# Patient Record
Sex: MALE | Race: WHITE | NOT HISPANIC OR LATINO | Employment: OTHER | URBAN - METROPOLITAN AREA
[De-identification: names, ages, dates, MRNs, and addresses within clinical notes are randomized per-mention and may not be internally consistent; named-entity substitution may affect disease eponyms.]

---

## 2017-12-08 ENCOUNTER — TELEPHONE (OUTPATIENT)
Dept: ORTHOPEDICS | Facility: CLINIC | Age: 72
End: 2017-12-08

## 2017-12-08 DIAGNOSIS — M40.12 OTHER SECONDARY KYPHOSIS, CERVICAL REGION: ICD-10-CM

## 2017-12-08 DIAGNOSIS — M47.12 CERVICAL SPONDYLOSIS WITH MYELOPATHY: Primary | ICD-10-CM

## 2017-12-08 DIAGNOSIS — G95.89 MYELOMALACIA OF CERVICAL CORD (H): ICD-10-CM

## 2017-12-08 NOTE — TELEPHONE ENCOUNTER
[facility].  C-spine MRI done 11/5/17.    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  WOF signs of worsening myelopathy.  Recommend surgery; at present given stable symptoms, this is elective in nature.  If will have surgery, likely AP fusion C3-T1.    Patient applying for non-[facility] Care ([employer]) approval to come to [facility].  Once approved, will schedule into [facility].  - with EOS full spine ap-lat and cervical flex-ext.

## 2017-12-11 ENCOUNTER — MEDICAL CORRESPONDENCE (OUTPATIENT)
Dept: HEALTH INFORMATION MANAGEMENT | Facility: CLINIC | Age: 72
End: 2017-12-11

## 2018-01-19 NOTE — TELEPHONE ENCOUNTER
APPT INFO    Date /Time: 3/1/18   Reason for Appt: Advanced Multilevel Spondylosis/Stenosis/Kyphosis   Ref Provider/Clinic: Dr Smalls, [facility]   Are there internal records? Yes/No?  IF YES, list clinic names: No   Are there outside records? Yes/No? Yes  See Above   Patient Contact (Y/N) & Call Details: No referred   Action: Sent records/imaging to ortho     OUTSIDE RECORDS CHECKLIST     CLINIC NAME COMMENTS REC (x) IMG (x)   [facility] Records Received From:     Date/Exam/Location  (specify location if different)   Office Notes: 12/4/17, 10/6/17    x x           Received Imaging From: [facility]    Image Type (x): Disc:_1__  Pacs:___      Exam Date/Name: 10/6/17 xray C Spine  11/5/17 MR C Spine Comments: Sent to Ortho

## 2018-02-22 DIAGNOSIS — M47.819 MULTILEVEL SPONDYLOSIS: ICD-10-CM

## 2018-02-22 DIAGNOSIS — M54.2 NECK PAIN: Primary | ICD-10-CM

## 2018-03-01 ENCOUNTER — OFFICE VISIT (OUTPATIENT)
Dept: ORTHOPEDICS | Facility: CLINIC | Age: 73
End: 2018-03-01
Payer: COMMERCIAL

## 2018-03-01 ENCOUNTER — RADIANT APPOINTMENT (OUTPATIENT)
Dept: CT IMAGING | Facility: CLINIC | Age: 73
End: 2018-03-01
Attending: ORTHOPAEDIC SURGERY
Payer: COMMERCIAL

## 2018-03-01 ENCOUNTER — RADIANT APPOINTMENT (OUTPATIENT)
Dept: GENERAL RADIOLOGY | Facility: CLINIC | Age: 73
End: 2018-03-01
Attending: ORTHOPAEDIC SURGERY
Payer: COMMERCIAL

## 2018-03-01 ENCOUNTER — PRE VISIT (OUTPATIENT)
Dept: ORTHOPEDICS | Facility: CLINIC | Age: 73
End: 2018-03-01

## 2018-03-01 VITALS — HEIGHT: 67 IN | BODY MASS INDEX: 22.6 KG/M2 | WEIGHT: 144 LBS | RESPIRATION RATE: 16 BRPM

## 2018-03-01 DIAGNOSIS — M47.12 CERVICAL SPONDYLOSIS WITH MYELOPATHY: Primary | ICD-10-CM

## 2018-03-01 DIAGNOSIS — M47.819 MULTILEVEL SPONDYLOSIS: ICD-10-CM

## 2018-03-01 DIAGNOSIS — M40.12 OTHER SECONDARY KYPHOSIS, CERVICAL REGION: ICD-10-CM

## 2018-03-01 DIAGNOSIS — M47.12 CERVICAL SPONDYLOSIS WITH MYELOPATHY: ICD-10-CM

## 2018-03-01 RX ORDER — AMLODIPINE BESYLATE 10 MG/1
10 TABLET ORAL DAILY
Status: ON HOLD | COMMUNITY
End: 2018-06-05

## 2018-03-01 RX ORDER — TACROLIMUS 1 MG/1
1 CAPSULE, GELATIN COATED ORAL 2 TIMES DAILY
Status: ON HOLD | COMMUNITY
End: 2018-06-01

## 2018-03-01 RX ORDER — MYCOPHENOLIC ACID 360 MG/1
720 TABLET, DELAYED RELEASE ORAL 2 TIMES DAILY
COMMUNITY

## 2018-03-01 ASSESSMENT — ENCOUNTER SYMPTOMS
RECTAL PAIN: 0
CHILLS: 0
FEVER: 0
VOMITING: 0
JAUNDICE: 0
FATIGUE: 0
EYE PAIN: 0
DIARRHEA: 1
EYE IRRITATION: 0
ABDOMINAL PAIN: 0
HEARTBURN: 1
HALLUCINATIONS: 0
NAUSEA: 0
ALTERED TEMPERATURE REGULATION: 1
EYE WATERING: 0
WEIGHT GAIN: 0
INCREASED ENERGY: 1
POLYPHAGIA: 0
DECREASED APPETITE: 1
EYE REDNESS: 0
NIGHT SWEATS: 0
DOUBLE VISION: 0
BLOATING: 0
WEIGHT LOSS: 1
BOWEL INCONTINENCE: 0
POLYDIPSIA: 0
BLOOD IN STOOL: 0
CONSTIPATION: 0
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
## 2018-03-01 NOTE — LETTER
"3/1/2018       RE: Familia Irving  [address]  [place] [address]     Dear Colleague,    Thank you for referring your patient, Familia Irving, to the [facility] at [facility]. Please see a copy of my visit note below.    REASON FOR CONSULTATION: Cervical stenosis with myelopathy     REFERRING PHYSICIAN: Raphael Smalls ([facility])  PCP:  Suki Goldberg - [facility]    History of Present Illness:  72/m, RHD, accompanied by son, referred from [facility] for treatment of cervical stenosis with myelopathy.    At [facility], patient seen [facility] 3/3/17 for neck pain; ordered MRI.  Again seen 10/6/17, still no MRI done (> 6 mos later).      Also with LBP but immensely helped by L4-5 IL NOELLE x 2 (last one 7/19/17).  Thinking of getting another.    Also with R knee pain; improved with R knee steroid injection ([facility], [facility] LEARY).    At 10/2017 visit, reported worsened neck sxs, mainly axial neck pain.  Was in ER 8/7/17 for very bad pain; given oxycodone, sent home. Since then, only taking tylenol.    Neck 95%, Arm 5%,  Left > Right  Left hand goes numb when driving; improves when he takes L hand off steering wheel.    MRI done 11/5/17.  I reviewed and tried to call patient 11/24/17.  \"(+) advanced multielvel spondylosis, stenosis , and kyphosis; worst at C4-5, with corresponding flattening of spinal cord and cord signal change (myelomalacia) indication spinal cord bruising.    I tried calling patient through all 3 phone numbers listed; left M on each.  Patient's reported sxs of neck pain and L arm/hand going numb is likely related to MRI findings.  Because of severity of stenosis and presence of meylomalacia patient is at incrased risk of progressive worsening (balance problems, loss of fine motor skills) and possible psinal cord injury (eg cenral cord syndrome; quadriplegia) with relatively trivial neck injuries " "or with whiplash type injury mechanism  Consider surgery (multilevel ap fusion) ~C3-T1 with intraop spinal cord monitoring.  Smaller surgery (eg C4-5 only) may be considered but may be inadequate and would have high lieklihood of needing further surgery.  Also, not sure how much is contributed by stenosis with HNP at C6-7; this may also progress in future.  Continued nonop may be an option, but patient will need to be thoroughly counselled re close monitoring of sxs that may suggest worsening myelopathy (e.g balance problems, loss of dexterity / clumsiness, bowel/bladder control problems).\"    I talked to him 12/8/17:  \"Nack pain slightly better than it was last Oct.  Does not know why, but still there, an still bothersome.  L arm nubnness/tingling still there.    Re myelopathic sxs:  Balance not great but ok.  Not necessarily gettingg worse.  Bowel/bladder function ok.  (+) clumsiness L hand - dropping things frequently, cannot  very well, cannot use small tools (e.g. Screwdriver).  Seems ok with R hand.  (+) difficulty with buttons - this is new (last 6 mos).  Discussed MRI results.  Explained his c-spien condition.  Given that he currently is not debilitated, is able to walk ok - surgery is elective and not urgent.  However, given severity of imaging findings, presence of myelomalacia and risk of profound neurologic deficit (ie quadriplegia) even wityh relatively trvial trauma, I counseled him to consider elective surgical treatment.  Advised to watch out for symptoms of worsening myelopathy (gait ibalance, clumsiness, b/b control problems, LE weakness).  Would liek to proceed with non-[facility] care at [facility].\"    I reviewed the above with the patient.  He corroborated what was written.  Symptoms essentially unchanged since Dec; no major worsening.  Still with neck and L arm sxs.      Oswestry (SRIKANTH) Questionnaire    OSWESTRY DISABILITY INDEX 3/1/2018   Count 10   Sum 16   Oswestry Score (%) 32 " "  Some recent data might be hidden        Neck Disability Index (NDI) Questionnaire    No flowsheet data found.       PROMIS-10 Scores  Global Mental Health Score: (P) 14  Global Physical Health Score: (P) 12  PROMIS TOTAL - SUBSCORES: (P) 26    ROS: A 12-point review of systems was completed and is negative except for otherwise noted above in the history of present illness.    Med Hx:  No past medical history on file.   [facility]: (+) Type 2 DM, s/p kidney transplant, obesity, hypertension, Vit D deficiency, BPH, tinnitus, diabetic retinopathy, central retinal vein occllusion, chronic renal failure, cystoid macular degeneration    Surg Hx:  No past surgical history on file.    Allergies:  Allergies   Allergen Reactions     Heparin        Meds:  Current Outpatient Prescriptions   Medication     ASPIRIN PO     MYFORTIC (BRAND) 360 MG EC TABLET     PROGRAF (BRAND) 1 MG CAPSULE     LISINOPRIL PO     PRAVASTATIN SODIUM PO     amLODIPine (NORVASC) 10 MG tablet     FUROSEMIDE PO     FOLIC ACID PO     calcium-vitamin D (CALTRATE) 600-400 MG-UNIT per tablet     insulin glargine (LANTUS) 100 UNIT/ML injection     No current facility-administered medications for this visit.        Fam Hx:  No family history on file.    P/S Hx:  Social History   Substance Use Topics     Smoking status: Not on file     Smokeless tobacco: Not on file     Alcohol use Not on file     (-) smoking / EtOH      Physical Exam:  Very pleasant, healthy appearing, alert, oriented x 3, cooperative.  Normal mood and affect.  Not in cardiorespiratory distress.  Resp 16  Ht 1.702 m (5' 7\")  Wt 65.3 kg (144 lb)  BMI 22.55 kg/m2  Able to stand on own, slightly hunched forward.  Slow gait with wide based gait.  Cannot tandem gait.      Neck: normal neck posture, able to maintain horizontal gaze.  No deformity, no skin lesions or surgical scars.  Localizes pain at posterior axial neck.  Limited neck ROM, ximena extension.  (+) neck pain on bilat " Spurling's.    Neuro Exam:  Motor: good strength for all muscle groups in both UE's.  Sensory:  Intact to light touch in both UE's.   Reflexes:      Biceps 2+ bilat.      Brachioradialis 2+ bilat.      Triceps 3+ bilat.    (+) Hester's bilat.        Imaging:   EOS full spine ap-lat today shows multilevel advanced cervical spondylosis with kyphosis.    MRI reviewed; findings as above.    After visit, had cervical CT, which also showed the multilevel spondylosis with loss of lordosis.    Impression:   - Cervical spondylotic myelopathy, progressive  - Severe spinal stenosis with myelomalacia  - Cervical kyphosis  - Lumbar stenosis, improved with L4-5 IL NOELLE  - R knee DJD, improved with intra-articular steroid injection    Plan:   Had long discussion with patient and son.  Explained his neck condition, likely natural history, risk of quariplegia.  Discussed nonop (observation) and surgical options.  Recommend AP fusion C3-T1/T2.  Goal is to decompress the spinal cord, stbilize the spine, and correct the deformity (restor lordosis).  Discussed rationale, risks, benfits, and alternatives.  Discussed bone graft options, agreed to ICBG harvest.    Surgical request placed:  - Part 1 (supine): ACDF C3-4,C4-5,C5-6,C6-7, poss C7-T1; Anterior ICBG harvest   - Part 2 (prone): PISF C3-T1/T2  SPinal cord monitoring, Patrick Ibarra bivector traction, TXA, cellsaver, intrawound vanco powder, computer navigation.    PAC referral placed.    Will likely need postop rehab/TCU stay (lives alone).  [facility].    Re getting another L4-5 IL NOELLE, I advised [facility].  Re getting another R knee injection, I advised that he [facility].    All questions and concerns were answered to the patient's apparent satisfaction before leaving the clinic.     Total visit time > 45 mins, > 50% counseling and coordination of care.      Again, thank you for allowing me to participate in the  care of your patient.      Sincerely,    Brijesh Bass MD

## 2018-03-01 NOTE — MR AVS SNAPSHOT
After Visit Summary   3/1/2018    Familia Irving    MRN: [id number]           Patient Information     Date Of Birth          1945        Visit Information        Provider Department      3/1/2018 11:00 AM Brijesh Bass MD [facility]        Today's Diagnoses     Cervical spondylosis with myelopathy    -  1    Other secondary kyphosis, cervical region          Care Instructions    Please get your CT done before you leave today.           Follow-ups after your visit        Additional Services     PAC Visit Referral (For [facility] Only)       Does this visit require an Anesthesia consult?    Will have multilevel anterior-posterior fusion C3-T2.      H&P done by:  N/A and PAC      Please be aware that coverage of these services is subject to the terms and limitations of your health insurance plan.  Call member services at your health plan with any benefit or coverage questions.      Please bring the following to your appointment:  >>   Any x-rays, CTs or MRIs which have been performed.  Contact the facility where they were done to arrange for  prior to your scheduled appointment.  Any new CT, MRI or other procedures ordered by your specialist must be performed at a [facility] or coordinated by your clinic's referral office.    >>   List of current medications  >>   This referral request   >>   Any documents/labs given to you for this referral                  Who to contact     Please call your clinic at [phone] to:    Ask questions about your health    Make or cancel appointments    Discuss your medicines    Learn about your test results    Speak to your doctor            Additional Information About Your Visit        MyChart Information     [employer] gives you secure access to your electronic health record. If you see a primary care provider, you can also send messages to your care team and make appointments. If you have questions, please call your primary  "care clinic.  If you do not have a primary care provider, please call [phone] and they will assist you.      [employer] is an electronic gateway that provides easy, online access to your medical records. With [employer], you can request a clinic appointment, read your test results, renew a prescription or communicate with your care team.     To access your existing account, please contact your [facility] or call [phone] for assistance.        Care EveryWhere ID     This is your Care EveryWhere ID. This could be used by other organizations to access your [place] medical records  [id number]        Your Vitals Were     Respirations Height BMI (Body Mass Index)             16 1.702 m (5' 7\") 22.55 kg/m2          Blood Pressure from Last 3 Encounters:   No data found for BP    Weight from Last 3 Encounters:   03/01/18 65.3 kg (144 lb)              We Performed the Following     PAC Visit Referral (For [facility] Only)     Maggie-Operative Worksheet        Primary Care Provider    None Specified       No primary provider on file.        Equal Access to Services     ARSENIO CHANG : Hadii briana ku hadasho Somarichuyali, waaxda luqadaha, qaybta kaalmada adeegyada, paulino parks . So [facility] [phone].    ATENCIÓN: Si habla español, tiene a moser disposición servicios gratuitos de asistencia lingüística. Llame al [phone].    We comply with applicable federal civil rights laws and [place] laws. We do not discriminate on the basis of race, color, national origin, age, disability, sex, sexual orientation, or gender identity.            Thank you!     Thank you for choosing [facility]  for your care. Our goal is always to provide you with excellent care. Hearing back from our patients is one way we can continue to improve our services. Please take a few minutes to complete the written survey that you may receive in the mail after your visit with us. Thank " you!             Your Updated Medication List - Protect others around you: Learn how to safely use, store and throw away your medicines at www.disposemymeds.org.          This list is accurate as of 3/1/18 11:59 PM.  Always use your most recent med list.                   Brand Name Dispense Instructions for use Diagnosis    amLODIPine 10 MG tablet    NORVASC     Take 10 mg by mouth daily        ASPIRIN PO      Take 81 mg by mouth daily        calcium-vitamin D 600-400 MG-UNIT per tablet    CALTRATE     Take 1 tablet by mouth 2 times daily        FOLIC ACID PO      Take 1 mg by mouth daily        FUROSEMIDE PO      Take 20 mg by mouth daily        insulin glargine 100 UNIT/ML injection    LANTUS     Inject 30 Units Subcutaneous At Bedtime        LISINOPRIL PO      Take 40 mg by mouth daily        mycophenolic acid 360 MG EC tablet      Take 360 mg by mouth 2 times daily        PRAVASTATIN SODIUM PO      Take 40 mg by mouth daily        tacrolimus 1 MG capsule      Take 1 mg by mouth 2 times daily

## 2018-03-03 NOTE — PROGRESS NOTES
"REASON FOR CONSULTATION: Cervical stenosis with myelopathy     REFERRING PHYSICIAN: Raphael Smalls ([facility])  PCP[facility]  [facility]    History of Present Illness:  72/m, RHD, accompanied by son, referred from [facility] for treatment of cervical stenosis with myelopathy.    At [facility], [facility] 3[facility]17 for neck pain; ordered MRI.  Again seen 10/6/17, still no MRI done (> 6 mos later).      Also with LBP but immensely helped by L4-5 IL NOELLE x 2 (last one 7/19/17).  Thinking of getting another.    Also with R knee pain; improved with R knee steroid injection ([facility]/21/[facility], [facility] LEARY).    At 10/2017 visit, reported worsened neck sxs, mainly axial neck pain.  Was in ER 8/7/17 for very bad pain; given oxycodone, sent home. Since then, only taking tylenol.    Neck 95%, Arm 5%,  Left > Right  Left hand goes numb when driving; improves when he takes L hand off steering wheel.    MRI done 11/5/17.  I reviewed and tried to call patient 11/24/17.  \"(+) advanced multielvel spondylosis, stenosis , and kyphosis; worst at C4-5, with corresponding flattening of spinal cord and cord signal change (myelomalacia) indication spinal cord bruising.    I tried calling patient through all 3 phone numbers listed; left M on each.  Patient's reported sxs of neck pain and L arm/hand going numb is likely related to MRI findings.  Because of severity of stenosis and presence of meylomalacia patient is at incrased risk of progressive worsening (balance problems, loss of fine motor skills) and possible psinal cord injury (eg cenral cord syndrome; quadriplegia) with relatively trivial neck injuries or with whiplash type injury mechanism  Consider surgery (multilevel ap fusion) ~C3-T1 with intraop spinal cord monitoring.  Smaller surgery (eg C4-5 only) may be considered but may be inadequate and would have high lieklihood of needing further surgery.  Also, not sure how much is " "contributed by stenosis with HNP at C6-7; this may also progress in future.  Continued nonop may be an option, but patient will need to be thoroughly counselled re close monitoring of sxs that may suggest worsening myelopathy (e.g balance problems, loss of dexterity / clumsiness, bowel/bladder control problems).\"    I talked to him 12/8/17:  \"Nack pain slightly better than it was last Oct.  Does not know why, but still there, an still bothersome.  L arm nubnness/tingling still there.    Re myelopathic sxs:  Balance not great but ok.  Not necessarily gettingg worse.  Bowel/bladder function ok.  (+) clumsiness L hand - dropping things frequently, cannot  very well, cannot use small tools (e.g. Screwdriver).  Seems ok with R hand.  (+) difficulty with buttons - this is new (last 6 mos).  Discussed MRI results.  Explained his c-spien condition.  Given that he currently is not debilitated, is able to walk ok - surgery is elective and not urgent.  However, given severity of imaging findings, presence of myelomalacia and risk of profound neurologic deficit (ie quadriplegia) even wityh relatively trvial trauma, I counseled him to consider elective surgical treatment.  Advised to watch out for symptoms of worsening myelopathy (gait ibalance, clumsiness, b/b control problems, LE weakness).  Would liek to proceed with non-[facility] care at [facility].\"    I reviewed the above with the patient.  He corroborated what was written.  Symptoms essentially unchanged since Dec; no major worsening.  Still with neck and L arm sxs.      Oswestry (SRIKANTH) Questionnaire    OSWESTRY DISABILITY INDEX 3/1/2018   Count 10   Sum 16   Oswestry Score (%) 32   Some recent data might be hidden        Neck Disability Index (NDI) Questionnaire    No flowsheet data found.       PROMIS-10 Scores  Global Mental Health Score: (P) 14  Global Physical Health Score: (P) 12  PROMIS TOTAL - SUBSCORES: (P) 26    ROS: A 12-point review of systems was " "completed and is negative except for otherwise noted above in the history of present illness.    Med Hx:  No past medical history on file.   [facility]: (+) Type 2 DM, s/p kidney transplant, obesity, hypertension, Vit D deficiency, BPH, tinnitus, diabetic retinopathy, central retinal vein occllusion, chronic renal failure, cystoid macular degeneration    Surg Hx:  No past surgical history on file.    Allergies:  Allergies   Allergen Reactions     Heparin        Meds:  Current Outpatient Prescriptions   Medication     ASPIRIN PO     MYFORTIC (BRAND) 360 MG EC TABLET     PROGRAF (BRAND) 1 MG CAPSULE     LISINOPRIL PO     PRAVASTATIN SODIUM PO     amLODIPine (NORVASC) 10 MG tablet     FUROSEMIDE PO     FOLIC ACID PO     calcium-vitamin D (CALTRATE) 600-400 MG-UNIT per tablet     insulin glargine (LANTUS) 100 UNIT/ML injection     No current facility-administered medications for this visit.        Fam Hx:  No family history on file.    P/S Hx:  Social History   Substance Use Topics     Smoking status: Not on file     Smokeless tobacco: Not on file     Alcohol use Not on file     (-) smoking / EtOH      Physical Exam:  Very pleasant, healthy appearing, alert, oriented x 3, cooperative.  Normal mood and affect.  Not in cardiorespiratory distress.  Resp 16  Ht 1.702 m (5' 7\")  Wt 65.3 kg (144 lb)  BMI 22.55 kg/m2  Able to stand on own, slightly hunched forward.  Slow gait with wide based gait.  Cannot tandem gait.      Neck: normal neck posture, able to maintain horizontal gaze.  No deformity, no skin lesions or surgical scars.  Localizes pain at posterior axial neck.  Limited neck ROM, ximena extension.  (+) neck pain on bilat Spurling's.    Neuro Exam:  Motor: good strength for all muscle groups in both UE's.  Sensory:  Intact to light touch in both UE's.   Reflexes:      Biceps 2+ bilat.      Brachioradialis 2+ bilat.      Triceps 3+ bilat.    (+) Hester's bilat.        Imaging:   EOS full spine ap-lat today shows " multilevel advanced cervical spondylosis with kyphosis.    MRI reviewed; findings as above.    After visit, had cervical CT, which also showed the multilevel spondylosis with loss of lordosis.    Impression:   - Cervical spondylotic myelopathy, progressive  - Severe spinal stenosis with myelomalacia  - Cervical kyphosis  - Lumbar stenosis, improved with L4-5 IL NOELLE  - R knee DJD, improved with intra-articular steroid injection    Plan:   Had long discussion with patient and son.  Explained his neck condition, likely natural history, risk of quariplegia.  Discussed nonop (observation) and surgical options.  Recommend AP fusion C3-T1/T2.  Goal is to decompress the spinal cord, stbilize the spine, and correct the deformity (restor lordosis).  Discussed rationale, risks, benfits, and alternatives.  Discussed bone graft options, agreed to ICBG harvest.    Surgical request placed:  - Part 1 (supine): ACDF C3-4,C4-5,C5-6,C6-7, poss C7-T1; Anterior ICBG harvest   - Part 2 (prone): PISF C3-T1/T2  SPinal cord monitoring, Patrick Ibarra bivector traction, TXA, cellsaver, intrawound vanco powder, computer navigation.    PAC referral placed.    Will likely need postop rehab/TCU stay (lives alone).  [facility] may [facility] an [facility].    Re getting another L4-5 IL NOELLE, I advised [facility].  Re getting another R knee injection, I advised that he [facility].    All questions and concerns were answered to the patient's apparent satisfaction before leaving the clinic.     Total visit time > 45 mins, > 50% counseling and coordination of care.    Respectfully,    Brijesh Bass MD    Orthopaedic Spine Surgery  Dept Orthopaedic Surgery, [facility], located within [facility]  [phone] office, [phone] pager  [email or web address]    Answers for HPI/ROS submitted by the patient on 3/1/2018   General Symptoms: Yes  Skin Symptoms: No  HENT Symptoms: No  EYE SYMPTOMS: Yes  HEART  SYMPTOMS: No  LUNG SYMPTOMS: No  INTESTINAL SYMPTOMS: Yes  URINARY SYMPTOMS: No  REPRODUCTIVE SYMPTOMS: No  SKELETAL SYMPTOMS: No  BLOOD SYMPTOMS: No  NERVOUS SYSTEM SYMPTOMS: No  MENTAL HEALTH SYMPTOMS: No  Fever: No  Loss of appetite: Yes  Weight loss: Yes  Weight gain: No  Fatigue: No  Night sweats: No  Chills: No  Increased stress: No  Excessive hunger: No  Excessive thirst: No  Feeling hot or cold when others believe the temperature is normal: Yes  Loss of height: Yes  Post-operative complications: No  Surgical site pain: No  Hallucinations: No  Change in or Loss of Energy: Yes  Hyperactivity: No  Confusion: No  Eye pain: No  Vision loss: Yes  Dry eyes: Yes  Watery eyes: No  Eye bulging: No  Double vision: No  Flashing of lights: No  Spots: No  Floaters: No  Redness: No  Crossed eyes: No  Tunnel Vision: No  Yellowing of eyes: No  Eye irritation: No  Heart burn or indigestion: Yes  Nausea: No  Vomiting: No  Abdominal pain: No  Bloating: No  Constipation: No  Diarrhea: Yes  Blood in stool: No  Black stools: No  Rectal or Anal pain: No  Fecal incontinence: No  Yellowing of skin or eyes: No  Vomit with blood: No  Change in stools: No

## 2018-03-09 ENCOUNTER — TELEPHONE (OUTPATIENT)
Dept: ORTHOPEDICS | Facility: CLINIC | Age: 73
End: 2018-03-09

## 2018-04-18 ENCOUNTER — OFFICE VISIT (OUTPATIENT)
Dept: SURGERY | Facility: CLINIC | Age: 73
End: 2018-04-18
Payer: COMMERCIAL

## 2018-04-18 ENCOUNTER — MEDICAL CORRESPONDENCE (OUTPATIENT)
Dept: HEALTH INFORMATION MANAGEMENT | Facility: CLINIC | Age: 73
End: 2018-04-18

## 2018-04-18 ENCOUNTER — ALLIED HEALTH/NURSE VISIT (OUTPATIENT)
Dept: SURGERY | Facility: CLINIC | Age: 73
End: 2018-04-18
Payer: COMMERCIAL

## 2018-04-18 ENCOUNTER — ANESTHESIA EVENT (OUTPATIENT)
Dept: SURGERY | Facility: CLINIC | Age: 73
DRG: 454 | End: 2018-04-18
Payer: COMMERCIAL

## 2018-04-18 ENCOUNTER — TRANSFERRED RECORDS (OUTPATIENT)
Dept: HEALTH INFORMATION MANAGEMENT | Facility: CLINIC | Age: 73
End: 2018-04-18

## 2018-04-18 VITALS
OXYGEN SATURATION: 100 % | HEIGHT: 67 IN | SYSTOLIC BLOOD PRESSURE: 111 MMHG | RESPIRATION RATE: 18 BRPM | WEIGHT: 134.8 LBS | TEMPERATURE: 97.4 F | DIASTOLIC BLOOD PRESSURE: 57 MMHG | BODY MASS INDEX: 21.16 KG/M2 | HEART RATE: 72 BPM

## 2018-04-18 DIAGNOSIS — R06.09 DOE (DYSPNEA ON EXERTION): ICD-10-CM

## 2018-04-18 DIAGNOSIS — M48.02 CERVICAL STENOSIS OF SPINAL CANAL: ICD-10-CM

## 2018-04-18 DIAGNOSIS — Z01.818 PREOP EXAMINATION: Primary | ICD-10-CM

## 2018-04-18 DIAGNOSIS — Z01.818 PRE-OP EXAMINATION: Primary | ICD-10-CM

## 2018-04-18 LAB
ALBUMIN UR-MCNC: NEGATIVE MG/DL
ANION GAP SERPL CALCULATED.3IONS-SCNC: 16 MMOL/L (ref 3–14)
APPEARANCE UR: ABNORMAL
BILIRUB UR QL STRIP: NEGATIVE
BUN SERPL-MCNC: 83 MG/DL (ref 7–30)
CALCIUM SERPL-MCNC: 8.6 MG/DL (ref 8.5–10.1)
CHLORIDE SERPL-SCNC: 118 MMOL/L (ref 94–109)
CO2 SERPL-SCNC: 8 MMOL/L (ref 20–32)
COLOR UR AUTO: YELLOW
CREAT SERPL-MCNC: 3.64 MG/DL (ref 0.66–1.25)
ERYTHROCYTE [DISTWIDTH] IN BLOOD BY AUTOMATED COUNT: 13.4 % (ref 10–15)
GFR SERPL CREATININE-BSD FRML MDRD: 17 ML/MIN/1.7M2
GLUCOSE SERPL-MCNC: 102 MG/DL (ref 70–99)
GLUCOSE UR STRIP-MCNC: NEGATIVE MG/DL
HBA1C MFR BLD: 7.8 % (ref 0–6.4)
HCT VFR BLD AUTO: 30.3 % (ref 40–53)
HGB BLD-MCNC: 9.7 G/DL (ref 13.3–17.7)
HGB UR QL STRIP: NEGATIVE
HYALINE CASTS #/AREA URNS LPF: 3 /LPF (ref 0–2)
INR PPP: 1.24 (ref 0.86–1.14)
KETONES UR STRIP-MCNC: NEGATIVE MG/DL
LEUKOCYTE ESTERASE UR QL STRIP: NEGATIVE
MCH RBC QN AUTO: 30.1 PG (ref 26.5–33)
MCHC RBC AUTO-ENTMCNC: 32 G/DL (ref 31.5–36.5)
MCV RBC AUTO: 94 FL (ref 78–100)
MUCOUS THREADS #/AREA URNS LPF: PRESENT /LPF
NITRATE UR QL: NEGATIVE
PH UR STRIP: 5 PH (ref 5–7)
PLATELET # BLD AUTO: 118 10E9/L (ref 150–450)
POTASSIUM SERPL-SCNC: 4.1 MMOL/L (ref 3.4–5.3)
RBC # BLD AUTO: 3.22 10E12/L (ref 4.4–5.9)
RBC #/AREA URNS AUTO: 1 /HPF (ref 0–2)
SODIUM SERPL-SCNC: 142 MMOL/L (ref 133–144)
SOURCE: ABNORMAL
SP GR UR STRIP: 1.01 (ref 1–1.03)
SQUAMOUS #/AREA URNS AUTO: <1 /HPF (ref 0–1)
UROBILINOGEN UR STRIP-MCNC: 0 MG/DL (ref 0–2)
WBC # BLD AUTO: 10 10E9/L (ref 4–11)
WBC #/AREA URNS AUTO: 3 /HPF (ref 0–5)

## 2018-04-18 RX ORDER — LOPERAMIDE HCL 2 MG
2 CAPSULE ORAL PRN
Status: ON HOLD | COMMUNITY
End: 2018-06-05

## 2018-04-18 ASSESSMENT — LIFESTYLE VARIABLES: TOBACCO_USE: 0

## 2018-04-18 NOTE — PATIENT INSTRUCTIONS
Preparing for Your Surgery      Name:  Familia Irving   MRN:  [id number]   :  1945   Today's Date:  2018     Arriving for surgery:  Surgery date:  18  Arrival time:  05:30 a.m.  Please come to:     [facility] [address]  [address] [place]  [address]    - parking is available in front of [facility] from 5:15AM to 8:00PM. If you prefer, park your car in the Green Lot.    -Proceed to the 3rd floor, check in at the Adult Surgery Waiting Lounge. [phone]    If an escort is needed stop at the Information Desk in the lobby. Inform the information person that you are here for surgery. An escort to the Adult Surgery Waiting Lounge will be provided.        What can I eat or drink?  -  You may have solid food or milk products until 8 hours prior to your surgery 11:00 p.m.  -  You may have water, apple juice or 7up/Sprite until 2 hours prior to your surgery 05:30 a.m.    Which medicines can I take? (Please hold aspirin and vitamins 7 days prior to procedure)  -  Do NOT take these medications in the morning, the day of surgery:     Lisinopril, furosemide    -  Please take these medications the day of surgery: (PLEASE REDUCE INSULIN GLARGINE LANTUS FROM 30 UNITS      TO 24 UNITS MORNING OF SURGERY)     Myfortic, Prograf, Amlodipine    How do I prepare myself?  -  Take two showers: one the night before surgery; and one the morning of surgery.         Use Scrubcare or Hibiclens to wash from neck down.  You may use your own shampoo and conditioner. No other hair products.   -  Do NOT use lotion, powder, deodorant, or antiperspirant the day of your surgery.  -  Do NOT wear any makeup, fingernail polish or jewelry.  -Do not bring your own medications to the hospital, except for inhalers and eye drops.  -  Bring your ID and insurance card.    Questions or Concerns:  If you have questions or concerns regarding the day of surgery, please call the  Preoperative Assessment Center (PAC), Monday-Friday 7AM-7PM:  [phone].  After surgery please call your surgeons office.     AFTER YOUR SURGERY  Breathing exercises   Breathing exercises help you recover faster. Take deep breaths and let the air out slowly. This will:     Help you wake up after surgery.    Help prevent complications like pneumonia.  Preventing complications will help you go home sooner.   We may give you a breathing device (incentive spirometer) to encourage you to breathe deeply.   Nausea and vomiting   You may feel sick to your stomach after surgery; if so, let your nurse know.    Pain control:  After surgery, you may have pain. Our goal is to help you manage your pain. Pain medicine will help you feel comfortable enough to do activities that will help you heal.  These activities may include breathing exercises, walking and physical therapy.   To help your health care team treat your pain we will ask: 1) If you have pain  2) where it is located 3) describe your pain in your words  Methods of pain control include medications given by mouth, vein or by nerve block for some surgeries.  We may give you a pain control pump that will:  1) Deliver the medicine through a tube placed in your vein  2) Control the amount of medicine you receive  3) Allow you to push a button to deliver a dose of pain medicine  Sequential Compression Device (SCD) or Pneumo Boots:  You may need to wear SCD S on your legs or feet. These are wraps connected to a machine that pumps in air and releases it. The repeated pumping helps prevent blood clots from forming.

## 2018-04-18 NOTE — PROGRESS NOTES
Preoperative Assessment Center medication history for April 18, 2018 is complete.    See Epic admission navigator for allergy information, pharmacy and prior to admission medications.    Operating room staff will still need to confirm medications and last dose information on day of surgery.     Medication history interview sources:  patient, patient    Changes made to PTA medication list (reason)  Added: vitamin D, immodium, mag oxide.   Deleted: calcium/vit D  Changed: updated dose on myfortic     Additional medication history information (including reliability of information, actions taken by pharmacist):  -- Takes Brand name myfortic and Prograf  -- Lantus insulin not on [facility], called patient - reports he fills at the [place] outpatient clinic and re-confirmed he is taking lantus insulin 30 units every morning.   -- No recent (within 30 days) course of antibiotics  -- Did have LESI about 2 weeks ago  -- No recent (within 30 days) chronic daily medications stopped   -- Patient declines being on any other prescription or over-the-counter medications    Prior to Admission medications    Medication Sig Last Dose Taking? Auth Provider   amLODIPine (NORVASC) 10 MG tablet Take 10 mg by mouth daily Taking Yes Reported, Patient   FOLIC ACID PO Take 1 mg by mouth daily Taking Yes Reported, Patient   FUROSEMIDE PO Take 20 mg by mouth 2 times daily  Taking Yes Reported, Patient   insulin glargine (LANTUS) 100 UNIT/ML injection Inject 30 Units Subcutaneous every morning  Taking Yes Reported, Patient   LISINOPRIL PO Take 40 mg by mouth At Bedtime  Taking Yes Reported, Patient   loperamide (IMODIUM) 2 MG capsule Take 4 mg by mouth as needed for diarrhea Taking Yes Unknown, Entered By History   MAGNESIUM OXIDE PO Take 420 mg by mouth Every Mon, Wed, Fri Morning Taking Yes Unknown, Entered By History   MYFORTIC (BRAND) 360 MG EC TABLET Take 720 mg by mouth 2 times daily  Taking Yes Reported, Patient   PRAVASTATIN SODIUM PO  Take 40 mg by mouth At Bedtime  Taking Yes Reported, Patient   PROGRAF (BRAND) 1 MG CAPSULE Take 1 mg by mouth 2 times daily Taking Yes Reported, Patient   VITAMIN D, CHOLECALCIFEROL, PO Take 400 Units by mouth daily Taking Yes Unknown, Entered By History   ASPIRIN PO Take 81 mg by mouth daily Not Taking  Reported, Patient       Medication history completed by: Pedro Pablo Stephen, [facility]

## 2018-04-18 NOTE — PLAN OF CARE
18 April 2018    STAFF NOTE: PAC  We were called from lab to report very abnormal BMP with creatine > 3.5, and CO2 = 8.  These normals are VERY inconsistent with prior labs from an outside hospital (Dec, 2017).   First thought = get labs repeated for potential artifact/ error of the lab values.   BUT, if the numbers are valid==>  1. Patient needs to be seen by Renal / transplant physician urgently!  2. This could be a number of things, including acute on chronic renal rejection, acquired RTA from either drugs or autoimmune process or even multiple myeloma.   3. The spine surgery would almost certainly need to be delayed or cancelled until there is clarification and stabilzation of the renal status.     STATUS = PENDING  --katharina cobb

## 2018-04-18 NOTE — MR AVS SNAPSHOT
After Visit Summary   2018    Familia Irving    MRN: [id number]           Patient Information     Date Of Birth          1945        Visit Information        Provider Department      2018 11:30 AM Rn, [facility]        Care Instructions    Preparing for Your Surgery      Name:  Familia Irving   MRN:  [id number]   :  1945   Today's Date:  2018     Arriving for surgery:  Surgery date:  18  Arrival time:  05:30 a.m.  Please come to:     [facility] [address]  [address] [place], [place] [address]  [address]    - parking is available in front of [facility] from 5:15AM to 8:00PM. If you prefer, park your car in the Green Lot.    -Proceed to the 3rd floor, check in at the Adult Surgery Waiting Lounge. [phone]    If an escort is needed stop at the Information Desk in the lobby. Inform the information person that you are here for surgery. An escort to the Adult Surgery Waiting Lounge will be provided.        What can I eat or drink?  -  You may have solid food or milk products until 8 hours prior to your surgery 11:00 p.m.  -  You may have water, apple juice or 7up/Sprite until 2 hours prior to your surgery 05:30 a.m.    Which medicines can I take? (Please hold aspirin and vitamins 7 days prior to procedure)  -  Do NOT take these medications in the morning, the day of surgery:     Lisinopril, furosemide    -  Please take these medications the day of surgery: (PLEASE REDUCE INSULIN GLARGINE LANTUS FROM 30 UNITS      TO 24 UNITS MORNING OF SURGERY)     Myfortic, Prograf, Amlodipine    How do I prepare myself?  -  Take two showers: one the night before surgery; and one the morning of surgery.         Use Scrubcare or Hibiclens to wash from neck down.  You may use your own shampoo and conditioner. No other hair products.   -  Do NOT use lotion, powder, deodorant, or antiperspirant the day of your  surgery.  -  Do NOT wear any makeup, fingernail polish or jewelry.  -Do not bring your own medications to the hospital, except for inhalers and eye drops.  -  Bring your ID and insurance card.    Questions or Concerns:  If you have questions or concerns regarding the day of surgery, please call the Preoperative Assessment Center (PAC), Monday-Friday 7AM-7PM:  [phone].  After surgery please call your surgeons office.     AFTER YOUR SURGERY  Breathing exercises   Breathing exercises help you recover faster. Take deep breaths and let the air out slowly. This will:     Help you wake up after surgery.    Help prevent complications like pneumonia.  Preventing complications will help you go home sooner.   We may give you a breathing device (incentive spirometer) to encourage you to breathe deeply.   Nausea and vomiting   You may feel sick to your stomach after surgery; if so, let your nurse know.    Pain control:  After surgery, you may have pain. Our goal is to help you manage your pain. Pain medicine will help you feel comfortable enough to do activities that will help you heal.  These activities may include breathing exercises, walking and physical therapy.   To help your health care team treat your pain we will ask: 1) If you have pain  2) where it is located 3) describe your pain in your words  Methods of pain control include medications given by mouth, vein or by nerve block for some surgeries.  We may give you a pain control pump that will:  1) Deliver the medicine through a tube placed in your vein  2) Control the amount of medicine you receive  3) Allow you to push a button to deliver a dose of pain medicine  Sequential Compression Device (SCD) or Pneumo Boots:  You may need to wear SCD S on your legs or feet. These are wraps connected to a machine that pumps in air and releases it. The repeated pumping helps prevent blood clots from forming.           Follow-ups after your visit        Your next 10  appointments already scheduled     May 16, 2018   Procedure with Brijesh Bass MD   [facility] [place] Same Day Surgery (--)    [address]  [place] [id number] [phone]            Jun 28, 2018 11:45 AM CDT   (Arrive by 11:15 AM)   RETURN NECK with Brijesh Bass MD   [facility] ([facility])    [address]  4th Floor  [facility] [address] [phone]              Future tests that were ordered for you today     Open Future Orders        Priority Expected Expires Ordered    Basic metabolic panel Routine 4/18/2018 5/18/2018 4/18/2018    CBC with platelets Routine 4/18/2018 5/18/2018 4/18/2018    Hemoglobin A1c Routine 4/18/2018 5/18/2018 4/18/2018    INR Routine 4/18/2018 5/18/2018 4/18/2018    UA reflex to Microscopic and Culture Routine 4/18/2018 5/18/2018 4/18/2018    ABO/Rh type and screen Routine 4/18/2018 5/18/2018 4/18/2018            Who to contact     Please call your clinic at [phone] to:    Ask questions about your health    Make or cancel appointments    Discuss your medicines    Learn about your test results    Speak to your doctor            Additional Information About Your Visit        [employer] Information     [employer] gives you secure access to your electronic health record. If you see a primary care provider, you can also send messages to your care team and make appointments. If you have questions, please call your primary care clinic.  If you do not have a primary care provider, please call [phone] and they will assist you.      [employer] is an electronic gateway that provides easy, online access to your medical records. With [employer], you can request a clinic appointment, read your test results, renew a prescription or communicate with your care team.     To access your existing account, please contact your [facility] or call [phone] for assistance.        Care  EveryWhere ID     This is your Care EveryWhere ID. This could be used by other organizations to access your [facility] medical records  [id number]         Blood Pressure from Last 3 Encounters:   04/18/18 111/57    Weight from Last 3 Encounters:   04/18/18 61.1 kg (134 lb 12.8 oz)   03/01/18 65.3 kg (144 lb)              Today, you had the following     No orders found for display       Primary Care Provider Office Phone # Fax #    [facility] [phone]       [facility]        Equal Access to Services     : Hadii aad ku hadasho Soomaali, waaxda luqadaha, qaybta kaalmada adedoniyada, paulino parks . So [facility] [phone].    ATENCIÓN: Si habla español, tiene a moser disposición servicios gratuitos de asistencia lingüística. Mickey al [phone].    We comply with applicable federal civil rights laws and [place] laws. We do not discriminate on the basis of race, color, national origin, age, disability, sex, sexual orientation, or gender identity.            Thank you!     Thank you for choosing [facility]  for your care. Our goal is always to provide you with excellent care. Hearing back from our patients is one way we can continue to improve our services. Please take a few minutes to complete the written survey that you may receive in the mail after your visit with us. Thank you!             Your Updated Medication List - Protect others around you: Learn how to safely use, store and throw away your medicines at www.disposemymeds.org.          This list is accurate as of 4/18/18 12:28 PM.  Always use your most recent med list.                   Brand Name Dispense Instructions for use Diagnosis    amLODIPine 10 MG tablet    NORVASC     Take 10 mg by mouth daily        ASPIRIN PO      Take 81 mg by mouth daily        FOLIC ACID PO      Take 1 mg by mouth daily        FUROSEMIDE PO      Take 20 mg by mouth 2 times daily         insulin glargine 100 UNIT/ML injection    LANTUS     Inject 30 Units Subcutaneous every morning        LISINOPRIL PO      Take 40 mg by mouth At Bedtime        loperamide 2 MG capsule    IMODIUM     Take 4 mg by mouth as needed for diarrhea        mycophenolic acid 360 MG EC tablet      Take 720 mg by mouth 2 times daily        PRAVASTATIN SODIUM PO      Take 40 mg by mouth At Bedtime        tacrolimus 1 MG capsule      Take 1 mg by mouth 2 times daily        VITAMIN D (CHOLECALCIFEROL) PO      Take 400 Units by mouth daily
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
## 2018-04-18 NOTE — CONSULTS
18 April 2018    STAFF NOTE: PAC addendum  We were called from lab to report very abnormal BMP with creatine > 3.5, and CO2 = 8.  These normals are VERY inconsistent with prior labs from an outside hospital (Dec, 2017).   First thought = get labs repeated for potential artifact/ error of the lab values.   BUT, if the numbers are valid==>  1. Patient needs to be seen by Renal / transplant physician urgently!  2. This could be a number of things, including acute on chronic renal rejection, acquired RTA from either drugs or autoimmune process or even multiple myeloma.   3. The spine surgery would almost certainly need to be delayed or cancelled until there is clarification and stabilzation of the renal status.     STATUS = PENDING  --katharina cobb
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
## 2018-04-18 NOTE — OR NURSING
MRAMARK Score   SNF/Home with help.  Discussed with patient and his son.  Jose Alberto lives alone.  His brother lives upstairs and would be willing to help Jose Alberto.  Dr. Bass mentions in preop note that TCU would be recommended postop.  Jose Alberto is here on his preop visit with his son Tacho.  Both agree with discussing TCU and are open to it.

## 2018-04-18 NOTE — MR AVS SNAPSHOT
After Visit Summary   4/18/2018    Familia Irving    MRN: [id number]           Patient Information     Date Of Birth          1945        Visit Information        Provider Department      4/18/2018 10:00 AM Pharmacist, Christine Easley [facility] Preoperative Assessment [place]        Today's Diagnoses     Preop examination    -  1       Follow-ups after your visit        Your next 10 appointments already scheduled     Apr 18, 2018 11:30 AM CDT   (Arrive by 11:15 AM)   PAC RN ASSESSMENT with Christine Pac Rn   [facility] Preoperative Assessment Center ([facility])    [address] [place] [id number] [phone]            Apr 18, 2018 12:00 PM CDT   (Arrive by 11:45 AM)   PAC Anesthesia Consult with Christine Pac Anesthesiologist   [facility] Preoperative Assessment [place] ([facility])    [address] [place] [id number] [phone]            Apr 18, 2018 12:15 PM CDT   LAB with CHRISTINE LAB   [facility] Lab ([facility])    [address] [place] [id number] [phone]           Please do not eat 10-12 hours before your appointment if you are coming in fasting for labs on lipids, cholesterol, or glucose (sugar). This does not apply to pregnant women. Water, hot tea and black coffee (with nothing added) are okay. Do not drink other fluids, diet soda or chew gum.            May 16, 2018   Procedure with Brijesh Bass MD   [facility], [place], Same Day Surgery (--)    [address] [id number] [phone]            Jun 28, 2018 11:45 AM CDT   (Arrive by 11:15 AM)   RETURN NECK with Brijesh Bass MD   [facility] Orthopaedic Clinic ([facility])    [address] [place] [id number] [phone]              Who to contact     Please call your clinic at [phone] to:    Ask questions about your  health    Make or cancel appointments    Discuss your medicines    Learn about your test results    Speak to your doctor            Additional Information About Your Visit        [employer] Information     [employer] gives you secure access to your electronic health record. If you see a primary care provider, you can also send messages to your care team and make appointments. If you have questions, please call your primary care clinic.  If you do not have a primary care provider, please call [phone] and they will assist you.      [employer] is an electronic gateway that provides easy, online access to your medical records. With [employer], you can request a clinic appointment, read your test results, renew a prescription or communicate with your care team.     To access your existing account, please contact your [facility] or call [phone] for assistance.        Care EveryWhere ID     This is your Care EveryWhere ID. This could be used by other organizations to access your [place] medical records  [id number]         Blood Pressure from Last 3 Encounters:   04/18/18 111/57    Weight from Last 3 Encounters:   04/18/18 61.1 kg (134 lb 12.8 oz)   03/01/18 65.3 kg (144 lb)              Today, you had the following     No orders found for display       Primary Care Provider Office Phone # Fax #    [facility] [phone]       [facility]        Equal Access to Services     ARSENIO CHANG : Hadii briana benítezo Solizandro, waaxda luqadaha, qaybta kaalmada adeegyada, paulino kwon. So [facility] [phone].    ATENCIÓN: Si habla español, tiene a moser disposición servicios gratuitos de asistencia lingüística. Llame al [phone].    We comply with applicable federal civil rights laws and [place] laws. We do not discriminate on the basis of race, color, national origin, age, disability, sex, sexual orientation, or gender identity.             Thank you!     Thank you for choosing [facility] PREOPERATIVE ASSESSMENT CENTER  for your care. Our goal is always to provide you with excellent care. Hearing back from our patients is one way we can continue to improve our services. Please take a few minutes to complete the written survey that you may receive in the mail after your visit with us. Thank you!             Your Updated Medication List - Protect others around you: Learn how to safely use, store and throw away your medicines at www.disposemymeds.org.          This list is accurate as of 4/18/18 11:27 AM.  Always use your most recent med list.                   Brand Name Dispense Instructions for use Diagnosis    amLODIPine 10 MG tablet    NORVASC     Take 10 mg by mouth daily        ASPIRIN PO      Take 81 mg by mouth daily        FOLIC ACID PO      Take 1 mg by mouth daily        FUROSEMIDE PO      Take 20 mg by mouth 2 times daily        insulin glargine 100 UNIT/ML injection    LANTUS     Inject 30 Units Subcutaneous every morning        LISINOPRIL PO      Take 40 mg by mouth At Bedtime        loperamide 2 MG capsule    IMODIUM     Take 4 mg by mouth as needed for diarrhea        mycophenolic acid 360 MG EC tablet      Take 720 mg by mouth 2 times daily        PRAVASTATIN SODIUM PO      Take 40 mg by mouth At Bedtime        tacrolimus 1 MG capsule      Take 1 mg by mouth 2 times daily        VITAMIN D (CHOLECALCIFEROL) PO      Take 400 Units by mouth daily

## 2018-04-19 ENCOUNTER — TRANSFERRED RECORDS (OUTPATIENT)
Dept: HEALTH INFORMATION MANAGEMENT | Facility: CLINIC | Age: 73
End: 2018-04-19

## 2018-04-19 LAB — INTERPRETATION ECG - MUSE: NORMAL

## 2018-05-02 ENCOUNTER — HOSPITAL ENCOUNTER (OUTPATIENT)
Dept: NUCLEAR MEDICINE | Facility: CLINIC | Age: 73
Setting detail: NUCLEAR MEDICINE
End: 2018-05-02
Attending: NURSE PRACTITIONER
Payer: COMMERCIAL

## 2018-05-02 ENCOUNTER — HOSPITAL ENCOUNTER (OUTPATIENT)
Dept: CARDIOLOGY | Facility: CLINIC | Age: 73
Discharge: HOME | End: 2018-05-02
Attending: NURSE PRACTITIONER | Admitting: NURSE PRACTITIONER
Payer: COMMERCIAL

## 2018-05-02 ENCOUNTER — HOSPITAL ENCOUNTER (OUTPATIENT)
Dept: ULTRASOUND IMAGING | Facility: CLINIC | Age: 73
Discharge: HOME | End: 2018-05-02
Attending: NURSE PRACTITIONER | Admitting: NURSE PRACTITIONER
Payer: COMMERCIAL

## 2018-05-02 DIAGNOSIS — R06.09 DOE (DYSPNEA ON EXERTION): ICD-10-CM

## 2018-05-02 LAB — RADIOLOGIST FLAGS: NORMAL

## 2018-05-02 PROCEDURE — A9502 TC99M TETROFOSMIN: HCPCS | Performed by: NURSE PRACTITIONER

## 2018-05-02 PROCEDURE — 93016 CV STRESS TEST SUPVJ ONLY: CPT | Performed by: INTERNAL MEDICINE

## 2018-05-02 PROCEDURE — 93880 EXTRACRANIAL BILAT STUDY: CPT

## 2018-05-02 PROCEDURE — 78452 HT MUSCLE IMAGE SPECT MULT: CPT | Mod: 26 | Performed by: INTERNAL MEDICINE

## 2018-05-02 PROCEDURE — 78452 HT MUSCLE IMAGE SPECT MULT: CPT

## 2018-05-02 PROCEDURE — 93017 CV STRESS TEST TRACING ONLY: CPT

## 2018-05-02 PROCEDURE — 93018 CV STRESS TEST I&R ONLY: CPT | Performed by: INTERNAL MEDICINE

## 2018-05-02 PROCEDURE — 34300033 ZZH RX 343: Performed by: NURSE PRACTITIONER

## 2018-05-02 PROCEDURE — 25000128 H RX IP 250 OP 636: Performed by: INTERNAL MEDICINE

## 2018-05-02 RX ORDER — ALBUTEROL SULFATE 90 UG/1
2 AEROSOL, METERED RESPIRATORY (INHALATION) EVERY 5 MIN PRN
Status: DISCONTINUED | OUTPATIENT
Start: 2018-05-02 | End: 2018-05-03 | Stop reason: HOSPADM

## 2018-05-02 RX ORDER — AMINOPHYLLINE 25 MG/ML
50-100 INJECTION, SOLUTION INTRAVENOUS
Status: DISCONTINUED | OUTPATIENT
Start: 2018-05-02 | End: 2018-05-03 | Stop reason: HOSPADM

## 2018-05-02 RX ORDER — ACYCLOVIR 200 MG/1
0-1 CAPSULE ORAL
Status: DISCONTINUED | OUTPATIENT
Start: 2018-05-02 | End: 2018-05-03 | Stop reason: HOSPADM

## 2018-05-02 RX ORDER — REGADENOSON 0.08 MG/ML
0.4 INJECTION, SOLUTION INTRAVENOUS ONCE
Status: COMPLETED | OUTPATIENT
Start: 2018-05-02 | End: 2018-05-02

## 2018-05-02 RX ADMIN — TETROFOSMIN 40 MCI.: 1.38 INJECTION, POWDER, LYOPHILIZED, FOR SOLUTION INTRAVENOUS at 10:54

## 2018-05-02 RX ADMIN — REGADENOSON 0.4 MG: 0.08 INJECTION, SOLUTION INTRAVENOUS at 10:52

## 2018-05-02 RX ADMIN — TETROFOSMIN 11 MCI.: 1.38 INJECTION, POWDER, LYOPHILIZED, FOR SOLUTION INTRAVENOUS at 09:37

## 2018-05-02 NOTE — PROGRESS NOTES
Pt here for Lexiscan.  Test, medication and side effects reviewed with patient.  Lung sounds clear.  Pt denies caffeine use. Pt tolerated Lexiscan dose without any adverse reactions.  Pt monitored post injection and then taken back to nuclear imaging for follow up imaging.

## 2018-05-07 ENCOUNTER — TELEPHONE (OUTPATIENT)
Dept: SURGERY | Facility: CLINIC | Age: 73
End: 2018-05-07

## 2018-05-07 NOTE — TELEPHONE ENCOUNTER
Called patient this morning to remind him of his appointment with Dr. Abdi on 4/10/18.  Patient called back and discussed time of appointment.  Answered his questions.  Plan on coming to appointment with Dr. Abdi.

## 2018-05-09 NOTE — TELEPHONE ENCOUNTER
RECORDS RECEIVED FROM: [employer] Admin   NOTES STATUS DETAILS   OFFICE NOTE from referring provider Received 4/24/2018 - Scanned   OFFICE NOTE from other specialist Internal 3/1/2018   DISCHARGE SUMMARY from hospital In process Surgery scheduled 5/16/18   DISCHARGE REPORT from the ER Received ED 4/24/2018   MEDICATION LIST Internal    XRAYS (IMAGES & REPORTS) Internal 5/2 Us Carotid Bilat; 5/2 NM Lexiscan Stress Test, 4/18 EKG

## 2018-05-10 ENCOUNTER — OFFICE VISIT (OUTPATIENT)
Dept: VASCULAR SURGERY | Facility: CLINIC | Age: 73
End: 2018-05-10
Payer: COMMERCIAL

## 2018-05-10 ENCOUNTER — PRE VISIT (OUTPATIENT)
Dept: VASCULAR SURGERY | Facility: CLINIC | Age: 73
End: 2018-05-10

## 2018-05-10 VITALS
SYSTOLIC BLOOD PRESSURE: 151 MMHG | OXYGEN SATURATION: 100 % | RESPIRATION RATE: 16 BRPM | DIASTOLIC BLOOD PRESSURE: 73 MMHG | HEART RATE: 67 BPM

## 2018-05-10 DIAGNOSIS — I65.22 CAROTID STENOSIS, ASYMPTOMATIC, LEFT: Primary | ICD-10-CM

## 2018-05-10 ASSESSMENT — PAIN SCALES - GENERAL: PAINLEVEL: NO PAIN (0)

## 2018-05-10 NOTE — MR AVS SNAPSHOT
After Visit Summary   5/10/2018    Familia Irving    MRN: [id number]           Patient Information     Date Of Birth          1945        Visit Information        Provider Department      5/10/2018 1:30 PM Jessy Rueda MD [facility]        Today's Diagnoses     Carotid stenosis, asymptomatic, left    -  1       Follow-ups after your visit        Your next 10 appointments already scheduled     May 16, 2018   Procedure with Brijesh Bass MD   [facility], [place], Same Day Surgery (--)    [address]  [facility] [address]   [phone]            Jun 28, 2018 11:45 AM CDT   (Arrive by 11:15 AM)   RETURN NECK with Brijesh Bass MD   [facility] Orthopaedic [facility] ([facility])    [address] [place] [id number] [phone]            Aug 09, 2018 10:30 AM CDT   (Arrive by 10:00 AM)   RETURN NECK with Brijesh Bass MD   [facility])    [address] [place] [address] [id number] [phone]              Who to contact     Please call your clinic at [phone] to:    Ask questions about your health    Make or cancel appointments    Discuss your medicines    Learn about your test results    Speak to your doctor            Additional Information About Your Visit        MyChart Information     [employer] gives you secure access to your electronic health record. If you see a primary care provider, you can also send messages to your care team and make appointments. If you have questions, please call your primary care clinic.  If you do not have a primary care provider, please call [phone] and they will assist you.      [employer] is an electronic gateway that provides easy, online access to your medical records. With [employer], you can request a clinic appointment, read your test results, renew a prescription or communicate with your care  team.     To access your existing account, please contact your [facility] or call [phone] for assistance.        Care EveryWhere ID     This is your Care EveryWhere ID. This could be used by other organizations to access your [place] medical records  [id number]        Your Vitals Were     Pulse Respirations Pulse Oximetry             67 16 100%          Blood Pressure from Last 3 Encounters:   05/10/18 151/73   04/18/18 111/57    Weight from Last 3 Encounters:   04/18/18 134 lb 12.8 oz   03/01/18 144 lb              Today, you had the following     No orders found for display       Primary Care Provider Office Phone # Fax #    [facility] [phone]       [facility]        Equal Access to Services     ARSENIO CHANG : Hadii aad ku hadasho Solizandro, waaxda luqadaha, qaybta kaalmada adeegyada, paulino parks . So [facility] [phone].    ATENCIÓN: Si habla español, tiene a moser disposición servicios gratuitos de asistencia lingüística. [facility] [phone].    We comply with applicable federal civil rights laws and [place] laws. We do not discriminate on the basis of race, color, national origin, age, disability, sex, sexual orientation, or gender identity.            Thank you!     Thank you for choosing [facility]  for your care. Our goal is always to provide you with excellent care. Hearing back from our patients is one way we can continue to improve our services. Please take a few minutes to complete the written survey that you may receive in the mail after your visit with us. Thank you!             Your Updated Medication List - Protect others around you: Learn how to safely use, store and throw away your medicines at www.disposemymeds.org.          This list is accurate as of 5/10/18  9:43 PM.  Always use your most recent med list.                   Brand Name Dispense Instructions for use Diagnosis     amLODIPine 10 MG tablet    NORVASC     Take 10 mg by mouth daily        ASPIRIN PO      Take 81 mg by mouth daily        FOLIC ACID PO      Take 1 mg by mouth daily        FUROSEMIDE PO      Take 20 mg by mouth 2 times daily        insulin glargine 100 UNIT/ML injection    LANTUS     Inject 30 Units Subcutaneous every morning        LISINOPRIL PO      Take 40 mg by mouth At Bedtime        loperamide 2 MG capsule    IMODIUM     Take 4 mg by mouth as needed for diarrhea        MAGNESIUM OXIDE PO      Take 420 mg by mouth Every Mon, Wed, Fri Morning        mycophenolic acid 360 MG EC tablet      Take 720 mg by mouth 2 times daily        PRAVASTATIN SODIUM PO      Take 40 mg by mouth At Bedtime        tacrolimus 1 MG capsule      Take 1 mg by mouth 2 times daily        VITAMIN D (CHOLECALCIFEROL) PO      Take 400 Units by mouth daily

## 2018-05-10 NOTE — NURSING NOTE
Chief Complaint   Patient presents with     Consult     left side carotid        Vitals:    05/10/18 1330 05/10/18 1333   BP: 146/77 151/73   BP Location: Right arm Left arm   Pulse: 67    Resp: 16    SpO2: 100%        There is no height or weight on file to calculate BMI.              Kirsty Mendez LPN

## 2018-05-10 NOTE — LETTER
5/10/2018       RE: Familia Irving  [address]  [facility] [id number]     Dear Colleague,    Thank you for referring your patient, Familia Irving, to the [facility] at [facility]. Please see a copy of my visit note below.      Vascular Surgery Consultation Note     Patient:  Familia Irving   Date of birth 1945, Medical record number [id number]  Date of Visit:  05/10/2018         Assessment and Recommendations:   ASSESSMENT:  74-year-old man with planned surgery for spinal stenosis next week also has asymptomatic 70% left internal carotid stenosis.  The patient is already on daily aspirin and pravastatin.    RECOMMENDATION:  1. The patient does not need carotid endarterectomy for his asymptomatic 70% left internal carotid stenosis at this point.  2. He is okay to proceed with a cervical spine surgery next week.  3. The patient will need a repeat follow-up carotid duplex in 6 months.  [facility]. the patient's son will discuss with his [facility] physicians to make sure that he has an appointment with vascular surgery in 6 months.    ________________________________________________________________    Consult Question: Asymptomatic left carotid stenosis         History of Present Illness:   Mr. Familia Irving is a 73-year-old man with history of end-stage renal disease status post cadaveric kidney transplant in 2006 to the right iliac fossa, hypertension, hyperlipidemia, diabetes, cervical kyphosis and stenosis of the spinal canal.  The patient has been worked up for cervical spine surgery, which is scheduled next week on May 16, 2018.  His cardiac stress test was negative for cardiac ischemia.  His preoperative workup also included a carotid duplex, which showed a 70% internal carotid stenosis on the left and a 50-69% internal carotid stenosis on the right.  The patient  denies symptoms of TIA, stroke or amaurosis fugax.  He has no history of slurred speech or facial droop.  He does have left-sided numbness due to spinal stenosis for the last year.  The patient was referred to vascular surgery clinic for evaluation  To determine whether he needs a carotid endarterectomy.           Review of Systems:   CONSTITUTIONAL:  No fevers or chills  EYES: negative for icterus  ENT:  negative for hearing loss, tinnitus and sore throat  RESPIRATORY:  negative for cough with sputum and dyspnea  CARDIOVASCULAR:  negative for chest pain, dyspnea  GASTROINTESTINAL:  negative for nausea, vomiting, diarrhea and constipation  GENITOURINARY:  negative for dysuria  HEME:  No easy bruising  INTEGUMENT:  negative for rash and pruritus  NEURO:  Negative for headache           Past Medical History:     Past Medical History:   Diagnosis Date     Cervical kyphosis      Cervical stenosis of spinal canal     with myeopathy'     Degenerative joint disease     right     Diabetes (H)      Hyperlipidemia      Hypertension      Kidney replaced by transplant      Retinopathy     no vision in left eye 2/2 retinal occlusion.      Tinnitus             Past Surgical History:     Past Surgical History:   Procedure Laterality Date     AMPUTATION Right     First and second partial amputation of toe.      AS OPEN TX KNEE DISLOCATION W REPAIR/RECONSTRUCTION       TRANSPLANT KIDNEY RECIPIENT  DONOR  2006            Family History:   No family history on file.         Social History:     Social History   Substance Use Topics     Smoking status: Never Smoker     Smokeless tobacco: Never Used     Alcohol use No     History   Sexual Activity     Sexual activity: Not on file            Current Medications (antimicrobials listed in bold):            Allergies:     Allergies   Allergen Reactions     Heparin      Patient unsure of what reaction was, but it was severe reaction during his transplant and finally was  determined that it was heparin            Physical Exam:   Vitals were reviewed  Patient Vitals for the past 24 hrs:   BP Pulse Resp SpO2   05/10/18 1333 151/73 - - -   05/10/18 1330 146/77 67 16 100 %     Ranges for his vital signs:  Pulse:  [67] 67  Resp:  [16] 16  BP: (146-151)/(73-77) 151/73  SpO2:  [100 %] 100 %    Physical Examination:  GENERAL: Awake, alert and oriented  HEENT: Normocephalic and atraumatic  RESPIRATORY: Not any respiratory distress  ABDOMEN: Soft and nontender  EXTREMITIES: Both hands are warm to touch there is no sign of ischemia in any of the extremities.         Laboratory Data:     Hematology Studies    Recent Labs   Lab Test  04/18/18   1330   WBC  10.0   HGB  9.7*   MCV  94   PLT  118*       Metabolic Studies   Recent Labs   Lab Test  04/18/18   1330   NA  142   POTASSIUM  4.1   CHLORIDE  118*   CO2  8*   BUN  83*   CR  3.64*   GFRESTIMATED  17*         Fanny (Jay Jay) MD Nohelia  Vascular Surgery Fellow  [phone] (p)      I saw the patient with the resident.  I agree with the resident note and plan of care attached.  In summary this patient has moderate left asymptomatic carotid stenosis.  Even if he did not have need for C-spine surgery, I would likely continue to observe him rather than operate.  The C-spine surgery should not significantly increase his risk for stroke.  It does however make carotid endarterectomy more challenging afterward.  She does stenosis progress we may want to consider trans-carotid stenting.  Chance of this and need for surgery in 1-2% per year risk range.  He should be kept on statin and aspirin.  He will need to be seen in 6 months time with repeat carotid duplex.  [facility] given that we have excellent vascular surgeons there.    I spent >50% of this 30 min visit in counseling    Again, thank you for allowing me to participate in the care of your patient.      Sincerely,    Jessy Rueda,  MD

## 2018-05-10 NOTE — PROGRESS NOTES
Vascular Surgery Consultation Note     Patient:  Familia Irving   Date of birth 1945, Medical record number [id number]  Date of Visit:  05/10/2018         Assessment and Recommendations:   ASSESSMENT:  74-year-old man with planned surgery for spinal stenosis next week also has asymptomatic 70% left internal carotid stenosis.  The patient is already on daily aspirin and pravastatin.    RECOMMENDATION:  1. The patient does not need carotid endarterectomy for his asymptomatic 70% left internal carotid stenosis at this point.  2. He is okay to proceed with a cervical spine surgery next week.  3. The patient will need a repeat follow-up carotid duplex in 6 months.  [facility]. the patient's son will discuss with his [facility] physicians to make sure that he has an appointment with vascular surgery in 6 months.    ________________________________________________________________    Consult Question: Asymptomatic left carotid stenosis         History of Present Illness:   Mr. Familia Irving is a 73-year-old man with history of end-stage renal disease status post cadaveric kidney transplant in 2006 to the right iliac fossa, hypertension, hyperlipidemia, diabetes, cervical kyphosis and stenosis of the spinal canal.  The patient has been worked up for cervical spine surgery, which is scheduled next week on May 16, 2018.  His cardiac stress test was negative for cardiac ischemia.  His preoperative workup also included a carotid duplex, which showed a 70% internal carotid stenosis on the left and a 50-69% internal carotid stenosis on the right.  The patient denies symptoms of TIA, stroke or amaurosis fugax.  He has no history of slurred speech or facial droop.  He does have left-sided numbness due to spinal stenosis for the last year.  The patient was referred to vascular surgery clinic for evaluation  To determine whether he needs a carotid  endarterectomy.           Review of Systems:   CONSTITUTIONAL:  No fevers or chills  EYES: negative for icterus  ENT:  negative for hearing loss, tinnitus and sore throat  RESPIRATORY:  negative for cough with sputum and dyspnea  CARDIOVASCULAR:  negative for chest pain, dyspnea  GASTROINTESTINAL:  negative for nausea, vomiting, diarrhea and constipation  GENITOURINARY:  negative for dysuria  HEME:  No easy bruising  INTEGUMENT:  negative for rash and pruritus  NEURO:  Negative for headache           Past Medical History:     Past Medical History:   Diagnosis Date     Cervical kyphosis      Cervical stenosis of spinal canal     with myeopathy'     Degenerative joint disease     right     Diabetes (H)      Hyperlipidemia      Hypertension      Kidney replaced by transplant      Retinopathy     no vision in left eye 2/2 retinal occlusion.      Tinnitus             Past Surgical History:     Past Surgical History:   Procedure Laterality Date     AMPUTATION Right     First and second partial amputation of toe.      AS OPEN TX KNEE DISLOCATION W REPAIR/RECONSTRUCTION       TRANSPLANT KIDNEY RECIPIENT  DONOR  2006            Family History:   No family history on file.         Social History:     Social History   Substance Use Topics     Smoking status: Never Smoker     Smokeless tobacco: Never Used     Alcohol use No     History   Sexual Activity     Sexual activity: Not on file            Current Medications (antimicrobials listed in bold):            Allergies:     Allergies   Allergen Reactions     Heparin      Patient unsure of what reaction was, but it was severe reaction during his transplant and finally was determined that it was heparin            Physical Exam:   Vitals were reviewed  Patient Vitals for the past 24 hrs:   BP Pulse Resp SpO2   05/10/18 1333 151/73 - - -   05/10/18 1330 146/77 67 16 100 %     Ranges for his vital signs:  Pulse:  [67] 67  Resp:  [16] 16  BP: (146-151)/(73-77)  151/73  SpO2:  [100 %] 100 %    Physical Examination:  GENERAL: Awake, alert and oriented  HEENT: Normocephalic and atraumatic  RESPIRATORY: Not any respiratory distress  ABDOMEN: Soft and nontender  EXTREMITIES: Both hands are warm to touch there is no sign of ischemia in any of the extremities.         Laboratory Data:     Hematology Studies    Recent Labs   Lab Test  04/18/18   1330   WBC  10.0   HGB  9.7*   MCV  94   PLT  118*       Metabolic Studies   Recent Labs   Lab Test  04/18/18   1330   NA  142   POTASSIUM  4.1   CHLORIDE  118*   CO2  8*   BUN  83*   CR  3.64*   GFRESTIMATED  17*         Fanny (Jay Jay) MD Nohelia  Vascular Surgery Fellow  [phone] (p)

## 2018-05-11 NOTE — PROGRESS NOTES
I saw the patient with the resident.  I agree with the resident note and plan of care attached.  In summary this patient has moderate left asymptomatic carotid stenosis.  Even if he did not have need for C-spine surgery, I would likely continue to observe him rather than operate.  The C-spine surgery should not significantly increase his risk for stroke.  It does however make carotid endarterectomy more challenging afterward.  She does stenosis progress we may want to consider trans-carotid stenting.  Chance of this and need for surgery in 1-2% per year risk range.  He should be kept on statin and aspirin.  He will need to be seen in 6 months time with repeat carotid duplex.  [facility] and I [facility] is child reasonable given that we have excellent vascular surgeons there.  Jessy Rueda MD     I spent >50% of this 30 min visit in counseling

## 2018-05-16 ENCOUNTER — APPOINTMENT (OUTPATIENT)
Dept: GENERAL RADIOLOGY | Facility: CLINIC | Age: 73
DRG: 454 | End: 2018-05-16
Attending: ORTHOPAEDIC SURGERY
Payer: COMMERCIAL

## 2018-05-16 ENCOUNTER — ANESTHESIA (OUTPATIENT)
Dept: SURGERY | Facility: CLINIC | Age: 73
DRG: 454 | End: 2018-05-16
Payer: COMMERCIAL

## 2018-05-16 ENCOUNTER — HOSPITAL ENCOUNTER (INPATIENT)
Facility: CLINIC | Age: 73
LOS: 5 days | Discharge: NURSING, REHAB OR LONG-TERM CARE | DRG: 454 | End: 2018-05-21
Attending: ORTHOPAEDIC SURGERY | Admitting: SURGERY
Payer: COMMERCIAL

## 2018-05-16 DIAGNOSIS — Z98.1 S/P CERVICAL SPINAL FUSION: Primary | ICD-10-CM

## 2018-05-16 PROBLEM — Z98.890 POST-OPERATIVE STATE: Status: ACTIVE | Noted: 2018-05-16

## 2018-05-16 LAB
ABO + RH BLD: NORMAL
ABO + RH BLD: NORMAL
APTT PPP: 32 SEC (ref 22–37)
APTT PPP: 32 SEC (ref 22–37)
APTT PPP: 33 SEC (ref 22–37)
BASE DEFICIT BLDA-SCNC: 6.4 MMOL/L
BASE DEFICIT BLDA-SCNC: 6.5 MMOL/L
BASE DEFICIT BLDA-SCNC: 6.7 MMOL/L
BASE DEFICIT BLDA-SCNC: 6.9 MMOL/L
BASE DEFICIT BLDA-SCNC: 7.1 MMOL/L
BASE DEFICIT BLDA-SCNC: 7.6 MMOL/L
BASE DEFICIT BLDA-SCNC: 7.8 MMOL/L
BASE DEFICIT BLDA-SCNC: 7.9 MMOL/L
BASE DEFICIT BLDA-SCNC: 8.1 MMOL/L
BLD GP AB SCN SERPL QL: NORMAL
BLD PROD TYP BPU: NORMAL
BLD UNIT ID BPU: 0
BLOOD BANK CMNT PATIENT-IMP: NORMAL
BLOOD BANK CMNT PATIENT-IMP: NORMAL
BLOOD PRODUCT CODE: NORMAL
BPU ID: NORMAL
CA-I BLD-MCNC: 4.3 MG/DL (ref 4.4–5.2)
CA-I BLD-MCNC: 4.5 MG/DL (ref 4.4–5.2)
CA-I BLD-MCNC: 4.5 MG/DL (ref 4.4–5.2)
CA-I BLD-MCNC: 4.6 MG/DL (ref 4.4–5.2)
CA-I BLD-MCNC: 4.7 MG/DL (ref 4.4–5.2)
CREAT SERPL-MCNC: 1.67 MG/DL (ref 0.66–1.25)
ERYTHROCYTE [DISTWIDTH] IN BLOOD BY AUTOMATED COUNT: 15.8 % (ref 10–15)
ERYTHROCYTE [DISTWIDTH] IN BLOOD BY AUTOMATED COUNT: 17.3 % (ref 10–15)
ERYTHROCYTE [DISTWIDTH] IN BLOOD BY AUTOMATED COUNT: 17.3 % (ref 10–15)
FIBRINOGEN PPP-MCNC: 306 MG/DL (ref 200–420)
FIBRINOGEN PPP-MCNC: 307 MG/DL (ref 200–420)
FIBRINOGEN PPP-MCNC: 309 MG/DL (ref 200–420)
FIBRINOGEN PPP-MCNC: 343 MG/DL (ref 200–420)
FIBRINOGEN PPP-MCNC: 348 MG/DL (ref 200–420)
FIBRINOGEN PPP-MCNC: 363 MG/DL (ref 200–420)
GFR SERPL CREATININE-BSD FRML MDRD: 41 ML/MIN/1.7M2
GLUCOSE BLD-MCNC: 123 MG/DL (ref 70–99)
GLUCOSE BLD-MCNC: 125 MG/DL (ref 70–99)
GLUCOSE BLD-MCNC: 128 MG/DL (ref 70–99)
GLUCOSE BLD-MCNC: 129 MG/DL (ref 70–99)
GLUCOSE BLD-MCNC: 143 MG/DL (ref 70–99)
GLUCOSE BLD-MCNC: 147 MG/DL (ref 70–99)
GLUCOSE BLD-MCNC: 150 MG/DL (ref 70–99)
GLUCOSE BLD-MCNC: 153 MG/DL (ref 70–99)
GLUCOSE BLD-MCNC: 156 MG/DL (ref 70–99)
GLUCOSE BLDC GLUCOMTR-MCNC: 135 MG/DL (ref 70–99)
GLUCOSE BLDC GLUCOMTR-MCNC: 164 MG/DL (ref 70–99)
GLUCOSE SERPL-MCNC: 134 MG/DL (ref 70–99)
HCO3 BLD-SCNC: 17 MMOL/L (ref 21–28)
HCO3 BLD-SCNC: 18 MMOL/L (ref 21–28)
HCO3 BLD-SCNC: 18 MMOL/L (ref 21–28)
HCO3 BLD-SCNC: 19 MMOL/L (ref 21–28)
HCO3 BLD-SCNC: 20 MMOL/L (ref 21–28)
HCT VFR BLD AUTO: 25.3 % (ref 40–53)
HCT VFR BLD AUTO: 30.8 % (ref 40–53)
HCT VFR BLD AUTO: 31.5 % (ref 40–53)
HGB BLD-MCNC: 7.3 G/DL (ref 13.3–17.7)
HGB BLD-MCNC: 7.4 G/DL (ref 13.3–17.7)
HGB BLD-MCNC: 7.5 G/DL (ref 13.3–17.7)
HGB BLD-MCNC: 7.8 G/DL (ref 13.3–17.7)
HGB BLD-MCNC: 7.9 G/DL (ref 13.3–17.7)
HGB BLD-MCNC: 8.7 G/DL (ref 13.3–17.7)
HGB BLD-MCNC: 9.1 G/DL (ref 13.3–17.7)
HGB BLD-MCNC: 9.6 G/DL (ref 13.3–17.7)
HGB BLD-MCNC: 9.6 G/DL (ref 13.3–17.7)
HGB BLD-MCNC: 9.7 G/DL (ref 13.3–17.7)
HGB BLD-MCNC: 9.9 G/DL (ref 13.3–17.7)
INR PPP: 1.23 (ref 0.86–1.14)
INR PPP: 1.26 (ref 0.86–1.14)
INR PPP: 1.29 (ref 0.86–1.14)
INR PPP: 1.4 (ref 0.86–1.14)
INR PPP: 1.41 (ref 0.86–1.14)
INR PPP: 1.42 (ref 0.86–1.14)
LACTATE BLD-SCNC: 0.6 MMOL/L (ref 0.7–2)
LACTATE BLD-SCNC: 0.7 MMOL/L (ref 0.7–2)
LACTATE BLD-SCNC: 0.8 MMOL/L (ref 0.7–2)
LACTATE BLD-SCNC: 0.9 MMOL/L (ref 0.7–2)
LACTATE BLD-SCNC: 1 MMOL/L (ref 0.7–2)
LACTATE BLD-SCNC: 1 MMOL/L (ref 0.7–2)
MCH RBC QN AUTO: 28.4 PG (ref 26.5–33)
MCH RBC QN AUTO: 28.9 PG (ref 26.5–33)
MCH RBC QN AUTO: 29.8 PG (ref 26.5–33)
MCHC RBC AUTO-ENTMCNC: 30.8 G/DL (ref 31.5–36.5)
MCHC RBC AUTO-ENTMCNC: 31.2 G/DL (ref 31.5–36.5)
MCHC RBC AUTO-ENTMCNC: 31.2 G/DL (ref 31.5–36.5)
MCV RBC AUTO: 92 FL (ref 78–100)
MCV RBC AUTO: 93 FL (ref 78–100)
MCV RBC AUTO: 96 FL (ref 78–100)
NUM BPU REQUESTED: 1
NUM BPU REQUESTED: 2
NUM BPU REQUESTED: 4
O2/TOTAL GAS SETTING VFR VENT: 33 %
O2/TOTAL GAS SETTING VFR VENT: 34 %
O2/TOTAL GAS SETTING VFR VENT: 36 %
O2/TOTAL GAS SETTING VFR VENT: ABNORMAL %
PCO2 BLD: 34 MM HG (ref 35–45)
PCO2 BLD: 36 MM HG (ref 35–45)
PCO2 BLD: 38 MM HG (ref 35–45)
PCO2 BLD: 38 MM HG (ref 35–45)
PCO2 BLD: 39 MM HG (ref 35–45)
PCO2 BLD: 41 MM HG (ref 35–45)
PCO2 BLD: 41 MM HG (ref 35–45)
PH BLD: 7.27 PH (ref 7.35–7.45)
PH BLD: 7.29 PH (ref 7.35–7.45)
PH BLD: 7.29 PH (ref 7.35–7.45)
PH BLD: 7.3 PH (ref 7.35–7.45)
PH BLD: 7.3 PH (ref 7.35–7.45)
PH BLD: 7.31 PH (ref 7.35–7.45)
PH BLD: 7.32 PH (ref 7.35–7.45)
PLATELET # BLD AUTO: 120 10E9/L (ref 150–450)
PLATELET # BLD AUTO: 131 10E9/L (ref 150–450)
PLATELET # BLD AUTO: 145 10E9/L (ref 150–450)
PLATELET # BLD AUTO: 146 10E9/L (ref 150–450)
PLATELET # BLD AUTO: 147 10E9/L (ref 150–450)
PLATELET # BLD AUTO: 159 10E9/L (ref 150–450)
PLATELET # BLD AUTO: 175 10E9/L (ref 150–450)
PO2 BLD: 105 MM HG (ref 80–105)
PO2 BLD: 118 MM HG (ref 80–105)
PO2 BLD: 119 MM HG (ref 80–105)
PO2 BLD: 120 MM HG (ref 80–105)
PO2 BLD: 126 MM HG (ref 80–105)
PO2 BLD: 131 MM HG (ref 80–105)
PO2 BLD: 132 MM HG (ref 80–105)
PO2 BLD: 133 MM HG (ref 80–105)
PO2 BLD: 158 MM HG (ref 80–105)
POTASSIUM BLD-SCNC: 4.1 MMOL/L (ref 3.4–5.3)
POTASSIUM BLD-SCNC: 4.1 MMOL/L (ref 3.4–5.3)
POTASSIUM BLD-SCNC: 4.3 MMOL/L (ref 3.4–5.3)
POTASSIUM BLD-SCNC: 4.4 MMOL/L (ref 3.4–5.3)
POTASSIUM BLD-SCNC: 4.5 MMOL/L (ref 3.4–5.3)
POTASSIUM SERPL-SCNC: 4.6 MMOL/L (ref 3.4–5.3)
RBC # BLD AUTO: 2.65 10E12/L (ref 4.4–5.9)
RBC # BLD AUTO: 3.32 10E12/L (ref 4.4–5.9)
RBC # BLD AUTO: 3.42 10E12/L (ref 4.4–5.9)
SODIUM BLD-SCNC: 142 MMOL/L (ref 133–144)
SODIUM BLD-SCNC: 143 MMOL/L (ref 133–144)
SODIUM BLD-SCNC: 146 MMOL/L (ref 133–144)
SODIUM BLD-SCNC: 147 MMOL/L (ref 133–144)
SPECIMEN EXP DATE BLD: NORMAL
TRANSFUSION STATUS PATIENT QL: NORMAL
WBC # BLD AUTO: 12.1 10E9/L (ref 4–11)
WBC # BLD AUTO: 12.5 10E9/L (ref 4–11)
WBC # BLD AUTO: 9.2 10E9/L (ref 4–11)

## 2018-05-16 PROCEDURE — 27210995 ZZH RX 272: Performed by: ORTHOPAEDIC SURGERY

## 2018-05-16 PROCEDURE — 85018 HEMOGLOBIN: CPT | Performed by: ANESTHESIOLOGY

## 2018-05-16 PROCEDURE — 0RG2071 FUSION OF 2 OR MORE CERVICAL VERTEBRAL JOINTS WITH AUTOLOGOUS TISSUE SUBSTITUTE, POSTERIOR APPROACH, POSTERIOR COLUMN, OPEN APPROACH: ICD-10-PCS | Performed by: ORTHOPAEDIC SURGERY

## 2018-05-16 PROCEDURE — 27211024 ZZHC OR SUPPLY OTHER OPNP: Performed by: ORTHOPAEDIC SURGERY

## 2018-05-16 PROCEDURE — 99207 ZZC MOONLIGHTING INDICATOR: CPT | Performed by: INTERNAL MEDICINE

## 2018-05-16 PROCEDURE — 85384 FIBRINOGEN ACTIVITY: CPT | Performed by: ORTHOPAEDIC SURGERY

## 2018-05-16 PROCEDURE — 95940 IONM IN OPERATNG ROOM 15 MIN: CPT | Performed by: ORTHOPAEDIC SURGERY

## 2018-05-16 PROCEDURE — C9399 UNCLASSIFIED DRUGS OR BIOLOG: HCPCS | Performed by: NURSE ANESTHETIST, CERTIFIED REGISTERED

## 2018-05-16 PROCEDURE — 27211020 ZZHC OR CELL SAVER OPNP: Performed by: ORTHOPAEDIC SURGERY

## 2018-05-16 PROCEDURE — 82803 BLOOD GASES ANY COMBINATION: CPT | Performed by: ORTHOPAEDIC SURGERY

## 2018-05-16 PROCEDURE — 25000132 ZZH RX MED GY IP 250 OP 250 PS 637: Performed by: NURSE ANESTHETIST, CERTIFIED REGISTERED

## 2018-05-16 PROCEDURE — P9016 RBC LEUKOCYTES REDUCED: HCPCS | Performed by: NURSE PRACTITIONER

## 2018-05-16 PROCEDURE — 0QB30ZZ EXCISION OF LEFT PELVIC BONE, OPEN APPROACH: ICD-10-PCS | Performed by: ORTHOPAEDIC SURGERY

## 2018-05-16 PROCEDURE — 25000125 ZZHC RX 250: Performed by: NURSE ANESTHETIST, CERTIFIED REGISTERED

## 2018-05-16 PROCEDURE — 84295 ASSAY OF SERUM SODIUM: CPT | Performed by: ORTHOPAEDIC SURGERY

## 2018-05-16 PROCEDURE — 71000016 ZZH RECOVERY PHASE 1 LEVEL 3 FIRST HR: Performed by: ORTHOPAEDIC SURGERY

## 2018-05-16 PROCEDURE — 25000128 H RX IP 250 OP 636: Performed by: NURSE ANESTHETIST, CERTIFIED REGISTERED

## 2018-05-16 PROCEDURE — 85049 AUTOMATED PLATELET COUNT: CPT | Performed by: ORTHOPAEDIC SURGERY

## 2018-05-16 PROCEDURE — 84295 ASSAY OF SERUM SODIUM: CPT | Performed by: ANESTHESIOLOGY

## 2018-05-16 PROCEDURE — 25000301 ZZH OR RX SURGIFLO W/THROMBIN KIT 2ML 1991 OPNP: Performed by: ORTHOPAEDIC SURGERY

## 2018-05-16 PROCEDURE — 85027 COMPLETE CBC AUTOMATED: CPT | Performed by: ORTHOPAEDIC SURGERY

## 2018-05-16 PROCEDURE — 25000128 H RX IP 250 OP 636

## 2018-05-16 PROCEDURE — C1762 CONN TISS, HUMAN(INC FASCIA): HCPCS | Performed by: ORTHOPAEDIC SURGERY

## 2018-05-16 PROCEDURE — 37000008 ZZH ANESTHESIA TECHNICAL FEE, 1ST 30 MIN: Performed by: ORTHOPAEDIC SURGERY

## 2018-05-16 PROCEDURE — 25000128 H RX IP 250 OP 636: Performed by: ANESTHESIOLOGY

## 2018-05-16 PROCEDURE — 25000125 ZZHC RX 250: Performed by: PHYSICIAN ASSISTANT

## 2018-05-16 PROCEDURE — 85730 THROMBOPLASTIN TIME PARTIAL: CPT | Performed by: ORTHOPAEDIC SURGERY

## 2018-05-16 PROCEDURE — 0RG6071 FUSION OF THORACIC VERTEBRAL JOINT WITH AUTOLOGOUS TISSUE SUBSTITUTE, POSTERIOR APPROACH, POSTERIOR COLUMN, OPEN APPROACH: ICD-10-PCS | Performed by: ORTHOPAEDIC SURGERY

## 2018-05-16 PROCEDURE — 0RG40A0 FUSION OF CERVICOTHORACIC VERTEBRAL JOINT WITH INTERBODY FUSION DEVICE, ANTERIOR APPROACH, ANTERIOR COLUMN, OPEN APPROACH: ICD-10-PCS | Performed by: ORTHOPAEDIC SURGERY

## 2018-05-16 PROCEDURE — 82330 ASSAY OF CALCIUM: CPT | Performed by: ANESTHESIOLOGY

## 2018-05-16 PROCEDURE — 82565 ASSAY OF CREATININE: CPT | Performed by: ANESTHESIOLOGY

## 2018-05-16 PROCEDURE — 82803 BLOOD GASES ANY COMBINATION: CPT | Performed by: ANESTHESIOLOGY

## 2018-05-16 PROCEDURE — 85049 AUTOMATED PLATELET COUNT: CPT | Performed by: ANESTHESIOLOGY

## 2018-05-16 PROCEDURE — 37000009 ZZH ANESTHESIA TECHNICAL FEE, EACH ADDTL 15 MIN: Performed by: ORTHOPAEDIC SURGERY

## 2018-05-16 PROCEDURE — C1713 ANCHOR/SCREW BN/BN,TIS/BN: HCPCS | Performed by: ORTHOPAEDIC SURGERY

## 2018-05-16 PROCEDURE — 82947 ASSAY GLUCOSE BLOOD QUANT: CPT | Performed by: ANESTHESIOLOGY

## 2018-05-16 PROCEDURE — 00NW0ZZ RELEASE CERVICAL SPINAL CORD, OPEN APPROACH: ICD-10-PCS | Performed by: ORTHOPAEDIC SURGERY

## 2018-05-16 PROCEDURE — 0RG20A0 FUSION OF 2 OR MORE CERVICAL VERTEBRAL JOINTS WITH INTERBODY FUSION DEVICE, ANTERIOR APPROACH, ANTERIOR COLUMN, OPEN APPROACH: ICD-10-PCS | Performed by: ORTHOPAEDIC SURGERY

## 2018-05-16 PROCEDURE — 0RT30ZZ RESECTION OF CERVICAL VERTEBRAL DISC, OPEN APPROACH: ICD-10-PCS | Performed by: ORTHOPAEDIC SURGERY

## 2018-05-16 PROCEDURE — 84132 ASSAY OF SERUM POTASSIUM: CPT | Performed by: ANESTHESIOLOGY

## 2018-05-16 PROCEDURE — 84132 ASSAY OF SERUM POTASSIUM: CPT | Performed by: ORTHOPAEDIC SURGERY

## 2018-05-16 PROCEDURE — P9041 ALBUMIN (HUMAN),5%, 50ML: HCPCS | Performed by: NURSE ANESTHETIST, CERTIFIED REGISTERED

## 2018-05-16 PROCEDURE — 4A11X4G MONITORING OF PERIPHERAL NERVOUS ELECTRICAL ACTIVITY, INTRAOPERATIVE, EXTERNAL APPROACH: ICD-10-PCS | Performed by: ORTHOPAEDIC SURGERY

## 2018-05-16 PROCEDURE — 36415 COLL VENOUS BLD VENIPUNCTURE: CPT | Performed by: ANESTHESIOLOGY

## 2018-05-16 PROCEDURE — 20000004 ZZH R&B ICU UMMC

## 2018-05-16 PROCEDURE — 83605 ASSAY OF LACTIC ACID: CPT | Performed by: ORTHOPAEDIC SURGERY

## 2018-05-16 PROCEDURE — 25000566 ZZH SEVOFLURANE, EA 15 MIN: Performed by: ORTHOPAEDIC SURGERY

## 2018-05-16 PROCEDURE — 83605 ASSAY OF LACTIC ACID: CPT | Performed by: ANESTHESIOLOGY

## 2018-05-16 PROCEDURE — 83735 ASSAY OF MAGNESIUM: CPT | Performed by: STUDENT IN AN ORGANIZED HEALTH CARE EDUCATION/TRAINING PROGRAM

## 2018-05-16 PROCEDURE — 40000278 XR SURGERY CARM FLUORO LESS THAN 5 MIN: Mod: TC

## 2018-05-16 PROCEDURE — P9059 PLASMA, FRZ BETWEEN 8-24HOUR: HCPCS | Performed by: ORTHOPAEDIC SURGERY

## 2018-05-16 PROCEDURE — 36000076 ZZH SURGERY LEVEL 6 EA 15 ADDTL MIN - UMMC: Performed by: ORTHOPAEDIC SURGERY

## 2018-05-16 PROCEDURE — 00000146 ZZHCL STATISTIC GLUCOSE BY METER IP

## 2018-05-16 PROCEDURE — 25000128 H RX IP 250 OP 636: Performed by: STUDENT IN AN ORGANIZED HEALTH CARE EDUCATION/TRAINING PROGRAM

## 2018-05-16 PROCEDURE — 36000078 ZZH SURGERY LEVEL 6 W FLUORO 1ST 30 MIN - UMMC: Performed by: ORTHOPAEDIC SURGERY

## 2018-05-16 PROCEDURE — 82947 ASSAY GLUCOSE BLOOD QUANT: CPT | Performed by: ORTHOPAEDIC SURGERY

## 2018-05-16 PROCEDURE — 82330 ASSAY OF CALCIUM: CPT | Performed by: ORTHOPAEDIC SURGERY

## 2018-05-16 PROCEDURE — 80048 BASIC METABOLIC PNL TOTAL CA: CPT | Performed by: STUDENT IN AN ORGANIZED HEALTH CARE EDUCATION/TRAINING PROGRAM

## 2018-05-16 PROCEDURE — 84100 ASSAY OF PHOSPHORUS: CPT | Performed by: STUDENT IN AN ORGANIZED HEALTH CARE EDUCATION/TRAINING PROGRAM

## 2018-05-16 PROCEDURE — 25000128 H RX IP 250 OP 636: Performed by: PHYSICIAN ASSISTANT

## 2018-05-16 PROCEDURE — 25000128 H RX IP 250 OP 636: Performed by: ORTHOPAEDIC SURGERY

## 2018-05-16 PROCEDURE — 27210794 ZZH OR GENERAL SUPPLY STERILE: Performed by: ORTHOPAEDIC SURGERY

## 2018-05-16 PROCEDURE — 0RG4071 FUSION OF CERVICOTHORACIC VERTEBRAL JOINT WITH AUTOLOGOUS TISSUE SUBSTITUTE, POSTERIOR APPROACH, POSTERIOR COLUMN, OPEN APPROACH: ICD-10-PCS | Performed by: ORTHOPAEDIC SURGERY

## 2018-05-16 PROCEDURE — 40000344 ZZHCL STATISTIC THAWING COMPONENT: Performed by: ORTHOPAEDIC SURGERY

## 2018-05-16 PROCEDURE — 0RT50ZZ RESECTION OF CERVICOTHORACIC VERTEBRAL DISC, OPEN APPROACH: ICD-10-PCS | Performed by: ORTHOPAEDIC SURGERY

## 2018-05-16 PROCEDURE — E2402 NEG PRESS WOUND THERAPY PUMP: HCPCS | Performed by: PHYSICAL THERAPIST

## 2018-05-16 PROCEDURE — 40000170 ZZH STATISTIC PRE-PROCEDURE ASSESSMENT II: Performed by: ORTHOPAEDIC SURGERY

## 2018-05-16 PROCEDURE — 85610 PROTHROMBIN TIME: CPT | Performed by: ORTHOPAEDIC SURGERY

## 2018-05-16 PROCEDURE — 25000125 ZZHC RX 250: Performed by: ORTHOPAEDIC SURGERY

## 2018-05-16 DEVICE — GRAFT BONE PUTTY DBX 01ML 038010: Type: IMPLANTABLE DEVICE | Site: SPINE CERVICAL | Status: FUNCTIONAL

## 2018-05-16 DEVICE — IMPLANTABLE DEVICE: Type: IMPLANTABLE DEVICE | Site: SPINE CERVICAL | Status: FUNCTIONAL

## 2018-05-16 DEVICE — IMP END CAP MEDT 6X18X16MM 6240686: Type: IMPLANTABLE DEVICE | Site: SPINE CERVICAL | Status: FUNCTIONAL

## 2018-05-16 DEVICE — GRAFT BONE CRUSH CANC 30ML 400080: Type: IMPLANTABLE DEVICE | Site: SPINE CERVICAL | Status: FUNCTIONAL

## 2018-05-16 DEVICE — IMP SCR SET MEDT VERTEX M6 6950315: Type: IMPLANTABLE DEVICE | Site: SPINE CERVICAL | Status: FUNCTIONAL

## 2018-05-16 DEVICE — IMP END CAP MEDT 7X18X16MM 6240786: Type: IMPLANTABLE DEVICE | Site: SPINE CERVICAL | Status: FUNCTIONAL

## 2018-05-16 RX ORDER — AMLODIPINE BESYLATE 10 MG/1
10 TABLET ORAL DAILY
Status: DISCONTINUED | OUTPATIENT
Start: 2018-05-17 | End: 2018-05-21 | Stop reason: HOSPADM

## 2018-05-16 RX ORDER — SODIUM CHLORIDE, SODIUM LACTATE, POTASSIUM CHLORIDE, CALCIUM CHLORIDE 600; 310; 30; 20 MG/100ML; MG/100ML; MG/100ML; MG/100ML
INJECTION, SOLUTION INTRAVENOUS CONTINUOUS PRN
Status: DISCONTINUED | OUTPATIENT
Start: 2018-05-16 | End: 2018-05-16

## 2018-05-16 RX ORDER — MYCOPHENOLIC ACID 360 MG/1
720 TABLET, DELAYED RELEASE ORAL 2 TIMES DAILY
Status: DISCONTINUED | OUTPATIENT
Start: 2018-05-16 | End: 2018-05-17

## 2018-05-16 RX ORDER — LISINOPRIL 40 MG/1
40 TABLET ORAL AT BEDTIME
Status: DISCONTINUED | OUTPATIENT
Start: 2018-05-16 | End: 2018-05-16

## 2018-05-16 RX ORDER — ONDANSETRON 4 MG/1
4 TABLET, ORALLY DISINTEGRATING ORAL EVERY 6 HOURS PRN
Status: DISCONTINUED | OUTPATIENT
Start: 2018-05-16 | End: 2018-05-21 | Stop reason: HOSPADM

## 2018-05-16 RX ORDER — CEFAZOLIN SODIUM 1 G/3ML
1 INJECTION, POWDER, FOR SOLUTION INTRAMUSCULAR; INTRAVENOUS EVERY 8 HOURS
Status: COMPLETED | OUTPATIENT
Start: 2018-05-16 | End: 2018-05-17

## 2018-05-16 RX ORDER — NICOTINE POLACRILEX 4 MG
15-30 LOZENGE BUCCAL
Status: DISCONTINUED | OUTPATIENT
Start: 2018-05-16 | End: 2018-05-17

## 2018-05-16 RX ORDER — FUROSEMIDE 20 MG
20 TABLET ORAL 2 TIMES DAILY
Status: DISCONTINUED | OUTPATIENT
Start: 2018-05-16 | End: 2018-05-16

## 2018-05-16 RX ORDER — EPHEDRINE SULFATE 50 MG/ML
INJECTION, SOLUTION INTRAMUSCULAR; INTRAVENOUS; SUBCUTANEOUS PRN
Status: DISCONTINUED | OUTPATIENT
Start: 2018-05-16 | End: 2018-05-16

## 2018-05-16 RX ORDER — NALOXONE HYDROCHLORIDE 0.4 MG/ML
.1-.4 INJECTION, SOLUTION INTRAMUSCULAR; INTRAVENOUS; SUBCUTANEOUS
Status: DISCONTINUED | OUTPATIENT
Start: 2018-05-16 | End: 2018-05-21 | Stop reason: HOSPADM

## 2018-05-16 RX ORDER — HYDRALAZINE HYDROCHLORIDE 20 MG/ML
10 INJECTION INTRAMUSCULAR; INTRAVENOUS EVERY 6 HOURS PRN
Status: DISCONTINUED | OUTPATIENT
Start: 2018-05-16 | End: 2018-05-21 | Stop reason: HOSPADM

## 2018-05-16 RX ORDER — AMOXICILLIN 250 MG
2 CAPSULE ORAL 2 TIMES DAILY
Status: DISCONTINUED | OUTPATIENT
Start: 2018-05-16 | End: 2018-05-21 | Stop reason: HOSPADM

## 2018-05-16 RX ORDER — LIDOCAINE 40 MG/G
CREAM TOPICAL
Status: DISCONTINUED | OUTPATIENT
Start: 2018-05-16 | End: 2018-05-21 | Stop reason: HOSPADM

## 2018-05-16 RX ORDER — FENTANYL CITRATE 50 UG/ML
25-50 INJECTION, SOLUTION INTRAMUSCULAR; INTRAVENOUS
Status: DISCONTINUED | OUTPATIENT
Start: 2018-05-16 | End: 2018-05-16 | Stop reason: HOSPADM

## 2018-05-16 RX ORDER — NALOXONE HYDROCHLORIDE 0.4 MG/ML
.1-.4 INJECTION, SOLUTION INTRAMUSCULAR; INTRAVENOUS; SUBCUTANEOUS
Status: DISCONTINUED | OUTPATIENT
Start: 2018-05-16 | End: 2018-05-17

## 2018-05-16 RX ORDER — SODIUM CHLORIDE 9 MG/ML
INJECTION, SOLUTION INTRAVENOUS CONTINUOUS PRN
Status: DISCONTINUED | OUTPATIENT
Start: 2018-05-16 | End: 2018-05-16

## 2018-05-16 RX ORDER — PRAVASTATIN SODIUM 20 MG
40 TABLET ORAL AT BEDTIME
Status: DISCONTINUED | OUTPATIENT
Start: 2018-05-16 | End: 2018-05-21 | Stop reason: HOSPADM

## 2018-05-16 RX ORDER — PROPOFOL 10 MG/ML
INJECTION, EMULSION INTRAVENOUS PRN
Status: DISCONTINUED | OUTPATIENT
Start: 2018-05-16 | End: 2018-05-16

## 2018-05-16 RX ORDER — ALBUMIN, HUMAN INJ 5% 5 %
SOLUTION INTRAVENOUS CONTINUOUS PRN
Status: DISCONTINUED | OUTPATIENT
Start: 2018-05-16 | End: 2018-05-16

## 2018-05-16 RX ORDER — HYDROMORPHONE HYDROCHLORIDE 1 MG/ML
.3-.5 INJECTION, SOLUTION INTRAMUSCULAR; INTRAVENOUS; SUBCUTANEOUS EVERY 10 MIN PRN
Status: DISCONTINUED | OUTPATIENT
Start: 2018-05-16 | End: 2018-05-16

## 2018-05-16 RX ORDER — KETAMINE HYDROCHLORIDE 10 MG/ML
INJECTION INTRAMUSCULAR; INTRAVENOUS PRN
Status: DISCONTINUED | OUTPATIENT
Start: 2018-05-16 | End: 2018-05-16

## 2018-05-16 RX ORDER — TACROLIMUS 1 MG/1
1 CAPSULE, GELATIN COATED ORAL 2 TIMES DAILY
Status: DISCONTINUED | OUTPATIENT
Start: 2018-05-16 | End: 2018-05-17

## 2018-05-16 RX ORDER — SODIUM CHLORIDE, SODIUM LACTATE, POTASSIUM CHLORIDE, CALCIUM CHLORIDE 600; 310; 30; 20 MG/100ML; MG/100ML; MG/100ML; MG/100ML
INJECTION, SOLUTION INTRAVENOUS CONTINUOUS
Status: DISCONTINUED | OUTPATIENT
Start: 2018-05-16 | End: 2018-05-19

## 2018-05-16 RX ORDER — FOLIC ACID 1 MG/1
1 TABLET ORAL DAILY
Status: DISCONTINUED | OUTPATIENT
Start: 2018-05-17 | End: 2018-05-21 | Stop reason: HOSPADM

## 2018-05-16 RX ORDER — NALOXONE HYDROCHLORIDE 0.4 MG/ML
.1-.4 INJECTION, SOLUTION INTRAMUSCULAR; INTRAVENOUS; SUBCUTANEOUS
Status: DISCONTINUED | OUTPATIENT
Start: 2018-05-16 | End: 2018-05-16

## 2018-05-16 RX ORDER — DEXTROSE MONOHYDRATE 25 G/50ML
25-50 INJECTION, SOLUTION INTRAVENOUS
Status: DISCONTINUED | OUTPATIENT
Start: 2018-05-16 | End: 2018-05-17

## 2018-05-16 RX ORDER — BUPIVACAINE HYDROCHLORIDE 2.5 MG/ML
INJECTION, SOLUTION INFILTRATION; PERINEURAL PRN
Status: DISCONTINUED | OUTPATIENT
Start: 2018-05-16 | End: 2018-05-16 | Stop reason: HOSPADM

## 2018-05-16 RX ORDER — ONDANSETRON 2 MG/ML
4 INJECTION INTRAMUSCULAR; INTRAVENOUS EVERY 6 HOURS PRN
Status: DISCONTINUED | OUTPATIENT
Start: 2018-05-16 | End: 2018-05-21 | Stop reason: HOSPADM

## 2018-05-16 RX ORDER — BACITRACIN ZINC 500 [USP'U]/G
OINTMENT TOPICAL PRN
Status: DISCONTINUED | OUTPATIENT
Start: 2018-05-16 | End: 2018-05-16 | Stop reason: HOSPADM

## 2018-05-16 RX ORDER — VANCOMYCIN HYDROCHLORIDE 1 G/20ML
INJECTION, POWDER, LYOPHILIZED, FOR SOLUTION INTRAVENOUS PRN
Status: DISCONTINUED | OUTPATIENT
Start: 2018-05-16 | End: 2018-05-16 | Stop reason: HOSPADM

## 2018-05-16 RX ORDER — PROPOFOL 10 MG/ML
INJECTION, EMULSION INTRAVENOUS CONTINUOUS PRN
Status: DISCONTINUED | OUTPATIENT
Start: 2018-05-16 | End: 2018-05-16

## 2018-05-16 RX ORDER — CEFAZOLIN SODIUM 2 G/100ML
2 INJECTION, SOLUTION INTRAVENOUS
Status: COMPLETED | OUTPATIENT
Start: 2018-05-16 | End: 2018-05-16

## 2018-05-16 RX ORDER — LIDOCAINE 40 MG/G
CREAM TOPICAL
Status: DISCONTINUED | OUTPATIENT
Start: 2018-05-16 | End: 2018-05-16 | Stop reason: HOSPADM

## 2018-05-16 RX ORDER — MEPERIDINE HYDROCHLORIDE 25 MG/ML
12.5 INJECTION INTRAMUSCULAR; INTRAVENOUS; SUBCUTANEOUS
Status: DISCONTINUED | OUTPATIENT
Start: 2018-05-16 | End: 2018-05-16 | Stop reason: HOSPADM

## 2018-05-16 RX ORDER — OXYCODONE HYDROCHLORIDE 5 MG/1
5-10 TABLET ORAL
Status: DISCONTINUED | OUTPATIENT
Start: 2018-05-16 | End: 2018-05-21 | Stop reason: HOSPADM

## 2018-05-16 RX ORDER — NALOXONE HYDROCHLORIDE 0.4 MG/ML
.1-.4 INJECTION, SOLUTION INTRAMUSCULAR; INTRAVENOUS; SUBCUTANEOUS
Status: DISCONTINUED | OUTPATIENT
Start: 2018-05-16 | End: 2018-05-16 | Stop reason: CLARIF

## 2018-05-16 RX ORDER — CEFAZOLIN SODIUM 1 G/3ML
1 INJECTION, POWDER, FOR SOLUTION INTRAMUSCULAR; INTRAVENOUS SEE ADMIN INSTRUCTIONS
Status: DISCONTINUED | OUTPATIENT
Start: 2018-05-16 | End: 2018-05-16 | Stop reason: HOSPADM

## 2018-05-16 RX ORDER — METHOCARBAMOL 500 MG/1
500 TABLET, FILM COATED ORAL 4 TIMES DAILY PRN
Status: DISCONTINUED | OUTPATIENT
Start: 2018-05-16 | End: 2018-05-21 | Stop reason: HOSPADM

## 2018-05-16 RX ORDER — ONDANSETRON 2 MG/ML
4 INJECTION INTRAMUSCULAR; INTRAVENOUS EVERY 30 MIN PRN
Status: DISCONTINUED | OUTPATIENT
Start: 2018-05-16 | End: 2018-05-16

## 2018-05-16 RX ORDER — LIDOCAINE HYDROCHLORIDE 20 MG/ML
INJECTION, SOLUTION INFILTRATION; PERINEURAL PRN
Status: DISCONTINUED | OUTPATIENT
Start: 2018-05-16 | End: 2018-05-16

## 2018-05-16 RX ORDER — ACETAMINOPHEN 325 MG/1
975 TABLET ORAL EVERY 8 HOURS
Status: DISPENSED | OUTPATIENT
Start: 2018-05-16 | End: 2018-05-19

## 2018-05-16 RX ORDER — KETAMINE HYDROCHLORIDE 10 MG/ML
50 INJECTION INTRAMUSCULAR; INTRAVENOUS ONCE
Status: DISCONTINUED | OUTPATIENT
Start: 2018-05-16 | End: 2018-05-16 | Stop reason: CLARIF

## 2018-05-16 RX ORDER — ONDANSETRON 4 MG/1
4 TABLET, ORALLY DISINTEGRATING ORAL EVERY 30 MIN PRN
Status: DISCONTINUED | OUTPATIENT
Start: 2018-05-16 | End: 2018-05-16

## 2018-05-16 RX ORDER — FENTANYL CITRATE 50 UG/ML
INJECTION, SOLUTION INTRAMUSCULAR; INTRAVENOUS PRN
Status: DISCONTINUED | OUTPATIENT
Start: 2018-05-16 | End: 2018-05-16

## 2018-05-16 RX ORDER — MAGNESIUM HYDROXIDE 1200 MG/15ML
LIQUID ORAL PRN
Status: DISCONTINUED | OUTPATIENT
Start: 2018-05-16 | End: 2018-05-16 | Stop reason: HOSPADM

## 2018-05-16 RX ORDER — ACETAMINOPHEN 325 MG/1
650 TABLET ORAL EVERY 4 HOURS PRN
Status: DISCONTINUED | OUTPATIENT
Start: 2018-05-19 | End: 2018-05-21 | Stop reason: HOSPADM

## 2018-05-16 RX ORDER — MAGNESIUM OXIDE 400 MG/1
420 TABLET ORAL
Status: DISCONTINUED | OUTPATIENT
Start: 2018-05-18 | End: 2018-05-21 | Stop reason: HOSPADM

## 2018-05-16 RX ORDER — AMOXICILLIN 250 MG
1 CAPSULE ORAL 2 TIMES DAILY
Status: DISCONTINUED | OUTPATIENT
Start: 2018-05-16 | End: 2018-05-21 | Stop reason: HOSPADM

## 2018-05-16 RX ORDER — FUROSEMIDE 20 MG
20 TABLET ORAL
Status: DISCONTINUED | OUTPATIENT
Start: 2018-05-16 | End: 2018-05-18

## 2018-05-16 RX ORDER — LISINOPRIL 40 MG/1
40 TABLET ORAL
Status: DISCONTINUED | OUTPATIENT
Start: 2018-05-16 | End: 2018-05-18

## 2018-05-16 RX ORDER — HYDROMORPHONE HYDROCHLORIDE 1 MG/ML
.3-.5 INJECTION, SOLUTION INTRAMUSCULAR; INTRAVENOUS; SUBCUTANEOUS
Status: DISCONTINUED | OUTPATIENT
Start: 2018-05-16 | End: 2018-05-21 | Stop reason: HOSPADM

## 2018-05-16 RX ORDER — SODIUM CHLORIDE, SODIUM LACTATE, POTASSIUM CHLORIDE, CALCIUM CHLORIDE 600; 310; 30; 20 MG/100ML; MG/100ML; MG/100ML; MG/100ML
INJECTION, SOLUTION INTRAVENOUS CONTINUOUS
Status: DISCONTINUED | OUTPATIENT
Start: 2018-05-16 | End: 2018-05-16 | Stop reason: HOSPADM

## 2018-05-16 RX ADMIN — PHENYLEPHRINE HYDROCHLORIDE 50 MCG: 10 INJECTION, SOLUTION INTRAMUSCULAR; INTRAVENOUS; SUBCUTANEOUS at 15:48

## 2018-05-16 RX ADMIN — SODIUM CHLORIDE 1830 MG: 9 INJECTION, SOLUTION INTRAVENOUS at 07:54

## 2018-05-16 RX ADMIN — SODIUM CHLORIDE, POTASSIUM CHLORIDE, SODIUM LACTATE AND CALCIUM CHLORIDE: 600; 310; 30; 20 INJECTION, SOLUTION INTRAVENOUS at 08:00

## 2018-05-16 RX ADMIN — SUGAMMADEX 200 MG: 100 INJECTION, SOLUTION INTRAVENOUS at 14:49

## 2018-05-16 RX ADMIN — ROCURONIUM BROMIDE 30 MG: 10 INJECTION INTRAVENOUS at 13:46

## 2018-05-16 RX ADMIN — FENTANYL CITRATE 50 MCG: 50 INJECTION, SOLUTION INTRAMUSCULAR; INTRAVENOUS at 07:42

## 2018-05-16 RX ADMIN — PROPOFOL 100 MG: 10 INJECTION, EMULSION INTRAVENOUS at 08:10

## 2018-05-16 RX ADMIN — KETAMINE HCL-NACL SOLN PREF SY 50 MG/5ML-0.9% (10MG/ML) 10 MG: 10 SOLUTION PREFILLED SYRINGE at 10:39

## 2018-05-16 RX ADMIN — Medication 5 MG: at 07:50

## 2018-05-16 RX ADMIN — SODIUM CHLORIDE, POTASSIUM CHLORIDE, SODIUM LACTATE AND CALCIUM CHLORIDE: 600; 310; 30; 20 INJECTION, SOLUTION INTRAVENOUS at 17:51

## 2018-05-16 RX ADMIN — HYDROMORPHONE HYDROCHLORIDE 0.5 MG: 1 INJECTION, SOLUTION INTRAMUSCULAR; INTRAVENOUS; SUBCUTANEOUS at 17:10

## 2018-05-16 RX ADMIN — PHENYLEPHRINE HYDROCHLORIDE 50 MCG: 10 INJECTION, SOLUTION INTRAMUSCULAR; INTRAVENOUS; SUBCUTANEOUS at 15:45

## 2018-05-16 RX ADMIN — SODIUM CHLORIDE, POTASSIUM CHLORIDE, SODIUM LACTATE AND CALCIUM CHLORIDE: 600; 310; 30; 20 INJECTION, SOLUTION INTRAVENOUS at 07:21

## 2018-05-16 RX ADMIN — CEFAZOLIN 1 G: 1 INJECTION, POWDER, FOR SOLUTION INTRAMUSCULAR; INTRAVENOUS at 16:22

## 2018-05-16 RX ADMIN — REMIFENTANIL HYDROCHLORIDE: 1 INJECTION, POWDER, LYOPHILIZED, FOR SOLUTION INTRAVENOUS at 15:15

## 2018-05-16 RX ADMIN — ALBUMIN HUMAN: 0.05 INJECTION, SOLUTION INTRAVENOUS at 15:05

## 2018-05-16 RX ADMIN — ALBUMIN HUMAN: 0.05 INJECTION, SOLUTION INTRAVENOUS at 10:13

## 2018-05-16 RX ADMIN — MIDAZOLAM 1 MG: 1 INJECTION INTRAMUSCULAR; INTRAVENOUS at 08:10

## 2018-05-16 RX ADMIN — PROCHLORPERAZINE EDISYLATE 5 MG: 5 INJECTION INTRAMUSCULAR; INTRAVENOUS at 23:11

## 2018-05-16 RX ADMIN — KETAMINE HCL-NACL SOLN PREF SY 50 MG/5ML-0.9% (10MG/ML) 30 MG: 10 SOLUTION PREFILLED SYRINGE at 09:29

## 2018-05-16 RX ADMIN — SODIUM CHLORIDE: 9 INJECTION, SOLUTION INTRAVENOUS at 10:52

## 2018-05-16 RX ADMIN — CEFAZOLIN SODIUM 2 G: 2 INJECTION, SOLUTION INTRAVENOUS at 08:20

## 2018-05-16 RX ADMIN — Medication 10 MG: at 13:44

## 2018-05-16 RX ADMIN — PHENYLEPHRINE HYDROCHLORIDE 50 MCG: 10 INJECTION, SOLUTION INTRAMUSCULAR; INTRAVENOUS; SUBCUTANEOUS at 16:54

## 2018-05-16 RX ADMIN — LIDOCAINE HYDROCHLORIDE 80 MG: 20 INJECTION, SOLUTION INFILTRATION; PERINEURAL at 07:42

## 2018-05-16 RX ADMIN — HYDROMORPHONE HYDROCHLORIDE 0.2 MG: 1 INJECTION, SOLUTION INTRAMUSCULAR; INTRAVENOUS; SUBCUTANEOUS at 21:54

## 2018-05-16 RX ADMIN — CEFAZOLIN 1 G: 1 INJECTION, POWDER, FOR SOLUTION INTRAMUSCULAR; INTRAVENOUS at 12:18

## 2018-05-16 RX ADMIN — ALBUMIN HUMAN: 0.05 INJECTION, SOLUTION INTRAVENOUS at 15:48

## 2018-05-16 RX ADMIN — HYDROMORPHONE HYDROCHLORIDE 0.3 MG: 1 INJECTION, SOLUTION INTRAMUSCULAR; INTRAVENOUS; SUBCUTANEOUS at 19:47

## 2018-05-16 RX ADMIN — ONDANSETRON 4 MG: 2 INJECTION INTRAMUSCULAR; INTRAVENOUS at 22:03

## 2018-05-16 RX ADMIN — REMIFENTANIL HYDROCHLORIDE 0.15 MCG/KG/MIN: 1 INJECTION, POWDER, LYOPHILIZED, FOR SOLUTION INTRAVENOUS at 11:07

## 2018-05-16 RX ADMIN — PROPOFOL: 10 INJECTION, EMULSION INTRAVENOUS at 11:54

## 2018-05-16 RX ADMIN — PHENYLEPHRINE HYDROCHLORIDE 0.3 MCG/KG/MIN: 10 INJECTION, SOLUTION INTRAMUSCULAR; INTRAVENOUS; SUBCUTANEOUS at 07:55

## 2018-05-16 RX ADMIN — POLYETHYLENE GLYCOL 400 AND PROPYLENE GLYCOL 2 DROP: 4; 3 SOLUTION/ DROPS OPHTHALMIC at 07:47

## 2018-05-16 RX ADMIN — HYDROMORPHONE HYDROCHLORIDE 0.5 MG: 1 INJECTION, SOLUTION INTRAMUSCULAR; INTRAVENOUS; SUBCUTANEOUS at 16:40

## 2018-05-16 RX ADMIN — PROPOFOL 50 MG: 10 INJECTION, EMULSION INTRAVENOUS at 07:53

## 2018-05-16 RX ADMIN — PHENYLEPHRINE HYDROCHLORIDE 50 MCG: 10 INJECTION, SOLUTION INTRAMUSCULAR; INTRAVENOUS; SUBCUTANEOUS at 16:42

## 2018-05-16 RX ADMIN — PHENYLEPHRINE HYDROCHLORIDE 50 MCG: 10 INJECTION, SOLUTION INTRAMUSCULAR; INTRAVENOUS; SUBCUTANEOUS at 16:01

## 2018-05-16 RX ADMIN — PHENYLEPHRINE HYDROCHLORIDE 50 MCG: 10 INJECTION, SOLUTION INTRAMUSCULAR; INTRAVENOUS; SUBCUTANEOUS at 17:29

## 2018-05-16 RX ADMIN — Medication 5 MG: at 08:03

## 2018-05-16 RX ADMIN — PROPOFOL 120 MG: 10 INJECTION, EMULSION INTRAVENOUS at 07:42

## 2018-05-16 RX ADMIN — CEFAZOLIN 1 G: 1 INJECTION, POWDER, FOR SOLUTION INTRAMUSCULAR; INTRAVENOUS at 19:50

## 2018-05-16 RX ADMIN — KETAMINE HCL-NACL SOLN PREF SY 50 MG/5ML-0.9% (10MG/ML) 10 MG: 10 SOLUTION PREFILLED SYRINGE at 11:59

## 2018-05-16 RX ADMIN — PROPOFOL 50 MCG/KG/MIN: 10 INJECTION, EMULSION INTRAVENOUS at 07:50

## 2018-05-16 RX ADMIN — SODIUM CHLORIDE: 9 INJECTION, SOLUTION INTRAVENOUS at 13:29

## 2018-05-16 RX ADMIN — MIDAZOLAM 1 MG: 1 INJECTION INTRAMUSCULAR; INTRAVENOUS at 07:21

## 2018-05-16 RX ADMIN — SODIUM CHLORIDE 5 MG/KG/HR: 9 INJECTION, SOLUTION INTRAVENOUS at 08:53

## 2018-05-16 RX ADMIN — REMIFENTANIL HYDROCHLORIDE 0.05 MCG/KG/MIN: 1 INJECTION, POWDER, LYOPHILIZED, FOR SOLUTION INTRAVENOUS at 07:50

## 2018-05-16 RX ADMIN — Medication 80 MG: at 07:42

## 2018-05-16 ASSESSMENT — PAIN DESCRIPTION - DESCRIPTORS: DESCRIPTORS: ACHING

## 2018-05-16 NOTE — ANESTHESIA POSTPROCEDURE EVALUATION
Patient: Familia Irving    Procedure(s):  Part 1: (anterior) Anterior Cervical Decompression Fusion C3-7, Cervical 7-Thoracic 1; Anterior Iliac Crest Bone Graft [place]  Part 2: (posterior) Posterior Interbody Spinal Fusion Cervical 3-Throracic 1/Thoracic 2; Laminectomies Cervical 3-7 - Wound Class: I-Clean       - Wound Class: I-Clean    Diagnosis:Advanced Cervical Spondylosis with Myelopathy; Secondary Cervical Kyphosis  Diagnosis Additional Information: No value filed.    Anesthesia Type:  General, ETT    Note:  Anesthesia Post Evaluation    Patient location during evaluation: PACU  Patient participation: Able to fully participate in evaluation  Level of consciousness: awake  Pain management: adequate  Airway patency: patent  Cardiovascular status: acceptable and stable  Respiratory status: acceptable and room air  Hydration status: acceptable  PONV: none     Anesthetic complications: None          Last vitals:  Vitals:    05/16/18 2000 05/16/18 2015 05/16/18 2030   BP:      Pulse:      Resp: 10 16    Temp:  36.4  C (97.6  F)    SpO2: 99% 99% 100%         Electronically Signed By: Charles Martinez MD  May 16, 2018  8:30 PM

## 2018-05-16 NOTE — IP AVS SNAPSHOT
MRN:[id number]                      After Visit Summary   5/16/2018    Familia Irving    MRN: [id number]           Thank you!     Thank you for choosing [facility] for your care. Our goal is always to provide you with excellent care. Hearing back from our patients is one way we can continue to improve our services. Please take a few minutes to complete the written survey that you may receive in the mail after you visit with us. Thank you!        Patient Information     Date Of Birth          1945        Designated Caregiver       Most Recent Value    Caregiver    Will someone help with your care after discharge? yes    Name of designated caregiver Tacho Irving    Phone number of caregiver [phone]    Caregiver address [place], [place] [patient unable to remember exact address]      About your hospital stay     You were admitted on:  May 16, 2018 You last received care in the:  [facility]    You were discharged on:  May 21, 2018        Reason for your hospital stay       You had cervical spine surgery                  Who to Call     For medical emergencies, please call 911.  For non-urgent questions about your medical care, please call your primary care provider or clinic, [phone]  For questions related to your surgery, please call your surgery clinic        Attending Provider     Provider Specialty    Chinedu Su MD Orthopedics    [facility], Brijesh Gama MD Orthopaedic Surgery       Primary Care Provider Office Phone # Fax #    [facility] [phone]      After Care Instructions     Activity - Up with nursing assistance           Additional Discharge Instructions       DISCHARGE INSTRUCTIONS:  Activity:   - You may weight bear as tolerated.  - Please avoid lifting >10 lbs, excessive bending, twisting, and strenuous activities.  Brace:   Please wear [place] (may remove for hygiene and meals) / custom TLSO postoperatively for 3 months when out of  bed.  Pain management:   - Please take pain medications as instructed, but it is okay to begin weaning off of them as your pain tolerates. Avoid driving while taking pain medications as they can make you drowsy.  Wound care:   - Your dressing is to remain in place until postoperative day 4 after which you may remove.   - Your stitches will dissolve on their own and do not need to be removed.   - You may shower once your dressing is off. Let water run over the incision and dab dry -- do not rub or submerge the incision under water for at least 2 weeks.   - After the dressing is off, you may leave it open to the air. You may continue to keep the incision covered for protection.   - Please contact us if there is increased drainage, swelling, pain, or redness stemming from your incision. This may be a sign of infection and would need to be looked at immediately.  New or worsening symptoms:  - Please call or seek medical attention for pain that continues to worsen, new onset of numbness or tingling, or extreme swelling.  - Please see a medical provider for new onset of chest pain or shortness of breath. This may be a sign of a heart attack or blood clot in your lungs.   Follow-up:  - After discharge, an appointment will be made for you to return to clinic in approximately 6 weeks for a postoperative check with Dr. Bass.   - Please call [phone] if you have any questions or concerns.  - Appointments are at [facility]: [address] [place] [id number]            Advance Diet as Tolerated       Follow this diet upon discharge:regular adult diet            General info for SNF       Length of Stay Estimate: Short Term Care: Estimated # of Days <30  Condition at Discharge: Improving  Level of care:skilled   Rehabilitation Potential: Good  Admission H&P remains valid and up-to-date: Yes  Recent Chemotherapy: N/A  Use Nursing Home Standing Orders: Yes    BG qid. No  "sliding scale ordered at this time, as Pt generally takes Lantus only at home  BMP every Monday and Thursay, fax results to Pt's nephrologist at the [facility], [phone]            Mantoux instructions       Give two-step Mantoux (PPD) Per Facility Policy Yes            Weight bearing status       As tolerated                  Your next 10 appointments already scheduled     Jun 28, 2018 11:45 AM CDT   (Arrive by 11:15 AM)   RETURN NECK with Brijesh Bass MD   [facility])    [address] [place] [address] [id number] [phone]            Aug 09, 2018 10:30 AM CDT   (Arrive by 10:00 AM)   RETURN NECK with Brijesh Bass MD   [facility])    [address] [place] [address] [id number] [phone]              Additional Services     Occupational Therapy Adult Consult       Evaluate and treat as clinically indicated.    Reason:  S/p fusion            Physical Therapy Adult Consult       Evaluate and treat as clinically indicated.    Reason:  S/p cervical fusion                  Future tests that were ordered for you     Assign Questionnaire Series to Patient                 Pending Results     Date and Time Order Name Status Description    5/19/2018 1558 EKG 12-lead, tracing only Preliminary             Statement of Approval     Ordered          05/21/18 1201  I have reviewed and agree with all the recommendations and orders detailed in this document.  EFFECTIVE NOW     Approved and electronically signed by:  Ebonie Lopez APRN CNP             Admission Information     Date & Time Provider Department Dept. Phone    5/16/2018 Brijesh Bass MD [facility] [phone]      Your Vitals Were     Blood Pressure Pulse Temperature Respirations Height Weight    160/62 (BP Location: Left arm) 68 97.8  F (36.6  C) (Oral) 16 1.727 m (5' 8\") " 69.2 kg (152 lb 8.9 oz)    Pulse Oximetry BMI (Body Mass Index)                99% 23.2 kg/m2          [facility] Information     [facility] gives you secure access to your electronic health record. If you see a primary care provider, you can also send messages to your care team and make appointments. If you have questions, please call your primary care clinic.  If you do not have a primary care provider, please call [phone] and they will assist you.        Care EveryWhere ID     This is your Care EveryWhere ID. This could be used by other organizations to access your [facility] medical records  [id number]        Equal Access to Services     [facility] : Hadbuffy Mesa, alberto arguello, olivia to, paulino parks . So [facility] [phone].    ATENCIÓN: Si habla español, tiene a moser disposición servicios gratuitos de asistencia lingüística. Llame al [phone].    We comply with applicable federal civil rights laws and [place] laws. We do not discriminate on the basis of race, color, national origin, age, disability, sex, sexual orientation, or gender identity.               Review of your medicines      START taking        Dose / Directions    methocarbamol 500 MG tablet   Commonly known as:  ROBAXIN        Dose:  500 mg   Take 1 tablet (500 mg) by mouth 4 times daily as needed for muscle spasms   Quantity:  120 tablet   Refills:  0       oxyCODONE IR 5 MG tablet   Commonly known as:  ROXICODONE        For pain of 1-5 give 5 mg, for pain of 6-10 give 10 mg every 3 hours as needed.   Quantity:  60 tablet   Refills:  0       senna-docusate 8.6-50 MG per tablet   Commonly known as:  SENOKOT-S;PERICOLACE        Dose:  1 tablet   Take 1 tablet by mouth 2 times daily   Quantity:  100 tablet   Refills:  0         CONTINUE these medicines which may have CHANGED, or have new prescriptions. If we are uncertain of the size of tablets/capsules you have at home, strength  may be listed as something that might have changed.        Dose / Directions    acetaminophen 325 MG tablet   Commonly known as:  TYLENOL   This may have changed:    - medication strength  - how much to take  - when to take this  - reasons to take this        Dose:  650 mg   Take 2 tablets (650 mg) by mouth every 4 hours as needed for other (multimodal surgical pain management along with NSAIDS and opioid medication as indicated based on pain control and physical function.)   Quantity:  100 tablet   Refills:  0         CONTINUE these medicines which have NOT CHANGED        Dose / Directions    amLODIPine 10 MG tablet   Commonly known as:  NORVASC        Dose:  10 mg   Take 10 mg by mouth daily   Refills:  0       ASPIRIN PO        Dose:  81 mg   Take 81 mg by mouth daily   Refills:  0       FOLIC ACID PO        Dose:  1 mg   Take 1 mg by mouth daily   Refills:  0       FUROSEMIDE PO        Dose:  20 mg   Take 20 mg by mouth 2 times daily   Refills:  0       insulin glargine 100 UNIT/ML injection   Commonly known as:  LANTUS        Dose:  30 Units   Inject 30 Units Subcutaneous every morning   Refills:  0       LISINOPRIL PO        Dose:  40 mg   Take 40 mg by mouth At Bedtime   Refills:  0       loperamide 2 MG capsule   Commonly known as:  IMODIUM        Dose:  4 mg   Take 4 mg by mouth as needed for diarrhea   Refills:  0       MAGNESIUM OXIDE PO        Dose:  420 mg   Take 420 mg by mouth Every Mon, Wed, Fri Morning   Refills:  0       mycophenolic acid 360 MG EC tablet        Dose:  720 mg   Take 720 mg by mouth 2 times daily   Refills:  0       PRAVASTATIN SODIUM PO        Dose:  40 mg   Take 40 mg by mouth At Bedtime   Refills:  0       tacrolimus 1 MG capsule        Dose:  1 mg   Take 1 mg by mouth 2 times daily   Refills:  0       VITAMIN D (CHOLECALCIFEROL) PO        Dose:  400 Units   Take 400 Units by mouth daily   Refills:  0            Where to get your medicines      Some of these will need a paper  prescription and others can be bought over the counter. Ask your nurse if you have questions.     You don't need a prescription for these medications     acetaminophen 325 MG tablet    methocarbamol 500 MG tablet    senna-docusate 8.6-50 MG per tablet         Information about where to get these medications is not yet available     ! Ask your nurse or doctor about these medications     oxyCODONE IR 5 MG tablet                Protect others around you: Learn how to safely use, store and throw away your medicines at www.disposemymeds.org.        Information about OPIOIDS     PRESCRIPTION OPIOIDS: WHAT YOU NEED TO KNOW   You have a prescription for an opioid (narcotic) pain medicine. Opioids can cause addiction. If you have a history of chemical dependency of any type, you are at a higher risk of becoming addicted to opioids. Only take this medicine after all other options have been tried. Take it for as short a time and as few doses as possible.     Do not:    Drive. If you drive while taking these medicines, you could be arrested for driving under the influence (DUI).    Operate heavy machinery    Do any other dangerous activities while taking these medicines.     Drink any alcohol while taking these medicines.      Take with any other medicines that contain acetaminophen. Read all labels carefully. Look for the word  acetaminophen  or  Tylenol.  Ask your pharmacist if you have questions or are unsure.    Store your pills in a secure place, locked if possible. We will not replace any lost or stolen medicine. If you don t finish your medicine, please throw away (dispose) as directed by your pharmacist. The [place] Pollution Control Agency has more information about safe disposal: https://www.pca.state.[place]/living-green/managing-unwanted-medications    All opioids tend to cause constipation. Drink plenty of water and eat foods that have a lot of fiber, such as fruits, vegetables, prune juice, apple juice and  high-fiber cereal. Take a laxative (Miralax, milk of magnesia, Colace, Senna) if you don t move your bowels at least every other day.              Medication List: This is a list of all your medications and when to take them. Check marks below indicate your daily home schedule. Keep this list as a reference.      Medications           Morning Afternoon Evening Bedtime As Needed    acetaminophen 325 MG tablet   Commonly known as:  TYLENOL   Take 2 tablets (650 mg) by mouth every 4 hours as needed for other (multimodal surgical pain management along with NSAIDS and opioid medication as indicated based on pain control and physical function.)   Last time this was given:  650 mg on 5/20/2018  5:18 AM                                amLODIPine 10 MG tablet   Commonly known as:  NORVASC   Take 10 mg by mouth daily   Last time this was given:  10 mg on 5/21/2018  8:42 AM                                ASPIRIN PO   Take 81 mg by mouth daily                                FOLIC ACID PO   Take 1 mg by mouth daily   Last time this was given:  1 mg on 5/21/2018  8:42 AM                                FUROSEMIDE PO   Take 20 mg by mouth 2 times daily   Last time this was given:  20 mg on 5/21/2018  8:42 AM                                insulin glargine 100 UNIT/ML injection   Commonly known as:  LANTUS   Inject 30 Units Subcutaneous every morning                                LISINOPRIL PO   Take 40 mg by mouth At Bedtime   Last time this was given:  20 mg on 5/21/2018  8:42 AM                                loperamide 2 MG capsule   Commonly known as:  IMODIUM   Take 4 mg by mouth as needed for diarrhea                                MAGNESIUM OXIDE PO   Take 420 mg by mouth Every Mon, Wed, Fri Morning   Last time this was given:  400 mg on 5/21/2018  8:42 AM                                methocarbamol 500 MG tablet   Commonly known as:  ROBAXIN   Take 1 tablet (500 mg) by mouth 4 times daily as needed for muscle spasms                                 mycophenolic acid 360 MG EC tablet   Take 720 mg by mouth 2 times daily   Last time this was given:  720 mg on 5/21/2018  8:53 AM                                oxyCODONE IR 5 MG tablet   Commonly known as:  ROXICODONE   For pain of 1-5 give 5 mg, for pain of 6-10 give 10 mg every 3 hours as needed.   Last time this was given:  5 mg on 5/21/2018  6:45 AM                                PRAVASTATIN SODIUM PO   Take 40 mg by mouth At Bedtime   Last time this was given:  40 mg on 5/20/2018  8:10 PM                                senna-docusate 8.6-50 MG per tablet   Commonly known as:  SENOKOT-S;PERICOLACE   Take 1 tablet by mouth 2 times daily                                tacrolimus 1 MG capsule   Take 1 mg by mouth 2 times daily                                VITAMIN D (CHOLECALCIFEROL) PO   Take 400 Units by mouth daily   Last time this was given:  400 Units on 5/21/2018  8:42 AM

## 2018-05-16 NOTE — ANESTHESIA CARE TRANSFER NOTE
Patient: Familia Irving    Procedure(s):  Part 1: (anterior) Anterior Cervical Decompression Fusion C3-7, Cervical 7-Thoracic 1; Anterior Iliac Crest Bone Graft [place]  Part 2: (posterior) Posterior Interbody Spinal Fusion Cervical 3-Throracic 1/Thoracic 2; Laminectomies Cervical 3-7 - Wound Class: I-Clean       - Wound Class: I-Clean    Diagnosis: Advanced Cervical Spondylosis with Myelopathy; Secondary Cervical Kyphosis  Diagnosis Additional Information: No value filed.    Anesthesia Type:   General, ETT     Note:  Airway :Face Mask  Patient transferred to:PACU  Handoff Report: Identifed the Patient, Identified the Reponsible Provider, Reviewed the pertinent medical history, Discussed the surgical course, Reviewed Intra-OP anesthesia mangement and issues during anesthesia, Set expectations for post-procedure period and Allowed opportunity for questions and acknowledgement of understanding      Vitals: (Last set prior to Anesthesia Care Transfer)    CRNA VITALS  5/16/2018 1745 - 5/16/2018 1828      5/16/2018             Resp Rate (observed): (!)  3                Electronically Signed By: CHRISS Champagne CRNA  May 16, 2018  6:28 PM

## 2018-05-16 NOTE — PROVIDER NOTIFICATION
Patient has multiple abrasions, scabs on arms and legs. Nailbeds are cracked. Has a old suture in on Right shoulde from mole removal. Patient states ok to have removed.  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

## 2018-05-16 NOTE — ANESTHESIA PROCEDURE NOTES
Arterial Line Procedure Note  Staff:     Anesthesiologist:  GLORIA VICKERS  Location: In OR After Induction  Procedure Start/Stop Times:     patient identified, IV checked, site marked, risks and benefits discussed, informed consent, monitors and equipment checked, pre-op evaluation and at physician/surgeon's request      Correct Patient: Yes      Correct Position: Yes      Correct Site: Yes      Correct Procedure: Yes      Correct Laterality:  Yes    Site Marked:  Yes  Line Placement:     Procedure:  Arterial Line    Insertion Site:  Radial    Insertion laterality:  Right    Skin Prep: Chloraprep      Patient Prep: patient draped, mask, sterile gloves, hat and hand hygiene      Local skin infiltration:  None    Ultrasound Guided?: No      Catheter size:  20 gauge, Quick cath    Cath secured with: suture      Dressing:  Tegaderm    Complications:  None obvious    Arterial waveform: Yes      IBP within 10% of NIBP: Yes

## 2018-05-16 NOTE — IP AVS SNAPSHOT
[facility]    [address]    [place] [address]    Phone:  [phone]                                       After Visit Summary   5/16/2018    Familia Irving    MRN: [id number]           After Visit Summary Signature Page     I have received my discharge instructions, and my questions have been answered. I have discussed any challenges I see with this plan with the nurse or doctor.    ..........................................................................................................................................  Patient/Patient Representative Signature      ..........................................................................................................................................  Patient Representative Print Name and Relationship to Patient    ..................................................               ................................................  Date                                            Time    ..........................................................................................................................................  Reviewed by Signature/Title    ...................................................              ..............................................  Date                                                            Time

## 2018-05-16 NOTE — IP AVS SNAPSHOT
Familia Irving #[id number] (CSN: [id number])  (73 year old M)  (Adm: 18)     [id number]               [facility] UNIT 10A: [phone]            Patient Demographics     Patient Name Sex          Age SSN Address Phone    Familia Irving Male 1945 (72 year old) [id number]  [facility] [address] [phone] (Home)  [phone] (Mobile)      Emergency Contact(s)     Name Relation Home Work Mobile    Silvia Mohr Daughter [phone]    Tacho Irving Son   [phone]      Admission Information     Attending Provider Admitting Provider Admission Type Admission Date/Time    Brijesh Bass MD Sembrano, Jonathan Nubla, MD Elective 18  0532    Discharge Date Hospital Service Auth/Cert Status Service Area     Hospitalist [facility]    Unit Room/Bed Admission Status       [facility]  Admission (Confirmed)       Admission     Complaint    Advanced Cervical Spondylosis with Myelopathy; Secondary Cervical Kyphosis, S/P cervical spinal fusion, Post-operative state      Hospital Account     Name Acct ID Class Status Primary Coverage    Familia Irving [id number] Inpatient Open [facility][note 3]            Guarantor Account (for Hospital Account #[id number])     Name Relation to Pt Service Area Active? Acct Type    Familia Irving Self FCS Yes Other    Address Phone          [address]  [place] [address] [phone](H)              Coverage Information (for Hospital Account #[id number])     F/O Payor/Plan Precert     [employer] [id number]    Subscriber Subscriber #    Familia Irving HADLEY [id number]    Address Phone    [address]  [place] [address] [phone]                                                INTERAGENCY TRANSFER FORM - PHYSICIAN ORDERS   2018                       [facility] UNIT 10A: [phone]            Attending Provider:  "Brijesh Bass MD     Allergies:  Heparin    Infection:  None   Service:  HOSPITALIST    Ht:  1.727 m (5' 8\")   Wt:  69.2 kg (152 lb 8.9 oz)   Admission Wt:  69.2 kg (152 lb 8.9 oz)    BMI:  23.2 kg/m 2   BSA:  1.82 m 2            ED Clinical Impression     Diagnosis Description Comment Added By Time Added    S/P cervical spinal fusion [Z98.1] S/P cervical spinal fusion [Z98.1]  Ebonie Lopez APRN CNP 5/21/2018 10:02 AM      Hospital Problems as of 5/21/2018              Priority Class Noted POA    S/P cervical spinal fusion Medium  5/16/2018 Yes    Post-operative state Medium  5/16/2018 Yes      Non-Hospital Problems as of 5/21/2018              Priority Class Noted    Cervical spondylosis with myelopathy Medium  12/8/2017    Other secondary kyphosis, cervical region Medium  12/8/2017    Myelomalacia of cervical cord (H) Medium  12/8/2017      Code Status History     Date Active Date Inactive Code Status Order ID Comments User Context    5/21/2018 10:12 AM  Full Code [id number]  Ebonie Lopez APRN CNP Outpatient    5/16/2018  7:06 PM 5/21/2018 10:12 AM Full Code [id number]  Jorge A Randolph MD Inpatient      Current Code Status     Date Active Code Status Order ID Comments User Context       Prior      Summary of Visit     Reason for your hospital stay       You had cervical spine surgery                Medication Review      START taking        Dose / Directions Comments    methocarbamol 500 MG tablet   Commonly known as:  ROBAXIN        Dose:  500 mg   Take 1 tablet (500 mg) by mouth 4 times daily as needed for muscle spasms   Quantity:  120 tablet   Refills:  0        oxyCODONE IR 5 MG tablet   Commonly known as:  ROXICODONE        For pain of 1-5 give 5 mg, for pain of 6-10 give 10 mg every 3 hours as needed.   Quantity:  60 tablet   Refills:  0        senna-docusate 8.6-50 MG per tablet   Commonly known as:  SENOKOT-S;PERICOLACE        Dose:  1 tablet   Take 1 tablet by mouth 2 times daily "   Quantity:  100 tablet   Refills:  0          CONTINUE these medications which may have CHANGED, or have new prescriptions. If we are uncertain of the size of tablets/capsules you have at home, strength may be listed as something that might have changed.        Dose / Directions Comments    acetaminophen 325 MG tablet   Commonly known as:  TYLENOL   This may have changed:    - medication strength  - how much to take  - when to take this  - reasons to take this        Dose:  650 mg   Take 2 tablets (650 mg) by mouth every 4 hours as needed for other (multimodal surgical pain management along with NSAIDS and opioid medication as indicated based on pain control and physical function.)   Quantity:  100 tablet   Refills:  0          CONTINUE these medications which have NOT CHANGED        Dose / Directions Comments    amLODIPine 10 MG tablet   Commonly known as:  NORVASC        Dose:  10 mg   Take 10 mg by mouth daily   Refills:  0        ASPIRIN PO        Dose:  81 mg   Take 81 mg by mouth daily   Refills:  0        FOLIC ACID PO        Dose:  1 mg   Take 1 mg by mouth daily   Refills:  0        FUROSEMIDE PO        Dose:  20 mg   Take 20 mg by mouth 2 times daily   Refills:  0        insulin glargine 100 UNIT/ML injection   Commonly known as:  LANTUS        Dose:  30 Units   Inject 30 Units Subcutaneous every morning   Refills:  0        LISINOPRIL PO        Dose:  40 mg   Take 40 mg by mouth At Bedtime   Refills:  0        loperamide 2 MG capsule   Commonly known as:  IMODIUM        Dose:  4 mg   Take 4 mg by mouth as needed for diarrhea   Refills:  0        MAGNESIUM OXIDE PO        Dose:  420 mg   Take 420 mg by mouth Every Mon, Wed, Fri Morning   Refills:  0        mycophenolic acid 360 MG EC tablet        Dose:  720 mg   Take 720 mg by mouth 2 times daily   Refills:  0        PRAVASTATIN SODIUM PO        Dose:  40 mg   Take 40 mg by mouth At Bedtime   Refills:  0        tacrolimus 1 MG capsule        Dose:  1 mg    Take 1 mg by mouth 2 times daily   Refills:  0        VITAMIN D (CHOLECALCIFEROL) PO        Dose:  400 Units   Take 400 Units by mouth daily   Refills:  0                After Care     Activity - Up with nursing assistance           Additional Discharge Instructions       DISCHARGE INSTRUCTIONS:  Activity:   - You may weight bear as tolerated.  - Please avoid lifting >10 lbs, excessive bending, twisting, and strenuous activities.  Brace:   Please wear Guadalupe J (may remove for hygiene and meals) / custom TLSO postoperatively for 3 months when out of bed.  Pain management:   - Please take pain medications as instructed, but it is okay to begin weaning off of them as your pain tolerates. Avoid driving while taking pain medications as they can make you drowsy.  Wound care:   - Your dressing is to remain in place until postoperative day 4 after which you may remove.   - Your stitches will dissolve on their own and do not need to be removed.   - You may shower once your dressing is off. Let water run over the incision and dab dry -- do not rub or submerge the incision under water for at least 2 weeks.   - After the dressing is off, you may leave it open to the air. You may continue to keep the incision covered for protection.   - Please contact us if there is increased drainage, swelling, pain, or redness stemming from your incision. This may be a sign of infection and would need to be looked at immediately.  New or worsening symptoms:  - Please call or seek medical attention for pain that continues to worsen, new onset of numbness or tingling, or extreme swelling.  - Please see a medical provider for new onset of chest pain or shortness of breath. This may be a sign of a heart attack or blood clot in your lungs.   Follow-up:  - After discharge, an appointment will be made for you to return to clinic in approximately 6 weeks for a postoperative check with Dr. Bass.   - Please call [phone] if you have any  questions or concerns.  - Appointments are at [facility]: [address] [place] [address]       Advance Diet as Tolerated       Follow this diet upon discharge:regular adult diet       General info for SNF       Length of Stay Estimate: Short Term Care: Estimated # of Days <30  Condition at Discharge: Improving  Level of care:skilled   Rehabilitation Potential: Good  Admission H&P remains valid and up-to-date: Yes  Recent Chemotherapy: N/A  Use Nursing Home Standing Orders: Yes    BG qid. No sliding scale ordered at this time, as Pt generally takes Lantus only at home  BMP every Monday and Thursay, fax results to Pt's nephrologist at the [facility] [address], [phone]       Mantoux instructions       Give two-step Mantoux (PPD) Per Facility Policy Yes       Weight bearing status       As tolerated             Referrals     Occupational Therapy Adult Consult       Evaluate and treat as clinically indicated.    Reason:  S/p fusion       Physical Therapy Adult Consult       Evaluate and treat as clinically indicated.    Reason:  S/p cervical fusion             Your next 10 appointments already scheduled     Jun 28, 2018 11:45 AM CDT   (Arrive by 11:15 AM)   RETURN NECK with Brijesh Bass MD   [facility] ([facility] [place])    [address] [place] [id number] [phone]            Aug 09, 2018 10:30 AM CDT   (Arrive by 10:00 AM)   RETURN NECK with Brijesh Bass MD   [facility] ([facility] [place])    [address] [place] [id number] [phone]              Statement of Approval     Ordered          05/21/18 1201  I have reviewed and agree with all the recommendations and orders detailed in this document.  EFFECTIVE NOW     Approved and electronically signed by:  Ebonie Lopez APRN CNP                                         "         INTERAGENCY TRANSFER FORM - NURSING   5/16/2018                       [facility] UNIT 10A: [phone]            Attending Provider: Brijesh Bass MD     Allergies:  Heparin    Infection:  None   Service:  HOSPITALIST    Ht:  1.727 m (5' 8\")   Wt:  69.2 kg (152 lb 8.9 oz)   Admission Wt:  69.2 kg (152 lb 8.9 oz)    BMI:  23.2 kg/m 2   BSA:  1.82 m 2            Advance Directives        Scanned docmt in ACP Activity?           No scanned doc (Copy in the chart.)        Immunizations     None      ASSESSMENT     Discharge Profile Flowsheet     EXPECTED DISCHARGE     SKIN      Expected Discharge Date  05/19/18 05/17/18 1519   Inspection of bony prominences  Full 05/21/18 1026    DISCHARGE NEEDS ASSESSMENT     Full except areas not inspected   Buttock, left;Buttock, right;Sacrum;Coccyx (under dressings on neck) 05/21/18 0311    Patient/family verbalizes understanding of discharge plan recommendations?  Yes 05/17/18 1519   Procedural focused assessment (identify areas inspected)   Cheek, left 05/19/18 1928    Medical Team notified of plan?  yes 05/17/18 1519   Inspection under devices  Full except (identify device(s) not inspected) 05/21/18 0311    Anticipated Changes Related to Illness  inability to care for self;inability to care for someone else 05/17/18 1519   Not Inspected under devices  TEDs /Jobst stocking 05/21/18 1026    Equipment Currently Used at Home  cane, straight (shoe horn) 05/19/18 1657   Focused inspection under devices  Oxygen mask/nasal cannula 05/16/18 2041    Transportation Available  family or friend will provide 05/19/18 1657   Skin WDL  ex 05/21/18 1026    GASTROINTESTINAL (ADULT,PEDIATRIC,OB)     Skin Temperature  warm 05/21/18 1026    GI WDL  WDL 05/21/18 1026   Skin Moisture  dry 05/21/18 1026    All Quadrants Bowel Sounds  audible and normoactive 05/21/18 1026   Skin Elasticity  slow return to original state 05/20/18 1953    Last Bowel Movement  05/20/18 05/21/18 1026   Skin " "Integrity  incision(s) 05/21/18 1026    GI Signs/Symptoms  fecal incontinence 05/17/18 0317   SAFETY      Passing flatus  yes 05/21/18 1026   Safety WDL  WDL 05/21/18 1026    COMMUNICATION ASSESSMENT     Safety Equipment  manual resusciator with appropriate mask;oxygen flowmeter 05/18/18 0805    Patient's communication style  spoken language (English or Bilingual) 04/18/18 1038   All Alarms  none present 05/20/18 1953                 Assessment WDL (Within Defined Limits) Definitions           Safety WDL     Effective: 09/28/15    Row Information: <b>WDL Definition:</b> Bed in low position, wheels locked; call light in reach; upper side rails up x 2; ID band on<br> <font color=\"gray\"><i>Item=AS safety wdl>>List=AS safety wdl>>Version=F14</i></font>      Skin WDL     Effective: 09/28/15    Row Information: <b>WDL Definition:</b> Warm; dry; intact; elastic; without discoloration; pressure points without redness<br> <font color=\"gray\"><i>Item=AS skin wdl>>List=AS skin wdl>>Version=F14</i></font>      Vitals     Vital Signs Flowsheet     VITAL SIGNS     Pain Control  partially effective 05/21/18 0312    Temp  97.8  F (36.6  C) 05/21/18 0738   Functioning  can do most things, but pain gets in the way of some 05/21/18 0312    Temp src  Oral 05/21/18 0738   Sleep  normal sleep 05/21/18 0312    Resp  16 05/21/18 0738   ANALGESIA SIDE EFFECTS MONITORING      Pulse  68 05/21/18 0738   Side Effects Monitoring: Respiratory Quality  R 05/20/18 2147    Heart Rate  67 05/21/18 0738   Side Effects Monitoring: Respiratory Depth  N 05/20/18 2147    Pulse/Heart Rate Source  Monitor 05/21/18 0738   Side Effects Monitoring: Sedation Level  1 05/20/18 2147    BP  160/62 05/21/18 0738   HEIGHT AND WEIGHT      BP Location  Left arm 05/21/18 0738   Height  1.727 m (5' 8\") 05/16/18 0549    Patient Position  Sitting 05/16/18 0549   Weight  69.2 kg (152 lb 8.9 oz) 05/16/18 0549    OXYGEN THERAPY     BSA (Calculated - sq m)  1.82 05/16/18 0549 "    SpO2  99 % 05/21/18 0738   BMI (Calculated)  23.24 05/16/18 0549    O2 Device  None (Room air) 05/21/18 0738   POSITIONING      Oxygen Delivery  2 LPM 05/17/18 0333   Body Position  independently positioning 05/21/18 1026    RESPIRATORY MONITORING     Head of Bed (HOB)  HOB at 20-30 degrees;HOB at 30-45 degrees 05/21/18 1026    Respiratory Monitoring (EtCO2)  31 mmHg 05/17/18 0802   Positioning/Transfer Devices  pillows;in use 05/20/18 1953    Integrated Pulmonary Index (IPI)  10 05/17/18 0802   Chair  Upright in chair 05/21/18 1026    PAIN/COMFORT     DAILY CARE      Patient Currently in Pain  no 05/21/18 1016   Activity Management  activity adjusted per tolerance 05/21/18 0311    Preferred Pain Scale  CAPA (Clinically Aligned Pain Assessment) ([facility] Adults Only) 05/21/18 1016   Activity Assistance Provided  assistance, stand-by 05/21/18 1026    Pain Location  Back 05/21/18 0312   Assistive Device Utilized  walker;cane 05/20/18 1953    Pain Orientation  Posterior;Upper 05/20/18 2147   ECG      Pain Descriptors  Aching 05/20/18 2147   ECG Rhythm  Sinus rhythm 05/17/18 0802    Pain Management Interventions  analgesia administered 05/20/18 2147   Ectopy  None 05/17/18 0802    Pain Intervention(s)  Medication (See eMAR) 05/21/18 0312   Lead Monitored  Lead II;V 1 05/17/18 0802    Response to Interventions  Absence of nonverbal indicators of pain 05/20/18 2147   POINT OF CARE TESTING      CLINICALLY ALIGNED PAIN ASSESSMENT (CAPA) ([facility] ADULTS ONLY)     Puncture Site  fingertip 05/17/18 0925    Comfort  comfortably manageable 05/21/18 0312   Bedside Glucose (mg/dl )   207 mg/dl 05/17/18 0925    Change in Pain  getting better 05/21/18 0312                 Patient Lines/Drains/Airways Status    Active LINES/DRAINS/AIRWAYS     Name: Placement date: Placement time: Site: Days: Last dressing change:    Peripheral IV 05/16/18 Right Hand 05/16/18   0645   Hand   5      Incision/Surgical Site 05/16/18 Anterior Neck 05/16/18   0953    5     Incision/Surgical Site 05/16/18 Left Hip 05/16/18   0953    5     Incision/Surgical Site 05/16/18 Anterior Neck 05/16/18   1214    5     Incision/Surgical Site 05/16/18 Posterior;Upper Back 05/16/18   1355    4             Patient Lines/Drains/Airways Status    Active PICC/CVC     None            Intake/Output Detail Report     Date Intake               Output     Net    Shift P.O. I.V. Other IV Piggyback Colloid Platelets Plasma Blood Components Total Urine Drains Blood Total       Day 05/20/18 0000 - 05/20/18 0659 -- -- -- -- -- -- -- -- -- 725 -- -- 725 -725    Devika 05/20/18 0700 - 05/20/18 1459 600 -- -- -- -- -- -- -- 600 -- -- -- -- 600    Noc 05/20/18 1500 - 05/20/18 2359 480 69 -- -- -- -- -- -- 549 -- -- -- -- 549    Day 05/21/18 0000 - 05/21/18 0659 -- -- -- -- -- -- -- -- -- -- -- -- -- 0    Devika 05/21/18 0700 - 05/21/18 1459 -- -- -- -- -- -- -- -- -- -- -- -- -- 0      Last Void/BM       Most Recent Value    Urine Occurrence 1 at 05/18/2018 1345    Stool Occurrence 1 at 05/18/2018 1345      Case Management/Discharge Planning     Case Management/Discharge Planning Flowsheet     LIVING ENVIRONMENT     Transportation Available  family or friend will provide 05/19/18 1657    Lives With  alone 05/19/18 1657   FINAL RESOURCES      Living Arrangements  house 05/19/18 1657   Equipment Currently Used at Home  cane, straight (shoe horn) 05/19/18 1657    COPING/STRESS     / CAREGIVER      Major Change/Loss/Stressor  none 05/16/18 2040   Filed Complexity Screen Score  2 05/17/18 1519    EXPECTED DISCHARGE     ABUSE RISK SCREEN      Expected Discharge Date  05/19/18 05/17/18 1519   QUESTION TO PATIENT:  Has a member of your family or a partner(now or in the past) intimidated, hurt, manipulated, or controlled you in any way?  no 05/16/18 0662    DISCHARGE PLANNING     QUESTION TO PATIENT: Do you feel safe going back to the place where you are  living?  yes 05/16/18 0617    Patient/family verbalizes understanding of discharge plan recommendations?  Yes 05/17/18 1519   OBSERVATION: Is there reason to believe there has been maltreatment of a vulnerable adult (ie. Physical/Sexual/Emotional abuse, self neglect, lack of adequate food, shelter, medical care, or financial exploitation)?  no 05/16/18 0617    Medical Team notified of plan?  yes 05/17/18 1519   OTHER      Anticipated Changes Related to Illness  inability to care for self;inability to care for someone else 05/17/18 1519   Are you depressed or being treated for depression?  No 05/16/18 2039                  [facility] UNIT 10A: [phone]            Medication Administration Report for Familia Irving as of 05/21/18 1219   Legend:    Given Hold Not Given Due Canceled Entry Other Actions    Time Time (Time) Time  Time-Action       Inactive    Active    Linked        Medications 05/15/18 05/16/18 05/17/18 05/18/18 05/19/18 05/20/18 05/21/18    acetaminophen (TYLENOL) tablet 650 mg  Dose: 650 mg  Freq: EVERY 4 HOURS PRN Route: PO  PRN Reason: other  PRN Comment: multimodal surgical pain management along with NSAIDS and opioid medication as indicated based on pain control and physical function.  Start: 05/19/18 0000   Admin Instructions: May give first dose 4 hours after last scheduled dose of acetaminophen.  Maximum acetaminophen dose from all sources = 75 mg/kg/day not to exceed 4 grams/day.    Admin. Amount: 2 tablet (2 × 325 mg tablet)  Last Admin: 05/20/18 0518  Dispense Loc: [facility] ADS 10AE          0518 (650 mg)-Given            amLODIPine (NORVASC) tablet 10 mg  Dose: 10 mg  Freq: DAILY Route: PO  Start: 05/17/18 0800   Admin Instructions: Hold for sbp<120    Admin. Amount: 1 tablet (1 × 10 mg tablet)  Last Admin: 05/21/18 0842  Dispense Loc: [facility] ADS 10AE       (1100)-Not Given        0850 (10 mg)-Given        (0856)-Not Given        0848 (10 mg)-Given        0842 (10 mg)-Given            benzocaine-menthol (CEPACOL) 15-3.6 MG lozenge 1 lozenge  Dose: 1 lozenge  Freq: EVERY 1 HOUR PRN Route: BU  PRN Reason: sore throat  Start: 05/16/18 1906   Admin Instructions: For sore throat without fever.    Admin. Amount: 1 lozenge  Dispense Loc: [facility] ADS 10AE               cholecalciferol (vitamin D3) tablet 400 Units  Dose: 400 Units  Freq: DAILY Route: PO  Start: 05/17/18 0800   Admin. Amount: 1 tablet (1 × 400 Units tablet)  Last Admin: 05/21/18 0842  Dispense Loc: [facility] ADS 10AE       (1151)-Not Given        (0938)-Not Given        (0855)-Not Given        0850 (400 Units)-Given        0842 (400 Units)-Given           folic acid (FOLVITE) tablet 1 mg  Dose: 1 mg  Freq: DAILY Route: PO  Start: 05/17/18 0800   Admin. Amount: 1 tablet (1 × 1 mg tablet)  Last Admin: 05/21/18 0842  Dispense Loc: [facility] ADS 10AE       (1151)-Not Given        (0938)-Not Given        (0854)-Not Given        0850 (1 mg)-Given        0842 (1 mg)-Given           furosemide (LASIX) tablet 20 mg  Dose: 20 mg  Freq: 2 TIMES DAILY. Route: PO  Start: 05/19/18 1045   Admin Instructions: Hold PRN SBP < 110    Admin. Amount: 1 tablet (1 × 20 mg tablet)  Last Admin: 05/21/18 0842  Dispense Loc: [facility] ADS 10AE         1329 (20 mg)-Given       1750 (20 mg)-Given        0849 (20 mg)-Given       1902 (20 mg)-Given        0842 (20 mg)-Given       [ ] 1800           glucose gel 15-30 g  Dose: 15-30 g  Freq: EVERY 15 MIN PRN Route: PO  PRN Reason: low blood sugar  Start: 05/19/18 0932   Admin Instructions: Give 15 g for BG 51 to 69 mg/dL IF patient is conscious and able to swallow. Give 30 g for BG less than or equal to 50 mg/dL IF patient is conscious and able to swallow. Do NOT give glucose gel via enteral tube.  IF patient has enteral tube: give apple juice 120 mL (4 oz or 15 g of CHO) via enteral tube for BG 51 to 69 mg/dL.  Give apple juice 240 mL (8 oz or 30 g of CHO) via enteral tube for BG less than or equal to 50 mg/dL.    ~Oral gel  is preferable for conscious and able to swallow patient.   ~IF gel unavailable or patient refuses may provide apple juice 120 mL (4 oz or 15 g of CHO). Document juice on I and O flowsheet.    Admin. Amount: 15-30 g  Dispense Loc: [facility] ADS 10AE  Volume: 93.75 mL              Or  dextrose 50 % injection 25-50 mL  Dose: 25-50 mL  Freq: EVERY 15 MIN PRN Route: IV  PRN Reason: low blood sugar  Start: 05/19/18 0932   Admin Instructions: Use if have IV access, BG less than 70 mg/dL and meet dose criteria below:  Dose if conscious and alert (or disorientated) and NPO = 25 mL  Dose if unconscious / not alert = 50 mL  Vesicant. For ordered doses up to 25 g, give IV Push undiluted. Give each 5g over 1 minute.    Admin. Amount: 25-50 mL  Dispense Loc: [facility] ADS 10AE  Infused Over: 1-5 Minutes  Volume: 50 mL              Or  glucagon injection 1 mg  Dose: 1 mg  Freq: EVERY 15 MIN PRN Route: SC  PRN Reason: low blood sugar  PRN Comment: May repeat x 1 only  Start: 05/19/18 0932   Admin Instructions: May give SQ or IM. ONLY use glucagon IF patient has NO IV access AND is UNABLE to swallow AND blood glucose is LESS than or EQUAL to 50 mg/dL.  If ordered IV, give IV Push over 1 minute. Reconstitute with 1mL sterile water.    Admin. Amount: 1 mg  Dispense Loc: [facility] ADS 10AE               hydrALAZINE (APRESOLINE) injection 10 mg  Dose: 10 mg  Freq: EVERY 6 HOURS PRN Route: IV  PRN Reason: high blood pressure  PRN Comment: sbp>170 or dbp>110  Start: 05/16/18 2040   Admin Instructions: For ordered doses up to 40 mg, give IV Push undiluted over 1 minute.    Admin. Amount: 10 mg = 0.5 mL Conc: 20 mg/mL  Dispense Loc: [facility] ADS 10AE  Volume: 0.5 mL               HYDROmorphone (PF) (DILAUDID) injection 0.3-0.5 mg  Dose: 0.3-0.5 mg  Freq: EVERY 3 HOURS PRN Route: IV  PRN Reason: other  PRN Comment: pain control or improvement in physical function.  Hold dose for analgesic side effects.  Start: 05/16/18 3896   Admin Instructions:  "Notify provider to assess for uncontrolled pain or analgesic side effects. Hold while on IV PCA or with regular IV opioid dosing.  For ordered doses up to 4 mg give IV Push undiluted. Administer each 2mg over 2-5 minutes.    Admin. Amount: 0.3-0.5 mg  Last Admin: 05/18/18 1058  Dispense Loc: [facility] ADS 10AE   Current Line: Peripheral IV 05/16/18 Right Hand     1947 (0.3 mg)-Given       2154 (0.2 mg)-Given [C]        0245 (0.5 mg)-Given       1108 (0.5 mg)-Given       1635 (0.5 mg)-Given       2131 (0.5 mg)-Given        0215 (0.5 mg)-Given       1058 (0.5 mg)-Given              hypromellose-dextran (ARTIFICAL TEARS) 0.1-0.3 % ophthalmic solution 1 drop  Dose: 1 drop  Freq: EVERY 1 HOUR PRN Route: Both Eyes  PRN Reason: dry eyes  Start: 05/16/18 2116   Admin. Amount: 1 drop  Dispense Loc: [facility]  Volume: 15 mL               insulin aspart (NovoLOG) inj (RAPID ACTING)  Freq: WITH SNACKS OR SUPPLEMENTS Route: SC  PRN Reason: high blood sugar  Start: 05/19/18 0934   Admin Instructions: DOSE:  1 units per 10 grams of carbohydrate. Only chart total amount of units given.  Do not give if pre-prandial glucose is less than 60 mg/dL.  If given at mealtime, must be administered 5 min before meal or immediately after.    Dispense Loc: Contact Rx for dose  Volume: 3 mL           (0844)-Not Given [C]           insulin glargine (LANTUS) injection 30 Units  Dose: 30 Units  Freq: EVERY MORNING BEFORE BREAKFAST Route: SC  Start: 05/21/18 1000   Admin Instructions: DC insulin infusion 2 hrs after lantus given.    Admin. Amount: 30 Units  Last Admin: 05/21/18 0956  Dispense Loc: Contact Rx for dose  Volume: 3 mL           0956 (30 Units)-Given           lidocaine (LMX4) kit  Freq: EVERY 1 HOUR PRN Route: Top  PRN Reason: pain  PRN Comment: with VAD insertion or accessing implanted port.  Start: 05/16/18 1906   Admin Instructions: Do NOT give if patient has a history of allergy to any local anesthetic or any \"sandrita\" " "product.   Apply 30 minutes prior to VAD insertion or port access.  MAX Dose:  2.5 g (  of 5 g tube)    Dispense Loc: [facility] Floor Stock               lidocaine 1 % 1 mL  Dose: 1 mL  Freq: EVERY 1 HOUR PRN Route: OTHER  PRN Comment: mild pain with VAD insertion or accessing implanted port  Start: 05/16/18 1906   Admin Instructions: Do NOT give if patient has a history of allergy to any local anesthetic or any \"sandrita\" product. MAX dose 1 mL subcutaneous OR intradermal in divided doses.    Admin. Amount: 1 mL  Dispense Loc: [facility] ADS 10AE  Volume: 2 mL               lisinopril (PRINIVIL/ZESTRIL) tablet 20 mg  Dose: 20 mg  Freq: DAILY Route: PO  Start: 05/20/18 0930   Admin. Amount: 1 tablet (1 × 20 mg tablet)  Last Admin: 05/21/18 0842  Dispense Loc: [facility] ADS 10AE          0944 (20 mg)-Given        0842 (20 mg)-Given           magnesium oxide (MAG-OX) tablet 400 mg  Dose: 400 mg  Freq: EVERY MONDAY WEDNESDAY AND FRIDAY MORNING Route: PO  Start: 05/18/18 0800   Admin. Amount: 1 tablet (1 × 400 mg tablet)  Last Admin: 05/21/18 0842  Dispense Loc: [facility] ADS 10AE        (0938)-Not Given          0842 (400 mg)-Given           magnesium sulfate 4 g in 100 mL sterile water (premade)  Dose: 4 g  Freq: EVERY 4 HOURS PRN Route: IV  PRN Reason: magnesium supplementation  Last Dose: 4 g (05/17/18 0059)  Start: 05/17/18 0049   Admin Instructions: For serum Mg++ less than 1.6 mg/dL  Give 4 g and recheck magnesium level 2 hours after dose, and next AM.    Admin. Amount: 4 g = 100 mL Conc: 4 g/100 mL  Last Admin: 05/17/18 0059  Dispense Loc: [facility]  Infused Over: 120 Minutes  Volume: 100 mL       0059 (4 g)-New Bag               methocarbamol (ROBAXIN) tablet 500 mg  Dose: 500 mg  Freq: 4 TIMES DAILY PRN Route: PO  PRN Reason: muscle spasms  Start: 05/16/18 1906   Admin Instructions: Hold for sedation.    Admin. Amount: 1 tablet (1 × 500 mg tablet)  Dispense Loc: [facility] ADS 10AE               mycophenolic acid " (MYFORTIC BRAND) EC tablet 720 mg  Dose: 720 mg  Freq: 2 TIMES DAILY. Route: PO  Start: 05/18/18 0900   Admin Instructions: DO NOT CRUSH or split.  Give on an empty stomach.  Check if BLOOD LEVEL is needed BEFORE administering dose.  This order is specifically written for MYFORTIC (BRAND NAME) tablet.    Admin. Amount: 2 tablet (2 × 360 mg tablet)  Last Admin: 05/21/18 0853  Dispense Loc: [facility] ADS 10AE        (1137)-Not Given [C]       (1819)-Not Given [C]        (0855)-Not Given [C]       1427 (720 mg)-Given [C]       2200-Hold [C]        0849 (720 mg)-Given              2010 (720 mg)-Given               0853 (720 mg)-Given              [ ] 2200           naloxone (NARCAN) injection 0.1-0.4 mg  Dose: 0.1-0.4 mg  Freq: EVERY 2 MIN PRN Route: IV  PRN Reason: opioid reversal  Start: 05/16/18 2031   Admin Instructions: For respiratory rate LESS than or EQUAL to 8.  Partial reversal dose:  0.1 mg titrated q 2 minutes for Analgesia Side Effects Monitoring Sedation Level of 3 (frequently drowsy, arousable, drifts to sleep during conversation).Full reversal dose:  0.4 mg bolus for Analgesia Side Effects Monitoring Sedation Level of 4 (somnolent, minimal or no response to stimulation).  For ordered doses up to 2mg give IVP. Give each 0.4mg over 15 seconds in emergency situations. For non-emergent situations further dilute in 9mL of NS to facilitate titration of response.    Admin. Amount: 0.1-0.4 mg = 0.25-1 mL Conc: 0.4 mg/mL  Dispense Loc: [facility] ADS 10AE  Volume: 1 mL               ondansetron (ZOFRAN-ODT) ODT tab 4 mg  Dose: 4 mg  Freq: EVERY 6 HOURS PRN Route: PO  PRN Reasons: nausea,vomiting  Start: 05/16/18 1906   Admin Instructions: This is Step 1 of nausea and vomiting management.  If nausea not resolved in 15 minutes, go to Step 2 prochlorperazine (COMPAZINE). Do not push through foil backing. Peel back foil and gently remove. Place on tongue immediately. Administration with liquid unnecessary  With dry hands,  peel back foil backing and gently remove tablet; do not push oral disintegrating tablet through foil backing; administer immediately on tongue and oral disintegrating tablet dissolves in seconds; then swallow with saliva; liquid not required.    Admin. Amount: 1 tablet (1 × 4 mg tablet)  Dispense Loc: [facility] ADS 10AE                     Or  ondansetron (ZOFRAN) injection 4 mg  Dose: 4 mg  Freq: EVERY 6 HOURS PRN Route: IV  PRN Reasons: nausea,vomiting  Start: 05/16/18 1906   Admin Instructions: This is Step 1 of nausea and vomiting management.  If nausea not resolved in 15 minutes, go to Step 2 prochlorperazine (COMPAZINE).  Irritant. For ordered doses up to 4 mg, give IV Push undiluted over 2-5 minutes.    Admin. Amount: 4 mg = 2 mL Conc: 4 mg/2 mL  Last Admin: 05/16/18 2203  Dispense Loc: [facility] ADS 10AE  Infused Over: 2-5 Minutes  Volume: 2 mL      2203 (4 mg)-Given                oxyCODONE IR (ROXICODONE) tablet 5-10 mg  Dose: 5-10 mg  Freq: EVERY 3 HOURS PRN Route: PO  PRN Reason: other  PRN Comment: pain control or improvement in physical function. Hold dose for analgesic side effects.  Start: 05/16/18 1906   Admin Instructions: Notify provider to assess for uncontrolled pain or analgesic side effects. Hold while on PCA or with regular IV opioid dosing.  Maximum total  is 40 mg in 24 hours.    Admin. Amount: 1-2 tablet (1-2 × 5 mg tablet)  Last Admin: 05/21/18 0645  Dispense Loc: [facility] ADS 10AE       0744 (5 mg)-Given        1330 (5 mg)-Given        0503 (5 mg)-Given       0821 (5 mg)-Given       1424 (5 mg)-Given       2354 (5 mg)-Given        0518 (5 mg)-Given       0814 (5 mg)-Given       1602 (5 mg)-Given       2009 (5 mg)-Given        0222 (5 mg)-Given       0645 (5 mg)-Given           pravastatin (PRAVACHOL) tablet 40 mg  Dose: 40 mg  Freq: AT BEDTIME Route: PO  Start: 05/16/18 2200   Admin. Amount: 2 tablet (2 × 20 mg tablet)  Last Admin: 05/20/18 2010  Dispense Loc: [facility] ADS 10AE      (7752)-Not  Given                (2109)-Not Given        (2233)-Not Given        2010 (40 mg)-Given               [ ] 2200           prochlorperazine (COMPAZINE) injection 5 mg  Dose: 5 mg  Freq: EVERY 6 HOURS PRN Route: IV  PRN Reasons: nausea,vomiting  Start: 05/16/18 1752   Admin Instructions: This is Step 2 of nausea and vomiting management.   If nausea not resolved in 15 minutes, give metoclopramide (REGLAN) if ordered [step 3 of nausea and vomiting management].  For ordered doses up to 10 mg, give IV Push undiluted. Each 5mg over 1 minute.    Admin. Amount: 5 mg = 1 mL Conc: 5 mg/mL  Last Admin: 05/16/18 2311  Dispense Loc: [facility] ADS 10AE  Infused Over: 1-2 Minutes  Volume: 1 mL      2311 (5 mg)-Given                senna-docusate (SENOKOT-S;PERICOLACE) 8.6-50 MG per tablet 1 tablet  Dose: 1 tablet  Freq: 2 TIMES DAILY Route: PO  Start: 05/16/18 1906   Admin Instructions: If no bowel movement in 24 hours, increase to 2 tablets PO.  Hold for loose stools.    Admin. Amount: 1 tablet  Dispense Loc: [facility] ADS 10AE      (2128)-Not Given        (0902)-Not Given               (0938)-Not Given                      (2114)-Not Given               (2024)-Not Given [C]        (0843)-Not Given       [ ] 2000          Or  senna-docusate (SENOKOT-S;PERICOLACE) 8.6-50 MG per tablet 2 tablet  Dose: 2 tablet  Freq: 2 TIMES DAILY Route: PO  Start: 05/16/18 1906   Admin Instructions: Hold for loose stools.    Admin. Amount: 2 tablet  Dispense Loc: [facility] ADS 10AE                                    (2109)-Not Given        (0855)-Not Given               (0855)-Not Given                      [ ] 2000           sodium bicarbonate tablet 650 mg  Dose: 650 mg  Freq: 2 TIMES DAILY Route: PO  Start: 05/19/18 0800   Admin. Amount: 1 tablet (1 × 650 mg tablet)  Last Admin: 05/21/18 0842  Dispense Loc: [facility] ADS 10AE         0811 (650 mg)-Given       2113 (650 mg)-Given        0847 (650 mg)-Given       2009 (650 mg)-Given        0842 (650  mg)-Given       [ ] 2000           sodium chloride (PF) 0.9% PF flush 3 mL  Dose: 3 mL  Freq: EVERY 8 HOURS Route: IK  Start: 05/16/18 1915   Admin Instructions: And Q1H PRN, to lock peripheral IV dormant line.    Admin. Amount: 3 mL  Last Admin: 05/20/18 1905  Dispense Loc: [facility] Floor Stock  Volume: 3 mL   Current Line: Peripheral IV 05/16/18 Right Hand     1946 (3 mL)-Given        0246 (3 mL)-Given       (1231)-Not Given               (0355)-Not Given       (1104)-Not Given       (1820)-Not Given        (0244)-Not Given       (1127)-Not Given       (2113)-Not Given        (0640)-Not Given              1905 (3 mL)-Given        (0315)-Not Given       [ ] 1115       [ ] 1915           sodium chloride (PF) 0.9% PF flush 3 mL  Dose: 3 mL  Freq: EVERY 1 HOUR PRN Route: IK  PRN Reason: line flush  PRN Comment: for peripheral IV flush post IV meds  Start: 05/16/18 1906   Admin. Amount: 3 mL  Dispense Loc: [facility] Floor Stock  Volume: 3 mL               tacrolimus (PROGRAF BRAND) suspension 1 mg  Dose: 1 mg  Freq: 2 TIMES DAILY. Route: PO  Start: 05/17/18 1300   Admin Instructions: Shake well. Check if BLOOD LEVEL is needed BEFORE administering dose.  As this medication is not commercially available as a liquid, [facility] manufactures this product using tacrolimus (PROGRAF BRAND) capsules.    Admin. Amount: 1 mg = 1 mL Conc: 1 mg/mL  Last Admin: 05/21/18 0853  Dispense Loc: [facility]  Volume: 1 mL                      0850 (1 mg)-Given       1819 (1 mg)-Given        0811 (1 mg)-Given       2235 (1 mg)-Given        0850 (1 mg)-Given              2011 (1 mg)-Given               0853 (1 mg)-Given              [ ] 2200          Discontinued Medications  Medications 05/15/18 05/16/18 05/17/18 05/18/18 05/19/18 05/20/18 05/21/18         Dose: 975 mg  Freq: EVERY 8 HOURS Route: PO  Start: 05/16/18 1915   End: 05/19/18 1914   Admin Instructions: Do not use if patient has an active opioid/acetaminophen combined  analgesic product ordered for pain.  Maximum acetaminophen dose from all sources = 75 mg/kg/day not to exceed 4 grams/day.    Admin. Amount: 3 tablet (3 × 325 mg tablet)  Dispense Loc: [facility] ADS 10AE  Administrations Remainin      ()-Not Given        (244)-Not Given [C]       (1230)-Not Given               (427)-Not Given [C]       ()-Not Given       ()-Not Given        (244)-Not Given       ()-Not Given       -Med Discontinued           Freq: CONTINUOUS PRN Route: IV  PRN Comment: Give if on IV Insulin Infusion, and Parenteral or Enteral nutrition held or cycled off.   Start: 18 165   End: 18   Admin Instructions: Infuse IV D10W at TPN/TF rate whenever nutrition is held or cycled off.    Dispense Loc: [facility] ADS 10AE  Volume: 1,000 mL   Mixture Administration Information:   Medication Type Amount   dextrose 10 % SOLN Base 1,000 mL                  0938-Med Discontinued          Rate: 60 mL/hr   Freq: CONTINUOUS Route: IV  Start: 18 1100   End: 18 09   Last Admin: 18 1844  Dispense Loc: [facility] Floor Stock  Volume: 1,000 mL   Current Line: Peripheral IV 18 Right Hand        1127 ( )-Rate/Dose Change       1844 ( )-New Bag        0927-Med Discontinued          Rate: 60 mL/hr   Freq: CONTINUOUS Route: IV  Start: 18 1430   End: 18 1049   Last Admin: 18 1456  Dispense Loc: [facility] Floor Stock  Volume: 1,000 mL   Current Line: Peripheral IV 18 Right Hand       1456 ( )-New Bag        1049-Med Discontinued                 Dose: 15-30 g  Freq: EVERY 15 MIN PRN Route: PO  PRN Reason: low blood sugar  Start: 18 0719   End: 18 0941   Admin Instructions: Give 15 g for BG 51 to 69 mg/dL IF patient is conscious and able to swallow. Give 30 g for BG less than or equal to 50 mg/dL IF patient is conscious and able to swallow. Do NOT give glucose gel via enteral tube.  IF patient has enteral tube: give apple juice 120 mL (4  oz or 15 g of CHO) via enteral tube for BG 51 to 69 mg/dL.  Give apple juice 240 mL (8 oz or 30 g of CHO) via enteral tube for BG less than or equal to 50 mg/dL.    ~Oral gel is preferable for conscious and able to swallow patient.   ~IF gel unavailable or patient refuses may provide apple juice 120 mL (4 oz or 15 g of CHO). Document juice on I and O flowsheet.    Admin. Amount: 15-30 g  Dispense Loc: [facility] ADS 10AE  Volume: 93.75 mL          41-Med Discontinued       Or    Dose: 25-50 mL  Freq: EVERY 15 MIN PRN Route: IV  PRN Reason: low blood sugar  Start: 05/17/18 0719   End: 05/20/18 0941   Admin Instructions: Use if have IV access, BG less than 70 mg/dL and meet dose criteria below:  Dose if conscious and alert (or disorientated) and NPO = 25 mL  Dose if unconscious / not alert = 50 mL  Vesicant. For ordered doses up to 25 g, give IV Push undiluted. Give each 5g over 1 minute.    Admin. Amount: 25-50 mL  Dispense Loc: [facility] ADS 10AE  Infused Over: 1-5 Minutes  Volume: 50 mL          [facility]-Med Discontinued       Or    Dose: 1 mg  Freq: EVERY 15 MIN PRN Route: SC  PRN Reason: low blood sugar  PRN Comment: May repeat x 1 only  Start: 05/17/18 0719   End: 05/20/18 0941   Admin Instructions: May give SQ or IM. ONLY use glucagon IF patient has NO IV access AND is UNABLE to swallow AND blood glucose is LESS than or EQUAL to 50 mg/dL.  If ordered IV, give IV Push over 1 minute. Reconstitute with 1mL sterile water.    Admin. Amount: 1 mg  Dispense Loc: [facility] ADS 10AE          0941-Med Discontinued          Rate: 0-24 mL/hr Dose: 0-24 Units/hr  Freq: CONTINUOUS Route: IV  Last Dose: Stopped (05/19/18 1328)  Start: 05/17/18 1715   End: 05/20/18 0941   Admin Instructions: Initiate drip with Algorithm #1 (see hyperlink to protocol). Start protocol only if glucose greater than 150 mg/dL. Maintain glucose level between 100-150 mg/dL.  Discontinue when glycemic control achieved and transitioning to SQ insulin, or  Insulin therapy no longer required. When blood glucose has stabilized and patient is tolerating PO intake, call provider for transition to SQ insulin.  For Infusion Instructions, Click on ADULT DRIP PROTOCOL hyperlink below.    Last Admin: 05/19/18 1221  Dispense Loc: [facility]  Volume: 30 mL       1957 (1 Units/hr)-New Bag [C]       2139 (1 Units/hr)-Rate/Dose Verify [C]       2200 (1 Units/hr)-Rate/Dose Verify [C]       2320 (0.5 Units/hr)-Rate/Dose Change [C]        0013 (0.5 Units/hr)-Rate/Dose Verify [C]       0112 (0.5 Units/hr)-Rate/Dose Verify [C]       0210 (1 Units/hr)-Rate/Dose Change [C]       0312 (0.5 Units/hr)-Rate/Dose Change [C]       0422 (1 Units/hr)-Rate/Dose Change [C]       0525 (1 Units/hr)-Rate/Dose Verify [C]       0615 (0.5 Units/hr)-Rate/Dose Change [C]       0719 (0.5 Units/hr)-Rate/Dose Verify [C]       0805 (0.5 Units/hr)-Rate/Dose Verify [C]       0941 (1 Units/hr)-Rate/Dose Change [C]       1049 (0.5 Units/hr)-Rate/Dose Change [C]       1155 (1 Units/hr)-Rate/Dose Change [C]       1255 (1 Units/hr)-Rate/Dose Verify [C]       1404 (1.5 Units/hr)-Rate/Dose Change [C]       1501 (1.5 Units/hr)-Rate/Dose Verify [C]       1611 (1 Units/hr)-Rate/Dose Change [C]       1716 (1.5 Units/hr)-Rate/Dose Change [C]       1812 (1.5 Units/hr)-Rate/Dose Verify [C]       1937 (1.5 Units/hr)-Rate/Dose Verify [C]       2105 (2.5 Units/hr)-Rate/Dose Change [C]       2227 (1.5 Units/hr)-New Bag [C]       2328 (1.5 Units/hr)-Rate/Dose Verify [C]        0035 (1 Units/hr)-Rate/Dose Change [C]       0137 (1 Units/hr)-Rate/Dose Verify [C]       0234 (1 Units/hr)-Rate/Dose Verify [C]       0331 (1 Units/hr)-Rate/Dose Verify [C]       0505 (0.5 Units/hr)-Rate/Dose Change [C]       0613 (1 Units/hr)-Rate/Dose Change [C]       0712 (1 Units/hr)-Rate/Dose Verify [C]       0809 (1 Units/hr)-Rate/Dose Verify [C]       0904 (1 Units/hr)-Rate/Dose Verify [C]       1009 (1 Units/hr)-Rate/Dose Verify [C]        1104 (2 Units/hr)-Rate/Dose Change [C]       1221 (1.5 Units/hr)-New Bag [C]       1328-Stopped        0941-Med Discontinued          Freq: 3 TIMES DAILY WITH MEALS Route: SC  Start: 05/19/18 0945   End: 05/21/18 0759   Admin Instructions: DOSE:  1 units per 10 grams of carbohydrate.  Only chart total amount of units given.  Do not give if pre-prandial glucose is less than 60 mg/dL.  If given at mealtime, must be administered 5 min before meal or immediately after.    Last Admin: 05/20/18 1903  Dispense Loc: Contact Rx for dose  Volume: 3 mL                (1144)-Not Given [C]       1757 (8 Units)-Given [C]        0944 (5 Units)-Given [C]       1349 (5 Units)-Given [C]       1903 (7 Units)-Given [C]        0759-Med Discontinued         Dose: 1-7 Units  Freq: AT BEDTIME Route: SC  Start: 05/19/18 2200   End: 05/21/18 0759   Admin Instructions: HIGH INSULIN RESISTANCE DOSING    Do Not give Bedtime Correction Insulin if BG less than 200.   For  - 224 give 1 units.   For  - 249 give 2 units.   For  - 274 give 3 units.   For  - 299 give 4 units.   For  - 324 give 5 units.   For  - 349 give 6 units.   For BG greater than or equal to 350 give 7 units.   Notify provider if glucose greater than or equal to 350 mg/dL after administration of correction dose.  If given at mealtime, must be administered 5 min before meal or immediately after.    Admin. Amount: 1-7 Units  Last Admin: 05/19/18 2236  Dispense Loc: Contact Rx for dose  Volume: 3 mL         2236 (1 Units)-Given [C]        (2148)-Not Given [C]        0759-Med Discontinued         Dose: 1-10 Units  Freq: 3 TIMES DAILY BEFORE MEALS Route: SC  Start: 05/19/18 1200   End: 05/21/18 0759   Admin Instructions: Correction Scale - HIGH INSULIN RESISTANCE DOSING     Do Not give Correction Insulin if Pre-Meal BG less than 140.   For Pre-Meal  - 164 give 1 unit.   For Pre-Meal  - 189 give 2 units.   For Pre-Meal  - 214 give  3 units.   For Pre-Meal  - 239 give 4 units.   For Pre-Meal  - 264 give 5 units.   For Pre-Meal  - 289 give 6 units.   For Pre-Meal  - 314 give 7 units.   For Pre-Meal  - 339 give 8 units.   For Pre-Meal  - 364 give 9 units.   For Pre-Meal BG greater than or equal to 365 give 10 units  To be given with prandial insulin, and based on pre-meal blood glucose.   Notify provider if glucose greater than or equal to 350 mg/dL after administration of correction dose.  If given at mealtime, must be administered 5 min before meal or immediately after.    Admin. Amount: 1-10 Units  Last Admin: 05/20/18 0944  Dispense Loc: Contact Rx for dose  Volume: 3 mL         (1144)-Not Given [C]       1756 (2 Units)-Given [C]        0944 (2 Units)-Given [C]       (1249)-Not Given [C]       (1649)-Not Given [C]        0759-Med Discontinued  (0946)-Not Given             Dose: 15 Units  Freq: EVERY MORNING BEFORE BREAKFAST Route: SC  Start: 05/21/18 0912   End: 05/21/18 0953   Admin Instructions: DC insulin infusion 2 hrs after lantus given.    Admin. Amount: 15 Units  Dispense Loc: Contact Rx for dose  Volume: 3 mL           0953-Med Discontinued  (0956)-Not Given             Dose: 15 Units  Freq: EVERY MORNING BEFORE BREAKFAST Route: SC  Start: 05/22/18 0730   End: 05/21/18 0913   Admin Instructions: DC insulin infusion 2 hrs after lantus given.    Admin. Amount: 15 Units  Volume: 0.15 mL           0913-Med Discontinued         Dose: 24 Units  Freq: EVERY MORNING BEFORE BREAKFAST Route: SC  Start: 05/19/18 0945   End: 05/21/18 0912   Admin Instructions: DC insulin infusion 2 hrs after lantus given.    Admin. Amount: 24 Units  Last Admin: 05/20/18 0850  Dispense Loc: Contact Rx for dose  Volume: 3 mL         1124 (24 Units)-Given        0850 (24 Units)-Given        (0843)-Not Given       0912-Med Discontinued         Rate: 60 mL/hr   Freq: CONTINUOUS Route: IV  Last Dose: Stopped (05/18/18 1404)  Start:  05/16/18 1800   End: 05/19/18 1048   Admin Instructions: Continue until IV catheter is weaned    Last Admin: 05/18/18 1332  Dispense Loc: [facility] Floor Stock  Volume: 1,000 mL   Current Line: Peripheral IV 05/16/18 Right Hand     1946 ( )-Rate/Dose Verify        0245 ( )-New Bag       0300 ( )-Rate/Dose Verify       0400 ( )-Rate/Dose Verify       0739 ( )-Rate/Dose Change       0800 ( )-Rate/Dose Verify       0900 ( )-Rate/Dose Verify       1407 ( )-New Bag        0029 ( )-New Bag       0859 ( )-Rate/Dose Change       1332 ( )-New Bag       1404-Stopped [C]        1048-Med Discontinued           Dose: 10 mg  Freq: DAILY Route: PO  Start: 05/18/18 0900   End: 05/18/18 1424   Admin Instructions: Hold prn SBP < 110    Admin. Amount: 1 tablet (1 × 10 mg tablet)  Last Admin: 05/18/18 1122  Dispense Loc: [facility] ADS 10AE        1122 (10 mg)-Given       1424-Med Discontinued            Dose: 40 mg  Freq: HOLD Route: PO  PRN Comment: until resumed  Start: 05/16/18 2030   End: 05/18/18 1343   Admin. Amount: 1 tablet (1 × 40 mg tablet)  Dispense Loc: [facility]      2034-Rx hold          1343-Unhold       1343-Med Discontinued            Dose: 20-40 mEq  Freq: EVERY 2 HOURS PRN Route: ORAL OR FEED  PRN Reason: potassium supplementation  Start: 05/17/18 0049   End: 05/18/18 1343   Admin Instructions: Use if unable to tolerate tablets.  If Serum K+ 3.0-3.3, dose = 60 mEq po total dose (40 mEq x1 followed in 2 hours by 20 mEq x1). Recheck K+ level 4 hours after dose and the next AM.  If Serum K+ 2.5-2.9, dose = 80 mEq po total dose (40 mEq Q2H x2). Recheck K+ level 4 hours after dose and the next AM.  If Serum K+ less than 2.5, See IV order.  Dissolve packet contents in 4-8 ounces of cold water or juice.    Admin. Amount: 20-40 mEq  Dispense Loc: [facility] ADS 10AE        1343-Med Discontinued            Dose: 10 mEq  Freq: EVERY 1 HOUR PRN Route: IV  PRN Reason: potassium supplementation  Start: 05/17/18 0049   End:  18 1343   Admin Instructions: Infuse via PERIPHERAL LINE or CENTRAL LINE. Use for central line replacement if patient weight less than 65 kg, if patient is on TPN with high potassium content or if unit does not stock 20 mEq bags.   If Serum K+ 3.0-3.3, dose = 10 mEq/hr x4 doses (40 mEq IV total dose). Recheck K+ level 2 hours after dose and the next AM.   If Serum K+ less than 3.0, dose = 10 mEq/hr x6 doses (60 mEq IV total dose). Recheck K+ level 2 hours after dose and the next AM.    Admin. Amount: 10 mEq = 100 mL Conc: 10 mEq/100 mL  Dispense Loc: [facility] ADS 10AE  Infused Over: 60 Minutes  Volume: 100 mL        1343-Med Discontinued            Dose: 20-40 mEq  Freq: EVERY 2 HOURS PRN Route: PO  PRN Reason: potassium supplementation  Start: 18   End: 18   Admin Instructions: Use if able to take PO.   If Serum K+ 3.0-3.3, dose = 60 mEq po total dose (40 mEq x1 followed in 2 hours by 20 mEq x1). Recheck K+ level 4 hours after dose and the next AM.  If Serum K+ 2.5-2.9, dose = 80 mEq po total dose (40 mEq Q2H x2). Recheck K+ level 4 hours after dose and the next AM.  If Serum K+ less than 2.5, See IV order.  DO NOT CRUSH.    Admin. Amount: 2-4 tablet (2-4 × 10 mEq tablet)  Dispense Loc: [facility] 10AE        1343-Med Discontinued            Start: 18 1249   End: 18   Admin Instructions: June Hernandez : cabinet override    Administrations Remainin                0059-Med Discontinued      Medications 05/15/18 05/16/18 05/17/18 05/18/18 05/19/18 05/20/18 05/21/18               INTERAGENCY TRANSFER FORM - NOTES (H&P, Discharge Summary, Consults, Procedures, Therapies)   2018                       [facility] UNIT 10A: [phone]               History & Physicals      H&P by Tray Shay MD at 2018  7:22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 and that Mr. Irving is followed by Dr. Suggs at the [facility] for his renal transplant.  Called Mr. Irving back.  He reported he was on the renal unit [facility].  Gave me phone number to renal floor at [facility].  Called renal floor and spoke with charge nurse, Gem.  Discussed abnormal lab values and the need for the patient to be evaluated emergently given concern for potential harm to his transplanted kidney.  She reported she would call Dr. Suggs and take over from here.          CHRISS Royal CNP  Preoperative Assessment Center  [facility]  Clinic and Surgery Center  Phone: [phone]  Fax: [phone][SE1.1]     Revision History        User Key Date/Time User Provider Type Action    > SE1.1 4/18/2018  4:08 PM Yocasta Luis APRN CNP Nurse Practitioner Sign                     Discharge Summaries      Discharge Summaries by Kenneth Rabago MD at 5/21/2018 11:43 AM     Author:  Kenneth Rabago MD Service:  Orthopedics Author Type:  Resident    Filed:  5/21/2018 11:45 AM " Date of Service:  5/21/2018 11:43 AM Creation Time:  5/21/2018 11:42 AM    Status:  Cosign Needed :  Kenneth Rabago MD (Resident)    Cosign Required:  Yes             Orthopaedic Surgery Discharge Summary    Date of Admission: 5/16/2018  Date of Discharge:[ER1.1] 05/21/18[ER1.2]    Attending Physician: Dr. Bass    Admission Diagnosis:  Advanced Cervical Spondylosis with Myelopathy; Secondary Cervical Kyphosis  S/P cervical spinal fusion  Post-operative state    Discharge Diagnosis:  same    Procedures Performed:  Part 1 - anterior:  1.  Anterior cervical diskectomy and fusion (ACDF) C3-4, C4-5, C5-6, C6-7 and C7-T1 (five levels), using iliac crest autograft (C3-C7) and demineralized bone matrix (C7-T1).             .   2.  Placement of interbody PEEK cage C3-4, C4-5, C5-6, C6-7 and C7-T1.   3.  Left anterior iliac crest cancellous bone graft harvest via separate skin incision.   4.  Placement and subsequent removal of Nguyen-Hebron cranial tongs for operative positioning (bivector traction) [parts 1 and 2].  5.  Use of operating microscope.   6.  Non-surgeon performed intraoperative neuromonitoring using SSEPs, tcMEPs and bilateral UE EMGs ([email or web address]) [parts 1 and 2].      Part 2 - posterior:  1.  Posterior spinal fusion C2-T2 (bilateral transfacet C2-T2; interlaminar T1-2), using local autograft and crushed cancellous allograft.  2.  Open bilateral segmental posterior fixation C2-T2, using C2 pars screws, cervical pedicle screws (C4,C5,C6,C7) and thoracic pedicle screws (T1,T2).  3.  Decompressive laminectomies C3,C4,C5,C6 and C7 (5 levels).  4.  Computer navigation using [employer]-arm and Stealth.    Brief History of Present Illness:  73 year old male with progressive neck pain, left arm/hand numbness, and clumsiness.  Referred from [facility].  Imaging revealed multilevel cervical stenosis, advanced spondylosis (degeneration), kyphosis, and spinal cord signal change (myelomalacia).  Tried nonoperative  treatment, continues to have significant disabling symptoms.  I thus offered surgery in form of anterior-posterior multilevel cervical fusion C2-T2, with multilevel decompressive laminectomies; and iliac crest bone graft harvest.  Consented after thorough discussion of the rationale, risks, benefits and alternatives.      Brief Hospital Course:  The patient was admitted following the above mentioned procedure.  The patient did well following surgery without any immediate complications.  The patient did experience dysphagia post op for which a short course of steroids was administered, and this had resolved by the time of discharge.  The patient was seen by physical therapy while in the hospital, received 24 hours of post-operative antibiotics and was placed on mechanical DVT ppx.      On POD#5 the patient was cleared for discharge to home.  The patient was tolerating a regular diet, voiding, independently ambulatory and had pain control with oral pain medications.      Discharge medications, instructions and follow-up as below.     Familia Irving   Home Medication Instructions AREN:[id number]    Printed on:05/21/18 1146   Medication Information                      acetaminophen (TYLENOL) 325 MG tablet  Take 2 tablets (650 mg) by mouth every 4 hours as needed for other (multimodal surgical pain management along with NSAIDS and opioid medication as indicated based on pain control and physical function.)             amLODIPine (NORVASC) 10 MG tablet  Take 10 mg by mouth daily             ASPIRIN PO  Take 81 mg by mouth daily             FOLIC ACID PO  Take 1 mg by mouth daily             FUROSEMIDE PO  Take 20 mg by mouth 2 times daily              insulin glargine (LANTUS) 100 UNIT/ML injection  Inject 30 Units Subcutaneous every morning              LISINOPRIL PO  Take 40 mg by mouth At Bedtime              loperamide (IMODIUM) 2 MG capsule  Take 4 mg by mouth as needed for diarrhea             MAGNESIUM OXIDE  PO  Take 420 mg by mouth Every Mon, Wed, Fri Morning             methocarbamol (ROBAXIN) 500 MG tablet  Take 1 tablet (500 mg) by mouth 4 times daily as needed for muscle spasms             MYFORTIC (BRAND) 360 MG EC TABLET  Take 720 mg by mouth 2 times daily              oxyCODONE IR (ROXICODONE) 5 MG tablet  For pain of 1-5 give 5 mg, for pain of 6-10 give 10 mg every 3 hours as needed.             PRAVASTATIN SODIUM PO  Take 40 mg by mouth At Bedtime              PROGRAF (BRAND) 1 MG CAPSULE  Take 1 mg by mouth 2 times daily             senna-docusate (SENOKOT-S;PERICOLACE) 8.6-50 MG per tablet  Take 1 tablet by mouth 2 times daily             VITAMIN D, CHOLECALCIFEROL, PO  Take 400 Units by mouth daily[ER1.1]                   Discharge Procedure Orders  Mantoux instructions   Order Comments: Give two-step Mantoux (PPD) Per Facility Policy Yes     Reason for your hospital stay   Order Comments: You had cervical spine surgery     Additional Discharge Instructions   Order Comments: DISCHARGE INSTRUCTIONS:  Activity:   - You may weight bear as tolerated.  - Please avoid lifting >10 lbs, excessive bending, twisting, and strenuous activities.  Brace:   Please wear [place] J (may remove for hygiene and meals) / custom TLSO postoperatively for 3 months when out of bed.  Pain management:   - Please take pain medications as instructed, but it is okay to begin weaning off of them as your pain tolerates. Avoid driving while taking pain medications as they can make you drowsy.  Wound care:   - Your dressing is to remain in place until postoperative day 4 after which you may remove.   - Your stitches will dissolve on their own and do not need to be removed.   - You may shower once your dressing is off. Let water run over the incision and dab dry -- do not rub or submerge the incision under water for at least 2 weeks.   - After the dressing is off, you may leave it open to the air. You may continue to keep the incision covered  for protection.   - Please contact us if there is increased drainage, swelling, pain, or redness stemming from your incision. This may be a sign of infection and would need to be looked at immediately.  New or worsening symptoms:  - Please call or seek medical attention for pain that continues to worsen, new onset of numbness or tingling, or extreme swelling.  - Please see a medical provider for new onset of chest pain or shortness of breath. This may be a sign of a heart attack or blood clot in your lungs.   Follow-up:  - After discharge, an appointment will be made for you to return to clinic in approximately 6 weeks for a postoperative check with Dr. Bass.   - Please call [phone] if you have any questions or concerns.  - Appointments are at [facility] and Surgery Center: [address] [place] [address]     Activity - Up with nursing assistance   Order Specific Question Answer Comments   Is discharge order? Yes      Weight bearing status   Order Comments: As tolerated     General info for SNF   Order Comments: Length of Stay Estimate: Short Term Care: Estimated # of Days <30  Condition at Discharge: Improving  Level of care:skilled   Rehabilitation Potential: Good  Admission H&P remains valid and up-to-date: Yes  Recent Chemotherapy: N/A  Use Nursing Home Standing Orders: Yes    BG qid. No sliding scale ordered at this time, as Pt generally takes Lantus only at home  BMP every Monday and Thursay     Full Code     Physical Therapy Adult Consult   Order Comments: Evaluate and treat as clinically indicated.    Reason:  S/p cervical fusion     Occupational Therapy Adult Consult   Order Comments: Evaluate and treat as clinically indicated.    Reason:  S/p fusion     Advance Diet as Tolerated   Order Comments: Follow this diet upon discharge:regular adult diet   Order Specific Question Answer Comments   Is discharge order? Yes      Assign Questionnaire Series to Patient[ER1.2]            Kenneth Rabago MD  PGY-4, Orthopaedic Surgery  [phone][ER1.1]             Revision History        User Key Date/Time User Provider Type Action    > ER1.2 2018 11:45 AM Kenneth Rabago MD Resident Sign     ER1.1 2018 11:42 AM Kenneth Rabago MD Resident                      Consult Notes      Consults by Alisson Mccain MSW at 2018  3:20 PM     Author:  Alisson Mccain MSW Service:  Social Work Author Type:      Filed:  2018  4:24 PM Date of Service:  2018  3:20 PM Creation Time:  2018  3:20 PM    Status:  Addendum :  Alisson Mccain MSW ()     Consult Orders:    1. Social Work IP Consult [[id number]] ordered by Jorge A Randolph MD at 18 1801                Social Work: Assessment with Discharge Plan    Patient Name:  Familia Irving  :  1945  Age:  73 year old  MRN:  [id number]  Risk/Complexity Score:  Filed Complexity Screen Score: 2  Completed assessment with:  Pt , son Tacho    Presenting Information   Reason for Referral:  Discharge plan  Date of Intake:  May 17, 2018  Referral Source:  Physician  Decision Maker:  pt  Alternate Decision Maker:  Adult children- Daughter Silvia listed as Health Care Agent  Health Care Directive:  Copy in Chart  Living Situation:  Apartment  Previous Functional Status:  Independent  Patient and family understanding of hospitalization:  Seeking spine surgery  Cultural/Language/Spiritual Considerations:  Has 10% disability certification through [employer]. Retired, has support and involvement of his adult children  Adjustment to Illness:  Accepting. Wanting a private room    Physical Health  Reason for Admission:  Progressive cervical stenosis  Services Needed/Recommended:  TCU    Mental Health/Chemical Dependency  Diagnosis:  None noted  Support/Services in Place:  None noted  Services Needed/Recommended:  None noted    Support System  Significant relationship at present  time:  Adult children at bed side. Daughter Silvia coming from [place] to provide advocacy and support  Family of origin is available for support:  yes  Other support available:  friends  Gaps in support system:  None noted  Patient is caregiver to:  None     Provider Information   Primary Care Physician:  Clinic, [facility]   [phone]   Clinic:  [facility]      :      Financial   Income Source:  Pension, VA, Social Security  Financial Concerns:  None noted  Insurance:    Payor/Plan Subscriber Name Rel Member # Group #   COMMERCIAL - [employer] MEDIC* [employer]  [id number]       [address]       Discharge Plan   Patient and family discharge goal:  TCU  Provided education on discharge plan:  YES  Patient agreeable to discharge plan:  YES  A list of Medicare Certified Facilities was provided to the patient and/or family to encourage patient choice. Patient's choices for facility are:  [facility] or [facility] in [place]  Will NH provide Skilled rehabilitation or complex medical:  YES  General information regarding anticipated insurance coverage and possible out of pocket cost was discussed. Patient and patient's family are aware patient may incur the cost of transportation to the facility, pending insurance payment: YES  Barriers to discharge:  Medical stability    Discharge Recommendations   Anticipated Disposition:  Facility:  [facility] [phone] or [facility] [phone]  Transportation Needs:  Family:  Tacho or Daughter Silvia      Additional comments   Writer introduced role/reason for visit. Pt is 10% certified disabled through VA and VA will not pay for TCU stay. Pt does have Medicare and would have Medicare Benefits for TCU stay. Writer provided education on Medicare A SNF and Home care benefits.  Pt and Son Tacho verbalized understanding.    Pt and Tacho expressed distress with roommate and would like to have room change. They expressed understanding that room availability is  limited at this time. Writer offered support, reassurance and validation.    Writer left voicemail message for Admissions at [facility][MK1.1] [phone][MK1.2], await call back with Fax number to fax referral/assessment information.    Writer contacted [facility] and left voicemail message for [phone] F: [phone]. Writer initiated referral by faxing assessment information. Await assessment response. SW con't to follow[MK1.1]    Addendum:    Writer received voicemail from [facility] with request to fax assessment information to [phone]. Writer faxed assessment information.    [facility] [phone] F: [phone]  [facility] [phone] F: [phone][MK1.2]     Revision History        User Key Date/Time User Provider Type Action    > MK1.2 5/17/2018  4:24 PM Alisson Mccain, MSW  Addend     MK1.1 5/17/2018  3:32 PM Alisson Mccain, MSW  Sign            Consults by Familia Dumont MD at 5/16/2018  7:22 PM     Author:  Familia Dumont MD Service:  Hospitalist Author Type:  Physician    Filed:  5/17/2018  1:57 PM Date of Service:  5/16/2018  7:22 PM Creation Time:  5/17/2018  1:57 PM    Status:  Signed :  Familia Dumont MD (Physician)     Consult Orders:    1. Internal Medicine Adult IP Consult for [facility] [place] [facility]: Patient to be seen: Routine within 24 hrs; Call back #: [phone]; s/p cervical fusion. hospitalist co-management. appreciate assistance.; Consultant may enter orders: Yes [[id number]] ordered by Jorge A Randolph MD at 05/16/18 1801                Completed 5/16.[WR1.1]      Revision History        User Key Date/Time User Provider Type Action    > WR1.1 5/17/2018  1:57 PM Familia Dumont MD Physician Sign            Consults by Robert Hood MD at 4/18/2018 12:00 PM     Author:  Robert Hood MD Service:  (none) Author Type:  Physician    Filed:  4/18/2018  3:30 PM Encounter Date:  4/18/2018  Status:  Signed    :  Robert Hood MD (Physician)           18 April 2018    STAFF NOTE: PAC addendum  We were called from lab to report very abnormal BMP with creatine > 3.5, and CO2 = 8.  These normals are VERY inconsistent with prior labs from an outside hospital (Dec, 2017).   First thought = get labs repeated for potential artifact/ error of the lab values.   BUT, if the numbers are valid==>  1. Patient needs to be seen by Renal / transplant physician urgently!  2. This could be a number of things, including acute on chronic renal rejection, acquired RTA from either drugs or autoimmune process or even multiple myeloma.   3. The spine surgery would almost certainly need to be delayed or cancelled until there is clarification and stabilzation of the renal status.     STATUS = PENDING  --katharina hood[RP1.1]     Revision History        User Key Date/Time User Provider Type Action    > RP1.1 4/18/2018  3:30 PM Robert Hood MD Physician Sign                     Progress Notes - Physician (Notes for yesterday and today)      Progress Notes by Dominguez Márquez MD at 5/21/2018 11:05 AM     Author:  Dominguez Márquez MD Service:  Hospitalist Author Type:  Physician    Filed:  5/21/2018 11:54 AM Date of Service:  5/21/2018 11:05 AM Creation Time:  5/21/2018 11:05 AM    Status:  Addendum :  Dominguez Márquez MD (Physician)         Pt seen, case reviewed with team, Dr Dumont, discussed with daughter  Pain is controlled  No difficulty swallowing    Pt feels improved, is anxious to d/c to TCU    Afebrile  BP 140s-170s/  BG 80s-100s    Alert, in NAD, fully oriented, but mildly confused re mes  Lungs clear  Cv rrr  Abd soft, non-tender  No LE edema    Cr 1.6 (1.7)  Hgb 9.6  Tacrolimus level last pm pending      Assessment    1)  S/P A/P cervical fusion 5/16.  Adequate pain control.  2)  Oral pharyngeal dysphagia 2nd to #1, improved with steroids. Has been cleared for a regular  "diet  3)  Type II DM, adequate control, on Lantus 24 units daily (PTA 30 units daily) and prandial insulin.  Pt does not usually take prandial insulin  54)  HTN, sub optimal control, back on lisinopril 20 mg daily (PTA 40 mg daily) and PTA lasix 20 mg bid  5)  S/p DDKT 2006.  Cr improved from peak of 1.92,( baseline 1.4)    Plan  Resume Lantus 30 units daily  D/c prandial insulin  D/c to TCU today[DS1.1]  Reviewed meds and labs with [facility][DS1.2] nep[DS1.1]hrologist nurse.  Will continue usual meds, check BMP 2 times weekly and fax results[DS1.2]     [DS1.1]     Revision History        User Key Date/Time User Provider Type Action    > DS1.2 5/21/2018 11:54 AM Dominguez Márquez MD Physician Addend     DS1.1 5/21/2018 11:24 AM Dominguez Márquez MD Physician Sign            Progress Notes by Kenneth Rabago MD at 5/21/2018  7:35 AM     Author:  Kenneth Rabago MD Service:  Orthopedics Author Type:  Resident    Filed:  5/21/2018  7:36 AM Date of Service:  5/21/2018  7:35 AM Creation Time:  5/21/2018  7:35 AM    Status:  Signed :  Kenneth Rabago MD (Resident)         Orthopedic Surgery Progress Note    Subjective:   NAEO.  Pain well controlled.  (-)cp/sob, (-)n/v, tolerating diet with improved swallowing.    Exam:  /70 (BP Location: Left arm)  Pulse 71  Temp 97.5  F (36.4  C) (Oral)  Resp 16  Ht 1.727 m (5' 8\")  Wt 69.2 kg (152 lb 8.9 oz)  SpO2 94%  BMI 23.2 kg/m2  Gen: Awake, alert, NAD  Resp: breathing equal and non-labored  Back incision:  Bandage c/d/i  Neck incision (anterior):  Bandage c/d/i  L flank incision:  Bandage c/d/i  Extremities:  RUE:   No obvious deformity   Delt 4+/5, elbow flexor 4+/5, elbow extensor 4+/5, wrist extensor 4/5,  4+/5, interossei 4+/5.   SILT C5-T1   All fingers wwp, radial pulse 2+  LUE:   No obvious deformity   Delt 4/5, elbow flexor 4/5, elbow extensor 4/5, wrist extensor 4/5,  4+/5, interossei 4/5.   SILT C5-T1.  Minor diffuse paresthesias most " pronounced in C5, much improved from preop   All fingers wwp, radial pulse 2+  RLE:   No obvious deformity, skin intact   SILT L2-S1, partial paresthesias, improved from preop   DP 2+   Fires hip flexor, quad, TA, EHL, FHL, Gsc 4+/5   No calf tenderness  LLE:   No obvious deformity, skin intact   SILT L2-S1, partial paresthesias, improved from preop   DP 2+   Fires hip flexor, quad, TA, EHL, FHL, Gsc 4+/5   No calf tenderness    Labs:    Recent Labs  Lab 05/20/18  0736 05/19/18  0801 05/18/18  0649 05/17/18  0400 05/16/18  1718 05/16/18  1607 05/16/18  1509 05/16/18  1446   WBC  --   --   --  9.6 12.1* 9.2  --  12.5*   HGB 10.4* 10.1* 10.4* 9.0* 9.6*  9.7* 7.9*  7.9* 8.7* 9.9*  9.6*   PLT  --   --   --  175 131* 145* 147* 120*       Recent Labs  Lab 05/20/18  0736 05/19/18  0801 05/18/18 2011 05/18/18  0854 05/17/18  0400 05/16/18  2345    146* 147* 149* 145* 147*   POTASSIUM 4.9 4.9 4.4 5.0 4.7 4.0   CHLORIDE 116* 117* 119* 121* 117* 119*   CO2 16* 22 19* 17* 18* 18*   BUN 53* 47* 42* 36* 29 27   CR 1.70* 1.92* 1.89* 1.93* 1.44* 1.38*   * 160* 233* 147* 205* 193*   MAG 1.8 2.1  --   --  2.2 1.1*   PHOS  --   --   --   --   --  2.9       Recent Labs  Lab 05/16/18  1718 05/16/18  1607 05/16/18  1509 05/16/18  1446   INR 1.42* 1.40* 1.23* 1.41*   PTT 33 33 32 33       Assessment:   73 year old male s/p 5-level ACDF C3-T1, PISF C3-T2 fusion w L ICBG harvest on 5/16/18 w Dr. Bass.  Recovering appropriately.    Plan:  Activity: Up with assist -- no bending, twisting. No lifting >10 lbs.  Weight bearing status: WBAT  Antibiotics: done  Diet: progress diet as tolerated per SLP  DVT prophylaxis: Mechanical  Bracing/Splinting: [place] J collar to be worn at all times except hygiene and eating.   Wound Care: Aquacel dressing posteriorly in place for 5-7 days, patient to perform dressing changes thereafter. Anterior dressing medipore tape and gauze to be changed on POD3  Drains: drain removed   Pain  "management: transition from IV to orals as tolerated  X-rays: Upright AP/Lat cervical XR prior to discharge  Physical Therapy: ADL's.   Occupational Therapy: ADL's  Labs: Trend Hgb on POD #1, 2, 3   Consults: PT, OT, Medicine co-management, appreciate assistance in caring for this patient.   Follow-up: Clinic with Dr. Bass in 2 weeks.    Dispo:  To TCU today if medically appropriate and coordination complete.      Kenneth Rabago MD  PGY-4, Orthopaedic Surgery  [phone][ER1.1]         Revision History        User Key Date/Time User Provider Type Action    > ER1.1 5/21/2018  7:36 AM Kenneth Rabago MD Resident Sign            Progress Notes by Familia Dumont MD at 5/20/2018  9:05 AM     Author:  Familia Dumont MD Service:  Hospitalist Author Type:  Physician    Filed:  5/20/2018  9:36 AM Date of Service:  5/20/2018  9:05 AM Creation Time:  5/20/2018  9:05 AM    Status:  Signed :  Familia Dumont MD (Physician)         [facility] Internal Medicine Progress Note            Interval History:   Record reviewed.  Seen with RN.   Clinically doing well.  Up in chair, ambulating without dizziness.  Adequate pain control with limited opiates.  No CP, SOB, cough.  irreg rhythm last PM with PAc's on EKG.  No nausea, reflux, abd pain.  Formed Bm X2.  Voiding large amounts.  On lantus, carb coverage and correction scale.  Lantus 30 units daily only at home with BG's 120-140's.           Medications:   All medications reviewed today          Physical Exam:   Blood pressure 175/69, pulse 66, temperature 96  F (35.6  C), temperature source Oral, resp. rate 16, height 1.727 m (5' 8\"), weight 69.2 kg (152 lb 8.9 oz), SpO2 97 %.    Intake/Output Summary (Last 24 hours) at 05/18/18 0839  Last data filed at 05/18/18 0700   Gross per 24 hour   Intake             1150 ml   Output              550 ml   Net              600 ml          General:  Alert.  Appropriate.  No distress.  No O2.     Heent:  "     Neck:    Skin:    Chest:  clear    Cardiac:  Reg without gallop, murmur.  No JVD.     Abdomen:  Non distended, soft, non-tender.  BS normal.     Extremities:  Perfused.  2 plus pretibial edema.  No calf, posterior thigh tenderness to suggest DVT.     Neuro:            Data:     Results for orders placed or performed during the hospital encounter of 05/16/18 (from the past 24 hour(s))   Glucose by meter   Result Value Ref Range    Glucose 153 (H) 70 - 99 mg/dL   Glucose by meter   Result Value Ref Range    Glucose 209 (H) 70 - 99 mg/dL   Glucose by meter   Result Value Ref Range    Glucose 212 (H) 70 - 99 mg/dL   Glucose by meter   Result Value Ref Range    Glucose 185 (H) 70 - 99 mg/dL   EKG 12-lead, tracing only   Result Value Ref Range    Interpretation ECG Click View Image link to view waveform and result    Glucose by meter   Result Value Ref Range    Glucose 186 (H) 70 - 99 mg/dL   Glucose by meter   Result Value Ref Range    Glucose 204 (H) 70 - 99 mg/dL   Glucose by meter   Result Value Ref Range    Glucose 177 (H) 70 - 99 mg/dL   Basic metabolic panel   Result Value Ref Range    Sodium 143 133 - 144 mmol/L    Potassium 4.9 3.4 - 5.3 mmol/L    Chloride 116 (H) 94 - 109 mmol/L    Carbon Dioxide 16 (L) 20 - 32 mmol/L    Anion Gap 11 3 - 14 mmol/L    Glucose 169 (H) 70 - 99 mg/dL    Urea Nitrogen 53 (H) 7 - 30 mg/dL    Creatinine 1.70 (H) 0.66 - 1.25 mg/dL    GFR Estimate 40 (L) >60 mL/min/1.7m2    GFR Estimate If Black 48 (L) >60 mL/min/1.7m2    Calcium 8.0 (L) 8.5 - 10.1 mg/dL   Magnesium   Result Value Ref Range    Magnesium 1.8 1.6 - 2.3 mg/dL   Hemoglobin   Result Value Ref Range    Hemoglobin 10.4 (L) 13.3 - 17.7 g/dL                  Assessment and Plan:   1)  S/P A/P cervical fusion 5/16.  Adequate pain control.  Mobilizing well.   2)  Oral pharyngeal dysphagia 2nd to #1, improved.  Tolerating diet.  Advanced to DD3 per speech.   3)  Acute blood loss anemia.  Adequate.  4)  Type II DM, mild  "hyperglycemia but adequate on present insulin as above.   Potential residual steroid effect.    5)  HTN, pre med BP elevated (off lisinopril).  Volume overload with edema.   6)  S/p DDKT 2006.  Mild rise in renal function improved back to baseline.   7)  HLD.   8)  PAC's on rhythm.  Benign.     PLAN:  1)  SL IV.  2)  Resume lisinopril 20 mg daily (1/2 dose).  Reviewed with Renal Transplant. Continue lasix 20 mg BID and norvasc 10 mg daily.   3)  Tacrolimus level 9 PM tonight (goal 3-5).  4)  Same insulin for now.  Continue daily lantus at 24 (decision on DC dose in AM).  Pt does not want to take prandial novolog.  5)  AM BMP.  6)  Dispo.  Anticipate home tomorrow.    Disposition Plan   Expected discharge in 1 day.      Entered: Familia Dumont 05/20/2018, 9:05 AM              Attestation:  I have reviewed today's vital signs, notes, medications, labs and imaging.     Familia Dumont MD[WR1.1]             Revision History        User Key Date/Time User Provider Type Action    > WR1.1 5/20/2018  9:36 AM Familia Dumont MD Physician Sign            Progress Notes by Kenneth Rabago MD at 5/20/2018  7:55 AM     Author:  Kenneth Rabago MD Service:  Orthopedics Author Type:  Resident    Filed:  5/20/2018  7:56 AM Date of Service:  5/20/2018  7:55 AM Creation Time:  5/20/2018  7:55 AM    Status:  Signed :  Kenneth Rabago MD (Resident)         Orthopedic Surgery Progress Note    Subjective:   NAEO.  Pain well controlled.  (-)cp/sob, (-)n/v, tolerating soft diet with improved swallowing.    Exam:[ER1.1]  /69 (BP Location: Left arm)  Pulse 66  Temp 96  F (35.6  C) (Oral)  Resp 16  Ht 1.727 m (5' 8\")  Wt 69.2 kg (152 lb 8.9 oz)  SpO2 97%  BMI 23.2 kg/m2[ER1.2]  Gen: Awake, alert, NAD  Resp: breathing equal and non-labored  Back incision:  Bandage c/d/i  Neck incision (anterior):  Bandage c/d/i  L flank incision:  Bandage c/d/i  Extremities:  RUE:   No obvious deformity   Delt 4+/5, elbow " flexor 4+/5, elbow extensor 4+/5, wrist extensor 4/5,  4+/5, interossei 4+/5.   SILT C5-T1   All fingers wwp, radial pulse 2+  LUE:   No obvious deformity   Delt 4/5, elbow flexor 4/5, elbow extensor 4/5, wrist extensor 4/5,  4+/5, interossei 4/5.   SILT C5-T1.  Minor diffuse paresthesias most pronounced in C5, much improved from preop   All fingers wwp, radial pulse 2+  RLE:   No obvious deformity, skin intact   SILT L2-S1, partial paresthesias, improved from preop   DP 2+   Fires hip flexor, quad, TA, EHL, FHL, Gsc 4+/5   No calf tenderness  LLE:   No obvious deformity, skin intact   SILT L2-S1, partial paresthesias, improved from preop   DP 2+   Fires hip flexor, quad, TA, EHL, FHL, Gsc 4+/5   No calf tenderness    Labs:[ER1.1]    Recent Labs  Lab 05/19/18  0801 05/18/18  0649 05/17/18  0400 05/16/18  1718 05/16/18  1607 05/16/18  1509 05/16/18  1446   WBC  --   --  9.6 12.1* 9.2  --  12.5*   HGB 10.1* 10.4* 9.0* 9.6*  9.7* 7.9*  7.9* 8.7* 9.9*  9.6*   PLT  --   --  175 131* 145* 147* 120*       Recent Labs  Lab 05/19/18  0801 05/18/18 2011 05/18/18  0854 05/17/18  0400 05/16/18  2345   * 147* 149* 145* 147*   POTASSIUM 4.9 4.4 5.0 4.7 4.0   CHLORIDE 117* 119* 121* 117* 119*   CO2 22 19* 17* 18* 18*   BUN 47* 42* 36* 29 27   CR 1.92* 1.89* 1.93* 1.44* 1.38*   * 233* 147* 205* 193*   MAG 2.1  --   --  2.2 1.1*   PHOS  --   --   --   --  2.9       Recent Labs  Lab 05/16/18  1718 05/16/18  1607 05/16/18  1509 05/16/18  1446   INR 1.42* 1.40* 1.23* 1.41*   PTT 33 33 32 33[ER1.2]       Assessment:   73 year old male s/p 5-level ACDF C3-T1, PISF C3-T2 fusion w L ICBG harvest on 5/16/18 w Dr. Bass.  Recovering appropriately.    Plan:  Activity: Up with assist -- no bending, twisting. No lifting >10 lbs.  Weight bearing status: WBAT  Antibiotics: Ancef until drains out.  Diet: progress diet as tolerated per SLP  DVT prophylaxis: Mechanical  Bracing/Splinting: [place] J collar to be worn at all  times except hygiene and eating.   Wound Care: Aquacel dressing posteriorly in place for 5-7 days, patient to perform dressing changes thereafter. Anterior dressing medipore tape and gauze to be changed on POD3  Drains: drain removed   Pain management: transition from IV to orals as tolerated  X-rays: Upright AP/Lat cervical XR prior to discharge  Physical Therapy: ADL's.   Occupational Therapy: ADL's  Labs: Trend Hgb on POD #1, 2, 3   Consults: PT, OT, Medicine co-management, appreciate assistance in caring for this patient.   Follow-up: Clinic with Dr. Bass in 2 weeks.      Kenneth Rabgao MD  PGY-4, Orthopaedic Surgery  [phone][ER1.1]         Revision History        User Key Date/Time User Provider Type Action    > ER1.2 5/20/2018  7:56 AM Kenneth Rabago MD Resident Sign     ER1.1 5/20/2018  7:55 AM Kenneth Rabago MD Resident                   Procedure Notes     No notes of this type exist for this encounter.         Progress Notes - Therapies (Notes from 05/18/18 through 05/21/18)      Progress Notes by Siobhan Wang OT at 5/19/2018 10:08 AM     Author:  Siobhan Wang OT Service:  (none) Author Type:  Occupational Therapist    Filed:  5/19/2018  5:08 PM Date of Service:  5/19/2018 10:08 AM Creation Time:  5/19/2018  5:08 PM    Status:  Signed :  Siobhan Wang OT (Occupational Therapist)          05/19/18 3262   Quick Adds   Type of Visit Initial Occupational Therapy Evaluation   Living Environment   Lives With alone   Living Arrangements house   Home Accessibility stairs to enter home;stairs (1 railing present)   Number of Stairs to Enter Home 6   Number of Stairs Within Home 0   Stair Railings at Home inside, present on left side   Transportation Available family or friend will provide   Living Environment Comment Pt previously driving though limited 2/2 low vision.  Pt has walk-in shower on the main level.    Self-Care   Dominant Hand right   Usual Activity Tolerance moderate    Current Activity Tolerance fair   Equipment Currently Used at Home cane, straight  (shoe horn)   Activity/Exercise/Self-Care Comment IND with ADL at baseline   Functional Level Prior   Ambulation 1-->assistive equipment   Transferring 1-->assistive equipment   Toileting 0-->independent   Bathing 0-->independent   Dressing 0-->independent   Eating 0-->independent   Communication 0-->understands/communicates without difficulty   Swallowing 0-->swallows foods/liquids without difficulty   Cognition 0 - no cognition issues reported   Fall history within last six months no   Which of the above functional risks had a recent onset or change? ambulation;transferring;bathing;dressing;toileting   Prior Functional Level Comment Pt used SEC prior, otherwise IND with mobility and ADL   General Information   Onset of Illness/Injury or Date of Surgery - Date 05/16/18   Referring Physician Daniel Bass MD   Patient/Family Goals Statement go to rehab   Additional Occupational Profile Info/Pertinent History of Current Problem 73 year old male s/p 5-level ACDF C3-T1, PISF C3-T2 fusion w L ICBG harvest.  See chart for PMH.     Precautions/Limitations fall precautions;spinal precautions   Weight-Bearing Status - LUE other (see comments)  (10# lifting restriction)   Weight-Bearing Status - RUE other (see comments)  (10# lifting restriction)   Weight-Bearing Status - LLE full weight-bearing   Weight-Bearing Status - RLE full weight-bearing   Cognitive Status Examination   Orientation orientation to person, place and time   Level of Consciousness alert   Able to Follow Commands WNL/WFL   Cognitive Comment No concerns observed; will continue to monitor   Visual Perception   Visual Perception Comments Pt with low vision at baseline, denies changes.    Sensory Examination   Sensory Comments Not tested, no concerns reported   Pain Assessment   Patient Currently in Pain No   Integumentary/Edema   Integumentary/Edema Comments No concerns  "  Range of Motion (ROM)   ROM Comment Not tested, appears WFL for BUE   Strength   Strength Comments Not tested 2/2 precautions   Bathing   Level of [place] moderate assist (50% patients effort)   Lower Body Dressing   Level of [place]: Dress Lower Body moderate assist (50% patients effort)   Activities of Daily Living Analysis   Impairments Contributing to Impaired Activities of Daily Living pain;post surgical precautions;strength decreased   General Therapy Interventions   Planned Therapy Interventions ADL retraining;transfer training;progressive activity/exercise;strengthening   Clinical Impression   Criteria for Skilled Therapeutic Interventions Met yes, treatment indicated   OT Diagnosis decreased ind with ADL   Influenced by the following impairments pain, post surgical precautions, low vision, activity tolerance   Assessment of Occupational Performance 3-5 Performance Deficits   Identified Performance Deficits dressing, bathing, transfers, grooming, IADL   Clinical Decision Making (Complexity) Moderate complexity   Therapy Frequency 5 times/wk   Predicted Duration of Therapy Intervention (days/wks) 5 days   Anticipated Equipment Needs at Discharge shower chair   Anticipated Discharge Disposition Transitional Care Facility   Risks and Benefits of Treatment have been explained. Yes   Patient, Family & other staff in agreement with plan of care Yes   Clinical Impression Comments Pt presents with decreased IND with ADL 2/2 post surgical precautions and impaired activity tolerance.  Pt would benefit from OT to progress activity tolerance and learn compensatory strategies to increase safety and IND with ADL/IADL   [facility] AM-PAC TM \"6 Clicks\"   2016, Trustees of [facility], under license to [employer].  All rights reserved.   6 Clicks Short Forms Daily Activity Inpatient Short Form   [facility] AM-PAC  \"6 Clicks\" Daily Activity Inpatient Short Form   1. Putting on and taking off " regular lower body clothing? 2 - A Lot   2. Bathing (including washing, rinsing, drying)? 2 - A Lot   3. Toileting, which includes using toilet, bedpan or urinal? 3 - A Little   4. Putting on and taking off regular upper body clothing? 3 - A Little   5. Taking care of personal grooming such as brushing teeth? 3 - A Little   6. Eating meals? 3 - A Little   Daily Activity Raw Score (Score out of 24.Lower scores equate to lower levels of function) 16   Total Evaluation Time   Total Evaluation Time (Minutes) 10      05/19/18 0930   Quick Adds   Type of Visit Initial Occupational Therapy Evaluation   Living Environment   Lives With alone   Living Arrangements house   Home Accessibility stairs to enter home;stairs (1 railing present)   Number of Stairs to Enter Home 6   Number of Stairs Within Home 0   Stair Railings at Home inside, present on left side   Transportation Available family or friend will provide   Living Environment Comment Pt previously driving though limited 2/2 low vision.  Pt has walk-in shower on the main level.    Self-Care   Dominant Hand right   Usual Activity Tolerance moderate   Current Activity Tolerance fair   Equipment Currently Used at Home cane, straight  (shoe horn)   Activity/Exercise/Self-Care Comment IND with ADL at baseline   Functional Level Prior   Ambulation 1-->assistive equipment   Transferring 1-->assistive equipment   Toileting 0-->independent   Bathing 0-->independent   Dressing 0-->independent   Eating 0-->independent   Communication 0-->understands/communicates without difficulty   Swallowing 0-->swallows foods/liquids without difficulty   Cognition 0 - no cognition issues reported   Fall history within last six months no   Which of the above functional risks had a recent onset or change? ambulation;transferring;bathing;dressing;toileting   Prior Functional Level Comment Pt used SEC prior, otherwise IND with mobility and ADL   General Information   Onset of Illness/Injury or  Date of Surgery - Date 05/16/18   Referring Physician Daniel Bass MD   Patient/Family Goals Statement go to rehab   Additional Occupational Profile Info/Pertinent History of Current Problem 73 year old male s/p 5-level ACDF C3-T1, PISF C3-T2 fusion w L ICBG harvest.  See chart for PMH.     Precautions/Limitations fall precautions;spinal precautions   Weight-Bearing Status - LUE other (see comments)  (10# lifting restriction)   Weight-Bearing Status - RUE other (see comments)  (10# lifting restriction)   Weight-Bearing Status - LLE full weight-bearing   Weight-Bearing Status - RLE full weight-bearing   Cognitive Status Examination   Orientation orientation to person, place and time   Level of Consciousness alert   Able to Follow Commands WNL/WFL   Cognitive Comment No concerns observed; will continue to monitor   Visual Perception   Visual Perception Comments Pt with low vision at baseline, denies changes.    Sensory Examination   Sensory Comments Not tested, no concerns reported   Pain Assessment   Patient Currently in Pain No   Integumentary/Edema   Integumentary/Edema Comments No concerns   Range of Motion (ROM)   ROM Comment Not tested, appears WFL for BUE   Strength   Strength Comments Not tested 2/2 precautions   Bathing   Level of [place] moderate assist (50% patients effort)   Lower Body Dressing   Level of [place]: Dress Lower Body moderate assist (50% patients effort)   Activities of Daily Living Analysis   Impairments Contributing to Impaired Activities of Daily Living pain;post surgical precautions;strength decreased   General Therapy Interventions   Planned Therapy Interventions ADL retraining;transfer training;progressive activity/exercise;strengthening   Clinical Impression   Criteria for Skilled Therapeutic Interventions Met yes, treatment indicated   OT Diagnosis decreased ind with ADL   Influenced by the following impairments pain, post surgical precautions, low vision, activity  "tolerance   Assessment of Occupational Performance 3-5 Performance Deficits   Identified Performance Deficits dressing, bathing, transfers, grooming, IADL   Clinical Decision Making (Complexity) Moderate complexity   Therapy Frequency 5 times/wk   Predicted Duration of Therapy Intervention (days/wks) 5 days   Anticipated Equipment Needs at Discharge shower chair   Anticipated Discharge Disposition Transitional Care Facility   Risks and Benefits of Treatment have been explained. Yes   Patient, Family & other staff in agreement with plan of care Yes   Clinical Impression Comments Pt presents with decreased IND with ADL 2/2 post surgical precautions and impaired activity tolerance.  Pt would benefit from OT to progress activity tolerance and learn compensatory strategies to increase safety and IND with ADL/IADL   [facility] AM-PAC TM \"6 Clicks\"   2016, Trustees of [facility], under license to [employer].  All rights reserved.   6 Clicks Short Forms Daily Activity Inpatient Short Form   [facility] AM-PAC  \"6 Clicks\" Daily Activity Inpatient Short Form   1. Putting on and taking off regular lower body clothing? 2 - A Lot   2. Bathing (including washing, rinsing, drying)? 2 - A Lot   3. Toileting, which includes using toilet, bedpan or urinal? 3 - A Little   4. Putting on and taking off regular upper body clothing? 3 - A Little   5. Taking care of personal grooming such as brushing teeth? 3 - A Little   6. Eating meals? 3 - A Little   Daily Activity Raw Score (Score out of 24.Lower scores equate to lower levels of function) 16   Total Evaluation Time   Total Evaluation Time (Minutes) 10[KH1.1]        Revision History        User Key Date/Time User Provider Type Action    > KH1.1 5/19/2018  5:08 PM Siobhan Wang OT Occupational Therapist Sign            Progress Notes by Kenneth Nguyen, SLP at 5/18/2018 12:50 PM     Author:  Kenneth Nguyen, SLP Service:  (none) Author Type:  Speech Therapist "    Filed:  5/18/2018 12:50 PM Date of Service:  5/18/2018 12:50 PM Creation Time:  5/18/2018 12:50 PM    Status:  Signed :  Kenneth Nguyen, SLP (Speech Therapist)            05/18/18 1200   General Information   Onset Date 05/16/18   Start of Care Date 05/18/18   Clinical Swallow Evaluation   Oral Musculature generally intact   Structural Abnormalities none present  ([place] J collar in place)   Dentition upper and lower dentures  (During evaluation patient did not want to wear them)   Mucosal Quality dry   Mandibular Strength and Mobility intact   Oral Labial Strength and Mobility WFL   Lingual Strength and Mobility WFL   Velar Elevation intact   Buccal Strength and Mobility intact   Laryngeal Function Cough;Throat clear;Swallow   Clinical Swallow Eval: Thin Liquid Texture Trial   Mode of Presentation, Thin Liquids straw;self-fed   Volume of Liquid or Food Presented Small single swallows   Oral Phase of Swallow WFL   Pharyngeal Phase of Swallow intact   Diagnostic Statement Heart throat clear on the first attempt but once spit out, patient was able to tolerate without any further signs and symptoms of aspiration.   Clinical Swallow Eval: Puree Solid Texture Trial   Mode of Presentation, Puree spoon;self-fed   Volume of Puree Presented Teaspoon boluses   Oral Phase, Puree WFL   Pharyngeal Phase, Puree intact   Diagnostic Statement Patient was able to tolerate without any overt difficulties noted.   Clinical Swallow Eval: Semisolid Texture Trial   Mode of Presentation, Semisolid spoon;self-fed   Volume of Semisolid Food Presented Teaspoon boluses   Oral Phase, Semisolid WFL   Pharyngeal Phase, Semisolid feeling of something stuck in throat   Diagnostic Statement Mild sensation of something caught in his throat.  Was able to clear with use of a liquid wash.  Did not present with any overt signs and symptoms of aspiration.  Patient was a bit hesitant to try anything that was harder   Swallow Eval: Clinical  "Impressions   Skilled Criteria for Therapy Intervention Skilled criteria met.  Treatment indicated.   Functional Assessment Scale (FAS) 3   Treatment Diagnosis Moderate oropharyngeal dysphagia   Diet texture recommendations Dysphagia diet level 1;Thin liquids   Recommended Feeding/Eating Techniques alternate between small bites and sips of food/liquid;maintain upright posture during/after eating for 30 mins;small sips/bites   Therapy Frequency daily   Predicted Duration of Therapy Intervention (days/wks) Less than 1 week   Anticipated Discharge Disposition other (see comments)  (TCU)   Risks and Benefits of Treatment have been explained. Yes   Patient, family and/or staff in agreement with Plan of Care Yes   Clinical Impression Comments Dysphagia evaluation completed.  Patient having some anxiety regarding his swallowing function.  Proceeded very slowly with patient and he was able to tolerate puréed textures as well as softer, moister food textures.  After discussion with patient he was agreeable to a diet upgrade to NDD-1 with thin liquids.  For medications, patient likely to be able to tolerate smaller pills taking one at a time with applesauce.  Will plan to follow-up with patient on 5/19 to continue upgrading diet as appropriate.  Anticipate patient his ability to tolerate medications as well as advanced food textures to be improving soon.   Total Evaluation Time   Total Evaluation Time (Minutes) 30[EG1.1]        Revision History        User Key Date/Time User Provider Type Action    > EG1.1 5/18/2018 12:50 PM Kenneth Nguyen, SLP Speech Therapist Sign                                                      INTERAGENCY TRANSFER FORM - LAB / IMAGING / EKG / EMG RESULTS   5/16/2018                       [facility] [address]: [phone]            Unresulted Labs (24h ago through future)    Start       Ordered    Unscheduled  Magnesium  (Magnesium Replacement -  Adult - \"Standard\" - Replacement for all levels less than " 1.6 mg/dL )  CONDITIONAL (SPECIFY),   Routine     Comments:  Obtain Magnesium Level for these conditions:  *IF no magnesium result within 24 hrs before initiation of order set, draw magnesium level with next lab collect.    *2 HOURS AFTER last magnesium replacement dose when magnesium replacement given for level less than 1.6   *Next morning after magnesium dose.     Repeat Magnesium Replacement if necessary.    05/17/18 0049    Unscheduled  Glucose - Diabetes  CONDITIONAL X 1 STAT,   STAT     Comments:  for changes in mental status, diaphoresis, or unexplained tachycardia    05/17/18 1701         Lab Results - 3 Days      Glucose by meter [[id number]] (Abnormal)  Resulted: 05/21/18 1140, Result status: Final result    Ordering provider: Brijesh Bass MD  05/21/18 1128 Resulting lab: POINT OF CARE TEST, GLUCOSE    Specimen Information    Type Source Collected On     05/21/18 1128          Components       Value Reference Range Flag Lab   Glucose 211 70 - 99 mg/dL H 170            Tacrolimus level [[id number]] (Abnormal)  Resulted: 05/21/18 1127, Result status: Final result    Ordering provider: Familia Dumont MD  05/20/18 1500 Resulting lab: [facility]    Specimen Information    Type Source Collected On   Blood  05/20/18 2107          Components       Value Reference Range Flag Lab   Tacrolimus Last Dose Not Provided   51   Tacrolimus Level 4.1 5.0 - 15.0 ug/L L 51   Comment:         Tacrolimus Reference Range  Kidney Transplant  Pediatric                      ug/L    0-3 months post transplant   10-12    3-6 months post transplant   8-10    6-12 months post transplant  6-8    >12 months post transplant   4-7  Adult    0-6 months post transplant   8-10    6-12 months post transplant  6-8    >12 months post transplant   4-6    >5 years post transplant     3-5  Heart Transplant  Pediatric    0-12 months post transplant  10-15    >12 months post transplant    5-10  Adult    0-3 months post transplant   10-15    3-6 months post transplant   8-12    6-12 months post transplant  6-12    >12 months post transplant   6-10  Lung Transplant    0-12 months post transplant  10-15    >12 months post transplant   8-12  Liver Transplant  Pediatric    0-3 months post transplant   10-15    3-6 months post transplant   8-10    >6 months post transplant    6-8  Adult    0-3 months post transplant   10-12    3-6 months post transplant   8-10    >6 months post transplant    6-8  Pancrea  s Transplant    0-6 months post transplant   8-10    >6 months post transplant    5-8  This test was developed and its performance characteristics determined by the   [facility],  Special Chemistry Laboratory. It has   not been cleared or approved by the FDA. The laboratory is regulated under   CLIA as qualified to perform high-complexity testing. This test is used for   clinical purposes. It should not be regarded as investigational or for   research.              Basic metabolic panel [[id number]] (Abnormal)  Resulted: 05/21/18 1023, Result status: Final result    Ordering provider: Familia Dumont MD  05/21/18 0000 Resulting lab: [facility]    Specimen Information    Type Source Collected On   Blood  05/21/18 0955          Components       Value Reference Range Flag Lab   Sodium 143 133 - 144 mmol/L  13   Potassium 4.5 3.4 - 5.3 mmol/L  13   Chloride 113 94 - 109 mmol/L H 13   Carbon Dioxide 17 20 - 32 mmol/L L 13   Anion Gap 13 3 - 14 mmol/L  13   Glucose 227 70 - 99 mg/dL H 13   Urea Nitrogen 55 7 - 30 mg/dL H 13   Creatinine 1.60 0.66 - 1.25 mg/dL H 13   GFR Estimate 43 >60 mL/min/1.7m2 L 13   Comment:  Non  GFR Calc   GFR Estimate If Black 52 >60 mL/min/1.7m2 L 13   Comment:  African American GFR Calc   Calcium 7.8 8.5 - 10.1 mg/dL L 13            Hemoglobin [[id number]] (Abnormal)  Resulted: 05/21/18 1015, Result  status: Final result    Ordering provider: Familia Dumont MD  05/21/18 0000 Resulting lab: [facility]    Specimen Information    Type Source Collected On   Blood  05/21/18 0955          Components       Value Reference Range Flag Lab   Hemoglobin 9.5 13.3 - 17.7 g/dL L 13            Glucose by meter [[id number]] (Abnormal)  Resulted: 05/21/18 0759, Result status: Final result    Ordering provider: Brijesh Bass MD  05/21/18 0735 Resulting lab: POINT OF CARE TEST, GLUCOSE    Specimen Information    Type Source Collected On     05/21/18 0735          Components       Value Reference Range Flag Lab   Glucose 168 70 - 99 mg/dL H 170            Glucose by meter [[id number]] (Abnormal)  Resulted: 05/20/18 2113, Result status: Final result    Ordering provider: Brijesh Bass MD  05/20/18 2100 Resulting lab: POINT OF CARE TEST, GLUCOSE    Specimen Information    Type Source Collected On     05/20/18 2100          Components       Value Reference Range Flag Lab   Glucose 176 70 - 99 mg/dL H 170            Glucose by meter [[id number]]  Resulted: 05/20/18 1619, Result status: Final result    Ordering provider: Brijesh Bass MD  05/20/18 1606 Resulting lab: POINT OF CARE TEST, GLUCOSE    Specimen Information    Type Source Collected On     05/20/18 1606          Components       Value Reference Range Flag Lab   Glucose 91 70 - 99 mg/dL  170            Glucose by meter [[id number]]  Resulted: 05/20/18 1233, Result status: Final result    Ordering provider: Brijesh Bass MD  05/20/18 1221 Resulting lab: POINT OF CARE TEST, GLUCOSE    Specimen Information    Type Source Collected On     05/20/18 1221          Components       Value Reference Range Flag Lab   Glucose 79 70 - 99 mg/dL  170            Basic metabolic panel [[id number]] (Abnormal)  Resulted: 05/20/18 0818, Result status: Final result    Ordering provider: Familia Dumont MD   05/20/18 0000 Resulting lab: [facility]    Specimen Information    Type Source Collected On   Blood  05/20/18 0736          Components       Value Reference Range Flag Lab   Sodium 143 133 - 144 mmol/L  13   Potassium 4.9 3.4 - 5.3 mmol/L  13   Chloride 116 94 - 109 mmol/L H 13   Carbon Dioxide 16 20 - 32 mmol/L L 13   Anion Gap 11 3 - 14 mmol/L  13   Glucose 169 70 - 99 mg/dL H 13   Urea Nitrogen 53 7 - 30 mg/dL H 13   Creatinine 1.70 0.66 - 1.25 mg/dL H 13   GFR Estimate 40 >60 mL/min/1.7m2 L 13   Comment:  Non  GFR Calc   GFR Estimate If Black 48 >60 mL/min/1.7m2 L 13   Comment:  African American GFR Calc   Calcium 8.0 8.5 - 10.1 mg/dL L 13            Magnesium [[id number]]  Resulted: 05/20/18 0818, Result status: Final result    Ordering provider: Familia Dumont MD  05/20/18 0000 Resulting lab: [facility]    Specimen Information    Type Source Collected On   Blood  05/20/18 0736          Components       Value Reference Range Flag Lab   Magnesium 1.8 1.6 - 2.3 mg/dL  13            Hemoglobin [[id number]] (Abnormal)  Resulted: 05/20/18 0803, Result status: Final result    Ordering provider: Familia Dumont MD  05/20/18 0000 Resulting lab: [facility]    Specimen Information    Type Source Collected On   Blood  05/20/18 0736          Components       Value Reference Range Flag Lab   Hemoglobin 10.4 13.3 - 17.7 g/dL L 13            Tacrolimus level [[id number]]  Resulted: 05/20/18 0738, Result status: In process    Ordering provider: Familia Dumont MD  05/20/18 0000 Resulting lab: [facility]    Specimen Information    Type Source Collected On   Blood  05/20/18 0736            Glucose by meter [[id number]] (Abnormal)  Resulted: 05/20/18 0236, Result status: Final result    Ordering provider: Brijesh Bass MD  05/20/18 0219 Resulting lab: POINT OF CARE TEST, GLUCOSE    Specimen  Information    Type Source Collected On     05/20/18 0219          Components       Value Reference Range Flag Lab   Glucose 177 70 - 99 mg/dL H 170            Glucose by meter [[id number]] (Abnormal)  Resulted: 05/19/18 2227, Result status: Final result    Ordering provider: Brijesh Bass MD  05/19/18 2210 Resulting lab: POINT OF CARE TEST, GLUCOSE    Specimen Information    Type Source Collected On     05/19/18 2210          Components       Value Reference Range Flag Lab   Glucose 204 70 - 99 mg/dL H 170            Glucose by meter [[id number]] (Abnormal)  Resulted: 05/19/18 1745, Result status: Final result    Ordering provider: Brijesh Bass MD  05/19/18 1727 Resulting lab: POINT OF CARE TEST, GLUCOSE    Specimen Information    Type Source Collected On     05/19/18 1727          Components       Value Reference Range Flag Lab   Glucose 186 70 - 99 mg/dL H 170            Magnesium [[id number]]  Resulted: 05/19/18 1627, Result status: Final result    Ordering provider: Familia Dumont MD  05/19/18 0801 Resulting lab: [facility]    Specimen Information    Type Source Collected On     05/19/18 0801          Components       Value Reference Range Flag Lab   Magnesium 2.1 1.6 - 2.3 mg/dL  13            Glucose by meter [[id number]] (Abnormal)  Resulted: 05/19/18 1236, Result status: Final result    Ordering provider: Brijesh Bass MD  05/19/18 1219 Resulting lab: POINT OF CARE TEST, GLUCOSE    Specimen Information    Type Source Collected On     05/19/18 1219          Components       Value Reference Range Flag Lab   Glucose 185 70 - 99 mg/dL H 170            Glucose by meter [[id number]] (Abnormal)  Resulted: 05/19/18 1133, Result status: Final result    Ordering provider: Brijesh Bass MD  05/19/18 1121 Resulting lab: POINT OF CARE TEST, GLUCOSE    Specimen Information    Type Source Collected On     05/19/18 1121          Components        Value Reference Range Flag Lab   Glucose 212 70 - 99 mg/dL H 170            Glucose by meter [[id number]] (Abnormal)  Resulted: 05/19/18 1015, Result status: Final result    Ordering provider: Brijesh Bass MD  05/19/18 1003 Resulting lab: POINT OF CARE TEST, GLUCOSE    Specimen Information    Type Source Collected On     05/19/18 1003          Components       Value Reference Range Flag Lab   Glucose 209 70 - 99 mg/dL H 170            Glucose by meter [[id number]] (Abnormal)  Resulted: 05/19/18 0920, Result status: Final result    Ordering provider: Brijesh Bass MD  05/19/18 0906 Resulting lab: POINT OF CARE TEST, GLUCOSE    Specimen Information    Type Source Collected On     05/19/18 0906          Components       Value Reference Range Flag Lab   Glucose 153 70 - 99 mg/dL H 170            Basic metabolic panel [[id number]] (Abnormal)  Resulted: 05/19/18 0843, Result status: Final result    Ordering provider: Familia Dumont MD  05/19/18 0000 Resulting lab: [facility]    Specimen Information    Type Source Collected On   Blood  05/19/18 0801          Components       Value Reference Range Flag Lab   Sodium 146 133 - 144 mmol/L H 13   Potassium 4.9 3.4 - 5.3 mmol/L  13   Chloride 117 94 - 109 mmol/L H 13   Carbon Dioxide 22 20 - 32 mmol/L  13   Anion Gap 7 3 - 14 mmol/L  13   Glucose 160 70 - 99 mg/dL H 13   Urea Nitrogen 47 7 - 30 mg/dL H 13   Creatinine 1.92 0.66 - 1.25 mg/dL H 13   GFR Estimate 35 >60 mL/min/1.7m2 L 13   Comment:  Non  GFR Calc   GFR Estimate If Black 42 >60 mL/min/1.7m2 L 13   Comment:  African American GFR Calc   Calcium 9.1 8.5 - 10.1 mg/dL  13            Hemoglobin [[id number]] (Abnormal)  Resulted: 05/19/18 0829, Result status: Final result    Ordering provider: Familia Dumont MD  05/19/18 0000 Resulting lab: [facility]    Specimen Information    Type Source Collected On    Blood  05/19/18 0801          Components       Value Reference Range Flag Lab   Hemoglobin 10.1 13.3 - 17.7 g/dL L 13            Glucose by meter [[id number]] (Abnormal)  Resulted: 05/19/18 0812, Result status: Final result    Ordering provider: Brijesh Bass MD  05/19/18 0800 Resulting lab: POINT OF CARE TEST, GLUCOSE    Specimen Information    Type Source Collected On     05/19/18 0800          Components       Value Reference Range Flag Lab   Glucose 164 70 - 99 mg/dL H 170            Glucose by meter [[id number]] (Abnormal)  Resulted: 05/19/18 0720, Result status: Final result    Ordering provider: Brijesh Bass MD  05/19/18 0703 Resulting lab: POINT OF CARE TEST, GLUCOSE    Specimen Information    Type Source Collected On     05/19/18 0703          Components       Value Reference Range Flag Lab   Glucose 154 70 - 99 mg/dL H 170            Glucose by meter [[id number]] (Abnormal)  Resulted: 05/19/18 0626, Result status: Final result    Ordering provider: Brijesh Bass MD  05/19/18 0614 Resulting lab: POINT OF CARE TEST, GLUCOSE    Specimen Information    Type Source Collected On     05/19/18 0614          Components       Value Reference Range Flag Lab   Glucose 156 70 - 99 mg/dL H 170            Glucose by meter [[id number]] (Abnormal)  Resulted: 05/19/18 0509, Result status: Final result    Ordering provider: Brijesh Bass MD  05/19/18 0458 Resulting lab: POINT OF CARE TEST, GLUCOSE    Specimen Information    Type Source Collected On     05/19/18 0458          Components       Value Reference Range Flag Lab   Glucose 148 70 - 99 mg/dL H 170            Glucose by meter [[id number]] (Abnormal)  Resulted: 05/19/18 0340, Result status: Final result    Ordering provider: Brijesh Bass MD  05/19/18 0328 Resulting lab: POINT OF CARE TEST, GLUCOSE    Specimen Information    Type Source Collected On     05/19/18 0328          Components       Value Reference  Range Flag Lab   Glucose 158 70 - 99 mg/dL H 170            Glucose by meter [[id number]] (Abnormal)  Resulted: 05/19/18 0244, Result status: Final result    Ordering provider: Brijesh Bass MD  05/19/18 0232 Resulting lab: POINT OF CARE TEST, GLUCOSE    Specimen Information    Type Source Collected On     05/19/18 0232          Components       Value Reference Range Flag Lab   Glucose 162 70 - 99 mg/dL H 170            Glucose by meter [[id number]] (Abnormal)  Resulted: 05/19/18 0149, Result status: Final result    Ordering provider: Brijesh Bass MD  05/19/18 0137 Resulting lab: POINT OF CARE TEST, GLUCOSE    Specimen Information    Type Source Collected On     05/19/18 0137          Components       Value Reference Range Flag Lab   Glucose 158 70 - 99 mg/dL H 170            Glucose by meter [[id number]] (Abnormal)  Resulted: 05/19/18 0040, Result status: Final result    Ordering provider: Brijesh Bass MD  05/19/18 0028 Resulting lab: POINT OF CARE TEST, GLUCOSE    Specimen Information    Type Source Collected On     05/19/18 0028          Components       Value Reference Range Flag Lab   Glucose 165 70 - 99 mg/dL H 170            Glucose by meter [[id number]] (Abnormal)  Resulted: 05/18/18 2339, Result status: Final result    Ordering provider: Brijesh Bass MD  05/18/18 2327 Resulting lab: POINT OF CARE TEST, GLUCOSE    Specimen Information    Type Source Collected On     05/18/18 2327          Components       Value Reference Range Flag Lab   Glucose 178 70 - 99 mg/dL H 170            Glucose by meter [[id number]] (Abnormal)  Resulted: 05/18/18 2242, Result status: Final result    Ordering provider: Brijesh Bass MD  05/18/18 2220 Resulting lab: POINT OF CARE TEST, GLUCOSE    Specimen Information    Type Source Collected On     05/18/18 2220          Components       Value Reference Range Flag Lab   Glucose 184 70 - 99 mg/dL H 170            Glucose by  meter [[id number]] (Abnormal)  Resulted: 05/18/18 2050, Result status: Final result    Ordering provider: Brijesh Bass MD  05/18/18 2026 Resulting lab: POINT OF CARE TEST, GLUCOSE    Specimen Information    Type Source Collected On     05/18/18 2026          Components       Value Reference Range Flag Lab   Glucose 233 70 - 99 mg/dL H 170            Basic metabolic panel [[id number]] (Abnormal)  Resulted: 05/18/18 2030, Result status: Final result    Ordering provider: Familia Dumont MD  05/18/18 1424 Resulting lab: [facility]    Specimen Information    Type Source Collected On   Blood  05/18/18 2011          Components       Value Reference Range Flag Lab   Sodium 147 133 - 144 mmol/L H 13   Potassium 4.4 3.4 - 5.3 mmol/L  13   Chloride 119 94 - 109 mmol/L H 13   Carbon Dioxide 19 20 - 32 mmol/L L 13   Anion Gap 9 3 - 14 mmol/L  13   Glucose 233 70 - 99 mg/dL H 13   Urea Nitrogen 42 7 - 30 mg/dL H 13   Creatinine 1.89 0.66 - 1.25 mg/dL H 13   GFR Estimate 35 >60 mL/min/1.7m2 L 13   Comment:  Non  GFR Calc   GFR Estimate If Black 43 >60 mL/min/1.7m2 L 13   Comment:  African American GFR Calc   Calcium 8.7 8.5 - 10.1 mg/dL  13            Glucose by meter [[id number]] (Abnormal)  Resulted: 05/18/18 1947, Result status: Final result    Ordering provider: Brijesh Bass MD  05/18/18 1910 Resulting lab: POINT OF CARE TEST, GLUCOSE    Specimen Information    Type Source Collected On     05/18/18 1910          Components       Value Reference Range Flag Lab   Glucose 198 70 - 99 mg/dL H 170            Glucose by meter [[id number]] (Abnormal)  Resulted: 05/18/18 1818, Result status: Final result    Ordering provider: Brijesh Bass MD  05/18/18 1806 Resulting lab: POINT OF CARE TEST, GLUCOSE    Specimen Information    Type Source Collected On     05/18/18 1806          Components       Value Reference Range Flag Lab   Glucose 192 70 - 99  mg/dL H 170            Glucose by meter [[id number]] (Abnormal)  Resulted: 05/18/18 1715, Result status: Final result    Ordering provider: Brijesh Bass MD  05/18/18 1702 Resulting lab: POINT OF CARE TEST, GLUCOSE    Specimen Information    Type Source Collected On     05/18/18 1702          Components       Value Reference Range Flag Lab   Glucose 196 70 - 99 mg/dL H 170            Glucose by meter [[id number]] (Abnormal)  Resulted: 05/18/18 1620, Result status: Final result    Ordering provider: Brijesh Bass MD  05/18/18 1605 Resulting lab: POINT OF CARE TEST, GLUCOSE    Specimen Information    Type Source Collected On     05/18/18 1605          Components       Value Reference Range Flag Lab   Glucose 165 70 - 99 mg/dL H 170            Glucose by meter [[id number]] (Abnormal)  Resulted: 05/18/18 1520, Result status: Final result    Ordering provider: Brijesh Bass MD  05/18/18 1459 Resulting lab: POINT OF CARE TEST, GLUCOSE    Specimen Information    Type Source Collected On     05/18/18 1459          Components       Value Reference Range Flag Lab   Glucose 186 70 - 99 mg/dL H 170            Glucose by meter [[id number]] (Abnormal)  Resulted: 05/18/18 1401, Result status: Final result    Ordering provider: Brijesh Bass MD  05/18/18 1349 Resulting lab: POINT OF CARE TEST, GLUCOSE    Specimen Information    Type Source Collected On     05/18/18 1349          Components       Value Reference Range Flag Lab   Glucose 190 70 - 99 mg/dL H 170            Glucose by meter [[id number]] (Abnormal)  Resulted: 05/18/18 1259, Result status: Final result    Ordering provider: Brijesh Bass MD  05/18/18 1247 Resulting lab: POINT OF CARE TEST, GLUCOSE    Specimen Information    Type Source Collected On     05/18/18 1247          Components       Value Reference Range Flag Lab   Glucose 151 70 - 99 mg/dL H 170            Glucose by meter [[id number]] (Abnormal)  Resulted:  05/18/18 1159, Result status: Final result    Ordering provider: Brijesh Bass MD  05/18/18 1147 Resulting lab: POINT OF CARE TEST, GLUCOSE    Specimen Information    Type Source Collected On     05/18/18 1147          Components       Value Reference Range Flag Lab   Glucose 152 70 - 99 mg/dL H 170            Glucose by meter [[id number]] (Abnormal)  Resulted: 05/18/18 1059, Result status: Final result    Ordering provider: Brijesh Bass MD  05/18/18 1047 Resulting lab: POINT OF CARE TEST, GLUCOSE    Specimen Information    Type Source Collected On     05/18/18 1047          Components       Value Reference Range Flag Lab   Glucose 145 70 - 99 mg/dL H 170            Glucose by meter [[id number]] (Abnormal)  Resulted: 05/18/18 0943, Result status: Final result    Ordering provider: Brijesh Bass MD  05/18/18 0931 Resulting lab: POINT OF CARE TEST, GLUCOSE    Specimen Information    Type Source Collected On     05/18/18 0931          Components       Value Reference Range Flag Lab   Glucose 153 70 - 99 mg/dL H 170            Basic metabolic panel [[id number]] (Abnormal)  Resulted: 05/18/18 0927, Result status: Final result    Ordering provider: Familia Dumont MD  05/18/18 0804 Resulting lab: [facility]    Specimen Information    Type Source Collected On   Blood  05/18/18 0854          Components       Value Reference Range Flag Lab   Sodium 149 133 - 144 mmol/L H 13   Potassium 5.0 3.4 - 5.3 mmol/L  13   Chloride 121 94 - 109 mmol/L H 13   Carbon Dioxide 17 20 - 32 mmol/L L 13   Anion Gap 11 3 - 14 mmol/L  13   Glucose 147 70 - 99 mg/dL H 13   Urea Nitrogen 36 7 - 30 mg/dL H 13   Creatinine 1.93 0.66 - 1.25 mg/dL H 13   GFR Estimate 34 >60 mL/min/1.7m2 L 13   Comment:  Non  GFR Calc   GFR Estimate If Black 42 >60 mL/min/1.7m2 L 13   Comment:  African American GFR Calc   Calcium 8.9 8.5 - 10.1 mg/dL  13            Glucose by meter  [[id number]] (Abnormal)  Resulted: 05/18/18 0814, Result status: Final result    Ordering provider: Brijesh Bass MD  05/18/18 0802 Resulting lab: POINT OF CARE TEST, GLUCOSE    Specimen Information    Type Source Collected On     05/18/18 0802          Components       Value Reference Range Flag Lab   Glucose 144 70 - 99 mg/dL H 170            Glucose by meter [[id number]] (Abnormal)  Resulted: 05/18/18 0729, Result status: Final result    Ordering provider: Brijesh Bass MD  05/18/18 0718 Resulting lab: POINT OF CARE TEST, GLUCOSE    Specimen Information    Type Source Collected On     05/18/18 0718          Components       Value Reference Range Flag Lab   Glucose 142 70 - 99 mg/dL H 170            Hemoglobin [[id number]] (Abnormal)  Resulted: 05/18/18 0712, Result status: Final result    Ordering provider: Jorge A Randolph MD  05/17/18 2300 Resulting lab: [facility]    Specimen Information    Type Source Collected On   Blood  05/18/18 0649          Components       Value Reference Range Flag Lab   Hemoglobin 10.4 13.3 - 17.7 g/dL L 13            Glucose by meter [[id number]] (Abnormal)  Resulted: 05/18/18 0624, Result status: Final result    Ordering provider: Brijesh Bass MD  05/18/18 0612 Resulting lab: POINT OF CARE TEST, GLUCOSE    Specimen Information    Type Source Collected On     05/18/18 0612          Components       Value Reference Range Flag Lab   Glucose 133 70 - 99 mg/dL H 170            Glucose by meter [[id number]] (Abnormal)  Resulted: 05/18/18 0517, Result status: Final result    Ordering provider: Brijesh Bass MD  05/18/18 0459 Resulting lab: POINT OF CARE TEST, GLUCOSE    Specimen Information    Type Source Collected On     05/18/18 0459          Components       Value Reference Range Flag Lab   Glucose 153 70 - 99 mg/dL H 170   Comment:  /RN Notified            Glucose by meter [[id number]] (Abnormal)  Resulted:  05/18/18 0406, Result status: Final result    Ordering provider: Brijesh Bass MD  05/18/18 0354 Resulting lab: POINT OF CARE TEST, GLUCOSE    Specimen Information    Type Source Collected On     05/18/18 0354          Components       Value Reference Range Flag Lab   Glucose 154 70 - 99 mg/dL H 170            Glucose by meter [[id number]] (Abnormal)  Resulted: 05/18/18 0308, Result status: Final result    Ordering provider: Brijesh Bass MD  05/18/18 0257 Resulting lab: POINT OF CARE TEST, GLUCOSE    Specimen Information    Type Source Collected On     05/18/18 0257          Components       Value Reference Range Flag Lab   Glucose 148 70 - 99 mg/dL H 170            Glucose by meter [[id number]] (Abnormal)  Resulted: 05/18/18 0221, Result status: Final result    Ordering provider: Brijesh Bass MD  05/18/18 0203 Resulting lab: POINT OF CARE TEST, GLUCOSE    Specimen Information    Type Source Collected On     05/18/18 0203          Components       Value Reference Range Flag Lab   Glucose 150 70 - 99 mg/dL H 170            Glucose by meter [[id number]] (Abnormal)  Resulted: 05/18/18 0118, Result status: Final result    Ordering provider: Brijesh Bass MD  05/18/18 0107 Resulting lab: POINT OF CARE TEST, GLUCOSE    Specimen Information    Type Source Collected On     05/18/18 0107          Components       Value Reference Range Flag Lab   Glucose 148 70 - 99 mg/dL H 170            Glucose by meter [[id number]] (Abnormal)  Resulted: 05/18/18 0026, Result status: Final result    Ordering provider: Brijesh Bass MD  05/18/18 0013 Resulting lab: POINT OF CARE TEST, GLUCOSE    Specimen Information    Type Source Collected On     05/18/18 0013          Components       Value Reference Range Flag Lab   Glucose 149 70 - 99 mg/dL H 170            Testing Performed By     Lab - Abbreviation Name Director Address Valid Date Range    13 - Unknown [facility]  [facility] Unknown [address] [facility] [address] 01/15/15 0916 - Present    45 - [facility] Unknown Unknown 01/28/02 0000 - Present    51 - Unknown [facility] Unknown [address] [facility] [address] 12/31/14 1010 - Present    170 - Unknown POINT OF CARE TEST, GLUCOSE Unknown Unknown 10/31/11 1114 - Present               Imaging Results - 3 Days      XR Cervical Spine 2/3 Views [[id number]]  Resulted: 05/20/18 1648, Result status: Final result    Ordering provider: Jorge A Randolph MD  05/16/18 1905 Resulted by: Nilda Chamorro MD    Performed: 05/20/18 1416 - 05/20/18 1428 Resulting lab: RADIOLOGY RESULTS    Narrative:       2 views cervical spine radiographs 5/20/2018 4:45 PM    History: s/p cervical spinal fusion;     Comparison: CT March 1, 2018 6    Findings:    AP and lateral views of the cervical spine were obtained.    Down to the level of C7 is well visualized on the lateral view(s). The  cervicothoracic junction is not  well assessed.    Since comparison CT, interim postsurgical change of cervicothoracic  fusion instrumentation extending from C2 down to T2. Lower levels are  not well assessed. Due to overlapping soft tissue and  underpenetration. There is marked interval improvement of cervical  kyphosis. Posterior subcutaneous emphysema, compatible with recent  surgical change. Posterior decompression changes.    Atherosclerotic calcification of aortic arch. Visualized lungs are  clear.      Impression:       IMPRESSION: Interim, recent postsurgical change of C2-T2 combine  fusion instrumentation with significant interval improvement of  cervical kyphosis.    NILDA CHAMORRO      Testing Performed By     Lab - Abbreviation Name Director Address Valid Date Range    104 - Rad [facility] RADIOLOGY RESULTS Unknown Unknown 02/16/05 1553 - Present            Encounter-Level Documents:     There are no encounter-level documents.      Order-Level Documents:      There are no order-level documents.

## 2018-05-16 NOTE — BRIEF OP NOTE
[facility], [place]    Brief Operative Note    Pre-operative diagnosis: Advanced Cervical Spondylosis with Myelopathy; Secondary Cervical Kyphosis  Post-operative diagnosis Same as preop  Procedure: Procedure(s):  Part 1: (anterior) Anterior Cervical Decompression Fusion C3-7, Cervical 7-Thoracic 1; Anterior Iliac Crest Bone Graft Bowers  Part 2: (posterior) Posterior Interbody Spinal Fusion Cervical 3-Throracic 1/Thoracic 2; Laminectomies Cervical 3-7 - Wound Class: I-Clean     - Wound Class: I-Clean  Surgeon: Surgeon(s) and Role:  Panel 1:     * Brijesh Bass MD - Primary    Panel 2:     * Brijesh Bass MD - Primary    Panel 3:     * Brijesh Bass MD - Primary  Anesthesia: General   Estimated blood loss: 1900 mL  Drains: Hemovac posterior and Jeovany-Ibanez anterior  Specimens: * No specimens in log *  Complications: None.    Postop Plan:  Admit to [place] ICU overnight for monitoring.  If stable ok to transfer to [facility].    Activity: Up with assist -- no bending, twisting. No lifting >10 lbs.  Weight bearing status: WBAT  Antibiotics: Ancef until drains out.  Diet: Begin with clear fluids and progress diet as tolerated   DVT prophylaxis: Mechanical  Bracing/Splinting: [place] J collar to be worn at all times except hygiene and eating. Soft collar until orthotics consult completed.   Wound Care: Aquacel dressing posteriorly in place for 5-7 days, patient to perform dressing changes thereafter. Anterior dressing medipore tape and gauze to be changed on POD3  Drains: drain to be removed when flow less than 30cc/shift  Pain management: transition from IV to orals as tolerated  X-rays: Upright AP/Lat cervical XR prior to discharge  Physical Therapy: ADL's.   Occupational Therapy: ADL's  Labs: Trend Hgb on POD #1, 2, 3   Consults: PT, OT, Medicine co-management, appreciate assistance in caring for this patient.   Follow-up: Clinic with Dr. Bass in 2  weeks.     Disposition: Pending progress with therapies, pain control on orals, and medical stability, anticipate discharge to home vs. TCU on POD #2-4.    Jorge A Randolph MD  PGY-1 Orthopaedic Surgery

## 2018-05-16 NOTE — OP NOTE
Date of Service:  5/16/2018       Surgeon:  Brijesh Bass MD   Assistant:  None for first 90% of case;  Jorge A Randolph MD PGY-1 for last 10% of case (wound closure, posterior part).      Preoperative Diagnosis:     1.  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  2.  Severe multilevel cervical spinal stenosis C3-C7, with myelomalacia.  3.  Cervical kyphosis.      Postoperative Diagnosis:     Same      Procedures:   Part 1 - anterior:  1.  Anterior cervical diskectomy and fusion (ACDF) C3-4, C4-5, C5-6, C6-7 and C7-T1 (five levels), using iliac crest autograft (C3-C7) and demineralized bone matrix (C7-T1). .   2.  Placement of interbody PEEK cage C3-4, C4-5, C5-6, C6-7 and C7-T1.   3.  Left anterior iliac crest cancellous bone graft harvest via separate skin incision.   4.  Placement and subsequent removal of [email or web address] cranial tongs for operative positioning (bivector traction) [parts 1 and 2].  5.  Use of operating microscope.   6.  Non-surgeon performed intraoperative neuromonitoring using SSEPs, tcMEPs and bilateral UE EMGs ([employer]) [parts 1 and 2].      Part 2 - posterior:  1.  Posterior spinal fusion C2-T2 (bilateral transfacet C2-T2; interlaminar T1-2), using local autograft and crushed cancellous allograft.  2.  Open bilateral segmental posterior fixation C2-T2, using C2 pars screws, cervical pedicle screws (C4,C5,C6,C7) and thoracic pedicle screws (T1,T2).  3.  Decompressive laminectomies C3,C4,C5,C6 and C7 (5 levels).  4.  Computer navigation using O-arm and "[employer]      Anesthesia:  General endotracheal.   Local anesthetic:  0.25% marcaine + epinephrine = 60 cc (30cc anterior; 30cc posterio).  EBL:  Part 1 = 495 cc; part 2 = 1,505 cc; TOTAL = 1900 cc (cellsaver return 621 cc).  Complications:  None apparent.   Implants / Equipment used:   1.  Medtronic PEEK cages, 18 mm width x 16 mm depth; cage heights as follows: C3-4 = 6 mm; C4-5 = 6 mm; C5-6 = 6 mm; C6-7 = 7 mm; C7-T1 = 6 mm.  2.   Medtronic Vertex Select system:  C2 = 4.5 x 24mm bilat; C3 = none; C4 = none on right, 3.5 x 24 mm left; C5 = 3.5 x 24 mm bilat; C6 = 3.5 x 24 mm bilat; C7 = 4.5 x 24 mm right, 4.5 x 26 mm left; T1 = 4.5 x 28 mm right, 4.5 x 30 mm left; T2 = 4.5 x 32 mm bilat.  Two 3.5 mm CoCr rods, cut to approx 130 mm length from longer rods; contoured manually using [employer].   4.  B[employer]u bone graft harvesting system, drill size 8.   5.  Crushed cancellous allograft 30cc.  6.  DBX DBM putty 1 cc.  7.  TXA 30 mg/kg LD then 5 mg/kg/hr.  8.  Vancomycin powder 1 gm.  9.  Aquamantys with 6mm handpiece.      Indications:  73 year old male with progressive neck pain, left arm/hand numbness, and clumsiness.  Referred from [facility].  Imaging revealed multilevel cervical stenosis, advanced spondylosis (degeneration), kyphosis, and spinal cord signal change (myelomalacia).  Tried nonoperative treatment, continues to have significant disabling symptoms.  I thus offered surgery in form of anterior-posterior multilevel cervical fusion C2-T2, with multilevel decompressive laminectomies; and iliac crest bone graft harvest.  Consented after thorough discussion of the rationale, risks, benefits and alternatives.        Details:  Part 1 - anterior:  Properly identified in preop area, site marked, consent signed.  Wheeled to operating theater.  Brief earlier performed.  General anesthesia administered.  St inserted.  Positioned supine on flat Trios table.  Arms tucked to sides.  Neck slightly extended.  Nguyen-2sms cranial tongs applied, set up for bivector traction with 2 traction ropes; 10 lb in-line traction weight applied.  Anterior neck and left pelvic region squared off and prepped with ChloraPrep and draped in sterile fashion.  Surgical timeout performed.  Baseline motor and sensory stimulation showed good signals.      Made 1 inch incision two fingerbreadths posterior to left ASIS, carried down to bone.  Used bovie to  subperiosteally expose iliac crest.  Created divot on bone using Acumed awl.  Obtained approx 10cc of good quality cancellous bone graft with 5 passes of 8mm Acumed drill.  Irrigated, packed with gelfoam strips, closed in layers using vicryl 0, vicryl 2-0 and monocryl 3-0.  Injected local anesthetic.     Used C-arm to localize neck incisions; used separate incisions for C3-C6 (3 levels) and C6-T1 (2 levels) .  4 cm horizontal incisions made; left anterior Cottrell-Collazo approach to spine performed.  Developed plane between SCM and carotid sheath laterally, and strap muscles medially.  Went through pretracheal and prevertebral fascia, got down to spine.  G18 spinal needle inserted into presumed C4-5 disc.  C-arm lateral image showed needle at C4-5; verified by radiologist; confirmatory timeout performed.  Lifted longus colli bilaterally.  Placed Trimline retractor at C3-4.  Placed [place] pins at adjacent vertebral bodies; applied distraction.  Microscope brought in.  Annulotomy made using #15 blade.  Used bur to prepare endplates and create wide rectangular space down to posterior annulus.  Removed posterior annulus and osteophytes using 2 mm Kerrison.  Resected PLL using same.  Bilateral anterior foraminotomies performed using 2 and 3 mm Kerrisons.  Able to achieve wide decompression. Burred central holes on endplates for ingrowth purposes.  Epidural bleeders controlled using bipolar, Surgiflo, Evicel and cottonoids.  Inserted 18 x 16 mm trial spacers.  Elected to use 6 mm height cage.  Cage filled with graft; tamped into disc space.  Motor signals stable.     Retractor repositioned at C4-5.  C3 [place] pin repositioned to C5; distraction applied.  Annulotomy made.  Endplates prepared, created wide rectangular space down to posterior annulus.  Removed posterior annulus and osteophytes.  Resected PLL.  Bilateral anterior foraminotomies performed.  Able to achieve wide decompression. Burred central holes on endplates.   Epidural bleeders controlled.  Inserted 18 x 16 mm trial spacers.  Elected to use 6 mm height cage.  Cage filled with graft; tamped into disc space.  Motor signals stable.     Retractor repositioned at C5-6.  C4 [place] pin repositioned to C6; distraction applied.  Annulotomy made.  Endplates prepared, created wide rectangular space down to posterior annulus.  Removed posterior annulus and osteophytes.  Resected PLL.  Bilateral anterior foraminotomies performed.  Able to achieve wide decompression. Burred central holes on endplates.  Epidural bleeders controlled.  Inserted 18 x 16 mm trial spacers.  Elected to use 6 mm height cage.  Cage filled with graft; tamped into disc space.  Motor signals stable.    [place] pins and retractor removed; placed thru lower skin incision at C6-7.  Distraction applied.  Annulotomy made.  Endplates prepared, created wide rectangular space down to posterior annulus.  Removed posterior annulus and osteophytes.  Resected PLL.  Bilateral anterior foraminotomies performed.  Able to achieve wide decompression. Burred central holes on endplates.  Epidural bleeders controlled.  Inserted 18 x 16 mm trial spacers.  Elected to use 7 mm height cage.  Cage filled with graft; tamped into disc space.  Motor signals stable.    Retractor repositioned at C7-T1.  C6 [place] pin repositioned to T1; distraction applied.  Annulotomy made.  Endplates prepared, created wide rectangular space down to posterior annulus.  Removed posterior annulus and osteophytes.  Resected PLL.  Bilateral anterior foraminotomies performed.  Able to achieve wide decompression. Burred central holes on endplates.  Epidural bleeders controlled.  Inserted 18 x 16 mm trial spacers.  Elected to use 6 mm height cage.  At this point, we've already used up all iliac crest bone earlier harvested.  Thus, cage filled with demineralized bone matrix (DBM) putty 1cc; tamped into disc space.  Motor signals stable.    Lateral image showed good  position of all cages; C3-4 slightly deep.  I pulled this out ~ 1 mm.  Irrigation.  Deep 7-Fr round ROSI drain placed.  Closure of both incisions performed in layers; platysma with 2-0 Vicryl, subq with 3-0 Vicryl, skin with 4-0 Monocryl.  Local anesthetic injected.  Dermabond and sterile dressings applied to all incisions.        Part 2 - posterior:  In-table flip performed (rotisserie) to prone position on Trios 6-poster (with large hip pads).  Arms tucked to sides, pulled down with tape.  No face pillow; head position maintained using in-line GW traction.  Posterior hairline clipped.  Posterior neck squared off, prepped with Chloraprep, draped in sterile fashion.  Surgical timeout performed.      Posterior midline skin incision made ~ C2-T2.  Bilateral subperiosteal exposure performed out to lateral edges of lateral masses and transverse processes.  Hemostasis achieved using Aquamantys bipolar sealant and Surgiflo.  Detached muscle from C2 spinous process only as needed for exposure.  Preop CT showed posterior autofusion C2-3 across bilateral facet joints; confirmed via direct inspection.  Spinous process clamp placed at T1, attached to lyndsay frame; kocher clamp placed at C5 spinous process.  O-arm 3D scan obtained.  Placed pedicle screws bilateral T2, T1, C7, C6, C5 and left C4.  Placed isthmus/pars/pedicle screws bilateral C2.  At each level, starting point identified using lyndsay probe.   hole created with carolyn.  Track created using lyndsay drill guide and drill with 20 mm stop.  Stealth software used to select screw length: 4.5 mm screws at C2, C7, T1 and T2; 3.5 mm screws at C4, C5 and C6.  Used 4.0 and 3.0 mm lyndsay taps, respectivelhy.  Screw placed with lyndsay .  Check 3D scan showed good placement of all screws.  Motor stimulation performed; stable signals.     Using carolyn, decorticated bilateral facet joints C3 to T2 using carolyn; also served as posterior release.  At T1 and T2, also decorticated lamina and  spinous processes.      Complete laminectomies performed C3, C4, C5, C6 and C7 (5 levels).  Used carolyn to create troughs along junction of lamina and lateral mass bilaterally from C3-C7.  Used Kocher to lift up and remove C3-C7 laminae en bloc; curette used to release ligamentum flacum.  This effectively unroofed and decompressed spinal canal C3-C7.  Motor signals stable.  Laminectomy bone saved, cleaned and morcellized for grafting purposes.  Local bone graft placed into decorticated facet joints C3-4,C4-5,C5-6,C6-7,C7-T1 and T1-2.    We switched the 10 lb weight to the 2nd rope with a more vertical pull, to produce neck extension or lordosis.  We measured erick lengths using template, ~ 10 mm.  We cut appropriate length 3.5 mm CoCr rods; contoured using French batista.  Rods placed over screw heads; used reduction towers as needed; set plugs placed.  We made sure we had rods sticking out at both ends prior to final tightening the plugs using torque .  Placed gelfoam square over laminectomy defect.  Additional allograft bone placed over posterior elements C2-T2.     Irrigation.  Deep medium Hemovac drain.  Vancomycin powder 1 gm deep applied.  Layered closure: vicryl #1 popoffs, vicryl 0 for deep subq, vicryl 2-0 for superficial subq and monocryl 3-0.  Local anesthetic injected.  Dermabond and Aquacell dressing applied.  Turned supine.  Nguyen-YOGITECH cranial tongs removed.  Soft collar applied.      Postop:  Admit; ICU bed overnight; anticipate transfer to surgical floor tomorrow.  Antibiotics x 24 hours.  Mechanical DVT prophylaxis.  PT and OT consult.  Advance diet slowly as tolerated.  Orthotics consult for Ohai-Pathable collar; to be worn fulltime except for hygiene and during meals.  No lifting more than 10 pounds, avoid neck bending or twisting.  Hospitalist comanagement for medical issues.  Cervical AP-lat upright x-rays prior to discharge.  Anticipate discharge in 3-5 days.  RTC 6 weeks with cervical ap-lat  upright x-rays.

## 2018-05-16 NOTE — LETTER
Transition Communication Hand-off for Care Transitions to Next Level of Care Provider    Name: Familia Irving  : 1945  MRN #: [id number]  Primary Care Provider: [facility]     Primary Clinic: [facility]     Reason for Hospitalization:  Advanced Cervical Spondylosis with Myelopathy; Secondary Cervical Kyphosis  S/P cervical spinal fusion  Post-operative state  Admit Date/Time: 2018  5:32 AM  Discharge Date: 18  Payor Source: Payor: COMMERCIAL / Plan: [facility] / Product Type: Indemnity /            Reason for Communication Hand-off Referral: Other Discharge plan     Discharge Plan:  Discharge Plan:       Most Recent Value    Disposition Comments -- [pt understanding [facility] may not pay for TCU ,he has Medicare]           Concern for non-adherence with plan of care:   Y/N N  Discharge Needs Assessment:  Needs       Most Recent Value    Anticipated Changes Related to Illness inability to care for self, inability to care for someone else    Equipment Currently Used at Home cane, straight [shoe horn]    Transportation Available family or friend will provide            Follow-up plan:  Future Appointments  Date Time Provider Department Center   2018 11:30 AM Awilda Spencer, PT [facility] [place]   2018 1:30 PM Crystal Palacios, OT [facility] [place]   2018 6:30 PM UR PT OVERFLOW [facility] [place]   2018 11:45 AM Brijesh Bass MD [facility]   2018 10:30 AM Brijesh Bass MD [facility]       Any outstanding tests or procedures:              VICTOR HUGO Barba    Ortho/Med/Surg and [facility] [place] [facility]    [phone]  [email or web address]    AVS/Discharge Summary is the source of truth; this is a helpful guide for improved communication of patient story

## 2018-05-17 ENCOUNTER — APPOINTMENT (OUTPATIENT)
Dept: PHYSICAL THERAPY | Facility: CLINIC | Age: 73
DRG: 454 | End: 2018-05-17
Attending: ORTHOPAEDIC SURGERY
Payer: COMMERCIAL

## 2018-05-17 LAB
ANION GAP SERPL CALCULATED.3IONS-SCNC: 10 MMOL/L (ref 3–14)
ANION GAP SERPL CALCULATED.3IONS-SCNC: 10 MMOL/L (ref 3–14)
BASOPHILS # BLD AUTO: 0 10E9/L (ref 0–0.2)
BASOPHILS NFR BLD AUTO: 0.1 %
BUN SERPL-MCNC: 27 MG/DL (ref 7–30)
BUN SERPL-MCNC: 29 MG/DL (ref 7–30)
CALCIUM SERPL-MCNC: 7.1 MG/DL (ref 8.5–10.1)
CALCIUM SERPL-MCNC: 7.5 MG/DL (ref 8.5–10.1)
CHLORIDE SERPL-SCNC: 117 MMOL/L (ref 94–109)
CHLORIDE SERPL-SCNC: 119 MMOL/L (ref 94–109)
CO2 SERPL-SCNC: 18 MMOL/L (ref 20–32)
CO2 SERPL-SCNC: 18 MMOL/L (ref 20–32)
CREAT SERPL-MCNC: 1.38 MG/DL (ref 0.66–1.25)
CREAT SERPL-MCNC: 1.44 MG/DL (ref 0.66–1.25)
DIFFERENTIAL METHOD BLD: ABNORMAL
EOSINOPHIL # BLD AUTO: 0 10E9/L (ref 0–0.7)
EOSINOPHIL NFR BLD AUTO: 0 %
ERYTHROCYTE [DISTWIDTH] IN BLOOD BY AUTOMATED COUNT: 18.3 % (ref 10–15)
GFR SERPL CREATININE-BSD FRML MDRD: 48 ML/MIN/1.7M2
GFR SERPL CREATININE-BSD FRML MDRD: 51 ML/MIN/1.7M2
GLUCOSE BLDC GLUCOMTR-MCNC: 146 MG/DL (ref 70–99)
GLUCOSE BLDC GLUCOMTR-MCNC: 151 MG/DL (ref 70–99)
GLUCOSE BLDC GLUCOMTR-MCNC: 153 MG/DL (ref 70–99)
GLUCOSE BLDC GLUCOMTR-MCNC: 155 MG/DL (ref 70–99)
GLUCOSE BLDC GLUCOMTR-MCNC: 155 MG/DL (ref 70–99)
GLUCOSE BLDC GLUCOMTR-MCNC: 194 MG/DL (ref 70–99)
GLUCOSE BLDC GLUCOMTR-MCNC: 207 MG/DL (ref 70–99)
GLUCOSE SERPL-MCNC: 193 MG/DL (ref 70–99)
GLUCOSE SERPL-MCNC: 205 MG/DL (ref 70–99)
HCT VFR BLD AUTO: 27.9 % (ref 40–53)
HGB BLD-MCNC: 9 G/DL (ref 13.3–17.7)
IMM GRANULOCYTES # BLD: 0.1 10E9/L (ref 0–0.4)
IMM GRANULOCYTES NFR BLD: 0.5 %
LYMPHOCYTES # BLD AUTO: 0.3 10E9/L (ref 0.8–5.3)
LYMPHOCYTES NFR BLD AUTO: 3.3 %
MAGNESIUM SERPL-MCNC: 1.1 MG/DL (ref 1.6–2.3)
MAGNESIUM SERPL-MCNC: 2.2 MG/DL (ref 1.6–2.3)
MCH RBC QN AUTO: 29.9 PG (ref 26.5–33)
MCHC RBC AUTO-ENTMCNC: 32.3 G/DL (ref 31.5–36.5)
MCV RBC AUTO: 93 FL (ref 78–100)
MONOCYTES # BLD AUTO: 0.7 10E9/L (ref 0–1.3)
MONOCYTES NFR BLD AUTO: 7.5 %
NEUTROPHILS # BLD AUTO: 8.5 10E9/L (ref 1.6–8.3)
NEUTROPHILS NFR BLD AUTO: 88.6 %
NRBC # BLD AUTO: 0 10*3/UL
NRBC BLD AUTO-RTO: 0 /100
PHOSPHATE SERPL-MCNC: 2.9 MG/DL (ref 2.5–4.5)
PLATELET # BLD AUTO: 175 10E9/L (ref 150–450)
POTASSIUM SERPL-SCNC: 4 MMOL/L (ref 3.4–5.3)
POTASSIUM SERPL-SCNC: 4.7 MMOL/L (ref 3.4–5.3)
RBC # BLD AUTO: 3.01 10E12/L (ref 4.4–5.9)
SODIUM SERPL-SCNC: 145 MMOL/L (ref 133–144)
SODIUM SERPL-SCNC: 147 MMOL/L (ref 133–144)
TACROLIMUS BLD-MCNC: 5 UG/L (ref 5–15)
TME LAST DOSE: NORMAL H
WBC # BLD AUTO: 9.6 10E9/L (ref 4–11)

## 2018-05-17 PROCEDURE — E2402 NEG PRESS WOUND THERAPY PUMP: HCPCS | Performed by: PHYSICAL THERAPIST

## 2018-05-17 PROCEDURE — 97530 THERAPEUTIC ACTIVITIES: CPT | Mod: GP | Performed by: PHYSICAL THERAPIST

## 2018-05-17 PROCEDURE — 25000128 H RX IP 250 OP 636: Performed by: PHYSICIAN ASSISTANT

## 2018-05-17 PROCEDURE — 00000146 ZZHCL STATISTIC GLUCOSE BY METER IP

## 2018-05-17 PROCEDURE — 97161 PT EVAL LOW COMPLEX 20 MIN: CPT | Mod: GP | Performed by: PHYSICAL THERAPIST

## 2018-05-17 PROCEDURE — 97116 GAIT TRAINING THERAPY: CPT | Mod: GP | Performed by: PHYSICAL THERAPIST

## 2018-05-17 PROCEDURE — 12000003 ZZH R&B CRITICAL UMMC

## 2018-05-17 PROCEDURE — 85025 COMPLETE CBC W/AUTO DIFF WBC: CPT | Performed by: INTERNAL MEDICINE

## 2018-05-17 PROCEDURE — 83735 ASSAY OF MAGNESIUM: CPT | Performed by: INTERNAL MEDICINE

## 2018-05-17 PROCEDURE — 80048 BASIC METABOLIC PNL TOTAL CA: CPT | Performed by: INTERNAL MEDICINE

## 2018-05-17 PROCEDURE — 80197 ASSAY OF TACROLIMUS: CPT | Performed by: INTERNAL MEDICINE

## 2018-05-17 PROCEDURE — L1499 SPINAL ORTHOSIS NOS: HCPCS

## 2018-05-17 PROCEDURE — 25000125 ZZHC RX 250: Performed by: PHYSICIAN ASSISTANT

## 2018-05-17 PROCEDURE — 25000131 ZZH RX MED GY IP 250 OP 636 PS 637: Performed by: PHYSICIAN ASSISTANT

## 2018-05-17 PROCEDURE — 25000128 H RX IP 250 OP 636: Performed by: INTERNAL MEDICINE

## 2018-05-17 PROCEDURE — 99233 SBSQ HOSP IP/OBS HIGH 50: CPT | Performed by: PHYSICIAN ASSISTANT

## 2018-05-17 PROCEDURE — 40000193 ZZH STATISTIC PT WARD VISIT: Performed by: PHYSICAL THERAPIST

## 2018-05-17 PROCEDURE — 25000131 ZZH RX MED GY IP 250 OP 636 PS 637: Performed by: STUDENT IN AN ORGANIZED HEALTH CARE EDUCATION/TRAINING PROGRAM

## 2018-05-17 PROCEDURE — 25000132 ZZH RX MED GY IP 250 OP 250 PS 637: Performed by: STUDENT IN AN ORGANIZED HEALTH CARE EDUCATION/TRAINING PROGRAM

## 2018-05-17 PROCEDURE — L0174 CERV SR 2PC THOR EXT PRE OTS: HCPCS

## 2018-05-17 PROCEDURE — 25000128 H RX IP 250 OP 636: Performed by: STUDENT IN AN ORGANIZED HEALTH CARE EDUCATION/TRAINING PROGRAM

## 2018-05-17 RX ORDER — POTASSIUM CL/LIDO/0.9 % NACL 10MEQ/0.1L
10 INTRAVENOUS SOLUTION, PIGGYBACK (ML) INTRAVENOUS
Status: DISCONTINUED | OUTPATIENT
Start: 2018-05-17 | End: 2018-05-17 | Stop reason: RX

## 2018-05-17 RX ORDER — SODIUM CHLORIDE 9 MG/ML
INJECTION, SOLUTION INTRAVENOUS
Status: DISPENSED
Start: 2018-05-17 | End: 2018-05-18

## 2018-05-17 RX ORDER — MAGNESIUM SULFATE HEPTAHYDRATE 40 MG/ML
4 INJECTION, SOLUTION INTRAVENOUS EVERY 4 HOURS PRN
Status: DISCONTINUED | OUTPATIENT
Start: 2018-05-17 | End: 2018-05-21 | Stop reason: HOSPADM

## 2018-05-17 RX ORDER — POTASSIUM CHLORIDE 1.5 G/1.58G
20-40 POWDER, FOR SOLUTION ORAL
Status: DISCONTINUED | OUTPATIENT
Start: 2018-05-17 | End: 2018-05-18

## 2018-05-17 RX ORDER — DEXAMETHASONE SODIUM PHOSPHATE 4 MG/ML
10 INJECTION, SOLUTION INTRA-ARTICULAR; INTRALESIONAL; INTRAMUSCULAR; INTRAVENOUS; SOFT TISSUE EVERY 6 HOURS
Status: COMPLETED | OUTPATIENT
Start: 2018-05-17 | End: 2018-05-18

## 2018-05-17 RX ORDER — POTASSIUM CHLORIDE 750 MG/1
20-40 TABLET, EXTENDED RELEASE ORAL
Status: DISCONTINUED | OUTPATIENT
Start: 2018-05-17 | End: 2018-05-18

## 2018-05-17 RX ORDER — NICOTINE POLACRILEX 4 MG
15-30 LOZENGE BUCCAL
Status: DISCONTINUED | OUTPATIENT
Start: 2018-05-17 | End: 2018-05-20

## 2018-05-17 RX ORDER — DEXTROSE MONOHYDRATE 25 G/50ML
25-50 INJECTION, SOLUTION INTRAVENOUS
Status: DISCONTINUED | OUTPATIENT
Start: 2018-05-17 | End: 2018-05-20

## 2018-05-17 RX ORDER — POTASSIUM CHLORIDE 7.45 MG/ML
10 INJECTION INTRAVENOUS
Status: DISCONTINUED | OUTPATIENT
Start: 2018-05-17 | End: 2018-05-18

## 2018-05-17 RX ORDER — POTASSIUM CHLORIDE 29.8 MG/ML
20 INJECTION INTRAVENOUS
Status: DISCONTINUED | OUTPATIENT
Start: 2018-05-17 | End: 2018-05-17 | Stop reason: RX

## 2018-05-17 RX ORDER — NICOTINE POLACRILEX 4 MG
15-30 LOZENGE BUCCAL
Status: DISCONTINUED | OUTPATIENT
Start: 2018-05-17 | End: 2018-05-17

## 2018-05-17 RX ORDER — DEXTROSE MONOHYDRATE 25 G/50ML
25-50 INJECTION, SOLUTION INTRAVENOUS
Status: DISCONTINUED | OUTPATIENT
Start: 2018-05-17 | End: 2018-05-17

## 2018-05-17 RX ORDER — MYCOPHENOLATE MOFETIL 200 MG/ML
1000 POWDER, FOR SUSPENSION ORAL
Status: DISCONTINUED | OUTPATIENT
Start: 2018-05-17 | End: 2018-05-17

## 2018-05-17 RX ADMIN — DEXAMETHASONE SODIUM PHOSPHATE 10 MG: 4 INJECTION, SOLUTION INTRAMUSCULAR; INTRAVENOUS at 23:18

## 2018-05-17 RX ADMIN — CEFAZOLIN 1 G: 1 INJECTION, POWDER, FOR SOLUTION INTRAMUSCULAR; INTRAVENOUS at 02:47

## 2018-05-17 RX ADMIN — MAGNESIUM SULFATE IN WATER 4 G: 40 INJECTION, SOLUTION INTRAVENOUS at 00:59

## 2018-05-17 RX ADMIN — DEXAMETHASONE SODIUM PHOSPHATE 10 MG: 4 INJECTION, SOLUTION INTRAMUSCULAR; INTRAVENOUS at 17:37

## 2018-05-17 RX ADMIN — OXYCODONE HYDROCHLORIDE 5 MG: 5 TABLET ORAL at 07:44

## 2018-05-17 RX ADMIN — HYDROMORPHONE HYDROCHLORIDE 0.5 MG: 1 INJECTION, SOLUTION INTRAMUSCULAR; INTRAVENOUS; SUBCUTANEOUS at 02:45

## 2018-05-17 RX ADMIN — HUMAN INSULIN 1 UNITS/HR: 100 INJECTION, SOLUTION SUBCUTANEOUS at 19:57

## 2018-05-17 RX ADMIN — CEFAZOLIN 1 G: 1 INJECTION, POWDER, FOR SOLUTION INTRAMUSCULAR; INTRAVENOUS at 11:08

## 2018-05-17 RX ADMIN — SODIUM CHLORIDE, POTASSIUM CHLORIDE, SODIUM LACTATE AND CALCIUM CHLORIDE: 600; 310; 30; 20 INJECTION, SOLUTION INTRAVENOUS at 02:45

## 2018-05-17 RX ADMIN — HYDROMORPHONE HYDROCHLORIDE 0.5 MG: 1 INJECTION, SOLUTION INTRAMUSCULAR; INTRAVENOUS; SUBCUTANEOUS at 16:35

## 2018-05-17 RX ADMIN — INSULIN ASPART 2 UNITS: 100 INJECTION, SOLUTION INTRAVENOUS; SUBCUTANEOUS at 14:21

## 2018-05-17 RX ADMIN — SODIUM CHLORIDE, POTASSIUM CHLORIDE, SODIUM LACTATE AND CALCIUM CHLORIDE: 600; 310; 30; 20 INJECTION, SOLUTION INTRAVENOUS at 14:07

## 2018-05-17 RX ADMIN — INSULIN ASPART 3 UNITS: 100 INJECTION, SOLUTION INTRAVENOUS; SUBCUTANEOUS at 09:57

## 2018-05-17 RX ADMIN — HYDROMORPHONE HYDROCHLORIDE 0.5 MG: 1 INJECTION, SOLUTION INTRAMUSCULAR; INTRAVENOUS; SUBCUTANEOUS at 11:08

## 2018-05-17 RX ADMIN — HYDROMORPHONE HYDROCHLORIDE 0.5 MG: 1 INJECTION, SOLUTION INTRAMUSCULAR; INTRAVENOUS; SUBCUTANEOUS at 21:31

## 2018-05-17 ASSESSMENT — PAIN DESCRIPTION - DESCRIPTORS
DESCRIPTORS: ACHING

## 2018-05-17 ASSESSMENT — ACTIVITIES OF DAILY LIVING (ADL)
ADLS_ACUITY_SCORE: 13
ADLS_ACUITY_SCORE: 13
ADLS_ACUITY_SCORE: 14

## 2018-05-17 NOTE — PLAN OF CARE
Problem: Patient Care Overview  Goal: Plan of Care/Patient Progress Review  Discharge Planner PT   Patient plan for discharge: Pt did not state discharge plan  Current status: PT evaluation completed.  Pt demonstrated rolling to left side with HOB elevated and min A x 1.  Sidelying to sitting at EOB with SBA and min A x 1.  Pt is able to scoot towards EOB with increased time and CGA x 1.  Sit to standing from EOB with FWW and CGA to min A x 1, pt initially leaning posteriorly.  Pt ambulated approximately 20' with FWW and CGA x 1.  Standing to sitting at EOB with FWW and CGA x 1.  Bed to chair transfer with FWW and CGA x 1.    Barriers to return to prior living situation: Stairs, decreased strength, level of assist, and safety.   Recommendations for discharge: Will continue to assess discharge needs anticipate PT need  Rationale for recommendations:  Pt would benefit from further skilled PT for LE strengthening and increase independence with all functional mobility/gait.        Entered by: Ruby Betts 05/17/2018 9:14 AM
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
## 2018-05-17 NOTE — PLAN OF CARE
Problem: Patient Care Overview  Goal: Plan of Care/Patient Progress Review  Attempted PT session x 2 this PM, during first attempt pt declined any OOB activity due to just getting comfortable and during second attempt PT meeting with MD.

## 2018-05-17 NOTE — PROGRESS NOTES
Pt transferring to unit 10A. DORA Bailey taking patient, report given. Pt A&O, forgetful in early a.m. Reorients easily. VSS, hypertensive (150/70s) this a.m. D/t pt unable to swallow pills. NPO until swallow evaluation complete. St removed, pt due to void. Up to chair with assist of 1 and walker, PT following pt. Hard collar in place, drains x2 c/d/i. Pt c/o pain, received 5mg Oxycodone. Will continue to monitor patient. Transfer to 10A.

## 2018-05-17 NOTE — PROGRESS NOTES
"Brief Medicine Note    Familia Irving is a 72 year old male with PMHx of DDKT in 2006, type II DM, HTN and cervical spondylosis s/p both anterior and posterior cervical fusion 5/16/2018 for progressive cervical stenosis with myelopathy by Dr Bass.     Patient reports pain is under control. States he has not taken anything in by mouth. Has had difficulty swallowing; additionally he was unable to complete a swallow eval until his neck/throat improves.    Today's vital signs, medications, and nursing notes were reviewed. Labs notable for: Sodium 145, Chloride 117, Cr 1.44 (1.38), Tac level 5.0.     /70 (BP Location: Right arm)  Pulse 69  Temp 98.6  F (37  C) (Oral)  Resp 16  Ht 1.727 m (5' 8\")  Wt 69.2 kg (152 lb 8.9 oz)  SpO2 98%  BMI 23.2 kg/m2  GENERAL: Alert and oriented x 3. Appears comfortable.   HEENT: Anicteric sclera. Mucous membranes moist. Neck brace in place. Hemovac drain present.    A/P:  S/p cervical fusion. Doing well post-op aside from dysphagia. Unable to complete swallow eval today. Decadron recommended per ortho.   - Ordered 10mg IV decadron x 24hrs   - Attempt speech eval again tomorrow    Type II DM. Hgb A1C 7.8 on 4/18/18. PTA on lantus 30u and MSSI. BG ~200 today without PO intake.   - Will initiate insulin gtt while receiving decadron as above    HTN. BP mildly elevated likely 2/2 IVF and holding PTA meds (lisinopril and lasix). Continue to hold lisinopril and lasix. Unable to tolerate PTA amlodipine. IV Hydralazine available PRN    S/p DDKT 2006. Cr stable. UOP appropriate. PTA on tacrolimus 1mg BID and Myfortic 720mg BID. Unable to tolerate PO so has not received any immunosuppression meds. D/w Dr. Walker who agreed that decadron as above and Cellcept IV would be sufficient to cover immunosuppression in the interim. May hold tacrolimus until able to tolerate PO.  - Switch Cellcept to IV 1g BID  - Continue decadron as above  - Ok to hold off on Tacrolimus until able to tolerate " ALONDRA Velazco PA-C  [facility] Medicine  Pager: [phone]

## 2018-05-17 NOTE — PROGRESS NOTES
05/17/18 0928   Quick Adds   Type of Visit Initial PT Evaluation       Present no   Language English   Living Environment   Lives With alone  (brother lives close by)   Living Arrangements house   Home Accessibility stairs to enter home;stairs (1 railing present)   Number of Stairs to Enter Home 6   Number of Stairs Within Home 0   Stair Railings at Home inside, present on left side   Transportation Available family or friend will provide;car   Living Environment Comment Pt reported having stairs to get into the home then everything is on one level.    Self-Care   Dominant Hand other (see comments)  (not addressed)   Usual Activity Tolerance moderate   Current Activity Tolerance fair   Equipment Currently Used at Home cane, straight   Activity/Exercise/Self-Care Comment Pt reported being independent with all ADLs at baseline.     Functional Level Prior   Ambulation 1-->assistive equipment   Transferring 1-->assistive equipment   Toileting 0-->independent   Bathing 0-->independent   Dressing 0-->independent   Eating 0-->independent   Communication 0-->understands/communicates without difficulty   Swallowing 0-->swallows foods/liquids without difficulty   Cognition 0 - no cognition issues reported   Fall history within last six months no   Which of the above functional risks had a recent onset or change? ambulation;transferring   Prior Functional Level Comment Pt reported using SEC when walking longer distances or standing for a while.    General Information   Onset of Illness/Injury or Date of Surgery - Date 05/16/18   Referring Physician Daniel Bass MD   Patient/Family Goals Statement Goal not verbalized.    Pertinent History of Current Problem (include personal factors and/or comorbidities that impact the POC) 73 year old male s/p 5-level ACDF C3-T1, PISF C3-T2 fusion w L ICBG harvest.  See chart for PMH.      Precautions/Limitations fall precautions;spinal precautions  ([place] brace  on except for hygiene and eating)   Weight-Bearing Status - LUE full weight-bearing   Weight-Bearing Status - RUE full weight-bearing   Weight-Bearing Status - LLE weight-bearing as tolerated   Weight-Bearing Status - RLE weight-bearing as tolerated   Heart Disease Risk Factors High blood pressure;Diabetes   General Observations Pt supine in bed at start of PT session with cervical collar in place.     General Info Comments Pt currently getting IV fluids, PCDs in place,  hemovac/ROSI drain,  telet, and oxymetery.    Cognitive Status Examination   Orientation other (see comments)  (Not tested)   Level of Consciousness alert   Follows Commands and Answers Questions 100% of the time;able to follow multistep instructions   Personal Safety and Judgment intact   Memory intact   Pain Assessment   Patient Currently in Pain Yes, see Vital Sign flowsheet   Integumentary/Edema   Integumentary/Edema other (describe)   Integumentary/Edema Comments Incision not observed due to dressing in place.     Posture    Posture Forward head position;Protracted shoulders   Range of Motion (ROM)   ROM Comment Pt demonstrated functional UE/LE ROM during bed mobility. transfers, and gait.    Strength   Strength Comments LE strength not formally tested secondary to post-op pain.  Pt demonstrated functional strength for transfers and gait.     Bed Mobility   Bed Mobility Comments Rolling to right side with HOB elevated, bed rail, and min A x 1.  Sidelying to sitting at EOB with min A x 1.  Sit to/from standing from EOB with FWW and min A x 1.     Transfer Skills   Transfer Comments Sit to standing from EOB with FWW and CGA to min A x 1.  Standing to sitting with FWW and CGA x 1.  Bed to chair transfer with FWW and CGA x 1.     Gait   Gait Comments Pt ambulated 20' with FWW and CGA x 1.    Balance   Balance Comments Pt demonstrated good sitting balance at EOB.  Pt demonstrated fair standing balance with FWW, initially leaning posterioly .    "  Sensory Examination   Sensory Perception Comments Pt reported having numbness in left UE, but reported it being less after surgery.    General Therapy Interventions   Planned Therapy Interventions bed mobility training;gait training;strengthening;transfer training;home program guidelines   Intervention Comments Bed mobility, transfers, and gait initated.    Clinical Impression   Criteria for Skilled Therapeutic Intervention yes, treatment indicated   PT Diagnosis Decreased strength, decreased ROM, and impaired functional mobility.     Influenced by the following impairments Post-op pain, weakness, decreased ROM, and s/p 5-level ACDF C3-T1, PISF C3-T2 fusion w L ICBG harvesst on 5/16/18   Functional limitations due to impairments Impaired bed mobiltiy, transfers, and gait.    Clinical Presentation Stable/Uncomplicated   Clinical Presentation Rationale Pt is a 73 year old male s/p 5-level ACDF C3-T1, PISF C3-T2 fusion w L ICBG harvesst on 5/16/18.  Pt does not have any significant PMH that will impact PT POC was independent with all mobility/ADLs at baseline.    Clinical Decision Making (Complexity) Low complexity   Therapy Frequency` 2 times/day   Predicted Duration of Therapy Intervention (days/wks) 5 days   Anticipated Equipment Needs at Discharge (Will continue to assess discharge needs. )   Anticipated Discharge Disposition Transitional Care Facility   Risk & Benefits of therapy have been explained Yes   Patient, Family & other staff in agreement with plan of care Yes   [facility] AM-PAC TM \"6 Clicks\"   2016, Trustees of [facility], under license to [employer].  All rights reserved.   6 Clicks Short Forms Basic Mobility Inpatient Short Form   [facility] AM-PAC  \"6 Clicks\" V.2 Basic Mobility Inpatient Short Form   1. Turning from your back to your side while in a flat bed without using bedrails? 3 - A Little   2. Moving from lying on your back to sitting on the side of a flat bed without " using bedrails? 3 - A Little   3. Moving to and from a bed to a chair (including a wheelchair)? 3 - A Little   4. Standing up from a chair using your arms (e.g., wheelchair, or bedside chair)? 3 - A Little   5. To walk in hospital room? 3 - A Little   6. Climbing 3-5 steps with a railing? 2 - A Lot   Basic Mobility Raw Score (Score out of 24.Lower scores equate to lower levels of function) 17   Total Evaluation Time   Total Evaluation Time (Minutes) 12

## 2018-05-17 NOTE — PROGRESS NOTES
SURGICAL ICU PROGRESS NOTE  May 17, 2018      CO-MORBIDITIES:   No diagnosis found.    ASSESSMENT: Familia Irving is a 72 year old male with PMHx of DDKT in 2006, type II DM, HTN and cervical spondylosis s/p both anterior and posterior cervical fusion 5/16/2018 for progressive cervical stenosis with myelopathy by Dr Bass.     PLAN:  Neuro/ pain/ sedation:  # acute pain   # s/p anterior and posterior cervical fusion   - Monitor neurological status. Notify the MD for any acute changes in exam.  - pain: scheduled tylenol, PRN dilaudid, PRN oxycodone   -  Post-op care per ortho team     Pulmonary care:   - Supplemental oxygen to keep saturation above 92 %.  - Incentive spirometer every 15- 30 minutes when awake.  - no concern for airway edema     Cardiovascular:    # HTN  - Monitor hemodynamic status.   - PTA medications include amlodipine, give with hold parameters   - hold PTA ACE-I and lasix for now     GI care:   - NPO except ice chips and medications.  - bedside swallow, may need formal swallow if fails bedside   - advance diet as tolerated when able     Fluids/ Electrolytes/ Nutrition:   - LR @ 100cc/hr for IV fluid hydration, can decrease once able to take PO    Renal/ Fluid Balance:    # s/p DDKT in 2006  # Hypernatremia   - unclear baseline Cr, Cr currently 1.44, down trending from post-op  - sodium normalizing   - Urine output is appropriate   - Will continue to monitor intake and output.  - continue tacrolimus once taking PO    Endocrine:    # Type II DM  - PTA lantus 30 QAM  - hold lantus for now as NPO, will have high SSI available if needed     ID/ Antibiotics:  - krishna-operative abx     Heme:     # acute blood loss anemia   - EBL 1900  - Hgb stable post-op     Prophylaxis:    - Mechanical prophylaxis for DVT.   - chemoprophylaxis per primary team     Disposition:  - 10A    Zenobia Liang PA-C  Surgical ICU      ====================================    SUBJECTIVE:   Course reviewed. No acute events  overnight. Patient remained hemodynamically stable throughout the evening. Occasional episodes of nausea with emesis controlled by compazine. Patient endorses 6/10 neck pain, denies chest pain, abdominal pain, SOB. All other ROS negative.     OBJECTIVE:   1. VITAL SIGNS:   Temp:  [97.3  F (36.3  C)-98.7  F (37.1  C)] 97.9  F (36.6  C)  Heart Rate:  [66-80] 79  Resp:  [10-20] 20  BP: (134-144)/(55-73) 134/56  MAP:  [63 mmHg-93 mmHg] 74 mmHg  Arterial Line BP: ()/(33-61) 110/45  SpO2:  [92 %-100 %] 98 %  Resp: 20    2. INTAKE/ OUTPUT:   I/O last 3 completed shifts:  In: 7379.58 [I.V.:4837.58; Other:621]  Out: 4715 [Urine:2675; Drains:140; Blood:1900]    3. PHYSICAL EXAMINATION:     GEN: laying in bed, in no acute distress   EYES: PERRL, Anicteric sclera.   HEENT:  Normocephalic, atraumatic, neck brace in place   CV: RRR, no murmurs  PULM/CHEST: Clear breath sounds bilaterally without rhonchi, no wheeze  GI: soft, non-tender, non distended   EXTREMITIES: no peripheral edema  NEURO: A+O x3, Cranial nerves II-XII grossly intact, no motor-sensory deficits noted  ECG- NSR    4. INVESTIGATIONS:   Arterial Blood Gases     Recent Labs  Lab 05/16/18 1718 05/16/18  1607 05/16/18  1509 05/16/18  1446   PH 7.32* 7.31* 7.32* 7.30*   PCO2 36 36 34* 36   PO2 126* 158* 133* 132*   HCO3 19* 19* 17* 18*     Complete Blood Count     Recent Labs  Lab 05/17/18  0400 05/16/18 1718 05/16/18  1607 05/16/18  1509 05/16/18  1446   WBC 9.6 12.1* 9.2  --  12.5*   HGB 9.0* 9.6*  9.7* 7.9*  7.9* 8.7* 9.9*  9.6*    131* 145* 147* 120*     Basic Metabolic Panel    Recent Labs  Lab 05/17/18  0400 05/16/18  2345 05/16/18  1718 05/16/18  1607  05/16/18  0651   * 147* 143 147*  < >  --    POTASSIUM 4.7 4.0 4.5 4.4  < > 4.6   CHLORIDE 117* 119*  --   --   --   --    CO2 18* 18*  --   --   --   --    BUN 29 27  --   --   --   --    CR 1.44* 1.38*  --   --   --  1.67*   * 193* 123* 128*  < > 134*   < > = values in this  "interval not displayed.  Liver Function Tests    Recent Labs  Lab 05/16/18  1718 05/16/18  1607 05/16/18  1509 05/16/18  1446   INR 1.42* 1.40* 1.23* 1.41*     Pancreatic Enzymes  No lab results found in last 7 days.  Coagulation Profile    Recent Labs  Lab 05/16/18  1718 05/16/18  1607 05/16/18  1509 05/16/18  1446   INR 1.42* 1.40* 1.23* 1.41*   PTT 33 33 32 33         5. RADIOLOGY:   Recent Results (from the past 24 hour(s))   XR Surgery RONALDO L/T 5 Min Fluoro    Narrative    Exam: TEMPORARY 5/16/2018 9:02 AM     History:  Intraoperative spine level check.     Comparison: Cervical spine CT 3/1/2018.    Findings/    Impression    impression: Single lateral fluoroscopic image obtained at 0858 hours  demonstrates surgical curet at the level of the C4 vertebral body.    GABE PAUL MD   XR Surgery RONALDO L/T 5 Min Fluoro    Narrative    Exam: Single intraoperative fluoroscopic image 5/16/2018 from  13:49:06.    Comparison: CT cervical spine 3/1/2018.    History: part 1: ant cervical decompression fusion C3-6, poss cerv 6  T1, ant iliac crest bone graft harvest.  Par 2\" post interbody spinal  fusion C3-T1/T2; laminectomies C3-7.      Impression    Impression: Surgical instrument projecting toward the spinous process  of C5.    Above findings communicated by Dr. Darden to OR personnel via  telephone at 2:20 PM on 5/16/2018.    BYRON DARDEN MD       =========================================  "

## 2018-05-17 NOTE — PLAN OF CARE
Problem: Patient Care Overview  Goal: Plan of Care/Patient Progress Review    Stopped by to potentially complete dysphagia evaluation. Patient reporting he doesn't even want to try to swallow anything due to the discomfort being experienced. Patient provided with education re: swallow function impacts of surgery. Verbalized understanding and requesting that evaluation be deferred to 5/18.

## 2018-05-17 NOTE — PROGRESS NOTES
S: Patient was seen today at [facility] ICU  for an eval for a cervical collar as ordered by      O/G: The objective/goal of the collar is to help reduce motion/give cervical stability.    A: Pt was fit with a Miami J cervical collar, size 300.  Starting with the anterior section, the correct size and height was chosen that supported the patient in the desired position.  Attention was given to the chin support being sure that the correct height was selected to support the chin.   The posterior section was centered on the patients head .  With the side closure straps extending equally on both sides, the Velcro was secured.  An extra padding set was also provided.  Patient instructed on donning, doffing, use and care    P: Patient has been instructed to contact our facility with any questions and/or concerns.

## 2018-05-17 NOTE — PLAN OF CARE
Problem: Patient Care Overview  Goal: Plan of Care/Patient Progress Review  Outcome: Improving  A/Ox3, disoriented to time. Neuro's otherwise at baseline. Face swollen, decreased overnight. O2 sats >93% on 2 LPM O2 via NC. Prn dilaudid for pain. Sinus rhythm 60's-70's. Increasing ectopy noted. PAC's, PVC's, short runs of a fib/wide complex tachycardia. Moonlighter/SICU team notified, per orders BMP collected. Mg replaced (1.1) x1 with minimal ectopy noted. IVF increased to 125 cc/hr per orders. Failed bedside swallow eval, NPO overnight, SICU team aware. 2 episodes of sudden nausea/emesis. Given prn zofran x1, prn compazine x1. Adequate UOP. St removed at 0530. Incontinent of several loose BM's overnight. OR dressings CDI. Continue with POC. Transfer to [facility] when able.

## 2018-05-17 NOTE — CONSULTS
Social Work: Assessment with Discharge Plan    Patient Name:  Jc Shi  :  1945  Age:  73 year old  MRN:  [id number]  Risk/Complexity Score:  Filed Complexity Screen Score: 2  Completed assessment with:  Pt , son Tacho    Presenting Information   Reason for Referral:  Discharge plan  Date of Intake:  May 17, 2018  Referral Source:  Physician  Decision Maker:  pt  Alternate Decision Maker:  Adult children- Daughter Silvia listed as Health Care Agent  Health Care Directive:  Copy in Chart  Living Situation:  Apartment  Previous Functional Status:  Independent  Patient and family understanding of hospitalization:  Seeking spine surgery  Cultural/Language/Spiritual Considerations:  Has 10% disability certification through [employer]. Retired, has support and involvement of his adult children  Adjustment to Illness:  Accepting. Wanting a private room    Physical Health  Reason for Admission:  Progressive cervical stenosis  Services Needed/Recommended:  TCU    Mental Health/Chemical Dependency  Diagnosis:  None noted  Support/Services in Place:  None noted  Services Needed/Recommended:  None noted    Support System  Significant relationship at present time:  Adult children at bed side. Daughter Silvia coming from [place] to provide advocacy and support  Family of origin is available for support:  yes  Other support available:  friends  Gaps in support system:  None noted  Patient is caregiver to:  None     Provider Information   Primary Care Physician:  Clinic, [facility]   [phone]   Clinic:  [facility]      :      Financial   Income Source:  Pension, VA, Social Security  Financial Concerns:  None noted  Insurance:    Payor/Plan Subscriber Name Rel Member # Group #   COMMERCIAL - [employer] MEDIC* JC SHI  [id number]       [address]       Discharge Plan   Patient and family discharge goal:  TCU  Provided education on discharge plan:  YES  Patient agreeable to discharge plan:  YES  A list  of Medicare Certified Facilities was provided to the patient and/or family to encourage patient choice. Patient's choices for facility are:  [facility] or [facility] in [place]  Will NH provide Skilled rehabilitation or complex medical:  YES  General information regarding anticipated insurance coverage and possible out of pocket cost was discussed. Patient and patient's family are aware patient may incur the cost of transportation to the facility, pending insurance payment: YES  Barriers to discharge:  Medical stability    Discharge Recommendations   Anticipated Disposition:  Facility:  [phone] or [place] [phone]  Transportation Needs:  Family:  Tacho or Daughter Silvia      Additional comments   Writer introduced role/reason for visit. Pt is 10% certified disabled through VA and VA will not pay for TCU stay. Pt does have Medicare and would have Medicare Benefits for TCU stay. Writer provided education on Medicare A SNF and Home care benefits.  Pt and Son Tacho verbalized understanding.    Pt and Tacho expressed distress with roommate and would like to have room change. They expressed understanding that room availability is limited at this time. Writer offered support, reassurance and validation.    Writer left voicemail message for Admissions at [phone], await call back with Fax number to fax referral/assessment information.    Writer contacted [facility] and left voicemail message for [phone] F: [phone]. Writer initiated referral by faxing assessment information. Await assessment response. SW con't to follow    Addendum:    Writer received voicemail from [facility] with request to fax assessment information to [phone]. Writer faxed assessment information.    [place] [address] F: [phone]  [phone] F: [phone]

## 2018-05-17 NOTE — PROGRESS NOTES
"Orthopedic Surgery Progress Note    Subjective:   NAEO.  Pain well controlled.  (-)cp/sob, (-)n/v, has not yet tried PO.    Exam:  /64 (BP Location: Left arm)  Pulse 69  Temp 98.9  F (37.2  C) (Oral)  Resp 16  Ht 1.727 m (5' 8\")  Wt 69.2 kg (152 lb 8.9 oz)  SpO2 98%  BMI 23.2 kg/m2  Gen: Awake, alert, NAD  Resp: breathing equal and non-labored  Back incision:  Bandage c/d/i  Neck incision (anterior):  Bandage c/d/i  L flank incision:  Bandage c/d/i  Extremities:  RUE:   No obvious deformity   Delt 4/5, elbow flexor 4/5, elbow extensor 4-/5, wrist extensor 4/5,  4/5, interossei 4/5.   SILT C5-T1.   All fingers wwp, radial pulse 2+  LUE:   No obvious deformity   Delt 4/5, elbow flexor 4/5, elbow extensor 4-/5, wrist extensor 4/5,  4/5, interossei 4/5.   Paresthesias along C5, SILT C6-T1.   All fingers wwp, radial pulse 2+  RLE:   No obvious deformity, skin intact   SILT L2-S1, partial paresthesias, improved from preop   DP 2+   Fires hip flexor, quad, TA, EHL, FHL, Gsc 4+/5   No calf tenderness  LLE:   No obvious deformity, skin intact   SILT L2-S1, partial paresthesias, improved from preop   DP 2+   Fires hip flexor, quad, TA, EHL, FHL, Gsc 4+/5   No calf tenderness    Drain:   Anterior neck:  5 / 0  Posterior back:  125 / 0    Labs:    Recent Labs  Lab 05/17/18  0400 05/16/18  1718 05/16/18  1607 05/16/18  1509 05/16/18  1446   WBC 9.6 12.1* 9.2  --  12.5*   HGB 9.0* 9.6*  9.7* 7.9*  7.9* 8.7* 9.9*  9.6*    131* 145* 147* 120*       Recent Labs  Lab 05/17/18  0400 05/16/18  2345 05/16/18  1718 05/16/18  1607  05/16/18  0651   * 147* 143 147*  < >  --    POTASSIUM 4.7 4.0 4.5 4.4  < > 4.6   CHLORIDE 117* 119*  --   --   --   --    CO2 18* 18*  --   --   --   --    BUN 29 27  --   --   --   --    CR 1.44* 1.38*  --   --   --  1.67*   * 193* 123* 128*  < > 134*   MAG 2.2 1.1*  --   --   --   --    PHOS  --  2.9  --   --   --   --    < > = values in this interval not " displayed.    Recent Labs  Lab 05/16/18  1718 05/16/18  1607 05/16/18  1509 05/16/18  1446   INR 1.42* 1.40* 1.23* 1.41*   PTT 33 33 32 33       Assessment:   73 year old male s/p 5-level ACDF C3-T1, PISF C3-T2 fusion w L ICBG harvesst on 5/16/18 w Dr. Bass.  Recovering appropriately.    Plan:  Activity: Up with assist -- no bending, twisting. No lifting >10 lbs.  Weight bearing status: WBAT  Antibiotics: Ancef until drains out.  Diet: Begin with clear fluids and progress diet as tolerated   DVT prophylaxis: Mechanical  Bracing/Splinting: Miami J collar to be worn at all times except hygiene and eating. Soft collar until orthotics consult completed.   Wound Care: Aquacel dressing posteriorly in place for 5-7 days, patient to perform dressing changes thereafter. Anterior dressing medipore tape and gauze to be changed on POD3  Drains: drain to be removed when flow less than 30cc/shift  Pain management: transition from IV to orals as tolerated  X-rays: Upright AP/Lat cervical XR prior to discharge  Physical Therapy: ADL's.   Occupational Therapy: ADL's  Labs: Trend Hgb on POD #1, 2, 3   Consults: PT, OT, Medicine co-management, appreciate assistance in caring for this patient.   Follow-up: Clinic with Dr. Bass in 2 weeks.      Kenneth Rabago MD  PGY-4, Orthopaedic Surgery  [phone]

## 2018-05-18 ENCOUNTER — APPOINTMENT (OUTPATIENT)
Dept: SPEECH THERAPY | Facility: CLINIC | Age: 73
DRG: 454 | End: 2018-05-18
Attending: ORTHOPAEDIC SURGERY
Payer: COMMERCIAL

## 2018-05-18 ENCOUNTER — APPOINTMENT (OUTPATIENT)
Dept: PHYSICAL THERAPY | Facility: CLINIC | Age: 73
DRG: 454 | End: 2018-05-18
Attending: ORTHOPAEDIC SURGERY
Payer: COMMERCIAL

## 2018-05-18 LAB
ANION GAP SERPL CALCULATED.3IONS-SCNC: 11 MMOL/L (ref 3–14)
ANION GAP SERPL CALCULATED.3IONS-SCNC: 9 MMOL/L (ref 3–14)
BUN SERPL-MCNC: 36 MG/DL (ref 7–30)
BUN SERPL-MCNC: 42 MG/DL (ref 7–30)
CALCIUM SERPL-MCNC: 8.7 MG/DL (ref 8.5–10.1)
CALCIUM SERPL-MCNC: 8.9 MG/DL (ref 8.5–10.1)
CHLORIDE SERPL-SCNC: 119 MMOL/L (ref 94–109)
CHLORIDE SERPL-SCNC: 121 MMOL/L (ref 94–109)
CO2 SERPL-SCNC: 17 MMOL/L (ref 20–32)
CO2 SERPL-SCNC: 19 MMOL/L (ref 20–32)
CREAT SERPL-MCNC: 1.89 MG/DL (ref 0.66–1.25)
CREAT SERPL-MCNC: 1.93 MG/DL (ref 0.66–1.25)
GFR SERPL CREATININE-BSD FRML MDRD: 34 ML/MIN/1.7M2
GFR SERPL CREATININE-BSD FRML MDRD: 35 ML/MIN/1.7M2
GLUCOSE BLDC GLUCOMTR-MCNC: 133 MG/DL (ref 70–99)
GLUCOSE BLDC GLUCOMTR-MCNC: 142 MG/DL (ref 70–99)
GLUCOSE BLDC GLUCOMTR-MCNC: 144 MG/DL (ref 70–99)
GLUCOSE BLDC GLUCOMTR-MCNC: 145 MG/DL (ref 70–99)
GLUCOSE BLDC GLUCOMTR-MCNC: 148 MG/DL (ref 70–99)
GLUCOSE BLDC GLUCOMTR-MCNC: 148 MG/DL (ref 70–99)
GLUCOSE BLDC GLUCOMTR-MCNC: 149 MG/DL (ref 70–99)
GLUCOSE BLDC GLUCOMTR-MCNC: 150 MG/DL (ref 70–99)
GLUCOSE BLDC GLUCOMTR-MCNC: 151 MG/DL (ref 70–99)
GLUCOSE BLDC GLUCOMTR-MCNC: 152 MG/DL (ref 70–99)
GLUCOSE BLDC GLUCOMTR-MCNC: 153 MG/DL (ref 70–99)
GLUCOSE BLDC GLUCOMTR-MCNC: 153 MG/DL (ref 70–99)
GLUCOSE BLDC GLUCOMTR-MCNC: 154 MG/DL (ref 70–99)
GLUCOSE BLDC GLUCOMTR-MCNC: 165 MG/DL (ref 70–99)
GLUCOSE BLDC GLUCOMTR-MCNC: 178 MG/DL (ref 70–99)
GLUCOSE BLDC GLUCOMTR-MCNC: 184 MG/DL (ref 70–99)
GLUCOSE BLDC GLUCOMTR-MCNC: 186 MG/DL (ref 70–99)
GLUCOSE BLDC GLUCOMTR-MCNC: 190 MG/DL (ref 70–99)
GLUCOSE BLDC GLUCOMTR-MCNC: 192 MG/DL (ref 70–99)
GLUCOSE BLDC GLUCOMTR-MCNC: 196 MG/DL (ref 70–99)
GLUCOSE BLDC GLUCOMTR-MCNC: 198 MG/DL (ref 70–99)
GLUCOSE BLDC GLUCOMTR-MCNC: 233 MG/DL (ref 70–99)
GLUCOSE SERPL-MCNC: 147 MG/DL (ref 70–99)
GLUCOSE SERPL-MCNC: 233 MG/DL (ref 70–99)
HGB BLD-MCNC: 10.4 G/DL (ref 13.3–17.7)
POTASSIUM SERPL-SCNC: 4.4 MMOL/L (ref 3.4–5.3)
POTASSIUM SERPL-SCNC: 5 MMOL/L (ref 3.4–5.3)
SODIUM SERPL-SCNC: 147 MMOL/L (ref 133–144)
SODIUM SERPL-SCNC: 149 MMOL/L (ref 133–144)

## 2018-05-18 PROCEDURE — 36415 COLL VENOUS BLD VENIPUNCTURE: CPT | Performed by: PHYSICIAN ASSISTANT

## 2018-05-18 PROCEDURE — 97116 GAIT TRAINING THERAPY: CPT | Mod: GP | Performed by: REHABILITATION PRACTITIONER

## 2018-05-18 PROCEDURE — 80048 BASIC METABOLIC PNL TOTAL CA: CPT | Performed by: INTERNAL MEDICINE

## 2018-05-18 PROCEDURE — 85018 HEMOGLOBIN: CPT | Performed by: PHYSICIAN ASSISTANT

## 2018-05-18 PROCEDURE — 40000193 ZZH STATISTIC PT WARD VISIT: Performed by: REHABILITATION PRACTITIONER

## 2018-05-18 PROCEDURE — 25000128 H RX IP 250 OP 636: Performed by: INTERNAL MEDICINE

## 2018-05-18 PROCEDURE — 25000131 ZZH RX MED GY IP 250 OP 636 PS 637: Performed by: NURSE PRACTITIONER

## 2018-05-18 PROCEDURE — 25000128 H RX IP 250 OP 636: Performed by: PHYSICIAN ASSISTANT

## 2018-05-18 PROCEDURE — 99233 SBSQ HOSP IP/OBS HIGH 50: CPT | Performed by: INTERNAL MEDICINE

## 2018-05-18 PROCEDURE — E2402 NEG PRESS WOUND THERAPY PUMP: HCPCS | Performed by: PHYSICAL THERAPIST

## 2018-05-18 PROCEDURE — 36415 COLL VENOUS BLD VENIPUNCTURE: CPT | Performed by: INTERNAL MEDICINE

## 2018-05-18 PROCEDURE — 12000003 ZZH R&B CRITICAL UMMC

## 2018-05-18 PROCEDURE — 00000146 ZZHCL STATISTIC GLUCOSE BY METER IP

## 2018-05-18 PROCEDURE — 25000125 ZZHC RX 250: Performed by: PHYSICIAN ASSISTANT

## 2018-05-18 PROCEDURE — 25000128 H RX IP 250 OP 636: Performed by: STUDENT IN AN ORGANIZED HEALTH CARE EDUCATION/TRAINING PROGRAM

## 2018-05-18 PROCEDURE — 25000132 ZZH RX MED GY IP 250 OP 250 PS 637: Performed by: INTERNAL MEDICINE

## 2018-05-18 PROCEDURE — 25000132 ZZH RX MED GY IP 250 OP 250 PS 637: Performed by: STUDENT IN AN ORGANIZED HEALTH CARE EDUCATION/TRAINING PROGRAM

## 2018-05-18 PROCEDURE — 92610 EVALUATE SWALLOWING FUNCTION: CPT | Mod: GN

## 2018-05-18 PROCEDURE — 40000225 ZZH STATISTIC SLP WARD VISIT

## 2018-05-18 RX ORDER — SODIUM BICARBONATE 650 MG/1
650 TABLET ORAL 2 TIMES DAILY
Status: DISCONTINUED | OUTPATIENT
Start: 2018-05-19 | End: 2018-05-21 | Stop reason: HOSPADM

## 2018-05-18 RX ORDER — MYCOPHENOLIC ACID 360 MG/1
720 TABLET, DELAYED RELEASE ORAL
Status: DISCONTINUED | OUTPATIENT
Start: 2018-05-18 | End: 2018-05-21 | Stop reason: HOSPADM

## 2018-05-18 RX ORDER — DEXTROSE MONOHYDRATE 50 MG/ML
INJECTION, SOLUTION INTRAVENOUS CONTINUOUS
Status: DISCONTINUED | OUTPATIENT
Start: 2018-05-18 | End: 2018-05-19

## 2018-05-18 RX ORDER — LISINOPRIL 10 MG/1
10 TABLET ORAL DAILY
Status: DISCONTINUED | OUTPATIENT
Start: 2018-05-18 | End: 2018-05-18

## 2018-05-18 RX ORDER — SODIUM CHLORIDE 9 MG/ML
INJECTION, SOLUTION INTRAVENOUS
Status: DISPENSED
Start: 2018-05-18 | End: 2018-05-19

## 2018-05-18 RX ADMIN — DEXAMETHASONE SODIUM PHOSPHATE 10 MG: 4 INJECTION, SOLUTION INTRAMUSCULAR; INTRAVENOUS at 11:08

## 2018-05-18 RX ADMIN — LISINOPRIL 10 MG: 10 TABLET ORAL at 11:22

## 2018-05-18 RX ADMIN — SODIUM CHLORIDE, POTASSIUM CHLORIDE, SODIUM LACTATE AND CALCIUM CHLORIDE: 600; 310; 30; 20 INJECTION, SOLUTION INTRAVENOUS at 13:32

## 2018-05-18 RX ADMIN — HYDROMORPHONE HYDROCHLORIDE 0.5 MG: 1 INJECTION, SOLUTION INTRAMUSCULAR; INTRAVENOUS; SUBCUTANEOUS at 10:58

## 2018-05-18 RX ADMIN — OXYCODONE HYDROCHLORIDE 5 MG: 5 TABLET ORAL at 13:30

## 2018-05-18 RX ADMIN — HYDROMORPHONE HYDROCHLORIDE 0.5 MG: 1 INJECTION, SOLUTION INTRAMUSCULAR; INTRAVENOUS; SUBCUTANEOUS at 02:15

## 2018-05-18 RX ADMIN — HUMAN INSULIN 1.5 UNITS/HR: 100 INJECTION, SOLUTION SUBCUTANEOUS at 22:27

## 2018-05-18 RX ADMIN — DEXAMETHASONE SODIUM PHOSPHATE 10 MG: 4 INJECTION, SOLUTION INTRAMUSCULAR; INTRAVENOUS at 05:33

## 2018-05-18 RX ADMIN — Medication 1 MG: at 08:50

## 2018-05-18 RX ADMIN — DEXTROSE MONOHYDRATE: 50 INJECTION, SOLUTION INTRAVENOUS at 14:56

## 2018-05-18 RX ADMIN — SODIUM CHLORIDE, POTASSIUM CHLORIDE, SODIUM LACTATE AND CALCIUM CHLORIDE: 600; 310; 30; 20 INJECTION, SOLUTION INTRAVENOUS at 00:29

## 2018-05-18 RX ADMIN — AMLODIPINE BESYLATE 10 MG: 10 TABLET ORAL at 08:50

## 2018-05-18 RX ADMIN — Medication 1 MG: at 18:19

## 2018-05-18 ASSESSMENT — ACTIVITIES OF DAILY LIVING (ADL)
ADLS_ACUITY_SCORE: 12
ADLS_ACUITY_SCORE: 13
ADLS_ACUITY_SCORE: 12
ADLS_ACUITY_SCORE: 12

## 2018-05-18 NOTE — PLAN OF CARE
Problem: Patient Care Overview  Goal: Plan of Care/Patient Progress Review  Outcome: Improving  VSS, LS clear, BS hypooactive, not passing flatus yet. Decadron started to help ease swelling, insulin drip running at Algorithm 1 and 0.5units/hr. Pt. Is able to tolerated tiny sips of water every few hours. Dressing to posterior neck is CDI, dressing to anterior neck has dried, shadowing drainage, marked, Primapore to left flank, CDI.  ROSI/Hemovac are patent, Hemovac bag changed out tonight. Baseline N &T in BLE, Pt. Reports UE N & T has improved dramatically since surgery. Pain is well managed with 0.5 mg IV dilaudid. All PO meds are being held until swallowing is improved. Voiding in BR with SBA x1 and a walker or a urinal. PVR's have been in the low 300's. PCD's on for most of shift, taken off tonight at Pt. Request, refusing them at this time.   Pt. Is blind in right eye, and has poor vision in left eye. Soft touch call light in place, continue frequent visualization and with POC.

## 2018-05-18 NOTE — PROGRESS NOTES
Social Work Services Progress Note    Hospital Day: 3    Collaborated with:  Admissions at [facility], [phone],  Admissions at [facility] [phone]    Data:  DC plan    Intervention:  Pt con't to have insulin drip, self limiting participation with PT/OT and most likely will not be ready for DC until Sun/Mon at earliest.    Writer received voicemail message from [facility] requesting RN Station phone to complete assessment and wanting to know anticipated date of DC. Voicemail message at [phone] with this information.    Writer received voicemail message from [facility] admissions and they offered pt a private room for admission on Monday 5/21/18.    Writer met w/pt, his daughter Mary, son Tacho and  reviewed DC plan. He is accepting of bed at [facility]. Writer called and spoke w/admissions. Anticipate pt will be ready for DC on Monday, 5/21/18. Will follow up with Admission on Monday [phone].      Pt was offered and declined bed at [facility], writer confirmed he was agreeable to having writer communicate that he accepted bed at different facility. Writer left voicemail message with [facility] stating pt's preference and acceptance of bed in other facility.    Writer reviewed Medicare A Benefits for SNF, including estimated co-pay should he not DC home within 20 days.     Writer reviewed referral to [facility] for PAS/RR. Pt and family verbalized understanding    Pt and family verbalized understanding.          Assessment:  pt con't to have support and involvement of his family.     Plan:    Anticipated Disposition:  Facility:  [facility] [phone]    Barriers to d/c plan:  Medical stability, anticipate DC Monday 5/21/18    Follow Up:  SW con't to follow

## 2018-05-18 NOTE — PLAN OF CARE
"Problem: Diabetes Comorbidity  Intervention: Optimize Glycemic Control    VS:   VSS ex BP trending higher.     /70 (BP Location: Left arm)  Pulse 75  Temp 97.8  F (36.6  C) (Oral)  Resp 20  Ht 1.727 m (5' 8\")  Wt 69.2 kg (152 lb 8.9 oz)  SpO2 98%  BMI 23.2 kg/m2     Output:   Bladder scanned at 333 ml at 23:45. Voided 325 ml and PVR = 215.  ROSI = 0  Hemovac = 20 ml.  No flatus yet. Last BM = 5/16   Activity:   Sat on edge of bed at 0430, requiring an assist of 2.   Skin: WDL ex incision and drains   Pain:   Pain managed with 0.5mg of IV dilaudid.   Neuro/CMS:   Baseline numbness and tingling in hands and feet bilaterally   Dressing(s):   Minor shadowing noticed posteriorly at 0428.   Diet:   Tolerates just sips of water.   LDA:   Continuous insulin infusion   Equipment:   IV pole, PCDs   Plan:   Continue to monitor clinically.   Additional Info:   Nan BUTCHER collar on at all times except for hygiene and eating.             "

## 2018-05-18 NOTE — PLAN OF CARE
Problem: Patient Care Overview  Goal: Plan of Care/Patient Progress Review  Outcome: Improving      VS:   BP running high this morning at 172/73, given scheduled amlodipine, rechecked at 147/59.  Otherwise VSS on RA.   Output:   Voiding spontaneously without difficulty.  Passing gas, medium BM today.    Activity:   Up in room and halls with assist x1 and walker.    Skin: Anterior and posterior neck/back and L hip incisions.    Pain:   C/o shoulder/neck pain managed with prn oxycodone and one dose IV dilaudid.   Neuro/CMS:   Baseline numbness/tingling in all extremities, pt states has much improved since surgery.   Dressing(s):   Small amount dried drainage, otherwise CDI.   Diet:   Tolerating DD1 diet with thin liquids. Still reports some difficulty swallowing. SLP here to assess pt this morning. Pt able to swallow small pills one at a time in applesauce today.   LDA:   PIV x2 infusing. ROSI drain and hemovac drain removed today. Maries J collar on at all times.   Equipment:   Mi[place] J collar. Walker. IV pole.   Plan:   Continue with POC.    Additional Info:   Pt on continuous insulin drip on algorithm 1, BGs being checked hourly.   Pt's sodium 149, provider ordered D5W continuous fluids and lab recheck this evening.

## 2018-05-18 NOTE — PROGRESS NOTES
"Orthopedic Surgery Progress Note    Subjective:   NAEO.  Pain well controlled.  (-)cp/sob, (-)n/v, has not yet tried PO.    Exam:  /70 (BP Location: Left arm)  Pulse 75  Temp 97.8  F (36.6  C) (Oral)  Resp 20  Ht 1.727 m (5' 8\")  Wt 69.2 kg (152 lb 8.9 oz)  SpO2 98%  BMI 23.2 kg/m2  Gen: Awake, alert, NAD  Resp: breathing equal and non-labored  Back incision:  Bandage c/d/i  Neck incision (anterior):  Bandage c/d/i  L flank incision:  Bandage c/d/i  Extremities:  RUE:   No obvious deformity   Delt 4+/5, elbow flexor 4+/5, elbow extensor 4/5, wrist extensor 4+/5,  4+/5, interossei 4+/5.   SILT C5-T1.   All fingers wwp, radial pulse 2+  LUE:   No obvious deformity   Delt 4/5, elbow flexor 4/5, elbow extensor 4/5, wrist extensor 4+/5,  4+/5, interossei 4/5.   Paresthesias along C5, SILT C6-T1.   All fingers wwp, radial pulse 2+  RLE:   No obvious deformity, skin intact   SILT L2-S1, partial paresthesias, improved from preop   DP 2+   Fires hip flexor, quad, TA, EHL, FHL, Gsc 4+/5   No calf tenderness  LLE:   No obvious deformity, skin intact   SILT L2-S1, partial paresthesias, improved from preop   DP 2+   Fires hip flexor, quad, TA, EHL, FHL, Gsc 4+/5   No calf tenderness    Drain:   Anterior neck:  5 / 0 / 0  Posterior back:  120 / 20 / 10    Labs:    Recent Labs  Lab 05/18/18  0649 05/17/18  0400 05/16/18  1718 05/16/18  1607 05/16/18  1509 05/16/18  1446   WBC  --  9.6 12.1* 9.2  --  12.5*   HGB 10.4* 9.0* 9.6*  9.7* 7.9*  7.9* 8.7* 9.9*  9.6*   PLT  --  175 131* 145* 147* 120*       Recent Labs  Lab 05/17/18  0400 05/16/18  2345 05/16/18  1718 05/16/18  1607  05/16/18  0651   * 147* 143 147*  < >  --    POTASSIUM 4.7 4.0 4.5 4.4  < > 4.6   CHLORIDE 117* 119*  --   --   --   --    CO2 18* 18*  --   --   --   --    BUN 29 27  --   --   --   --    CR 1.44* 1.38*  --   --   --  1.67*   * 193* 123* 128*  < > 134*   MAG 2.2 1.1*  --   --   --   --    PHOS  --  2.9  --   --   --   --  "   < > = values in this interval not displayed.    Recent Labs  Lab 05/16/18  1718 05/16/18  1607 05/16/18  1509 05/16/18  1446   INR 1.42* 1.40* 1.23* 1.41*   PTT 33 33 32 33       Assessment:   73 year old male s/p 5-level ACDF C3-T1, PISF C3-T2 fusion w L ICBG harvesst on 5/16/18 w Dr. Bass.  Recovering appropriately.    Plan:  Activity: Up with assist -- no bending, twisting. No lifting >10 lbs.  Weight bearing status: WBAT  Antibiotics: Ancef until drains out.  Diet: Begin with clear fluids and progress diet as tolerated   DVT prophylaxis: Mechanical  Bracing/Splinting: [place] J collar to be worn at all times except hygiene and eating. Soft collar until orthotics consult completed.   Wound Care: Aquacel dressing posteriorly in place for 5-7 days, patient to perform dressing changes thereafter. Anterior dressing medipore tape and gauze to be changed on POD3  Drains: drain to be removed when flow less than 30cc/shift  Pain management: transition from IV to orals as tolerated  X-rays: Upright AP/Lat cervical XR prior to discharge  Physical Therapy: ADL's.   Occupational Therapy: ADL's  Labs: Trend Hgb on POD #1, 2, 3   Consults: PT, OT, Medicine co-management, appreciate assistance in caring for this patient.   Follow-up: Clinic with Dr. Bass in 2 weeks.      Kenneth Rabago MD  PGY-4, Orthopaedic Surgery  [phone]

## 2018-05-18 NOTE — PROGRESS NOTES
05/18/18 1200   General Information   Onset Date 05/16/18   Start of Care Date 05/18/18   Clinical Swallow Evaluation   Oral Musculature generally intact   Structural Abnormalities none present  (Mi[place] J collar in place)   Dentition upper and lower dentures  (During evaluation patient did not want to wear them)   Mucosal Quality dry   Mandibular Strength and Mobility intact   Oral Labial Strength and Mobility WFL   Lingual Strength and Mobility WFL   Velar Elevation intact   Buccal Strength and Mobility intact   Laryngeal Function Cough;Throat clear;Swallow   Clinical Swallow Eval: Thin Liquid Texture Trial   Mode of Presentation, Thin Liquids straw;self-fed   Volume of Liquid or Food Presented Small single swallows   Oral Phase of Swallow WFL   Pharyngeal Phase of Swallow intact   Diagnostic Statement Heart throat clear on the first attempt but once spit out, patient was able to tolerate without any further signs and symptoms of aspiration.   Clinical Swallow Eval: Puree Solid Texture Trial   Mode of Presentation, Puree spoon;self-fed   Volume of Puree Presented Teaspoon boluses   Oral Phase, Puree WFL   Pharyngeal Phase, Puree intact   Diagnostic Statement Patient was able to tolerate without any overt difficulties noted.   Clinical Swallow Eval: Semisolid Texture Trial   Mode of Presentation, Semisolid spoon;self-fed   Volume of Semisolid Food Presented Teaspoon boluses   Oral Phase, Semisolid WFL   Pharyngeal Phase, Semisolid feeling of something stuck in throat   Diagnostic Statement Mild sensation of something caught in his throat.  Was able to clear with use of a liquid wash.  Did not present with any overt signs and symptoms of aspiration.  Patient was a bit hesitant to try anything that was harder   Swallow Eval: Clinical Impressions   Skilled Criteria for Therapy Intervention Skilled criteria met.  Treatment indicated.   Functional Assessment Scale (FAS) 3   Treatment Diagnosis Moderate oropharyngeal  dysphagia   Diet texture recommendations Dysphagia diet level 1;Thin liquids   Recommended Feeding/Eating Techniques alternate between small bites and sips of food/liquid;maintain upright posture during/after eating for 30 mins;small sips/bites   Therapy Frequency daily   Predicted Duration of Therapy Intervention (days/wks) Less than 1 week   Anticipated Discharge Disposition other (see comments)  (TCU)   Risks and Benefits of Treatment have been explained. Yes   Patient, family and/or staff in agreement with Plan of Care Yes   Clinical Impression Comments Dysphagia evaluation completed.  Patient having some anxiety regarding his swallowing function.  Proceeded very slowly with patient and he was able to tolerate puréed textures as well as softer, moister food textures.  After discussion with patient he was agreeable to a diet upgrade to NDD-1 with thin liquids.  For medications, patient likely to be able to tolerate smaller pills taking one at a time with applesauce.  Will plan to follow-up with patient on 5/19 to continue upgrading diet as appropriate.  Anticipate patient his ability to tolerate medications as well as advanced food textures to be improving soon.   Total Evaluation Time   Total Evaluation Time (Minutes) 30

## 2018-05-18 NOTE — PROGRESS NOTES
"[facility] Internal Medicine Progress Note            Interval History:   Record reviewed.  Seen with RN.   Adequate pain control.  Received IV decadron 10 mg q6h X 4 doses.  Speech eval presently with upgrade to DD! With thin liquids.  Remains on insulin infusion.    Sat at bedside without LH.  No CP, SOB, cough, nausea, reflux, abd pain or distention.  No flatus.  Last BM 2 days ago.  Voiding OK.  Adequate pain control.          Medications:   All medications reviewed today          Physical Exam:   Blood pressure 172/73, pulse 75, temperature 97.5  F (36.4  C), temperature source Oral, resp. rate 16, height 1.727 m (5' 8\"), weight 69.2 kg (152 lb 8.9 oz), SpO2 96 %.    Intake/Output Summary (Last 24 hours) at 05/18/18 0839  Last data filed at 05/18/18 0700   Gross per 24 hour   Intake             1150 ml   Output              550 ml   Net              600 ml       General:  Alert.  Appropriate.  No distress.  No O2.     Heent:      Neck:    Skin:    Chest:  clear    Cardiac:  Reg without gallop, murmur.  No JVD.     Abdomen:  Non distended, soft, non-tender.  BS normal.     Extremities:  Perfused.  No edema, calf, posterior thigh tenderness to suggest DVT.     Neuro:            Data:     Results for orders placed or performed during the hospital encounter of 05/16/18 (from the past 24 hour(s))   Glucose by meter   Result Value Ref Range    Glucose 207 (H) 70 - 99 mg/dL   Glucose by meter   Result Value Ref Range    Glucose 155 (H) 70 - 99 mg/dL   Glucose by meter   Result Value Ref Range    Glucose 153 (H) 70 - 99 mg/dL   Glucose by meter   Result Value Ref Range    Glucose 155 (H) 70 - 99 mg/dL   Glucose by meter   Result Value Ref Range    Glucose 151 (H) 70 - 99 mg/dL   Glucose by meter   Result Value Ref Range    Glucose 146 (H) 70 - 99 mg/dL   Glucose by meter   Result Value Ref Range    Glucose 149 (H) 70 - 99 mg/dL   Glucose by meter   Result Value Ref Range    Glucose 148 (H) 70 - 99 mg/dL "   Glucose by meter   Result Value Ref Range    Glucose 150 (H) 70 - 99 mg/dL   Glucose by meter   Result Value Ref Range    Glucose 148 (H) 70 - 99 mg/dL   Glucose by meter   Result Value Ref Range    Glucose 154 (H) 70 - 99 mg/dL   Glucose by meter   Result Value Ref Range    Glucose 153 (H) 70 - 99 mg/dL   Glucose by meter   Result Value Ref Range    Glucose 133 (H) 70 - 99 mg/dL   Hemoglobin   Result Value Ref Range    Hemoglobin 10.4 (L) 13.3 - 17.7 g/dL   Glucose by meter   Result Value Ref Range    Glucose 142 (H) 70 - 99 mg/dL   Glucose by meter   Result Value Ref Range    Glucose 144 (H) 70 - 99 mg/dL                  Assessment and Plan:   1)  S/P A/P cervical fusion 5/16.  Adequate pain control.  2)  Oral pharyngeal dysphagia 2nd to #1, improved with steroids.  3)  Acute blood loss anemia.  Adequate.  4)  Type II DM, adequate on infusion.  5)  HTN, sub optimal off meds.  6)  S/p DDKT 2006.  Stable renal function.  7)  HLD.     PLAN:  1)  DD1 with thin liquids.  2)  Resume po BP and ani rejection meds.  Hold lasix.  3)  Continue insulin infusion until tolerating diet.  4)  IS, mobilize, same opiates, bowel meds, mechanical DVT prophylaxis. .  5)  BMP this AM.  Trend labs.  6)  Monitor clinically.  DC telemetry.   Disposition Plan   Expected discharge in 2-3 days to prior living arrangement once adequate mobilization and swallow.      Entered: Familia Dumont 05/18/2018, 8:39 AM     ADD:  Reviewed with renal.  BMP Na 149.  K 5.0  BUN 36 Cr 1.93 (unclear baseline).  Taking anti rejection meds.  Limited po fluids.  Add IV D5w at 75/hr.  Trend labs.  Hold lisinopril.  Reviewed with renal Tx.          Attestation:  I have reviewed today's vital signs, notes, medications, labs and imaging.     Familia Dumont MD

## 2018-05-18 NOTE — PLAN OF CARE
Problem: Patient Care Overview  Goal: Plan of Care/Patient Progress Review    Dysphagia evaluation completed.  Patient having some anxiety regarding his swallowing function.  Proceeded very slowly with patient and he was able to tolerate puréed textures as well as softer, moister food textures.  After discussion with patient he was agreeable to a diet upgrade to NDD 1 with thin liquids.  For medications, patient likely to be able to tolerate smaller pills taking one at a time with applesauce.  Will plan to follow-up with patient on 5/19 to continue upgrading diet as appropriate.  Anticipate patient his ability to tolerate medications as well as advanced food textures to be improving soon.

## 2018-05-18 NOTE — PLAN OF CARE
Problem: Patient Care Overview  Goal: Plan of Care/Patient Progress Review  PT - per plan established by the Physical Therapist, according to functional mobility the  discharge recommendation is TCU for continued skilled PT to progress functional mobility. Pt limited by pain and weakness. Pt needing CGA to Edi for supine to/from sitting. Pt demo STS with HHA / CGA to IV to for support. Pt amb up to 60'x 1 with pushing IV pole and HHA.   Discharge Planner PT   Patient plan for discharge: not stated  Current status: see above.   Barriers to return to prior living situation: pain weakness, spinal precautions.   Recommendations for discharge: TCU   Rationale for recommendations: cont skilled PT to progress functional mobility.        Entered by: Fausto Aguilar 05/18/2018 2:37 PM

## 2018-05-19 ENCOUNTER — APPOINTMENT (OUTPATIENT)
Dept: OCCUPATIONAL THERAPY | Facility: CLINIC | Age: 73
DRG: 454 | End: 2018-05-19
Attending: ORTHOPAEDIC SURGERY
Payer: COMMERCIAL

## 2018-05-19 ENCOUNTER — APPOINTMENT (OUTPATIENT)
Dept: SPEECH THERAPY | Facility: CLINIC | Age: 73
DRG: 454 | End: 2018-05-19
Attending: ORTHOPAEDIC SURGERY
Payer: COMMERCIAL

## 2018-05-19 ENCOUNTER — APPOINTMENT (OUTPATIENT)
Dept: PHYSICAL THERAPY | Facility: CLINIC | Age: 73
DRG: 454 | End: 2018-05-19
Attending: ORTHOPAEDIC SURGERY
Payer: COMMERCIAL

## 2018-05-19 LAB
ANION GAP SERPL CALCULATED.3IONS-SCNC: 7 MMOL/L (ref 3–14)
BUN SERPL-MCNC: 47 MG/DL (ref 7–30)
CALCIUM SERPL-MCNC: 9.1 MG/DL (ref 8.5–10.1)
CHLORIDE SERPL-SCNC: 117 MMOL/L (ref 94–109)
CO2 SERPL-SCNC: 22 MMOL/L (ref 20–32)
CREAT SERPL-MCNC: 1.92 MG/DL (ref 0.66–1.25)
GFR SERPL CREATININE-BSD FRML MDRD: 35 ML/MIN/1.7M2
GLUCOSE BLDC GLUCOMTR-MCNC: 148 MG/DL (ref 70–99)
GLUCOSE BLDC GLUCOMTR-MCNC: 153 MG/DL (ref 70–99)
GLUCOSE BLDC GLUCOMTR-MCNC: 154 MG/DL (ref 70–99)
GLUCOSE BLDC GLUCOMTR-MCNC: 156 MG/DL (ref 70–99)
GLUCOSE BLDC GLUCOMTR-MCNC: 158 MG/DL (ref 70–99)
GLUCOSE BLDC GLUCOMTR-MCNC: 158 MG/DL (ref 70–99)
GLUCOSE BLDC GLUCOMTR-MCNC: 162 MG/DL (ref 70–99)
GLUCOSE BLDC GLUCOMTR-MCNC: 164 MG/DL (ref 70–99)
GLUCOSE BLDC GLUCOMTR-MCNC: 165 MG/DL (ref 70–99)
GLUCOSE BLDC GLUCOMTR-MCNC: 185 MG/DL (ref 70–99)
GLUCOSE BLDC GLUCOMTR-MCNC: 186 MG/DL (ref 70–99)
GLUCOSE BLDC GLUCOMTR-MCNC: 204 MG/DL (ref 70–99)
GLUCOSE BLDC GLUCOMTR-MCNC: 209 MG/DL (ref 70–99)
GLUCOSE BLDC GLUCOMTR-MCNC: 212 MG/DL (ref 70–99)
GLUCOSE SERPL-MCNC: 160 MG/DL (ref 70–99)
HGB BLD-MCNC: 10.1 G/DL (ref 13.3–17.7)
MAGNESIUM SERPL-MCNC: 2.1 MG/DL (ref 1.6–2.3)
POTASSIUM SERPL-SCNC: 4.9 MMOL/L (ref 3.4–5.3)
SODIUM SERPL-SCNC: 146 MMOL/L (ref 133–144)

## 2018-05-19 PROCEDURE — 25000131 ZZH RX MED GY IP 250 OP 636 PS 637: Performed by: INTERNAL MEDICINE

## 2018-05-19 PROCEDURE — 99232 SBSQ HOSP IP/OBS MODERATE 35: CPT | Performed by: INTERNAL MEDICINE

## 2018-05-19 PROCEDURE — 80048 BASIC METABOLIC PNL TOTAL CA: CPT | Performed by: INTERNAL MEDICINE

## 2018-05-19 PROCEDURE — 36415 COLL VENOUS BLD VENIPUNCTURE: CPT | Performed by: INTERNAL MEDICINE

## 2018-05-19 PROCEDURE — 25000128 H RX IP 250 OP 636: Performed by: INTERNAL MEDICINE

## 2018-05-19 PROCEDURE — 97166 OT EVAL MOD COMPLEX 45 MIN: CPT | Mod: GO

## 2018-05-19 PROCEDURE — 92526 ORAL FUNCTION THERAPY: CPT | Mod: GN | Performed by: SPEECH-LANGUAGE PATHOLOGIST

## 2018-05-19 PROCEDURE — 25000125 ZZHC RX 250: Performed by: PHYSICIAN ASSISTANT

## 2018-05-19 PROCEDURE — 85018 HEMOGLOBIN: CPT | Performed by: INTERNAL MEDICINE

## 2018-05-19 PROCEDURE — 93005 ELECTROCARDIOGRAM TRACING: CPT

## 2018-05-19 PROCEDURE — 40000133 ZZH STATISTIC OT WARD VISIT

## 2018-05-19 PROCEDURE — 25000132 ZZH RX MED GY IP 250 OP 250 PS 637: Performed by: STUDENT IN AN ORGANIZED HEALTH CARE EDUCATION/TRAINING PROGRAM

## 2018-05-19 PROCEDURE — 97530 THERAPEUTIC ACTIVITIES: CPT | Mod: GP | Performed by: PHYSICAL THERAPIST

## 2018-05-19 PROCEDURE — 25000131 ZZH RX MED GY IP 250 OP 636 PS 637: Performed by: NURSE PRACTITIONER

## 2018-05-19 PROCEDURE — 40000193 ZZH STATISTIC PT WARD VISIT: Performed by: PHYSICAL THERAPIST

## 2018-05-19 PROCEDURE — 25000132 ZZH RX MED GY IP 250 OP 250 PS 637: Performed by: INTERNAL MEDICINE

## 2018-05-19 PROCEDURE — E2402 NEG PRESS WOUND THERAPY PUMP: HCPCS

## 2018-05-19 PROCEDURE — 40000225 ZZH STATISTIC SLP WARD VISIT: Performed by: SPEECH-LANGUAGE PATHOLOGIST

## 2018-05-19 PROCEDURE — 83735 ASSAY OF MAGNESIUM: CPT | Performed by: INTERNAL MEDICINE

## 2018-05-19 PROCEDURE — 97535 SELF CARE MNGMENT TRAINING: CPT | Mod: GO

## 2018-05-19 PROCEDURE — 97116 GAIT TRAINING THERAPY: CPT | Mod: GP | Performed by: PHYSICAL THERAPIST

## 2018-05-19 PROCEDURE — 12000003 ZZH R&B CRITICAL UMMC

## 2018-05-19 PROCEDURE — 00000146 ZZHCL STATISTIC GLUCOSE BY METER IP

## 2018-05-19 RX ORDER — DEXTROSE MONOHYDRATE 25 G/50ML
25-50 INJECTION, SOLUTION INTRAVENOUS
Status: DISCONTINUED | OUTPATIENT
Start: 2018-05-19 | End: 2018-05-21 | Stop reason: HOSPADM

## 2018-05-19 RX ORDER — DEXTROSE MONOHYDRATE 50 MG/ML
INJECTION, SOLUTION INTRAVENOUS CONTINUOUS
Status: DISCONTINUED | OUTPATIENT
Start: 2018-05-19 | End: 2018-05-20

## 2018-05-19 RX ORDER — FUROSEMIDE 20 MG
20 TABLET ORAL
Status: DISCONTINUED | OUTPATIENT
Start: 2018-05-19 | End: 2018-05-21 | Stop reason: HOSPADM

## 2018-05-19 RX ORDER — NICOTINE POLACRILEX 4 MG
15-30 LOZENGE BUCCAL
Status: DISCONTINUED | OUTPATIENT
Start: 2018-05-19 | End: 2018-05-21 | Stop reason: HOSPADM

## 2018-05-19 RX ADMIN — SODIUM BICARBONATE 650 MG TABLET 650 MG: at 08:11

## 2018-05-19 RX ADMIN — FUROSEMIDE 20 MG: 20 TABLET ORAL at 13:29

## 2018-05-19 RX ADMIN — OXYCODONE HYDROCHLORIDE 5 MG: 5 TABLET ORAL at 23:54

## 2018-05-19 RX ADMIN — Medication 1 MG: at 08:11

## 2018-05-19 RX ADMIN — OXYCODONE HYDROCHLORIDE 5 MG: 5 TABLET ORAL at 05:03

## 2018-05-19 RX ADMIN — SODIUM BICARBONATE 650 MG TABLET 650 MG: at 21:13

## 2018-05-19 RX ADMIN — OXYCODONE HYDROCHLORIDE 5 MG: 5 TABLET ORAL at 14:24

## 2018-05-19 RX ADMIN — INSULIN GLARGINE 24 UNITS: 100 INJECTION, SOLUTION SUBCUTANEOUS at 11:24

## 2018-05-19 RX ADMIN — MYCOPHENOLIC ACID 720 MG: 360 TABLET, DELAYED RELEASE ORAL at 14:27

## 2018-05-19 RX ADMIN — HUMAN INSULIN 1.5 UNITS/HR: 100 INJECTION, SOLUTION SUBCUTANEOUS at 12:21

## 2018-05-19 RX ADMIN — FUROSEMIDE 20 MG: 20 TABLET ORAL at 17:50

## 2018-05-19 RX ADMIN — DEXTROSE MONOHYDRATE: 50 INJECTION, SOLUTION INTRAVENOUS at 18:44

## 2018-05-19 RX ADMIN — Medication 1 MG: at 22:35

## 2018-05-19 RX ADMIN — OXYCODONE HYDROCHLORIDE 5 MG: 5 TABLET ORAL at 08:21

## 2018-05-19 ASSESSMENT — ACTIVITIES OF DAILY LIVING (ADL)
ADLS_ACUITY_SCORE: 12

## 2018-05-19 NOTE — PLAN OF CARE
Problem: Patient Care Overview  Goal: Plan of Care/Patient Progress Review  VS: Vitals are stable and within normal limits. Lung sounds are clear.    Output: Voiding spontaneously. Bowel sounds normoactive. Passing flatus.    Activity: Ambulates with assist of 1 and walker.    Skin: Incisions to anterior and posterior neck and left thigh- otherwise intact   Pain: Denies pain - declines PRN pain medication    Neuro/CMS: Baseline numbness and tingling in all extremities    Dressing(s): Dried drainage on posterior neck dressing- anterior neck and left thigh dressings are clean/dry/intact.    Diet: DD#1 diet - Still experiencing difficulty swallowing   LDA: PIV X2 infusing.    Equipment: Walker, [place] J collar.    Plan: Possible D/C to TCU on Monday    Additional Info: BG's checked hourly (170's-230's) Insulin drip infusing per protocol. Able to make needs known. Will continue with plan of care.

## 2018-05-19 NOTE — PROGRESS NOTES
"Orthopedic Surgery Progress Note    Subjective:   NAEO.  Pain well controlled.  (-)cp/sob, (-)n/v, tolerating soft diet with improved swallowing.    Exam:  /60 (BP Location: Left arm)  Pulse 76  Temp 96.7  F (35.9  C) (Axillary)  Resp 16  Ht 1.727 m (5' 8\")  Wt 69.2 kg (152 lb 8.9 oz)  SpO2 95%  BMI 23.2 kg/m2  Gen: Awake, alert, NAD  Resp: breathing equal and non-labored  Back incision:  Bandage c/d/i  Neck incision (anterior):  Bandage c/d/i  L flank incision:  Bandage c/d/i  Extremities:  RUE:   No obvious deformity   Delt 4+/5, elbow flexor 4+/5, elbow extensor 4+/5, wrist extensor 4+/5,  4+/5, interossei 4+/5.   SILT C5-T1   All fingers wwp, radial pulse 2+  LUE:   No obvious deformity   Delt 4/5, elbow flexor 4/5, elbow extensor 4/5, wrist extensor 4+/5,  4+/5, interossei 4/5.   SILT C5-T1.  Minor diffuse paresthesias most pronounced in C5, much improved from preop   All fingers wwp, radial pulse 2+  RLE:   No obvious deformity, skin intact   SILT L2-S1, partial paresthesias, improved from preop   DP 2+   Fires hip flexor, quad, TA, EHL, FHL, Gsc 4+/5   No calf tenderness  LLE:   No obvious deformity, skin intact   SILT L2-S1, partial paresthesias, improved from preop   DP 2+   Fires hip flexor, quad, TA, EHL, FHL, Gsc 4+/5   No calf tenderness    Labs:    Recent Labs  Lab 05/18/18  0649 05/17/18  0400 05/16/18  1718 05/16/18  1607 05/16/18  1509 05/16/18  1446   WBC  --  9.6 12.1* 9.2  --  12.5*   HGB 10.4* 9.0* 9.6*  9.7* 7.9*  7.9* 8.7* 9.9*  9.6*   PLT  --  175 131* 145* 147* 120*       Recent Labs  Lab 05/18/18 2011 05/18/18  0854 05/17/18  0400 05/16/18  2345   * 149* 145* 147*   POTASSIUM 4.4 5.0 4.7 4.0   CHLORIDE 119* 121* 117* 119*   CO2 19* 17* 18* 18*   BUN 42* 36* 29 27   CR 1.89* 1.93* 1.44* 1.38*   * 147* 205* 193*   MAG  --   --  2.2 1.1*   PHOS  --   --   --  2.9       Recent Labs  Lab 05/16/18  1718 05/16/18  1607 05/16/18  1509 05/16/18  1446   INR 1.42* " 1.40* 1.23* 1.41*   PTT 33 33 32 33       Assessment:   73 year old male s/p 5-level ACDF C3-T1, PISF C3-T2 fusion w L ICBG harvest on 5/16/18 w Dr. Bass.  Recovering appropriately.    Plan:  Activity: Up with assist -- no bending, twisting. No lifting >10 lbs.  Weight bearing status: WBAT  Antibiotics: Ancef until drains out.  Diet: progress diet as tolerated per SLP  DVT prophylaxis: Mechanical  Bracing/Splinting: [place] J collar to be worn at all times except hygiene and eating. Soft collar until orthotics consult completed.   Wound Care: Aquacel dressing posteriorly in place for 5-7 days, patient to perform dressing changes thereafter. Anterior dressing medipore tape and gauze to be changed on POD3  Drains: drain to be removed when flow less than 30cc/shift  Pain management: transition from IV to orals as tolerated  X-rays: Upright AP/Lat cervical XR prior to discharge  Physical Therapy: ADL's.   Occupational Therapy: ADL's  Labs: Trend Hgb on POD #1, 2, 3   Consults: PT, OT, Medicine co-management, appreciate assistance in caring for this patient.   Follow-up: Clinic with Dr. Bass in 2 weeks.      Kenneth Rabago MD  PGY-4, Orthopaedic Surgery  [phone]

## 2018-05-19 NOTE — PLAN OF CARE
Problem: Patient Care Overview  Goal: Plan of Care/Patient Progress Review  Discharge Planner OT   Patient plan for discharge: rehab  Current status: Pt needs mod A for LB dressing.  Pt unaware of spinal precautions, however per ortho note has bending, lifting, twisting restrictions.  Reviewed with pt and daughter.    Barriers to return to prior living situation: medical status, stairs, post surgical precautions, assist for ADL and mobility  Recommendations for discharge: TCU  Rationale for recommendations: to progress safety and IND with ADL and mobility       Entered by: Siobhan Wang 05/19/2018 5:13 PM

## 2018-05-19 NOTE — PLAN OF CARE
Problem: Patient Care Overview  Goal: Plan of Care/Patient Progress Review  PT: Pt received resting in bed, declining therapy d/t recent return to bed. Pt reports being up in chair all morning, was up to BR with Ax1. Daughter at bedside and supportive. Agreeable to PM session.

## 2018-05-19 NOTE — PLAN OF CARE
VS:   VSS, LS clear. Denies SOB, chest pain, and dizziness.    Output:   Voiding spontaneously without difficulty. LBM yesterday.    Activity:   Up SBA w/ walker.    Skin: Intact with exception of incisions.    Pain:   Comfortably managed with PRN oxycodone 5mg.    Neuro/CMS:   Has baseline n/t in all extremities, mostly LUE - improving per pt report. A/O x4. All other CMS/neuros intact.    Dressing(s):   Anterior and posterior neck dressings C/D/I.    Diet:   After speech visit, upgraded to DD2 diet. Per pt report, able to swallow better today. Refused some medications this morning due to inability to swallow, but was able to take more this afternoon.   IV insulin drip d/c'd this afternoon.    LDA:   2 PIVs, SL and infusing D5 @ 60ml/hr.     Equipment [place] J collar on, walker, IV pole   Plan:   Continue to monitor.    Additional Info:   Call light in reach, able to make needs known.

## 2018-05-19 NOTE — PROVIDER NOTIFICATION
Notified moonlighter  :  Tele rhythm change since morning.  Appears a-fib,  Irregular, HR 6--70's, no visible OH interval, K 4.9,  Mg ordered add-on.  Previous cardiac testing ECG reviewed date:4/18/2018 results:SR 74 bpm with short OH interval  ms.     Order received  : 12 lead EKG.

## 2018-05-19 NOTE — PLAN OF CARE
Problem: Patient Care Overview  Goal: Plan of Care/Patient Progress Review  Discharge Planner PT   Patient plan for discharge: TCU  Current status: Pt tx sit<>stand with CGA-min A and UE supported. Pt amb 100 ft x 2 with 1 UE support at IV pole vs SPC, CGA for safety. Pt negotiated 3 stairs with 1 HR + SPC, min-mod A for stability and eccentric control on descent. Recommend SPC, gaitbelt, and Ax1 for amb in halls.  Barriers to return to prior living situation: Medical status, higher level balance, strength, stairs  Recommendations for discharge: TCU  Rationale for recommendations: Pt would benefit from rehab stay to maximize IND with functional transfers, amb and stairs within precautions prior to return to home.       Entered by: Amber Hung 05/19/2018 3:03 PM

## 2018-05-19 NOTE — PLAN OF CARE
Problem: Patient Care Overview  Goal: Plan of Care/Patient Progress Review  SLP:  Pt demonstrating ability to tolerate DD2 textures without overt s/sx of aspiration.  Recommendations:  1. Upgrade to DD2 textures, continue thin liquids.  SLP will plan to complete meal f/u tomorrow and trial DD3 textures.

## 2018-05-19 NOTE — PROGRESS NOTES
05/19/18 0930   Quick Adds   Type of Visit Initial Occupational Therapy Evaluation   Living Environment   Lives With alone   Living Arrangements house   Home Accessibility stairs to enter home;stairs (1 railing present)   Number of Stairs to Enter Home 6   Number of Stairs Within Home 0   Stair Railings at Home inside, present on left side   Transportation Available family or friend will provide   Living Environment Comment Pt previously driving though limited 2/2 low vision.  Pt has walk-in shower on the main level.    Self-Care   Dominant Hand right   Usual Activity Tolerance moderate   Current Activity Tolerance fair   Equipment Currently Used at Home cane, straight  (shoe horn)   Activity/Exercise/Self-Care Comment IND with ADL at baseline   Functional Level Prior   Ambulation 1-->assistive equipment   Transferring 1-->assistive equipment   Toileting 0-->independent   Bathing 0-->independent   Dressing 0-->independent   Eating 0-->independent   Communication 0-->understands/communicates without difficulty   Swallowing 0-->swallows foods/liquids without difficulty   Cognition 0 - no cognition issues reported   Fall history within last six months no   Which of the above functional risks had a recent onset or change? ambulation;transferring;bathing;dressing;toileting   Prior Functional Level Comment Pt used SEC prior, otherwise IND with mobility and ADL   General Information   Onset of Illness/Injury or Date of Surgery - Date 05/16/18   Referring Physician Daniel Bass MD   Patient/Family Goals Statement go to rehab   Additional Occupational Profile Info/Pertinent History of Current Problem 73 year old male s/p 5-level ACDF C3-T1, PISF C3-T2 fusion w L ICBG harvest.  See chart for PMH.     Precautions/Limitations fall precautions;spinal precautions   Weight-Bearing Status - LUE other (see comments)  (10# lifting restriction)   Weight-Bearing Status - RUE other (see comments)  (10# lifting restriction)    Weight-Bearing Status - LLE full weight-bearing   Weight-Bearing Status - RLE full weight-bearing   Cognitive Status Examination   Orientation orientation to person, place and time   Level of Consciousness alert   Able to Follow Commands WNL/WFL   Cognitive Comment No concerns observed; will continue to monitor   Visual Perception   Visual Perception Comments Pt with low vision at baseline, denies changes.    Sensory Examination   Sensory Comments Not tested, no concerns reported   Pain Assessment   Patient Currently in Pain No   Integumentary/Edema   Integumentary/Edema Comments No concerns   Range of Motion (ROM)   ROM Comment Not tested, appears WFL for BUE   Strength   Strength Comments Not tested 2/2 precautions   Bathing   Level of [place] moderate assist (50% patients effort)   Lower Body Dressing   Level of [place]: Dress Lower Body moderate assist (50% patients effort)   Activities of Daily Living Analysis   Impairments Contributing to Impaired Activities of Daily Living pain;post surgical precautions;strength decreased   General Therapy Interventions   Planned Therapy Interventions ADL retraining;transfer training;progressive activity/exercise;strengthening   Clinical Impression   Criteria for Skilled Therapeutic Interventions Met yes, treatment indicated   OT Diagnosis decreased ind with ADL   Influenced by the following impairments pain, post surgical precautions, low vision, activity tolerance   Assessment of Occupational Performance 3-5 Performance Deficits   Identified Performance Deficits dressing, bathing, transfers, grooming, IADL   Clinical Decision Making (Complexity) Moderate complexity   Therapy Frequency 5 times/wk   Predicted Duration of Therapy Intervention (days/wks) 5 days   Anticipated Equipment Needs at Discharge shower chair   Anticipated Discharge Disposition Transitional Care Facility   Risks and Benefits of Treatment have been explained. Yes   Patient, Family & other  "staff in agreement with plan of care Yes   Clinical Impression Comments Pt presents with decreased IND with ADL 2/2 post surgical precautions and impaired activity tolerance.  Pt would benefit from OT to progress activity tolerance and learn compensatory strategies to increase safety and IND with ADL/IADL   [facility]-PAC TM \"6 Clicks\"   2016, Trustees of [facility], under license to [employer].  All rights reserved.   6 Clicks Short Forms Daily Activity Inpatient Short Form   [facility] AM-PAC  \"6 Clicks\" Daily Activity Inpatient Short Form   1. Putting on and taking off regular lower body clothing? 2 - A Lot   2. Bathing (including washing, rinsing, drying)? 2 - A Lot   3. Toileting, which includes using toilet, bedpan or urinal? 3 - A Little   4. Putting on and taking off regular upper body clothing? 3 - A Little   5. Taking care of personal grooming such as brushing teeth? 3 - A Little   6. Eating meals? 3 - A Little   Daily Activity Raw Score (Score out of 24.Lower scores equate to lower levels of function) 16   Total Evaluation Time   Total Evaluation Time (Minutes) 10      05/19/18 0930   Quick Adds   Type of Visit Initial Occupational Therapy Evaluation   Living Environment   Lives With alone   Living Arrangements house   Home Accessibility stairs to enter home;stairs (1 railing present)   Number of Stairs to Enter Home 6   Number of Stairs Within Home 0   Stair Railings at Home inside, present on left side   Transportation Available family or friend will provide   Living Environment Comment Pt previously driving though limited 2/2 low vision.  Pt has walk-in shower on the main level.    Self-Care   Dominant Hand right   Usual Activity Tolerance moderate   Current Activity Tolerance fair   Equipment Currently Used at Home cane, straight  (shoe horn)   Activity/Exercise/Self-Care Comment IND with ADL at baseline   Functional Level Prior   Ambulation 1-->assistive equipment "   Transferring 1-->assistive equipment   Toileting 0-->independent   Bathing 0-->independent   Dressing 0-->independent   Eating 0-->independent   Communication 0-->understands/communicates without difficulty   Swallowing 0-->swallows foods/liquids without difficulty   Cognition 0 - no cognition issues reported   Fall history within last six months no   Which of the above functional risks had a recent onset or change? ambulation;transferring;bathing;dressing;toileting   Prior Functional Level Comment Pt used SEC prior, otherwise IND with mobility and ADL   General Information   Onset of Illness/Injury or Date of Surgery - Date 05/16/18   Referring Physician Daniel Bass MD   Patient/Family Goals Statement go to rehab   Additional Occupational Profile Info/Pertinent History of Current Problem 73 year old male s/p 5-level ACDF C3-T1, PISF C3-T2 fusion w L ICBG harvest.  See chart for PMH.     Precautions/Limitations fall precautions;spinal precautions   Weight-Bearing Status - LUE other (see comments)  (10# lifting restriction)   Weight-Bearing Status - RUE other (see comments)  (10# lifting restriction)   Weight-Bearing Status - LLE full weight-bearing   Weight-Bearing Status - RLE full weight-bearing   Cognitive Status Examination   Orientation orientation to person, place and time   Level of Consciousness alert   Able to Follow Commands WNL/WFL   Cognitive Comment No concerns observed; will continue to monitor   Visual Perception   Visual Perception Comments Pt with low vision at baseline, denies changes.    Sensory Examination   Sensory Comments Not tested, no concerns reported   Pain Assessment   Patient Currently in Pain No   Integumentary/Edema   Integumentary/Edema Comments No concerns   Range of Motion (ROM)   ROM Comment Not tested, appears WFL for BUE   Strength   Strength Comments Not tested 2/2 precautions   Bathing   Level of [place] moderate assist (50% patients effort)   Lower Body Dressing  "  Level of [place]: Dress Lower Body moderate assist (50% patients effort)   Activities of Daily Living Analysis   Impairments Contributing to Impaired Activities of Daily Living pain;post surgical precautions;strength decreased   General Therapy Interventions   Planned Therapy Interventions ADL retraining;transfer training;progressive activity/exercise;strengthening   Clinical Impression   Criteria for Skilled Therapeutic Interventions Met yes, treatment indicated   OT Diagnosis decreased ind with ADL   Influenced by the following impairments pain, post surgical precautions, low vision, activity tolerance   Assessment of Occupational Performance 3-5 Performance Deficits   Identified Performance Deficits dressing, bathing, transfers, grooming, IADL   Clinical Decision Making (Complexity) Moderate complexity   Therapy Frequency 5 times/wk   Predicted Duration of Therapy Intervention (days/wks) 5 days   Anticipated Equipment Needs at Discharge shower chair   Anticipated Discharge Disposition Transitional Care Facility   Risks and Benefits of Treatment have been explained. Yes   Patient, Family & other staff in agreement with plan of care Yes   Clinical Impression Comments Pt presents with decreased IND with ADL 2/2 post surgical precautions and impaired activity tolerance.  Pt would benefit from OT to progress activity tolerance and learn compensatory strategies to increase safety and IND with ADL/IADL   [facility]-PAC TM \"6 Clicks\"   2016, Trustees of [facility], under license to [employer].  All rights reserved.   6 Clicks Short Forms Daily Activity Inpatient Short Form   [facility] AM-PAC  \"6 Clicks\" Daily Activity Inpatient Short Form   1. Putting on and taking off regular lower body clothing? 2 - A Lot   2. Bathing (including washing, rinsing, drying)? 2 - A Lot   3. Toileting, which includes using toilet, bedpan or urinal? 3 - A Little   4. Putting on and taking off regular upper " body clothing? 3 - A Little   5. Taking care of personal grooming such as brushing teeth? 3 - A Little   6. Eating meals? 3 - A Little   Daily Activity Raw Score (Score out of 24.Lower scores equate to lower levels of function) 16   Total Evaluation Time   Total Evaluation Time (Minutes) 10

## 2018-05-19 NOTE — PROGRESS NOTES
"[facility] Internal Medicine Progress Note            Interval History:   Record reviewed.  Seen with RN.   Doing reasonably well.  Adequate pain control.  Up in chair, ambulated without dizziness.   Off IV decadron.  Improved swallow.   Advanced to DD2 diet with thin liquids by speech.  Tolerating most pills.  Off lisinopril.   Remains on insulin infusion.  No CP, SOB, cough, nausea, reflux, abd pain or distention.  No flatus. Soft BM last night.  Voiding OK.            Medications:   All medications reviewed today          Physical Exam:   Blood pressure 157/67, pulse 76, temperature 96.5  F (35.8  C), temperature source Axillary, resp. rate 15, height 1.727 m (5' 8\"), weight 69.2 kg (152 lb 8.9 oz), SpO2 99 %.    Intake/Output Summary (Last 24 hours) at 05/18/18 0839  Last data filed at 05/18/18 0700   Gross per 24 hour   Intake             1150 ml   Output              550 ml   Net              600 ml       General:  Alert.  Appropriate.  No distress.  No O2.     Heent:      Neck:    Skin:    Chest:  clear    Cardiac:  Reg without gallop, murmur.  No JVD.     Abdomen:  Non distended, soft, non-tender.  BS normal.     Extremities:  Perfused.  No edema, calf, posterior thigh tenderness to suggest DVT.     Neuro:            Data:     Results for orders placed or performed during the hospital encounter of 05/16/18 (from the past 24 hour(s))   Glucose by meter   Result Value Ref Range    Glucose 145 (H) 70 - 99 mg/dL   Glucose by meter   Result Value Ref Range    Glucose 152 (H) 70 - 99 mg/dL   Glucose by meter   Result Value Ref Range    Glucose 151 (H) 70 - 99 mg/dL   Glucose by meter   Result Value Ref Range    Glucose 190 (H) 70 - 99 mg/dL   Glucose by meter   Result Value Ref Range    Glucose 186 (H) 70 - 99 mg/dL   Glucose by meter   Result Value Ref Range    Glucose 165 (H) 70 - 99 mg/dL   Glucose by meter   Result Value Ref Range    Glucose 196 (H) 70 - 99 mg/dL   Glucose by meter   Result Value Ref " Range    Glucose 192 (H) 70 - 99 mg/dL   Glucose by meter   Result Value Ref Range    Glucose 198 (H) 70 - 99 mg/dL   Basic metabolic panel   Result Value Ref Range    Sodium 147 (H) 133 - 144 mmol/L    Potassium 4.4 3.4 - 5.3 mmol/L    Chloride 119 (H) 94 - 109 mmol/L    Carbon Dioxide 19 (L) 20 - 32 mmol/L    Anion Gap 9 3 - 14 mmol/L    Glucose 233 (H) 70 - 99 mg/dL    Urea Nitrogen 42 (H) 7 - 30 mg/dL    Creatinine 1.89 (H) 0.66 - 1.25 mg/dL    GFR Estimate 35 (L) >60 mL/min/1.7m2    GFR Estimate If Black 43 (L) >60 mL/min/1.7m2    Calcium 8.7 8.5 - 10.1 mg/dL   Glucose by meter   Result Value Ref Range    Glucose 233 (H) 70 - 99 mg/dL   Glucose by meter   Result Value Ref Range    Glucose 184 (H) 70 - 99 mg/dL   Glucose by meter   Result Value Ref Range    Glucose 178 (H) 70 - 99 mg/dL   Glucose by meter   Result Value Ref Range    Glucose 165 (H) 70 - 99 mg/dL   Glucose by meter   Result Value Ref Range    Glucose 158 (H) 70 - 99 mg/dL   Glucose by meter   Result Value Ref Range    Glucose 162 (H) 70 - 99 mg/dL   Glucose by meter   Result Value Ref Range    Glucose 158 (H) 70 - 99 mg/dL   Glucose by meter   Result Value Ref Range    Glucose 148 (H) 70 - 99 mg/dL   Glucose by meter   Result Value Ref Range    Glucose 156 (H) 70 - 99 mg/dL   Glucose by meter   Result Value Ref Range    Glucose 154 (H) 70 - 99 mg/dL   Glucose by meter   Result Value Ref Range    Glucose 164 (H) 70 - 99 mg/dL   Basic metabolic panel   Result Value Ref Range    Sodium 146 (H) 133 - 144 mmol/L    Potassium 4.9 3.4 - 5.3 mmol/L    Chloride 117 (H) 94 - 109 mmol/L    Carbon Dioxide 22 20 - 32 mmol/L    Anion Gap 7 3 - 14 mmol/L    Glucose 160 (H) 70 - 99 mg/dL    Urea Nitrogen 47 (H) 7 - 30 mg/dL    Creatinine 1.92 (H) 0.66 - 1.25 mg/dL    GFR Estimate 35 (L) >60 mL/min/1.7m2    GFR Estimate If Black 42 (L) >60 mL/min/1.7m2    Calcium 9.1 8.5 - 10.1 mg/dL   Hemoglobin   Result Value Ref Range    Hemoglobin 10.1 (L) 13.3 - 17.7 g/dL    Glucose by meter   Result Value Ref Range    Glucose 153 (H) 70 - 99 mg/dL   Glucose by meter   Result Value Ref Range    Glucose 209 (H) 70 - 99 mg/dL                  Assessment and Plan:   1)  S/P A/P cervical fusion 5/16.  Adequate pain control.  2)  Oral pharyngeal dysphagia 2nd to #1, improved.  Tolerating diet.   3)  Acute blood loss anemia.  Adequate.  4)  Type II DM, adequate on infusion.  5)  HTN, borderline off meds.  6)  S/p DDKT 2006.  Mild rise in renal function from baseline.     7)  HLD.     PLAN:  1)  DD2 with thin liquids per speech.  2) lasix 20 mg BID.  IV D5W plus po fluids. AM tacrolimus level (reviewed with renal staff) .3)  Change to prandial insulin (lantus 24, 1 unit bper 10 gms novolog, high correction) per diabetes service.  4)  IS, mobilize, same opiates, bowel meds, mechanical DVT prophylaxis. .  5)  Trend labs.  6)  Monitor clinically.  Monitor clinically.    Disposition Plan   Expected discharge in 2-3 days to prior living arrangement once adequate mobilization and swallow, stable labs, BP. .      Entered: Familia Dumont 05/19/2018, 10:36 AM     ADD:  Reviewed with renal.  BMP Na 149.  K 5.0  BUN 36 Cr 1.93 (unclear baseline).  Taking anti rejection meds.  Limited po fluids.  Add IV D5w at 75/hr.  Trend labs.  Hold lisinopril.  Reviewed with renal Tx.          Attestation:  I have reviewed today's vital signs, notes, medications, labs and imaging.     Familia Dumont MD

## 2018-05-19 NOTE — PROGRESS NOTES
12 lead EKG result :19-MAY-2018 16:29:02   Sinus rhythm with Premature atrial complexes  Otherwise normal ECG  When compared with ECG of 18-APR-2018 12:42,  Premature atrial complexes are now Present  QT has shortened    K 4.9,Mg 2.1, Hgb 10.1,  Pt denies chest pain or SOB.  3+ BLE  Edema. /68 otherwise & afebrile. SpO2 100% on RA  Na 146,Cr 1.92, Dextrose 5% @ 60ml/hr infusing. Continue to monitor.

## 2018-05-20 ENCOUNTER — APPOINTMENT (OUTPATIENT)
Dept: PHYSICAL THERAPY | Facility: CLINIC | Age: 73
DRG: 454 | End: 2018-05-20
Attending: ORTHOPAEDIC SURGERY
Payer: COMMERCIAL

## 2018-05-20 ENCOUNTER — APPOINTMENT (OUTPATIENT)
Dept: GENERAL RADIOLOGY | Facility: CLINIC | Age: 73
DRG: 454 | End: 2018-05-20
Attending: PHYSICIAN ASSISTANT
Payer: COMMERCIAL

## 2018-05-20 ENCOUNTER — APPOINTMENT (OUTPATIENT)
Dept: SPEECH THERAPY | Facility: CLINIC | Age: 73
DRG: 454 | End: 2018-05-20
Attending: ORTHOPAEDIC SURGERY
Payer: COMMERCIAL

## 2018-05-20 ENCOUNTER — APPOINTMENT (OUTPATIENT)
Dept: OCCUPATIONAL THERAPY | Facility: CLINIC | Age: 73
DRG: 454 | End: 2018-05-20
Attending: ORTHOPAEDIC SURGERY
Payer: COMMERCIAL

## 2018-05-20 LAB
ANION GAP SERPL CALCULATED.3IONS-SCNC: 11 MMOL/L (ref 3–14)
BUN SERPL-MCNC: 53 MG/DL (ref 7–30)
CALCIUM SERPL-MCNC: 8 MG/DL (ref 8.5–10.1)
CHLORIDE SERPL-SCNC: 116 MMOL/L (ref 94–109)
CO2 SERPL-SCNC: 16 MMOL/L (ref 20–32)
CREAT SERPL-MCNC: 1.7 MG/DL (ref 0.66–1.25)
GFR SERPL CREATININE-BSD FRML MDRD: 40 ML/MIN/1.7M2
GLUCOSE BLDC GLUCOMTR-MCNC: 176 MG/DL (ref 70–99)
GLUCOSE BLDC GLUCOMTR-MCNC: 177 MG/DL (ref 70–99)
GLUCOSE BLDC GLUCOMTR-MCNC: 79 MG/DL (ref 70–99)
GLUCOSE BLDC GLUCOMTR-MCNC: 91 MG/DL (ref 70–99)
GLUCOSE SERPL-MCNC: 169 MG/DL (ref 70–99)
HGB BLD-MCNC: 10.4 G/DL (ref 13.3–17.7)
MAGNESIUM SERPL-MCNC: 1.8 MG/DL (ref 1.6–2.3)
POTASSIUM SERPL-SCNC: 4.9 MMOL/L (ref 3.4–5.3)
SODIUM SERPL-SCNC: 143 MMOL/L (ref 133–144)

## 2018-05-20 PROCEDURE — 85018 HEMOGLOBIN: CPT | Performed by: INTERNAL MEDICINE

## 2018-05-20 PROCEDURE — 25000132 ZZH RX MED GY IP 250 OP 250 PS 637: Performed by: INTERNAL MEDICINE

## 2018-05-20 PROCEDURE — 97535 SELF CARE MNGMENT TRAINING: CPT | Mod: GO

## 2018-05-20 PROCEDURE — 25000131 ZZH RX MED GY IP 250 OP 636 PS 637: Performed by: NURSE PRACTITIONER

## 2018-05-20 PROCEDURE — 83735 ASSAY OF MAGNESIUM: CPT | Performed by: INTERNAL MEDICINE

## 2018-05-20 PROCEDURE — 25000131 ZZH RX MED GY IP 250 OP 636 PS 637: Performed by: INTERNAL MEDICINE

## 2018-05-20 PROCEDURE — 80197 ASSAY OF TACROLIMUS: CPT | Performed by: INTERNAL MEDICINE

## 2018-05-20 PROCEDURE — 00000146 ZZHCL STATISTIC GLUCOSE BY METER IP

## 2018-05-20 PROCEDURE — 97110 THERAPEUTIC EXERCISES: CPT | Mod: GP | Performed by: PHYSICAL THERAPIST

## 2018-05-20 PROCEDURE — 40000193 ZZH STATISTIC PT WARD VISIT: Performed by: PHYSICAL THERAPIST

## 2018-05-20 PROCEDURE — 92526 ORAL FUNCTION THERAPY: CPT | Mod: GN | Performed by: SPEECH-LANGUAGE PATHOLOGIST

## 2018-05-20 PROCEDURE — E2402 NEG PRESS WOUND THERAPY PUMP: HCPCS

## 2018-05-20 PROCEDURE — 97116 GAIT TRAINING THERAPY: CPT | Mod: GP | Performed by: PHYSICAL THERAPIST

## 2018-05-20 PROCEDURE — 99233 SBSQ HOSP IP/OBS HIGH 50: CPT | Performed by: INTERNAL MEDICINE

## 2018-05-20 PROCEDURE — 40000225 ZZH STATISTIC SLP WARD VISIT: Performed by: SPEECH-LANGUAGE PATHOLOGIST

## 2018-05-20 PROCEDURE — 25000132 ZZH RX MED GY IP 250 OP 250 PS 637: Performed by: STUDENT IN AN ORGANIZED HEALTH CARE EDUCATION/TRAINING PROGRAM

## 2018-05-20 PROCEDURE — 72040 X-RAY EXAM NECK SPINE 2-3 VW: CPT

## 2018-05-20 PROCEDURE — 80048 BASIC METABOLIC PNL TOTAL CA: CPT | Performed by: INTERNAL MEDICINE

## 2018-05-20 PROCEDURE — 40000133 ZZH STATISTIC OT WARD VISIT

## 2018-05-20 PROCEDURE — 36416 COLLJ CAPILLARY BLOOD SPEC: CPT | Performed by: INTERNAL MEDICINE

## 2018-05-20 PROCEDURE — 12000001 ZZH R&B MED SURG/OB UMMC

## 2018-05-20 RX ORDER — LISINOPRIL 20 MG/1
20 TABLET ORAL DAILY
Status: DISCONTINUED | OUTPATIENT
Start: 2018-05-20 | End: 2018-05-21 | Stop reason: HOSPADM

## 2018-05-20 RX ADMIN — PRAVASTATIN SODIUM 40 MG: 20 TABLET ORAL at 20:10

## 2018-05-20 RX ADMIN — AMLODIPINE BESYLATE 10 MG: 10 TABLET ORAL at 08:48

## 2018-05-20 RX ADMIN — INSULIN GLARGINE 24 UNITS: 100 INJECTION, SOLUTION SUBCUTANEOUS at 08:50

## 2018-05-20 RX ADMIN — Medication 1 MG: at 08:50

## 2018-05-20 RX ADMIN — MYCOPHENOLIC ACID 720 MG: 360 TABLET, DELAYED RELEASE ORAL at 20:10

## 2018-05-20 RX ADMIN — ACETAMINOPHEN 650 MG: 325 TABLET ORAL at 05:18

## 2018-05-20 RX ADMIN — OXYCODONE HYDROCHLORIDE 5 MG: 5 TABLET ORAL at 20:09

## 2018-05-20 RX ADMIN — MYCOPHENOLIC ACID 720 MG: 360 TABLET, DELAYED RELEASE ORAL at 08:49

## 2018-05-20 RX ADMIN — OXYCODONE HYDROCHLORIDE 5 MG: 5 TABLET ORAL at 08:14

## 2018-05-20 RX ADMIN — SODIUM BICARBONATE 650 MG TABLET 650 MG: at 20:09

## 2018-05-20 RX ADMIN — OXYCODONE HYDROCHLORIDE 5 MG: 5 TABLET ORAL at 05:18

## 2018-05-20 RX ADMIN — Medication 400 UNITS: at 08:50

## 2018-05-20 RX ADMIN — LISINOPRIL 20 MG: 20 TABLET ORAL at 09:44

## 2018-05-20 RX ADMIN — OXYCODONE HYDROCHLORIDE 5 MG: 5 TABLET ORAL at 16:02

## 2018-05-20 RX ADMIN — FUROSEMIDE 20 MG: 20 TABLET ORAL at 19:02

## 2018-05-20 RX ADMIN — Medication 1 MG: at 20:11

## 2018-05-20 RX ADMIN — SODIUM BICARBONATE 650 MG TABLET 650 MG: at 08:47

## 2018-05-20 RX ADMIN — FUROSEMIDE 20 MG: 20 TABLET ORAL at 08:49

## 2018-05-20 RX ADMIN — FOLIC ACID 1 MG: 1 TABLET ORAL at 08:50

## 2018-05-20 ASSESSMENT — ACTIVITIES OF DAILY LIVING (ADL)
ADLS_ACUITY_SCORE: 13
ADLS_ACUITY_SCORE: 12
ADLS_ACUITY_SCORE: 12
ADLS_ACUITY_SCORE: 13
ADLS_ACUITY_SCORE: 12
ADLS_ACUITY_SCORE: 13

## 2018-05-20 NOTE — PLAN OF CARE
Problem: Patient Care Overview  Goal: Plan of Care/Patient Progress Review  Outcome: Improving  Patient doing well. VSS, BS active, passing flatus. Tolerateing a DD2 diet with good appetite. Able to swallow all pills now. Reports little to no pain and declines pain meds. Up with walker and SBA x 1 in the hallways, chair for meals. Dressing is CDI. Generalized edema, pitting in the left hand and bilateral feet. Compression stockings on. Dextrose 5% is running at 60mL/hr. BG's this kait were 186, and 204. Tele on and showing NSR (irregular) with occasional PVC's. 12 lead EKG done earlier this evening didn't show anything of concern.     Call light with in reach, Pt able to make needs known. Continue to monitor.

## 2018-05-20 NOTE — PLAN OF CARE
Problem: Patient Care Overview  Goal: Plan of Care/Patient Progress Review  Outcome: Improving        VS:   VSS on RA. LS clear. Denies chest pain, SOB and dizziness.    Output:   Voiding spontaneously without difficulty. LBM yesterday x2. BS +, passing flatus.    Activity:   Up SBA w/ walker to bathroom and in hallway. Up in chair for meals.    Skin: Intact with exception of incisions.    Pain:   Comfortably managed. 5mg oxycodone given PRN with relief.    Neuro/CMS:   Intact. Pt has baseline n/t that is improving per pt report. Pt is alert and oriented in all spheres.    Dressing(s):   C/D/I.    Diet:   Moved to DD3 diet - tolerating well. Now able to swallow all pills. It is helpful for him to take pills with spoon and sips of water/bites of food inbetween.  BGs 169 and 79.    LDA:   PIV SL - IV fluids D/C'd.    Equipment:   Walker, [place] J collar    Plan:   Continue to monitor, encourage ambulation, encourage fluids, manage pain. D/C tomorrow likely.    Additional Info:   Call light in reach, able to make needs known.   Standing spine x-rays completed.

## 2018-05-20 NOTE — PLAN OF CARE
Problem: Patient Care Overview  Goal: Plan of Care/Patient Progress Review  Discharge Planner PT   Patient plan for discharge: TCU  Current status: Pt tx sit<>stand with CGA-min A and UE supported. Pt amb 300 ft with SPC and CGA, min VCs for forward gaze and increased step length. Reviewed LE therex in chair, left with all needs in reach and daughter at bedside.  Barriers to return to prior living situation: Medical status, level of assist with mobility  Recommendations for discharge: TCU  Rationale for recommendations: Progress IND and endurance with functional mobility prior to disch home.       Entered by: Amber Hung 05/20/2018 12:57 PM

## 2018-05-20 NOTE — PLAN OF CARE
Problem: Patient Care Overview  Goal: Plan of Care/Patient Progress Review  SLP:  Pt seen for test tray DD3 items.  Pt tolerating w/o overt s/sx of aspiration.  Pt cognizant of safe swallow strategies and can apply independently.  Reportedly swallowing pills whole with ease.  Recommendations:  1. Upgrade to DD3 textures, continue thin liquids.  SLP to see tomorrow for trials of Regular textures.

## 2018-05-20 NOTE — PLAN OF CARE
Problem: Patient Care Overview  Goal: Plan of Care/Patient Progress Review  Discharge Planner OT   Patient plan for discharge: TCU  Current status: Patient stands from chair x2 without AD with CGA-SBA. Doffed underwear with mod A. Donned new underwear with SBA and vc's for technique. Patient stood and ambulated a few feet to wc using SEC with CGA. Assisted patient to/from wc for xray. Needing min A to stand from wc. Patient completed toilet transfer in BR using BSC as overlay as patient reported getting on/off low toilet was very difficult.   Barriers to return to prior living situation: pain, assist needed for mobility and ADLs  Recommendations for discharge: TCU  Rationale for recommendations: To maximize safety and IND with functional mobility and ADLs/IADLs       Entered by: YUE OBRIEN 05/20/2018 2:19 PM

## 2018-05-20 NOTE — PROGRESS NOTES
"Orthopedic Surgery Progress Note    Subjective:   NAEO.  Pain well controlled.  (-)cp/sob, (-)n/v, tolerating soft diet with improved swallowing.    Exam:  /69 (BP Location: Left arm)  Pulse 66  Temp 96  F (35.6  C) (Oral)  Resp 16  Ht 1.727 m (5' 8\")  Wt 69.2 kg (152 lb 8.9 oz)  SpO2 97%  BMI 23.2 kg/m2  Gen: Awake, alert, NAD  Resp: breathing equal and non-labored  Back incision:  Bandage c/d/i  Neck incision (anterior):  Bandage c/d/i  L flank incision:  Bandage c/d/i  Extremities:  RUE:   No obvious deformity   Delt 4+/5, elbow flexor 4+/5, elbow extensor 4+/5, wrist extensor 4/5,  4+/5, interossei 4+/5.   SILT C5-T1   All fingers wwp, radial pulse 2+  LUE:   No obvious deformity   Delt 4/5, elbow flexor 4/5, elbow extensor 4/5, wrist extensor 4/5,  4+/5, interossei 4/5.   SILT C5-T1.  Minor diffuse paresthesias most pronounced in C5, much improved from preop   All fingers wwp, radial pulse 2+  RLE:   No obvious deformity, skin intact   SILT L2-S1, partial paresthesias, improved from preop   DP 2+   Fires hip flexor, quad, TA, EHL, FHL, Gsc 4+/5   No calf tenderness  LLE:   No obvious deformity, skin intact   SILT L2-S1, partial paresthesias, improved from preop   DP 2+   Fires hip flexor, quad, TA, EHL, FHL, Gsc 4+/5   No calf tenderness    Labs:    Recent Labs  Lab 05/19/18  0801 05/18/18  0649 05/17/18  0400 05/16/18  1718 05/16/18  1607 05/16/18  1509 05/16/18  1446   WBC  --   --  9.6 12.1* 9.2  --  12.5*   HGB 10.1* 10.4* 9.0* 9.6*  9.7* 7.9*  7.9* 8.7* 9.9*  9.6*   PLT  --   --  175 131* 145* 147* 120*       Recent Labs  Lab 05/19/18  0801 05/18/18 2011 05/18/18  0854 05/17/18  0400 05/16/18  2345   * 147* 149* 145* 147*   POTASSIUM 4.9 4.4 5.0 4.7 4.0   CHLORIDE 117* 119* 121* 117* 119*   CO2 22 19* 17* 18* 18*   BUN 47* 42* 36* 29 27   CR 1.92* 1.89* 1.93* 1.44* 1.38*   * 233* 147* 205* 193*   MAG 2.1  --   --  2.2 1.1*   PHOS  --   --   --   --  2.9       Recent " Labs  Lab 05/16/18  1718 05/16/18  1607 05/16/18  1509 05/16/18  1446   INR 1.42* 1.40* 1.23* 1.41*   PTT 33 33 32 33       Assessment:   73 year old male s/p 5-level ACDF C3-T1, PISF C3-T2 fusion w L ICBG harvest on 5/16/18 w Dr. Bass.  Recovering appropriately.    Plan:  Activity: Up with assist -- no bending, twisting. No lifting >10 lbs.  Weight bearing status: WBAT  Antibiotics: Ancef until drains out.  Diet: progress diet as tolerated per SLP  DVT prophylaxis: Mechanical  Bracing/Splinting: [place] J collar to be worn at all times except hygiene and eating.   Wound Care: Aquacel dressing posteriorly in place for 5-7 days, patient to perform dressing changes thereafter. Anterior dressing medipore tape and gauze to be changed on POD3  Drains: drain removed   Pain management: transition from IV to orals as tolerated  X-rays: Upright AP/Lat cervical XR prior to discharge  Physical Therapy: ADL's.   Occupational Therapy: ADL's  Labs: Trend Hgb on POD #1, 2, 3   Consults: PT, OT, Medicine co-management, appreciate assistance in caring for this patient.   Follow-up: Clinic with Dr. Bass in 2 weeks.      Kenneth Rabago MD  PGY-4, Orthopaedic Surgery  [phone]

## 2018-05-20 NOTE — PLAN OF CARE
Problem: Patient Care Overview  Goal: Plan of Care/Patient Progress Review  Outcome: No Change    Shift report from [id number]    VS:   VSS, increased BP close to baseline, afebrile, A&Ox3, on RA, denies SOB or CP,    Output:   Voiding via urinal at bedside, has urgency and occasional incontinence, no BM this shift,    Activity:   Up with SBA + walker, ambulating to BR, sat in chair for short period of time, dangled, repositioned with Ax2, pt has specific preferences when trying to position in bed,    Skin: Intact with some bruising,and 2+ generalized edema, skin is slightly hilda, COCO incision   Pain:   Tolerable with prn oxycodone 5mg and repositioning   Neuro/CMS:   Baseline numbness in LE and hands (improving), moderate strength,  [place], blind in left eye and decreased or poor vision in left eye,no overt mobility deficits noted, seemed frustrated occasionally, able to express needs, and follow instructions,    Dressing(s):   CDI   Diet:   Tolerating DD2, able to swallow pills, denies nausea, passing gas, no BM this shift, no reflux, denies any abdominal discomfort,  0200.    LDA:   PIV infusing   Equipment:   Walker, urinal, IV pole, PCDs, Tele- irregular w/occasional PVCs, [place] J collar on    Plan:   Continue to monitor swallowing, assist with mobility    Additional Info:   Able to make needs known

## 2018-05-20 NOTE — PROGRESS NOTES
"[facility] Internal Medicine Progress Note            Interval History:   Record reviewed.  Seen with RN.   Clinically doing well.  Up in chair, ambulating without dizziness.  Adequate pain control with limited opiates.  No CP, SOB, cough.  irreg rhythm last PM with PAc's on EKG.  No nausea, reflux, abd pain.  Formed Bm X2.  Voiding large amounts.  On lantus, carb coverage and correction scale.  Lantus 30 units daily only at home with BG's 120-140's.           Medications:   All medications reviewed today          Physical Exam:   Blood pressure 175/69, pulse 66, temperature 96  F (35.6  C), temperature source Oral, resp. rate 16, height 1.727 m (5' 8\"), weight 69.2 kg (152 lb 8.9 oz), SpO2 97 %.    Intake/Output Summary (Last 24 hours) at 05/18/18 0839  Last data filed at 05/18/18 0700   Gross per 24 hour   Intake             1150 ml   Output              550 ml   Net              600 ml          General:  Alert.  Appropriate.  No distress.  No O2.     Heent:      Neck:    Skin:    Chest:  clear    Cardiac:  Reg without gallop, murmur.  No JVD.     Abdomen:  Non distended, soft, non-tender.  BS normal.     Extremities:  Perfused.  2 plus pretibial edema.  No calf, posterior thigh tenderness to suggest DVT.     Neuro:            Data:     Results for orders placed or performed during the hospital encounter of 05/16/18 (from the past 24 hour(s))   Glucose by meter   Result Value Ref Range    Glucose 153 (H) 70 - 99 mg/dL   Glucose by meter   Result Value Ref Range    Glucose 209 (H) 70 - 99 mg/dL   Glucose by meter   Result Value Ref Range    Glucose 212 (H) 70 - 99 mg/dL   Glucose by meter   Result Value Ref Range    Glucose 185 (H) 70 - 99 mg/dL   EKG 12-lead, tracing only   Result Value Ref Range    Interpretation ECG Click View Image link to view waveform and result    Glucose by meter   Result Value Ref Range    Glucose 186 (H) 70 - 99 mg/dL   Glucose by meter   Result Value Ref Range    Glucose 204 (H) " 70 - 99 mg/dL   Glucose by meter   Result Value Ref Range    Glucose 177 (H) 70 - 99 mg/dL   Basic metabolic panel   Result Value Ref Range    Sodium 143 133 - 144 mmol/L    Potassium 4.9 3.4 - 5.3 mmol/L    Chloride 116 (H) 94 - 109 mmol/L    Carbon Dioxide 16 (L) 20 - 32 mmol/L    Anion Gap 11 3 - 14 mmol/L    Glucose 169 (H) 70 - 99 mg/dL    Urea Nitrogen 53 (H) 7 - 30 mg/dL    Creatinine 1.70 (H) 0.66 - 1.25 mg/dL    GFR Estimate 40 (L) >60 mL/min/1.7m2    GFR Estimate If Black 48 (L) >60 mL/min/1.7m2    Calcium 8.0 (L) 8.5 - 10.1 mg/dL   Magnesium   Result Value Ref Range    Magnesium 1.8 1.6 - 2.3 mg/dL   Hemoglobin   Result Value Ref Range    Hemoglobin 10.4 (L) 13.3 - 17.7 g/dL                  Assessment and Plan:   1)  S/P A/P cervical fusion 5/16.  Adequate pain control.  Mobilizing well.   2)  Oral pharyngeal dysphagia 2nd to #1, improved.  Tolerating diet.  Advanced to DD3 per speech.   3)  Acute blood loss anemia.  Adequate.  4)  Type II DM, mild hyperglycemia but adequate on present insulin as above.   Potential residual steroid effect.    5)  HTN, pre med BP elevated (off lisinopril).  Volume overload with edema.   6)  S/p DDKT 2006.  Mild rise in renal function improved back to baseline.   7)  HLD.   8)  PAC's on rhythm.  Benign.     PLAN:  1)  SL IV.  2)  Resume lisinopril 20 mg daily (1/2 dose).  Reviewed with Renal Transplant. Continue lasix 20 mg BID and norvasc 10 mg daily.   3)  Tacrolimus level 9 PM tonight (goal 3-5).  4)  Same insulin for now.  Continue daily lantus at 24 (decision on DC dose in AM).  Pt does not want to take prandial novolog.  5)  AM BMP.  6)  Dispo.  Anticipate home tomorrow.    Disposition Plan   Expected discharge in 1 day.      Entered: Familia Dumont 05/20/2018, 9:05 AM              Attestation:  I have reviewed today's vital signs, notes, medications, labs and imaging.     Familia Dumont MD

## 2018-05-21 ENCOUNTER — APPOINTMENT (OUTPATIENT)
Dept: PHYSICAL THERAPY | Facility: CLINIC | Age: 73
DRG: 454 | End: 2018-05-21
Attending: ORTHOPAEDIC SURGERY
Payer: COMMERCIAL

## 2018-05-21 ENCOUNTER — APPOINTMENT (OUTPATIENT)
Dept: SPEECH THERAPY | Facility: CLINIC | Age: 73
DRG: 454 | End: 2018-05-21
Attending: ORTHOPAEDIC SURGERY
Payer: COMMERCIAL

## 2018-05-21 VITALS
DIASTOLIC BLOOD PRESSURE: 62 MMHG | HEART RATE: 68 BPM | SYSTOLIC BLOOD PRESSURE: 160 MMHG | HEIGHT: 68 IN | OXYGEN SATURATION: 99 % | WEIGHT: 152.56 LBS | TEMPERATURE: 97.8 F | BODY MASS INDEX: 23.12 KG/M2 | RESPIRATION RATE: 16 BRPM

## 2018-05-21 LAB
ANION GAP SERPL CALCULATED.3IONS-SCNC: 13 MMOL/L (ref 3–14)
BUN SERPL-MCNC: 55 MG/DL (ref 7–30)
CALCIUM SERPL-MCNC: 7.8 MG/DL (ref 8.5–10.1)
CHLORIDE SERPL-SCNC: 113 MMOL/L (ref 94–109)
CO2 SERPL-SCNC: 17 MMOL/L (ref 20–32)
CREAT SERPL-MCNC: 1.6 MG/DL (ref 0.66–1.25)
GFR SERPL CREATININE-BSD FRML MDRD: 43 ML/MIN/1.7M2
GLUCOSE BLDC GLUCOMTR-MCNC: 168 MG/DL (ref 70–99)
GLUCOSE BLDC GLUCOMTR-MCNC: 211 MG/DL (ref 70–99)
GLUCOSE SERPL-MCNC: 227 MG/DL (ref 70–99)
HGB BLD-MCNC: 9.5 G/DL (ref 13.3–17.7)
POTASSIUM SERPL-SCNC: 4.5 MMOL/L (ref 3.4–5.3)
SODIUM SERPL-SCNC: 143 MMOL/L (ref 133–144)
TACROLIMUS BLD-MCNC: 4.1 UG/L (ref 5–15)
TME LAST DOSE: ABNORMAL H

## 2018-05-21 PROCEDURE — 36415 COLL VENOUS BLD VENIPUNCTURE: CPT | Performed by: INTERNAL MEDICINE

## 2018-05-21 PROCEDURE — 25000132 ZZH RX MED GY IP 250 OP 250 PS 637: Performed by: STUDENT IN AN ORGANIZED HEALTH CARE EDUCATION/TRAINING PROGRAM

## 2018-05-21 PROCEDURE — 92526 ORAL FUNCTION THERAPY: CPT | Mod: GN

## 2018-05-21 PROCEDURE — 80048 BASIC METABOLIC PNL TOTAL CA: CPT | Performed by: INTERNAL MEDICINE

## 2018-05-21 PROCEDURE — 40000193 ZZH STATISTIC PT WARD VISIT

## 2018-05-21 PROCEDURE — 25000132 ZZH RX MED GY IP 250 OP 250 PS 637: Performed by: INTERNAL MEDICINE

## 2018-05-21 PROCEDURE — 40000225 ZZH STATISTIC SLP WARD VISIT

## 2018-05-21 PROCEDURE — 97116 GAIT TRAINING THERAPY: CPT | Mod: GP

## 2018-05-21 PROCEDURE — 25000131 ZZH RX MED GY IP 250 OP 636 PS 637: Performed by: NURSE PRACTITIONER

## 2018-05-21 PROCEDURE — E2402 NEG PRESS WOUND THERAPY PUMP: HCPCS

## 2018-05-21 PROCEDURE — 85018 HEMOGLOBIN: CPT | Performed by: INTERNAL MEDICINE

## 2018-05-21 PROCEDURE — 00000146 ZZHCL STATISTIC GLUCOSE BY METER IP

## 2018-05-21 PROCEDURE — 25000131 ZZH RX MED GY IP 250 OP 636 PS 637: Performed by: INTERNAL MEDICINE

## 2018-05-21 PROCEDURE — 99232 SBSQ HOSP IP/OBS MODERATE 35: CPT | Performed by: INTERNAL MEDICINE

## 2018-05-21 PROCEDURE — 97530 THERAPEUTIC ACTIVITIES: CPT | Mod: GP

## 2018-05-21 RX ORDER — METHOCARBAMOL 500 MG/1
500 TABLET, FILM COATED ORAL 4 TIMES DAILY PRN
Qty: 120 TABLET | Status: ON HOLD | DISCHARGE
Start: 2018-05-21 | End: 2018-06-05

## 2018-05-21 RX ORDER — ACETAMINOPHEN 325 MG/1
650 TABLET ORAL EVERY 4 HOURS PRN
Qty: 100 TABLET | DISCHARGE
Start: 2018-05-21 | End: 2018-06-11 | Stop reason: DRUGHIGH

## 2018-05-21 RX ORDER — AMOXICILLIN 250 MG
1 CAPSULE ORAL 2 TIMES DAILY
Qty: 100 TABLET | DISCHARGE
Start: 2018-05-21 | End: 2018-06-11 | Stop reason: SINTOL

## 2018-05-21 RX ORDER — OXYCODONE HYDROCHLORIDE 5 MG/1
TABLET ORAL
Qty: 60 TABLET | Refills: 0 | Status: ON HOLD | DISCHARGE
Start: 2018-05-21 | End: 2018-06-01

## 2018-05-21 RX ADMIN — MAGNESIUM OXIDE TAB 400 MG (241.3 MG ELEMENTAL MG) 400 MG: 400 (241.3 MG) TAB at 08:42

## 2018-05-21 RX ADMIN — Medication 1 MG: at 08:53

## 2018-05-21 RX ADMIN — MYCOPHENOLIC ACID 720 MG: 360 TABLET, DELAYED RELEASE ORAL at 08:53

## 2018-05-21 RX ADMIN — LISINOPRIL 20 MG: 20 TABLET ORAL at 08:42

## 2018-05-21 RX ADMIN — AMLODIPINE BESYLATE 10 MG: 10 TABLET ORAL at 08:42

## 2018-05-21 RX ADMIN — OXYCODONE HYDROCHLORIDE 5 MG: 5 TABLET ORAL at 06:45

## 2018-05-21 RX ADMIN — OXYCODONE HYDROCHLORIDE 5 MG: 5 TABLET ORAL at 02:22

## 2018-05-21 RX ADMIN — FOLIC ACID 1 MG: 1 TABLET ORAL at 08:42

## 2018-05-21 RX ADMIN — SODIUM BICARBONATE 650 MG TABLET 650 MG: at 08:42

## 2018-05-21 RX ADMIN — FUROSEMIDE 20 MG: 20 TABLET ORAL at 08:42

## 2018-05-21 RX ADMIN — Medication 400 UNITS: at 08:42

## 2018-05-21 ASSESSMENT — ACTIVITIES OF DAILY LIVING (ADL)
ADLS_ACUITY_SCORE: 13

## 2018-05-21 NOTE — PLAN OF CARE
Problem: Patient Care Overview  Goal: Plan of Care/Patient Progress Review  Speech Language Therapy Discharge Summary    Reason for therapy discharge:    Discharged to transitional care facility.    Progress towards therapy goal(s). See goals on Care Plan in [facility] electronic health record for goal details.  Goals partially met.  Barriers to achieving goals:   discharge from facility.    Therapy recommendation(s):    Continued therapy is recommended.  Rationale/Recommendations:  Ensure diet tolerance of regular/thin.     Patient able to consistently tolerate regular texture foods and thin liquids without any overt signs and symptoms of aspiration.  Recommend diet advancement to regular textures and thin liquids.  Reviewed with patient general recommendation to choose naturally softer food textures as his throat continues to heal from surgery.  Current swallowing function will not be a barrier to discharge but do recommend additional follow-up at new facility or if still here tomorrow just to ensure diet tolerance of regular and thin.

## 2018-05-21 NOTE — PLAN OF CARE
Problem: Patient Care Overview  Goal: Plan of Care/Patient Progress Review        A/O x 4 sat up in chair for awhile overnight. Oxycodone for pain and pain is managed well. [place]-J collar in place. Dressing intact. Up to bathroom and is voiding. Able to make needs known. Cont to assess.

## 2018-05-21 NOTE — PROGRESS NOTES
Pt seen, case reviewed with team, Dr Dumont, discussed with daughter  Pain is controlled  No difficulty swallowing    Pt feels improved, is anxious to d/c to TCU    Afebrile  BP 140s-170s/  BG 80s-100s    Alert, in NAD, fully oriented, but mildly confused re mes  Lungs clear  Cv rrr  Abd soft, non-tender  No LE edema    Cr 1.6 (1.7)  Hgb 9.6  Tacrolimus level last pm pending      Assessment    1)  S/P A/P cervical fusion 5/16.  Adequate pain control.  2)  Oral pharyngeal dysphagia 2nd to #1, improved with steroids. Has been cleared for a regular diet  3)  Type II DM, adequate control, on Lantus 24 units daily (PTA 30 units daily) and prandial insulin.  Pt does not usually take prandial insulin  54)  HTN, sub optimal control, back on lisinopril 20 mg daily (PTA 40 mg daily) and PTA lasix 20 mg bid  5)  S/p DDKT 2006.  Cr improved from peak of 1.92,( baseline 1.4)    Plan  Resume Lantus 30 units daily  D/c prandial insulin  D/c to TCU today  Reviewed meds and labs with [facility] nephrologist nurse.  Will continue usual meds, check BMP 2 times weekly and fax results

## 2018-05-21 NOTE — PLAN OF CARE
Problem: Patient Care Overview  Goal: Plan of Care/Patient Progress Review  Occupational Therapy Discharge Summary    Reason for therapy discharge:    Discharged to transitional care facility.    Progress towards therapy goal(s). See goals on Care Plan in [facility] electronic health record for goal details.  Goals not met.  Barriers to achieving goals:   limited tolerance for therapy and discharge from facility.    Therapy recommendation(s):    Continued therapy is recommended.  Rationale/Recommendations:  Continue OT at TCU to maximize (I), safety, endurance with ADL, IADL and mobility.

## 2018-05-21 NOTE — PROGRESS NOTES
"Orthopedic Surgery Progress Note    Subjective:   NAEO.  Pain well controlled.  (-)cp/sob, (-)n/v, tolerating diet with improved swallowing.    Exam:  /70 (BP Location: Left arm)  Pulse 71  Temp 97.5  F (36.4  C) (Oral)  Resp 16  Ht 1.727 m (5' 8\")  Wt 69.2 kg (152 lb 8.9 oz)  SpO2 94%  BMI 23.2 kg/m2  Gen: Awake, alert, NAD  Resp: breathing equal and non-labored  Back incision:  Bandage c/d/i  Neck incision (anterior):  Bandage c/d/i  L flank incision:  Bandage c/d/i  Extremities:  RUE:   No obvious deformity   Delt 4+/5, elbow flexor 4+/5, elbow extensor 4+/5, wrist extensor 4/5,  4+/5, interossei 4+/5.   SILT C5-T1   All fingers wwp, radial pulse 2+  LUE:   No obvious deformity   Delt 4/5, elbow flexor 4/5, elbow extensor 4/5, wrist extensor 4/5,  4+/5, interossei 4/5.   SILT C5-T1.  Minor diffuse paresthesias most pronounced in C5, much improved from preop   All fingers wwp, radial pulse 2+  RLE:   No obvious deformity, skin intact   SILT L2-S1, partial paresthesias, improved from preop   DP 2+   Fires hip flexor, quad, TA, EHL, FHL, Gsc 4+/5   No calf tenderness  LLE:   No obvious deformity, skin intact   SILT L2-S1, partial paresthesias, improved from preop   DP 2+   Fires hip flexor, quad, TA, EHL, FHL, Gsc 4+/5   No calf tenderness    Labs:    Recent Labs  Lab 05/20/18  0736 05/19/18  0801 05/18/18  0649 05/17/18  0400 05/16/18  1718 05/16/18  1607 05/16/18  1509 05/16/18  1446   WBC  --   --   --  9.6 12.1* 9.2  --  12.5*   HGB 10.4* 10.1* 10.4* 9.0* 9.6*  9.7* 7.9*  7.9* 8.7* 9.9*  9.6*   PLT  --   --   --  175 131* 145* 147* 120*       Recent Labs  Lab 05/20/18  0736 05/19/18  0801 05/18/18 2011 05/18/18  0854 05/17/18  0400 05/16/18  2345    146* 147* 149* 145* 147*   POTASSIUM 4.9 4.9 4.4 5.0 4.7 4.0   CHLORIDE 116* 117* 119* 121* 117* 119*   CO2 16* 22 19* 17* 18* 18*   BUN 53* 47* 42* 36* 29 27   CR 1.70* 1.92* 1.89* 1.93* 1.44* 1.38*   * 160* 233* 147* 205* 193* "   MAG 1.8 2.1  --   --  2.2 1.1*   PHOS  --   --   --   --   --  2.9       Recent Labs  Lab 05/16/18  1718 05/16/18  1607 05/16/18  1509 05/16/18  1446   INR 1.42* 1.40* 1.23* 1.41*   PTT 33 33 32 33       Assessment:   73 year old male s/p 5-level ACDF C3-T1, PISF C3-T2 fusion w L ICBG harvest on 5/16/18 w Dr. Bass.  Recovering appropriately.    Plan:  Activity: Up with assist -- no bending, twisting. No lifting >10 lbs.  Weight bearing status: WBAT  Antibiotics: done  Diet: progress diet as tolerated per SLP  DVT prophylaxis: Mechanical  Bracing/Splinting: [place] J collar to be worn at all times except hygiene and eating.   Wound Care: Aquacel dressing posteriorly in place for 5-7 days, patient to perform dressing changes thereafter. Anterior dressing medipore tape and gauze to be changed on POD3  Drains: drain removed   Pain management: transition from IV to orals as tolerated  X-rays: Upright AP/Lat cervical XR prior to discharge  Physical Therapy: ADL's.   Occupational Therapy: ADL's  Labs: Trend Hgb on POD #1, 2, 3   Consults: PT, OT, Medicine co-management, appreciate assistance in caring for this patient.   Follow-up: Clinic with Dr. Bass in 2 weeks.    Dispo:  To TCU today if medically appropriate and coordination complete.      Kenneth Rabago MD  PGY-4, Orthopaedic Surgery  [phone]

## 2018-05-21 NOTE — PLAN OF CARE
Problem: Patient Care Overview  Goal: Plan of Care/Patient Progress Review  Outcome: Improving  Patient is doing well. VSS, LS clear (dimished rightt posteriorly). Encouraged IS use, and fluids. Tolerating a DD3 diet, eating 100%. Denies N & V. CMS/ Neuro's intact at baseline, Pt reports numbness continues to improve since surgery. Pain is well controlled with 5 mg oxy. Meds are taken with applesauce or food. Dressing under [place] J collar was reinforced this kait. Call light with in reach, able to make needs known.

## 2018-05-21 NOTE — DISCHARGE SUMMARY
Orthopaedic Surgery Discharge Summary    Date of Admission: 5/16/2018  Date of Discharge: 05/21/18    Attending Physician: Dr. Bass    Admission Diagnosis:  Advanced Cervical Spondylosis with Myelopathy; Secondary Cervical Kyphosis  S/P cervical spinal fusion  Post-operative state    Discharge Diagnosis:  same    Procedures Performed:  Part 1 - anterior:  1.  Anterior cervical diskectomy and fusion (ACDF) C3-4, C4-5, C5-6, C6-7 and C7-T1 (five levels), using iliac crest autograft (C3-C7) and demineralized bone matrix (C7-T1).             .   2.  Placement of interbody PEEK cage C3-4, C4-5, C5-6, C6-7 and C7-T1.   3.  Left anterior iliac crest cancellous bone graft harvest via separate skin incision.   4.  Placement and subsequent removal of [place]-[employer] cranial tongs for operative positioning (bivector traction) [parts 1 and 2].  5.  Use of operating microscope.   6.  Non-surgeon performed intraoperative neuromonitoring using SSEPs, tcMEPs and bilateral UE EMGs (Allovue) [parts 1 and 2].      Part 2 - posterior:  1.  Posterior spinal fusion C2-T2 (bilateral transfacet C2-T2; interlaminar T1-2), using local autograft and crushed cancellous allograft.  2.  Open bilateral segmental posterior fixation C2-T2, using C2 pars screws, cervical pedicle screws (C4,C5,C6,C7) and thoracic pedicle screws (T1,T2).  3.  Decompressive laminectomies C3,C4,C5,C6 and C7 (5 levels).  4.  Computer navigation using O-arm and Stealth.    Brief History of Present Illness:  73 year old male with progressive neck pain, left arm/hand numbness, and clumsiness.  Referred from [facility].  Imaging revealed multilevel cervical stenosis, advanced spondylosis (degeneration), kyphosis, and spinal cord signal change (myelomalacia).  Tried nonoperative treatment, continues to have significant disabling symptoms.  I thus offered surgery in form of anterior-posterior multilevel cervical fusion C2-T2, with multilevel decompressive  laminectomies; and iliac crest bone graft harvest.  Consented after thorough discussion of the rationale, risks, benefits and alternatives.      Brief Hospital Course:  The patient was admitted following the above mentioned procedure.  The patient did well following surgery without any immediate complications.  The patient did experience dysphagia post op for which a short course of steroids was administered, and this had resolved by the time of discharge.  The patient was seen by physical therapy while in the hospital, received 24 hours of post-operative antibiotics and was placed on mechanical DVT ppx.      On POD#5 the patient was cleared for discharge to home.  The patient was tolerating a regular diet, voiding, independently ambulatory and had pain control with oral pain medications.      Discharge medications, instructions and follow-up as below.     Familia Irving   Home Medication Instructions AREN:[id number]    Printed on:05/21/18 1142   Medication Information                      acetaminophen (TYLENOL) 325 MG tablet  Take 2 tablets (650 mg) by mouth every 4 hours as needed for other (multimodal surgical pain management along with NSAIDS and opioid medication as indicated based on pain control and physical function.)             amLODIPine (NORVASC) 10 MG tablet  Take 10 mg by mouth daily             ASPIRIN PO  Take 81 mg by mouth daily             FOLIC ACID PO  Take 1 mg by mouth daily             FUROSEMIDE PO  Take 20 mg by mouth 2 times daily              insulin glargine (LANTUS) 100 UNIT/ML injection  Inject 30 Units Subcutaneous every morning              LISINOPRIL PO  Take 40 mg by mouth At Bedtime              loperamide (IMODIUM) 2 MG capsule  Take 4 mg by mouth as needed for diarrhea             MAGNESIUM OXIDE PO  Take 420 mg by mouth Every Mon, Wed, Fri Morning             methocarbamol (ROBAXIN) 500 MG tablet  Take 1 tablet (500 mg) by mouth 4 times daily as needed for muscle spasms              MYFORTIC (BRAND) 360 MG EC TABLET  Take 720 mg by mouth 2 times daily              oxyCODONE IR (ROXICODONE) 5 MG tablet  For pain of 1-5 give 5 mg, for pain of 6-10 give 10 mg every 3 hours as needed.             PRAVASTATIN SODIUM PO  Take 40 mg by mouth At Bedtime              PROGRAF (BRAND) 1 MG CAPSULE  Take 1 mg by mouth 2 times daily             senna-docusate (SENOKOT-S;PERICOLACE) 8.6-50 MG per tablet  Take 1 tablet by mouth 2 times daily             VITAMIN D, CHOLECALCIFEROL, PO  Take 400 Units by mouth daily                   Discharge Procedure Orders  Mantoux instructions   Order Comments: Give two-step Mantoux (PPD) Per Facility Policy Yes     Reason for your hospital stay   Order Comments: You had cervical spine surgery     Additional Discharge Instructions   Order Comments: DISCHARGE INSTRUCTIONS:  Activity:   - You may weight bear as tolerated.  - Please avoid lifting >10 lbs, excessive bending, twisting, and strenuous activities.  Brace:   Please wear [place] J (may remove for hygiene and meals) / custom TLSO postoperatively for 3 months when out of bed.  Pain management:   - Please take pain medications as instructed, but it is okay to begin weaning off of them as your pain tolerates. Avoid driving while taking pain medications as they can make you drowsy.  Wound care:   - Your dressing is to remain in place until postoperative day 4 after which you may remove.   - Your stitches will dissolve on their own and do not need to be removed.   - You may shower once your dressing is off. Let water run over the incision and dab dry -- do not rub or submerge the incision under water for at least 2 weeks.   - After the dressing is off, you may leave it open to the air. You may continue to keep the incision covered for protection.   - Please contact us if there is increased drainage, swelling, pain, or redness stemming from your incision. This may be a sign of infection and would need to be looked at  immediately.  New or worsening symptoms:  - Please call or seek medical attention for pain that continues to worsen, new onset of numbness or tingling, or extreme swelling.  - Please see a medical provider for new onset of chest pain or shortness of breath. This may be a sign of a heart attack or blood clot in your lungs.   Follow-up:  - After discharge, an appointment will be made for you to return to clinic in approximately 6 weeks for a postoperative check with Dr. Bass.   - Please call [phone] if you have any questions or concerns.  - Appointments are at [facility]: [address] [place] [address]     Activity - Up with nursing assistance   Order Specific Question Answer Comments   Is discharge order? Yes      Weight bearing status   Order Comments: As tolerated     General info for SNF   Order Comments: Length of Stay Estimate: Short Term Care: Estimated # of Days <30  Condition at Discharge: Improving  Level of care:skilled   Rehabilitation Potential: Good  Admission H&P remains valid and up-to-date: Yes  Recent Chemotherapy: N/A  Use Nursing Home Standing Orders: Yes    BG qid. No sliding scale ordered at this time, as Pt generally takes Lantus only at home  BMP every Monday and      Full Code     Physical Therapy Adult Consult   Order Comments: Evaluate and treat as clinically indicated.    Reason:  S/p cervical fusion     Occupational Therapy Adult Consult   Order Comments: Evaluate and treat as clinically indicated.    Reason:  S/p fusion     Advance Diet as Tolerated   Order Comments: Follow this diet upon discharge:regular adult diet   Order Specific Question Answer Comments   Is discharge order? Yes      Assign Questionnaire Series to Patient           Kenneth Rabago MD  PGY-4, Orthopaedic Surgery  [phone]

## 2018-05-21 NOTE — PROGRESS NOTES
Social Work Services Discharge Note      Patient Name:  Familia Irving     Anticipated Discharge Date:  5/21/18    Discharge Disposition:   TCU:  [facility] [phone] F: [phone]    Following MD:  Dr Emerald Ibrahim  Clinic: [phone]     Pre-Admission Screening (PAS) online form has been completed.  The Level of Care (LOC) is:  Determined  Confirmation Code is:  PAS  Patient/caregiver informed of referral to [facility] Line for Pre-Admission Screening for skilled nursing facility (SNF) placement and to expect a phone call post discharge from SNF.     Additional Services/Equipment Arranged:  None noted     Patient / Family response to discharge plan:  agreeable     Persons notified of above discharge plan:  Pt, adult children, 10A Charge RN    Staff Discharge Instructions:  Please fax discharge orders and signed hard scripts for any controlled substances.  Please print a packet and send with patient.     CTS Handoff completed:  YES    Medicare Notice of Rights provided to the patient/family:  NO-pt has [employer] Commercial plan

## 2018-05-21 NOTE — PROGRESS NOTES
Pt was A&Ox4, denied pain or any other concerns. Writer gave report to TCU nursing staff who verbalized understanding of the discharge medications and orders. Pt left unit at approx 1300 w/ daughter, daughter received discharge packet and was to drive Pt to TCU.

## 2018-05-22 LAB
GLUCOSE BLDC GLUCOMTR-MCNC: 160 MG/DL (ref 70–99)
INTERPRETATION ECG - MUSE: NORMAL

## 2018-05-27 ENCOUNTER — APPOINTMENT (OUTPATIENT)
Dept: GENERAL RADIOLOGY | Facility: CLINIC | Age: 73
DRG: 554 | End: 2018-05-27
Attending: INTERNAL MEDICINE
Payer: MEDICARE

## 2018-05-27 ENCOUNTER — APPOINTMENT (OUTPATIENT)
Dept: ULTRASOUND IMAGING | Facility: CLINIC | Age: 73
DRG: 554 | End: 2018-05-27
Attending: INTERNAL MEDICINE
Payer: MEDICARE

## 2018-05-27 ENCOUNTER — HOSPITAL ENCOUNTER (INPATIENT)
Facility: CLINIC | Age: 73
LOS: 10 days | Discharge: NURSING, REHAB OR LONG-TERM CARE | DRG: 554 | End: 2018-06-06
Attending: INTERNAL MEDICINE | Admitting: ORTHOPAEDIC SURGERY
Payer: MEDICARE

## 2018-05-27 DIAGNOSIS — M00.9 PYOGENIC ARTHRITIS OF LEFT KNEE JOINT, DUE TO UNSPECIFIED ORGANISM (H): ICD-10-CM

## 2018-05-27 DIAGNOSIS — M10.9 ACUTE GOUTY ARTHRITIS: ICD-10-CM

## 2018-05-27 DIAGNOSIS — E83.51 HYPOCALCEMIA: Primary | ICD-10-CM

## 2018-05-27 DIAGNOSIS — E55.9 HYPOVITAMINOSIS D: ICD-10-CM

## 2018-05-27 DIAGNOSIS — R50.9 FEVER AND CHILLS: ICD-10-CM

## 2018-05-27 DIAGNOSIS — M00.9 PYOGENIC ARTHRITIS, UPPER ARM (H): ICD-10-CM

## 2018-05-27 DIAGNOSIS — E11.65 TYPE 2 DIABETES MELLITUS WITH HYPERGLYCEMIA, WITH LONG-TERM CURRENT USE OF INSULIN (H): Chronic | ICD-10-CM

## 2018-05-27 DIAGNOSIS — R19.7 DIARRHEA, UNSPECIFIED TYPE: ICD-10-CM

## 2018-05-27 DIAGNOSIS — Z79.4 TYPE 2 DIABETES MELLITUS WITH HYPERGLYCEMIA, WITH LONG-TERM CURRENT USE OF INSULIN (H): Chronic | ICD-10-CM

## 2018-05-27 DIAGNOSIS — M79.89 SWELLING OF LIMB: ICD-10-CM

## 2018-05-27 DIAGNOSIS — L03.113 CELLULITIS OF RIGHT ARM: ICD-10-CM

## 2018-05-27 DIAGNOSIS — Z94.0 KIDNEY REPLACED BY TRANSPLANT: ICD-10-CM

## 2018-05-27 DIAGNOSIS — R50.9 HIGH TEMPERATURE: ICD-10-CM

## 2018-05-27 LAB
ALBUMIN SERPL-MCNC: 1.9 G/DL (ref 3.4–5)
ALBUMIN UR-MCNC: 10 MG/DL
ALP SERPL-CCNC: 87 U/L (ref 40–150)
ALT SERPL W P-5'-P-CCNC: 40 U/L (ref 0–70)
AMORPH CRY #/AREA URNS HPF: ABNORMAL /HPF
ANION GAP SERPL CALCULATED.3IONS-SCNC: 10 MMOL/L (ref 3–14)
APPEARANCE FLD: NORMAL
APPEARANCE UR: CLEAR
AST SERPL W P-5'-P-CCNC: 45 U/L (ref 0–45)
BACTERIA #/AREA URNS HPF: ABNORMAL /HPF
BASOPHILS # BLD AUTO: 0 10E9/L (ref 0–0.2)
BASOPHILS NFR BLD AUTO: 0.1 %
BILIRUB SERPL-MCNC: 1.7 MG/DL (ref 0.2–1.3)
BILIRUB UR QL STRIP: NEGATIVE
BUN SERPL-MCNC: 63 MG/DL (ref 7–30)
CALCIUM SERPL-MCNC: 8 MG/DL (ref 8.5–10.1)
CHLORIDE SERPL-SCNC: 115 MMOL/L (ref 94–109)
CO2 SERPL-SCNC: 22 MMOL/L (ref 20–32)
COLOR FLD: YELLOW
COLOR UR AUTO: YELLOW
CREAT SERPL-MCNC: 2.56 MG/DL (ref 0.66–1.25)
CRP SERPL-MCNC: 247 MG/L (ref 0–8)
DIFFERENTIAL METHOD BLD: ABNORMAL
EOSINOPHIL # BLD AUTO: 0 10E9/L (ref 0–0.7)
EOSINOPHIL NFR BLD AUTO: 0 %
ERYTHROCYTE [DISTWIDTH] IN BLOOD BY AUTOMATED COUNT: 16 % (ref 10–15)
ERYTHROCYTE [SEDIMENTATION RATE] IN BLOOD BY WESTERGREN METHOD: 103 MM/H (ref 0–20)
GFR SERPL CREATININE-BSD FRML MDRD: 25 ML/MIN/1.7M2
GLUCOSE SERPL-MCNC: 74 MG/DL (ref 70–99)
GLUCOSE UR STRIP-MCNC: NEGATIVE MG/DL
HCT VFR BLD AUTO: 26.9 % (ref 40–53)
HGB BLD-MCNC: 8.2 G/DL (ref 13.3–17.7)
HGB UR QL STRIP: NEGATIVE
IMM GRANULOCYTES # BLD: 0.1 10E9/L (ref 0–0.4)
IMM GRANULOCYTES NFR BLD: 0.6 %
INR PPP: 1.45 (ref 0.86–1.14)
KETONES UR STRIP-MCNC: NEGATIVE MG/DL
LACTATE BLD-SCNC: 0.8 MMOL/L (ref 0.7–2)
LEUKOCYTE ESTERASE UR QL STRIP: NEGATIVE
LIPASE SERPL-CCNC: 36 U/L (ref 73–393)
LYMPHOCYTES # BLD AUTO: 1.4 10E9/L (ref 0.8–5.3)
LYMPHOCYTES NFR BLD AUTO: 7.6 %
MAGNESIUM SERPL-MCNC: 1.7 MG/DL (ref 1.6–2.3)
MCH RBC QN AUTO: 28.6 PG (ref 26.5–33)
MCHC RBC AUTO-ENTMCNC: 30.5 G/DL (ref 31.5–36.5)
MCV RBC AUTO: 94 FL (ref 78–100)
MONOCYTES # BLD AUTO: 1.2 10E9/L (ref 0–1.3)
MONOCYTES NFR BLD AUTO: 6.5 %
MONOS+MACROS NFR FLD MANUAL: 3 %
NEUTROPHILS # BLD AUTO: 16 10E9/L (ref 1.6–8.3)
NEUTROPHILS NFR BLD AUTO: 85.2 %
NEUTS BAND NFR FLD MANUAL: 97 %
NITRATE UR QL: NEGATIVE
NRBC # BLD AUTO: 0 10*3/UL
NRBC BLD AUTO-RTO: 0 /100
PH UR STRIP: 5 PH (ref 5–7)
PLATELET # BLD AUTO: 163 10E9/L (ref 150–450)
POTASSIUM SERPL-SCNC: 4.1 MMOL/L (ref 3.4–5.3)
PROT SERPL-MCNC: 5.2 G/DL (ref 6.8–8.8)
RBC # BLD AUTO: 2.87 10E12/L (ref 4.4–5.9)
RBC #/AREA URNS AUTO: 1 /HPF (ref 0–2)
SODIUM SERPL-SCNC: 147 MMOL/L (ref 133–144)
SOURCE: ABNORMAL
SP GR UR STRIP: 1.01 (ref 1–1.03)
SPECIMEN SOURCE FLD: NORMAL
TROPONIN I SERPL-MCNC: <0.015 UG/L (ref 0–0.04)
UROBILINOGEN UR STRIP-MCNC: NORMAL MG/DL (ref 0–2)
WBC # BLD AUTO: 18.7 10E9/L (ref 4–11)
WBC # FLD AUTO: NORMAL /UL
WBC #/AREA URNS AUTO: 1 /HPF (ref 0–5)

## 2018-05-27 PROCEDURE — 0R9N3ZX DRAINAGE OF RIGHT WRIST JOINT, PERCUTANEOUS APPROACH, DIAGNOSTIC: ICD-10-PCS | Performed by: ORTHOPAEDIC SURGERY

## 2018-05-27 PROCEDURE — 85025 COMPLETE CBC W/AUTO DIFF WBC: CPT | Performed by: INTERNAL MEDICINE

## 2018-05-27 PROCEDURE — 83605 ASSAY OF LACTIC ACID: CPT | Performed by: INTERNAL MEDICINE

## 2018-05-27 PROCEDURE — 36415 COLL VENOUS BLD VENIPUNCTURE: CPT | Performed by: INTERNAL MEDICINE

## 2018-05-27 PROCEDURE — 12000001 ZZH R&B MED SURG/OB UMMC

## 2018-05-27 PROCEDURE — 87015 SPECIMEN INFECT AGNT CONCNTJ: CPT | Performed by: STUDENT IN AN ORGANIZED HEALTH CARE EDUCATION/TRAINING PROGRAM

## 2018-05-27 PROCEDURE — 96361 HYDRATE IV INFUSION ADD-ON: CPT | Performed by: INTERNAL MEDICINE

## 2018-05-27 PROCEDURE — 87205 SMEAR GRAM STAIN: CPT | Performed by: STUDENT IN AN ORGANIZED HEALTH CARE EDUCATION/TRAINING PROGRAM

## 2018-05-27 PROCEDURE — 87075 CULTR BACTERIA EXCEPT BLOOD: CPT | Performed by: STUDENT IN AN ORGANIZED HEALTH CARE EDUCATION/TRAINING PROGRAM

## 2018-05-27 PROCEDURE — 81001 URINALYSIS AUTO W/SCOPE: CPT | Performed by: INTERNAL MEDICINE

## 2018-05-27 PROCEDURE — 87040 BLOOD CULTURE FOR BACTERIA: CPT | Performed by: INTERNAL MEDICINE

## 2018-05-27 PROCEDURE — 89060 EXAM SYNOVIAL FLUID CRYSTALS: CPT | Performed by: STUDENT IN AN ORGANIZED HEALTH CARE EDUCATION/TRAINING PROGRAM

## 2018-05-27 PROCEDURE — 83690 ASSAY OF LIPASE: CPT | Performed by: INTERNAL MEDICINE

## 2018-05-27 PROCEDURE — 83735 ASSAY OF MAGNESIUM: CPT | Performed by: INTERNAL MEDICINE

## 2018-05-27 PROCEDURE — 25000125 ZZHC RX 250

## 2018-05-27 PROCEDURE — 99285 EMERGENCY DEPT VISIT HI MDM: CPT | Mod: Z6 | Performed by: INTERNAL MEDICINE

## 2018-05-27 PROCEDURE — 93005 ELECTROCARDIOGRAM TRACING: CPT | Performed by: INTERNAL MEDICINE

## 2018-05-27 PROCEDURE — 96366 THER/PROPH/DIAG IV INF ADDON: CPT | Performed by: INTERNAL MEDICINE

## 2018-05-27 PROCEDURE — 80053 COMPREHEN METABOLIC PANEL: CPT | Performed by: INTERNAL MEDICINE

## 2018-05-27 PROCEDURE — 84484 ASSAY OF TROPONIN QUANT: CPT | Performed by: INTERNAL MEDICINE

## 2018-05-27 PROCEDURE — 25000128 H RX IP 250 OP 636: Performed by: INTERNAL MEDICINE

## 2018-05-27 PROCEDURE — 93970 EXTREMITY STUDY: CPT

## 2018-05-27 PROCEDURE — 99285 EMERGENCY DEPT VISIT HI MDM: CPT | Mod: 25 | Performed by: INTERNAL MEDICINE

## 2018-05-27 PROCEDURE — 96365 THER/PROPH/DIAG IV INF INIT: CPT | Performed by: INTERNAL MEDICINE

## 2018-05-27 PROCEDURE — 71045 X-RAY EXAM CHEST 1 VIEW: CPT

## 2018-05-27 PROCEDURE — 0S9D3ZX DRAINAGE OF LEFT KNEE JOINT, PERCUTANEOUS APPROACH, DIAGNOSTIC: ICD-10-PCS | Performed by: ORTHOPAEDIC SURGERY

## 2018-05-27 PROCEDURE — 87070 CULTURE OTHR SPECIMN AEROBIC: CPT | Performed by: STUDENT IN AN ORGANIZED HEALTH CARE EDUCATION/TRAINING PROGRAM

## 2018-05-27 PROCEDURE — 72040 X-RAY EXAM NECK SPINE 2-3 VW: CPT

## 2018-05-27 PROCEDURE — 86140 C-REACTIVE PROTEIN: CPT | Performed by: INTERNAL MEDICINE

## 2018-05-27 PROCEDURE — 73560 X-RAY EXAM OF KNEE 1 OR 2: CPT | Mod: LT

## 2018-05-27 PROCEDURE — 85652 RBC SED RATE AUTOMATED: CPT | Performed by: INTERNAL MEDICINE

## 2018-05-27 PROCEDURE — 87086 URINE CULTURE/COLONY COUNT: CPT | Performed by: INTERNAL MEDICINE

## 2018-05-27 PROCEDURE — 85610 PROTHROMBIN TIME: CPT | Performed by: INTERNAL MEDICINE

## 2018-05-27 PROCEDURE — 89051 BODY FLUID CELL COUNT: CPT | Performed by: STUDENT IN AN ORGANIZED HEALTH CARE EDUCATION/TRAINING PROGRAM

## 2018-05-27 RX ORDER — VANCOMYCIN HYDROCHLORIDE 1 G/200ML
1000 INJECTION, SOLUTION INTRAVENOUS ONCE
Status: COMPLETED | OUTPATIENT
Start: 2018-05-27 | End: 2018-05-27

## 2018-05-27 RX ORDER — ONDANSETRON 2 MG/ML
4 INJECTION INTRAMUSCULAR; INTRAVENOUS EVERY 6 HOURS PRN
Status: DISCONTINUED | OUTPATIENT
Start: 2018-05-27 | End: 2018-06-06 | Stop reason: HOSPADM

## 2018-05-27 RX ORDER — ACETAMINOPHEN 325 MG/1
650 TABLET ORAL EVERY 4 HOURS PRN
Status: DISCONTINUED | OUTPATIENT
Start: 2018-05-27 | End: 2018-05-30

## 2018-05-27 RX ORDER — ONDANSETRON 4 MG/1
4 TABLET, ORALLY DISINTEGRATING ORAL EVERY 6 HOURS PRN
Status: DISCONTINUED | OUTPATIENT
Start: 2018-05-27 | End: 2018-06-06 | Stop reason: HOSPADM

## 2018-05-27 RX ORDER — SODIUM CHLORIDE 9 MG/ML
1000 INJECTION, SOLUTION INTRAVENOUS CONTINUOUS
Status: DISCONTINUED | OUTPATIENT
Start: 2018-05-27 | End: 2018-05-28

## 2018-05-27 RX ORDER — LIDOCAINE HYDROCHLORIDE 20 MG/ML
INJECTION, SOLUTION EPIDURAL; INFILTRATION; INTRACAUDAL; PERINEURAL
Status: COMPLETED
Start: 2018-05-27 | End: 2018-05-27

## 2018-05-27 RX ORDER — SODIUM CHLORIDE, SODIUM LACTATE, POTASSIUM CHLORIDE, CALCIUM CHLORIDE 600; 310; 30; 20 MG/100ML; MG/100ML; MG/100ML; MG/100ML
1000 INJECTION, SOLUTION INTRAVENOUS CONTINUOUS
Status: DISCONTINUED | OUTPATIENT
Start: 2018-05-27 | End: 2018-05-28

## 2018-05-27 RX ORDER — NALOXONE HYDROCHLORIDE 0.4 MG/ML
.1-.4 INJECTION, SOLUTION INTRAMUSCULAR; INTRAVENOUS; SUBCUTANEOUS
Status: DISCONTINUED | OUTPATIENT
Start: 2018-05-27 | End: 2018-06-06 | Stop reason: HOSPADM

## 2018-05-27 RX ORDER — AMOXICILLIN 250 MG
1-2 CAPSULE ORAL 2 TIMES DAILY
Status: DISCONTINUED | OUTPATIENT
Start: 2018-05-27 | End: 2018-06-06 | Stop reason: HOSPADM

## 2018-05-27 RX ADMIN — VANCOMYCIN HYDROCHLORIDE 1000 MG: 1 INJECTION, SOLUTION INTRAVENOUS at 21:38

## 2018-05-27 RX ADMIN — SODIUM CHLORIDE 1000 ML: 9 INJECTION, SOLUTION INTRAVENOUS at 21:38

## 2018-05-27 RX ADMIN — LIDOCAINE HYDROCHLORIDE: 20 INJECTION, SOLUTION EPIDURAL; INFILTRATION; INTRACAUDAL; PERINEURAL at 20:56

## 2018-05-27 RX ADMIN — SODIUM CHLORIDE 1000 ML: 9 INJECTION, SOLUTION INTRAVENOUS at 19:58

## 2018-05-27 ASSESSMENT — ENCOUNTER SYMPTOMS
BACK PAIN: 0
JOINT SWELLING: 1
ADENOPATHY: 0
COUGH: 0
VOMITING: 0
SHORTNESS OF BREATH: 0
ABDOMINAL PAIN: 0
NUMBNESS: 1
HEADACHES: 0
NECK PAIN: 0
TROUBLE SWALLOWING: 0
LIGHT-HEADEDNESS: 0
WHEEZING: 0
CHILLS: 0
NAUSEA: 0
DYSURIA: 0
FEVER: 1
WEAKNESS: 1
CONFUSION: 1
DIARRHEA: 1

## 2018-05-27 NOTE — IP AVS SNAPSHOT
Familia Irving #[id number] (CSN: [id number])  (73 year old M)  (Adm: 18)     [id number]               UR 8A: [phone]            Patient Demographics     Patient Name Sex          Age SSN Address Phone    Familia Irving Male 1945 (73 year old) [id number]  [facility] [address] [phone] (Home)  [phone] (Mobile)      Emergency Contact(s)     Name Relation Home Work Mobile    Silvia Mohr Daughter [phone]    Tacho Irving Son   [phone]      Admission Information     Attending Provider Admitting Provider Admission Type Admission Date/Time    Familia Dumont MD Rosenberg, William H, MD Emergency 18  1756    Discharge Date Hospital Service Auth/Cert Status Service Area     Hospitalist [facility]    Unit Room/Bed Admission Status       UR 8A  Admission (Confirmed)       Admission     Complaint    Postoperative infection of knee, subsequent encounter      Hospital Account     Name Acct ID Class Status Primary Coverage    Familia Irving [id number] Inpatient Open [facility]            Guarantor Account (for Hospital Account #[id number])     Name Relation to Pt Service Area Active? Acct Type    Familia Irving Self FCS Yes Other    Address Phone          [address]  [place] [address] [phone](H)              Coverage Information (for Hospital Account #[id number])     F/O Payor/Plan Precert #    [facility]     Subscriber Subscriber #    Familia Irving [id number]    Address Phone    [address]  [place] [id number] [phone]                                                INTERAGENCY TRANSFER FORM - PHYSICIAN ORDERS   2018                       UR 8A: [phone]            Attending Provider: Familia Dumont MD     Allergies:  Heparin    Infection:  None   Service:  HOSPITALIST    Ht:   "1.727 m (5' 8\")   Wt:  72.6 kg (160 lb 1.6 oz)   Admission Wt:  78.1 kg (172 lb 1.6 oz)    BMI:  24.34 kg/m 2   BSA:  1.87 m 2            ED Clinical Impression     Diagnosis Description Comment Added By Time Added    Fever and chills [R50.9] Fever and chills [R50.9]  Nathan Singh MD 5/27/2018  9:53 PM    Pyogenic arthritis of left knee joint, due to unspecified organism (H) [M00.9] Pyogenic arthritis of left knee joint, due to unspecified organism (H) [M00.9]  Nathan Singh MD 5/27/2018  9:53 PM    Cellulitis of right arm [L03.113] Cellulitis of right arm [L03.113]  Nathan Singh MD 5/27/2018  9:53 PM      Hospital Problems as of 6/6/2018              Priority Class Noted POA    Acute gout of multiple sites Medium  5/27/2018 Yes    Complication of transplanted kidney Medium  5/30/2018 Yes    SALVADOR (acute kidney injury) (H) Medium  5/30/2018 Yes    Type 2 diabetes mellitus with hyperglycemia, with long-term current use of insulin (H) Medium  5/31/2018 Unknown      Non-Hospital Problems as of 6/6/2018              Priority Class Noted    Cervical spondylosis with myelopathy Medium  12/8/2017    Other secondary kyphosis, cervical region Medium  12/8/2017    Myelomalacia of cervical cord (H) Medium  12/8/2017    S/P cervical spinal fusion Medium  5/16/2018    Post-operative state Medium  5/16/2018      Code Status History     Date Active Date Inactive Code Status Order ID Comments User Context    6/5/2018  5:45 PM  Full Code [id number]  Dominguez Márquez MD Outpatient    5/27/2018  9:40 PM 6/5/2018  5:45 PM Full Code [id number]  La Rand MD ED    5/21/2018 10:12 AM 5/27/2018  9:40 PM Full Code [id number]  Ebonie Lopez APRN CNP Outpatient    5/16/2018  7:06 PM 5/21/2018 10:12 AM Full Code [id number]  Jorge A Randolph MD Inpatient      Current Code Status     Date Active Code Status Order ID Comments User Context       Prior         Medication Review      START taking  "       Dose / Directions Comments    allopurinol 100 MG tablet   Commonly known as:  ZYLOPRIM   Used for:  Acute gouty arthritis        Dose:  100 mg   Take 1 tablet (100 mg) by mouth daily   Quantity:  30 tablet   Refills:  0        calcitRIOL 0.25 MCG capsule   Commonly known as:  ROCALTROL   Used for:  Hypovitaminosis D        Dose:  0.25 mcg   Take 1 capsule (0.25 mcg) by mouth daily   Quantity:  30 capsule   Refills:  0        calcium carbonate 500 MG chewable tablet   Commonly known as:  TUMS   Used for:  Hypocalcemia        Dose:  1 chew tab   Take 1 tablet (500 mg) by mouth 2 times daily   Quantity:  150 tablet   Refills:  0        insulin glargine 100 UNIT/ML injection   Commonly known as:  LANTUS   Replaces:  insulin glargine 100 UNIT/ML injection        Dose:  6 Units   Inject 6 Units Subcutaneous daily (with dinner)   Refills:  0        sodium citrate-citric acid 500-334 MG/5ML solution   Commonly known as:  BICITRA   Used for:  Kidney replaced by transplant        Dose:  30 mL   Take 30 mLs by mouth daily   Quantity:  1800 mL   Refills:  0          CONTINUE these medications which may have CHANGED, or have new prescriptions. If we are uncertain of the size of tablets/capsules you have at home, strength may be listed as something that might have changed.        Dose / Directions Comments    cholecalciferol 2000 units tablet   This may have changed:    - medication strength  - how much to take   Used for:  Hypocalcemia        Dose:  2000 Units   Take 2,000 Units by mouth daily   Quantity:  30 tablet   Refills:  0        loperamide 2 MG capsule   Commonly known as:  IMODIUM   This may have changed:  when to take this   Used for:  Diarrhea, unspecified type        Dose:  2 mg   Take 1 capsule (2 mg) by mouth 4 times daily as needed for diarrhea   Quantity:  20 capsule   Refills:  0        tacrolimus 1 MG capsule   This may have changed:  Another medication with the same name was removed. Continue taking this  medication, and follow the directions you see here.   Used for:  Kidney replaced by transplant        Dose:  1 mg   Take 1 capsule (1 mg) by mouth 2 times daily   Quantity:  240 capsule   Refills:  0          CONTINUE these medications which have NOT CHANGED        Dose / Directions Comments    acetaminophen 325 MG tablet   Commonly known as:  TYLENOL   Used for:  S/P cervical spinal fusion        Dose:  650 mg   Take 2 tablets (650 mg) by mouth every 4 hours as needed for other (multimodal surgical pain management along with NSAIDS and opioid medication as indicated based on pain control and physical function.)   Quantity:  100 tablet   Refills:  0        ASPIRIN PO        Dose:  81 mg   Take 81 mg by mouth daily   Refills:  0        FOLIC ACID PO        Dose:  1 mg   Take 1 mg by mouth daily   Refills:  0        MAGNESIUM OXIDE PO        Dose:  420 mg   Take 420 mg by mouth daily   Refills:  0        mycophenolic acid 360 MG EC tablet        Dose:  720 mg   Take 720 mg by mouth 2 times daily   Refills:  0        PRAVASTATIN SODIUM PO        Dose:  40 mg   Take 40 mg by mouth At Bedtime   Refills:  0        senna-docusate 8.6-50 MG per tablet   Commonly known as:  SENOKOT-S;PERICOLACE   Used for:  S/P cervical spinal fusion        Dose:  1 tablet   Take 1 tablet by mouth 2 times daily   Quantity:  100 tablet   Refills:  0          STOP taking     amLODIPine 10 MG tablet   Commonly known as:  NORVASC           benzoyl peroxide 10 % Lotn lotion           clindamycin 1 % lotion   Commonly known as:  CLEOCIN T           FUROSEMIDE PO           insulin glargine 100 UNIT/ML injection   Commonly known as:  LANTUS   Replaced by:  insulin glargine 100 UNIT/ML injection           LISINOPRIL PO           methocarbamol 500 MG tablet   Commonly known as:  ROBAXIN           OXYCODONE HCL PO           SODIUM BICARBONATE PO                   After Care     Activity - Up with nursing assistance           Advance Diet as Tolerated        Follow this diet upon discharge: Orders Placed This Encounter      Room Service      Consistent Carbohydrate Diet 5943-7794 Calories: Moderate Consistent CHO (4-6 CHO units/meal)       Fall precautions           General info for SNF       Length of Stay Estimate: Short Term Care: Estimated # of Days <30  Condition at Discharge: Improving  Level of care:skilled   Rehabilitation Potential: Good  Admission H&P remains valid and up-to-date: Yes  Recent Chemotherapy: N/A  Use Nursing Home Standing Orders: Yes      BMP, CBC  every Monday and Thursday  Tacrolimus level every Monday  Magnesium level every Monday    Please call labs to Pt's Nephrologist at the [facility]    Cervical collar at all times    BG qid and every 2 am    Encourage Pt to drink at least 2000 cc of fluid daily    Wound Care    Sacral wound: Every other day, wash with wound cleanser and gauze. Apply No sting to sacrum and coccyx and allow to dry. Cover with Mepilex, taking care that foam is in contact with all skin, not draped across the  cleft.   Pulsate mattress ordered due to limited ability to turn on side.  Keep HOB less than 30 degrees unless eating  Patient to use a Geomatt cushion when up to chair or wheelchair at all times       Mantoux instructions       Give two-step Mantoux (PPD) Per Facility Policy Yes             Referrals     Occupational Therapy Adult Consult       Evaluate and treat as clinically indicated.    Reason:  Weakness secondary to polyarticular gout, C spine surgery       Physical Therapy Adult Consult       Evaluate and treat as clinically indicated.    Reason: weakness secondary to polyarticular gout, recent C spine surgery             Your next 10 appointments already scheduled     2018 11:45 AM CDT   (Arrive by 11:15 AM)   RETURN NECK with Brijesh Bass MD   [facility] ([facility])    [address] [place] [address]  4th Floor  [facility] [address]  "  [phone]            Aug 09, 2018 10:30 AM CDT   (Arrive by 10:00 AM)   RETURN NECK with Brijesh Bass MD   [facility] ([facility])    [address] [facility]  4th [facility] [address]   [phone]              Statement of Approval     Ordered          06/06/18 1011  I have reviewed and agree with all the recommendations and orders detailed in this document.  EFFECTIVE NOW     Approved and electronically signed by:  Dominguez Márquez MD           06/05/18 9516  I have reviewed and agree with all the recommendations and orders detailed in this document.  EFFECTIVE NOW     Approved and electronically signed by:  Dominguez Márquez MD                                                 INTERAGENCY TRANSFER FORM - NURSING   5/27/2018                       UR 8A: [phone]            Attending Provider: Familia Dumont MD     Allergies:  Heparin    Infection:  None   Service:  HOSPITALIST    Ht:  1.727 m (5' 8\")   Wt:  72.6 kg (160 lb 1.6 oz)   Admission Wt:  78.1 kg (172 lb 1.6 oz)    BMI:  24.34 kg/m 2   BSA:  1.87 m 2            Advance Directives        Scanned docmt in ACP Activity?           No scanned doc        Immunizations     None      ASSESSMENT     Discharge Profile Flowsheet     EXPECTED DISCHARGE     Patient's communication style  spoken language (English or Bilingual) 05/27/18 1751    Expected Discharge Date  05/31/18 05/29/18 1041   SKIN      DISCHARGE NEEDS ASSESSMENT     Inspection of bony prominences  Full except (identify areas not inspected) 06/06/18 0245    Concerns To Be Addressed  home safety concerns;compliance issue concerns;coping/stress concerns 05/29/18 1041   Full except areas not inspected   -- (under mepilex) 06/06/18 0245    Patient/family verbalizes understanding of discharge plan recommendations?  Yes 05/29/18 1041   Inspection under devices  Full except (identify device(s) not inspected) 06/06/18 0245    " "Medical Team notified of plan?  yes 05/29/18 1041   Not Inspected under devices  Cervical collar (Pt refused) 06/06/18 0245    Readmission Within The Last 30 Days  no previous admission in last 30 days 05/29/18 1041   Skin WDL  ex 06/05/18 1921    Anticipated Changes Related to Illness  inability to care for self;inability to care for someone else 05/29/18 1041   Skin Temperature  warm 06/06/18 0245    Equipment Currently Used at Home  cane, straight 05/29/18 1102   Skin Integrity  incision(s);wound(s) 06/06/18 0245    Transportation Available  car;family or friend will provide 05/29/18 1102   SAFETY      GASTROINTESTINAL (ADULT,PEDIATRIC,OB)     Safety WDL  WDL 06/05/18 1921    GI WDL  ex 06/05/18 1921   Aspiration Risk Screen  swallowing difficulty 06/04/18 1525    Last Bowel Movement  06/05/18 06/05/18 1921   All Alarms  none present 06/05/18 1921    Passing flatus  yes 06/05/18 1921   Safety Factors  ID band on;upper side rails raised x 2;call light in reach;wheels locked;bed in low position 06/05/18 1921    COMMUNICATION ASSESSMENT                        Assessment WDL (Within Defined Limits) Definitions           Safety WDL     Effective: 09/28/15    Row Information: <b>WDL Definition:</b> Bed in low position, wheels locked; call light in reach; upper side rails up x 2; ID band on<br> <font color=\"gray\"><i>Item=AS safety wdl>>List=AS safety wdl>>Version=F14</i></font>      Skin WDL     Effective: 09/28/15    Row Information: <b>WDL Definition:</b> Warm; dry; intact; elastic; without discoloration; pressure points without redness<br> <font color=\"gray\"><i>Item=AS skin wdl>>List=AS skin wdl>>Version=F14</i></font>      Vitals     Vital Signs Flowsheet     VITAL SIGNS     Pain Intervention(s)  Declines 06/04/18 1552    Temp  96.3  F (35.7  C) 06/06/18 0731   CLINICALLY ALIGNED PAIN ASSESSMENT (CAPA) ([facility], [facility] ADULTS ONLY)      Temp src  Oral 06/06/18 0731   Comfort  negligible pain 06/05/18 0640 " "   Resp  16 06/05/18 2230   Change in Pain  about the same 06/05/18 0640    Pulse  67 06/01/18 0651   Pain Control  partially effective 06/05/18 0640    Heart Rate  66 06/06/18 0731   Functioning  can do most things, but pain gets in the way of some 06/05/18 0640    Pulse/Heart Rate Source  Monitor 06/06/18 0731   Sleep  normal sleep 06/05/18 0640    BP  177/63 06/06/18 0731   ANALGESIA SIDE EFFECTS MONITORING      BP Location  Left arm 06/06/18 0731   Side Effects Monitoring: Respiratory Quality  R 06/03/18 0858    POSITIONING     Side Effects Monitoring: Respiratory Depth  N 06/03/18 0858    Body Position  side-lying, right 06/06/18 0245   Side Effects Monitoring: Sedation Level  1 06/03/18 0858    Head of Bed (HOB)  HOB at 20-30 degrees 06/06/18 0245   POINT OF CARE TESTING      Chair  Upright in chair 06/05/18 1921   Puncture Site  fingertip 06/04/18 2248    Positioning/Transfer Devices  pillows;in use 06/05/18 0143   Bedside Glucose (mg/dl )   167 mg/dl 06/04/18 2248    OXYGEN THERAPY     HEIGHT AND WEIGHT      SpO2  99 % 06/06/18 0731   Height  1.727 m (5' 8\") 05/28/18 0935    O2 Device  None (Room air) 06/06/18 0731   Height Method  Stated 05/28/18 0935    PAIN/COMFORT     Weight  72.6 kg (160 lb 1.6 oz) 06/06/18 0940    Patient Currently in Pain  denies 06/06/18 0130   Weight Method  Standing scale 06/06/18 0940    Preferred Pain Scale  CAPA (Clinically Aligned Pain Assessment) ([facility] Adults Only) 06/06/18 0130   Bed Scale  Draw sheet/ pad (add comment for number);Cover/flat sheet (add comment for number);Call light;St bag - must be empty before weighing;Other, please comment (Pulsate machine and PCD) 06/01/18 0651    Pain Location  Back 06/05/18 0640   DAILY CARE      Pain Orientation  Upper 06/05/18 0640   Activity Management  -- 06/06/18 0246    Pain Descriptors  Discomfort 06/05/18 0640   Activity Assistance Provided  -- 06/06/18 0246    Pain Management Interventions  " analgesia administered 06/05/18 0640                 Patient Lines/Drains/Airways Status    Active LINES/DRAINS/AIRWAYS     Name: Placement date: Placement time: Site: Days: Last dressing change:    Peripheral IV 05/16/18 Right Hand 05/16/18   0645   Hand   21     Peripheral IV 05/29/18 Right Lower forearm 05/29/18   1429   Lower forearm   7     Pressure Injury 05/29/18 Sacrum Deep tissue pressure injury 05/29/18   0913    8     Incision/Surgical Site 05/16/18 Anterior Neck 05/16/18   0953    21     Incision/Surgical Site 05/16/18 Left Hip 05/16/18   0953    21     Incision/Surgical Site 05/16/18 Anterior Neck 05/16/18   1214    20     Incision/Surgical Site 05/16/18 Posterior;Upper Back 05/16/18   1355    20             Patient Lines/Drains/Airways Status    Active PICC/CVC     None            Intake/Output Detail Report     Date Intake     Output Net    Shift P.O. I.V. IV Piggyback Total Urine Total       Day 06/05/18 0000 - 06/05/18 0659 -- -- -- -- 1000 1000 -1000    Devika 06/05/18 0700 - 06/05/18 1459 -- -- -- -- 250 250 -250    Noc 06/05/18 1500 - 06/05/18 2359 240 -- -- 240 250 250 -10    Day 06/06/18 0000 - 06/06/18 0659 -- -- -- -- -- -- 0    Devika 06/06/18 0700 - 06/06/18 1459 550 -- -- 550 450 450 100      Last Void/BM       Most Recent Value    Urine Occurrence 1 [Pt incontinent in brief and also used toilet] at 06/06/2018 0140    Stool Occurrence 1 [liquid incontinence] at 06/03/2018 2200      Case Management/Discharge Planning     Case Management/Discharge Planning Flowsheet     LIVING ENVIRONMENT     Transportation Available  car;family or friend will provide 05/29/18 1102    Lives With  alone 05/29/18 1057   FINAL RESOURCES      Living Arrangements  house 05/29/18 1102   Equipment Currently Used at Home  cane, straight 05/29/18 1102    COPING/STRESS     MH/BH CAREGIVER      Major Change/Loss/Stressor  surgery/procedure 05/28/18 0059   Filed Complexity Screen Score  6 05/29/18 1041    EXPECTED DISCHARGE      ABUSE RISK SCREEN      Expected Discharge Date  05/31/18 05/29/18 1041   QUESTION TO PATIENT:  Has a member of your family or a partner(now or in the past) intimidated, hurt, manipulated, or controlled you in any way?  no 05/27/18 1752    ASSESSMENT/CONCERNS TO BE ADDRESSED     QUESTION TO PATIENT: Do you feel safe going back to the place where you are living?  yes 05/27/18 1752    Concerns To Be Addressed  home safety concerns;compliance issue concerns;coping/stress concerns 05/29/18 1041   OBSERVATION: Is there reason to believe there has been maltreatment of a vulnerable adult (ie. Physical/Sexual/Emotional abuse, self neglect, lack of adequate food, shelter, medical care, or financial exploitation)?  no 05/27/18 1752    DISCHARGE PLANNING     OTHER      Patient/family verbalizes understanding of discharge plan recommendations?  Yes 05/29/18 1041   Are you depressed or being treated for depression?  No 05/28/18 0050    Medical Team notified of plan?  yes 05/29/18 1041   HOMICIDE RISK      Readmission Within The Last 30 Days  no previous admission in last 30 days 05/29/18 1041   Feels Like Hurting Others  no 05/27/18 1752    Anticipated Changes Related to Illness  inability to care for self;inability to care for someone else 05/29/18 1041                        8A: [phone]            Medication Administration Report for Familia Irving as of 06/06/18 1120   Legend:    Given Hold Not Given Due Canceled Entry Other Actions    Time Time (Time) Time  Time-Action       Inactive    Active    Linked        Medications 05/31/18 06/01/18 06/02/18 06/03/18 06/04/18 06/05/18 06/06/18    acetaminophen (TYLENOL) tablet 1,000 mg  Dose: 1,000 mg  Freq: EVERY 6 HOURS Route: PO  Start: 05/30/18 1445   Admin Instructions: Maximum acetaminophen dose from all sources = 75 mg/kg/day not to exceed 4 gram    Admin. Amount: 2 tablet (2 × 500 mg tablet)  Last Admin: 06/06/18 0842  Dispense Loc: [facility] ADS [facility]     (6998)-Not  Given       0926 (1,000 mg)-Given       1500 (1,000 mg)-Given       1930 (1,000 mg)-Given [C]        0247 (1,000 mg)-Given       1015 (1,000 mg)-Given       1631 (1,000 mg)-Given       2131 (1,000 mg)-Given        0403 (1,000 mg)-Given       0952 (1,000 mg)-Given       1629 (1,000 mg)-Given       2207 (1,000 mg)-Given        (0338)-Not Given       1245 (1,000 mg)-Given       1916 (1,000 mg)-Given        (0137)-Not Given       0657 (1,000 mg)-Given       (1516)-Not Given       1848 (1,000 mg)-Given        0124 (1,000 mg)-Given       0639 (1,000 mg)-Given              (1727)-Not Given       1845 (1,000 mg)-Given        0130 (1,000 mg)-Given       0842 (1,000 mg)-Given       [ ] 1300       [ ] 1900           allopurinol (ZYLOPRIM) tablet 100 mg  Dose: 100 mg  Freq: DAILY Route: PO  Start: 05/29/18 1515   Admin. Amount: 1 tablet (1 × 100 mg tablet)  Last Admin: 06/06/18 0837  Dispense Loc: [facility]     0913 (100 mg)-Given        0827 (100 mg)-Given        0839 (100 mg)-Given        0900 (100 mg)-Given        0811 (100 mg)-Given        0757 (100 mg)-Given        0837 (100 mg)-Given           calcitRIOL (ROCALTROL) capsule 0.25 mcg  Dose: 0.25 mcg  Freq: DAILY Route: PO  Start: 06/01/18 1315   Admin. Amount: 1 capsule (1 × 0.25 mcg capsule)  Last Admin: 06/05/18 1845  Dispense Loc: [facility]      1816 (0.25 mcg)-Given [C]        (0800)-Not Given [C]       1837 (0.25 mcg)-Given        1916 (0.25 mcg)-Given        1730 (0.25 mcg)-Given        1845 (0.25 mcg)-Given        [ ] 1800           calcium carbonate (TUMS) chewable tablet 500 mg  Dose: 500 mg  Freq: 2 TIMES DAILY Route: PO  Start: 06/04/18 2000   Admin Instructions: Separate by 2-3 hours from mycophenolate    Admin. Amount: 1 tablet (1 × 500 mg tablet)  Dispense Loc: [facility]         (2055)-Not Given        (0814)-Not Given       (1913)-Not Given        (0837)-Not Given       [ ] 2000           cholecalciferol (vitamin D3) tablet 2,000  Units  Dose: 2,000 Units  Freq: DAILY Route: PO  Start: 06/01/18 1315   Admin. Amount: 2 tablet (2 × 1,000 Units tablet)  Last Admin: 06/06/18 0837  Dispense Loc: [facility]      1815 (2,000 Units)-Given [C]        0839 (2,000 Units)-Given        0900 (2,000 Units)-Given        0811 (2,000 Units)-Given        0757 (2,000 Units)-Given        0837 (2,000 Units)-Given           folic acid (FOLVITE) tablet 1 mg  Dose: 1 mg  Freq: DAILY Route: PO  Start: 05/28/18 0800   Admin. Amount: 1 tablet (1 × 1 mg tablet)  Last Admin: 06/06/18 0837  Dispense Loc: [facility]     0913 (1 mg)-Given        0827 (1 mg)-Given        0838 (1 mg)-Given        0900 (1 mg)-Given        0811 (1 mg)-Given        0757 (1 mg)-Given        0837 (1 mg)-Given           glucose gel 15-30 g  Dose: 15-30 g  Freq: EVERY 15 MIN PRN Route: PO  PRN Reason: low blood sugar  Start: 05/28/18 0827   Admin Instructions: Give 15 g for BG 51 to 69 mg/dL IF patient is conscious and able to swallow. Give 30 g for BG less than or equal to 50 mg/dL IF patient is conscious and able to swallow. Do NOT give glucose gel via enteral tube.  IF patient has enteral tube: give apple juice 120 mL (4 oz or 15 g of CHO) via enteral tube for BG 51 to 69 mg/dL.  Give apple juice 240 mL (8 oz or 30 g of CHO) via enteral tube for BG less than or equal to 50 mg/dL.    ~Oral gel is preferable for conscious and able to swallow patient.   ~IF gel unavailable or patient refuses may provide apple juice 120 mL (4 oz or 15 g of CHO). Document juice on I and O flowsheet.    Admin. Amount: 15-30 g  Last Admin: 06/04/18 1740  Dispense Loc: [facility]  Volume: 93.75 mL        1853 (15 g)-Given [C]       1902 (15 g)-Given [C]        1740 (15 g)-Given            Or  dextrose 50 % injection 25-50 mL  Dose: 25-50 mL  Freq: EVERY 15 MIN PRN Route: IV  PRN Reason: low blood sugar  Start: 05/28/18 0827   Admin Instructions: Use if have IV access, BG less than 70 mg/dL and meet  dose criteria below:  Dose if conscious and alert (or disorientated) and NPO = 25 mL  Dose if unconscious / not alert = 50 mL  Vesicant. For ordered doses up to 25 g, give IV Push undiluted. Give each 5g over 1 minute.    Admin. Amount: 25-50 mL  Dispense Loc: [facility] ADS [facility]  Infused Over: 1-5 Minutes  Volume: 50 mL                                   Or  glucagon injection 1 mg  Dose: 1 mg  Freq: EVERY 15 MIN PRN Route: SC  PRN Reason: low blood sugar  PRN Comment: May repeat x 1 only  Start: 05/28/18 0827   Admin Instructions: May give SQ or IM. ONLY use glucagon IF patient has NO IV access AND is UNABLE to swallow AND blood glucose is LESS than or EQUAL to 50 mg/dL.  If ordered IV, give IV Push over 1 minute. Reconstitute with 1mL sterile water.    Admin. Amount: 1 mg  Dispense Loc: [facility]                                    insulin aspart (NovoLOG) inj (RAPID ACTING)  Dose: 3 Units  Freq: 3 TIMES DAILY WITH MEALS Route: SC  Start: 06/06/18 0800   Admin Instructions: Hold dose if not eating.  If given at mealtime, must be administered 5 min before meal or immediately after.    Admin. Amount: 3 Units  Last Admin: 06/06/18 0849  Dispense Loc: Contact Rx for dose  Volume: 3 mL           0849 (3 Units)-Given       [ ] 1200       [ ] 1800           insulin aspart (NovoLOG) inj (RAPID ACTING)  Dose: 1-5 Units  Freq: AT BEDTIME Route: SC  Start: 06/03/18 2200   Admin Instructions: MEDIUM INSULIN RESISTANCE DOSING    Do Not give Bedtime Correction Insulin if BG less than  200.   For  - 249 give 1 units.   For  - 299 give 2 units.   For  - 349 give 3 units.   For  -399 give 4 units.   For BG greater than or equal to 400 give 5 units.  Notify provider if glucose greater than or equal to 350 mg/dL after administration of correction dose.  If given at mealtime, must be administered 5 min before meal or immediately after.    Admin. Amount: 1-5 Units  Dispense Loc: Contact Rx for  dose  Volume: 3 mL        (2155)-Not Given [C]        (2243)-Not Given [C]        (2225)-Not Given        [ ] 2200           insulin aspart (NovoLOG) inj (RAPID ACTING)  Dose: 1-7 Units  Freq: 3 TIMES DAILY BEFORE MEALS Route: SC  Start: 06/03/18 1200   Admin Instructions: Correction Scale - MEDIUM INSULIN RESISTANCE DOSING     Do Not give Correction Insulin if Pre-Meal BG less than 140.   For Pre-Meal  - 189 give 1 unit.   For Pre-Meal  - 239 give 2 units.   For Pre-Meal  - 289 give 3 units.   For Pre-Meal  - 339 give 4 units.   For Pre-Meal - 399 give 5 units.   For Pre-Meal -449 give 6 units  For Pre-Meal BG greater than or equal to 450 give 7 units.   To be given with prandial insulin, and based on pre-meal blood glucose.    Notify provider if glucose greater than or equal to 350 mg/dL after administration of correction dose.  If given at mealtime, must be administered 5 min before meal or immediately after.    Admin. Amount: 1-7 Units  Last Admin: 06/05/18 1842  Dispense Loc: Contact Rx for dose  Volume: 3 mL        (1356)-Not Given       (1929)-Not Given [C]        (0858)-Not Given       1243 (1 Units)-Given [C]       (1723)-Not Given [C]        0758 (1 Units)-Given       (1323)-Not Given [C]       1842 (1 Units)-Given        (0754)-Not Given       [ ] 1200       [ ] 1700           insulin aspart (NovoLOG) inj (RAPID ACTING)  Freq: WITH SNACKS OR SUPPLEMENTS Route: SC  PRN Reason: high blood sugar  Start: 05/28/18 1136   Admin Instructions: DOSE:  1 units per 20  grams of carbohydrate. Only chart total amount of units given.  Do not give if pre-prandial glucose is less than 60 mg/dL.  If given at mealtime, must be administered 5 min before meal or immediately after.    Last Admin: 05/30/18 1904  Dispense Loc: Contact Rx for dose  Volume: 3 mL               loperamide (IMODIUM) capsule 2 mg  Dose: 2 mg  Freq: 4 TIMES DAILY PRN Route: PO  PRN Reason: diarrhea  Start: 06/05/18  0800   Admin. Amount: 1 capsule (1 × 2 mg capsule)  Last Admin: 06/05/18 1940  Dispense Loc: [facility]S [facility]          1114 (2 mg)-Given       1940 (2 mg)-Given            mycophenolic acid (MYFORTIC BRAND) EC tablet 720 mg  Dose: 720 mg  Freq: 2 TIMES DAILY. Route: PO  Start: 05/28/18 0130   Admin Instructions: DO NOT CRUSH or split.  Give on an empty stomach.  Check if BLOOD LEVEL is needed BEFORE administering dose.  This order is specifically written for MYFORTIC (BRAND NAME) tablet.    Admin. Amount: 2 tablet (2 × 360 mg tablet)  Last Admin: 06/06/18 0837  Dispense Loc: [facility]     0913 (720 mg)-Given       1857 (720 mg)-Given        0827 (720 mg)-Given       1816 (720 mg)-Given        0832 (720 mg)-Given       1837 (720 mg)-Given        0900 (720 mg)-Given       1916 (720 mg)-Given        0811 (720 mg)-Given       1730 (720 mg)-Given        0757 (720 mg)-Given       1845 (720 mg)-Given        0837 (720 mg)-Given       [ ] 1800           naloxone (NARCAN) injection 0.1-0.4 mg  Dose: 0.1-0.4 mg  Freq: EVERY 2 MIN PRN Route: IV  PRN Reason: opioid reversal  Start: 05/27/18 2132   Admin Instructions: For respiratory rate LESS than or EQUAL to 8.  Partial reversal dose:  0.1 mg titrated q 2 minutes for Analgesia Side Effects Monitoring Sedation Level of 3 (frequently drowsy, arousable, drifts to sleep during conversation).Full reversal dose:  0.4 mg bolus for Analgesia Side Effects Monitoring Sedation Level of 4 (somnolent, minimal or no response to stimulation).  For ordered doses up to 2mg give IVP. Give each 0.4mg over 15 seconds in emergency situations. For non-emergent situations further dilute in 9mL of NS to facilitate titration of response.    Admin. Amount: 0.1-0.4 mg = 0.25-1 mL Conc: 0.4 mg/mL  Dispense Loc: [facility]  Volume: 1 mL               ondansetron (ZOFRAN-ODT) ODT tab 4 mg  Dose: 4 mg  Freq: EVERY 6 HOURS PRN Route: PO  PRN Reasons: nausea,vomiting  Start: 05/27/18 1153    Admin Instructions: This is Step 1 of nausea and vomiting management.  If nausea not resolved in 15 minutes, go to Step 2 prochlorperazine (COMPAZINE). Do not push through foil backing. Peel back foil and gently remove. Place on tongue immediately. Administration with liquid unnecessary  With dry hands, peel back foil backing and gently remove tablet; do not push oral disintegrating tablet through foil backing; administer immediately on tongue and oral disintegrating tablet dissolves in seconds; then swallow with saliva; liquid not required.    Admin. Amount: 1 tablet (1 × 4 mg tablet)  Dispense Loc: [facility]              Or  ondansetron (ZOFRAN) injection 4 mg  Dose: 4 mg  Freq: EVERY 6 HOURS PRN Route: IV  PRN Reasons: nausea,vomiting  Start: 05/27/18 2139   Admin Instructions: This is Step 1 of nausea and vomiting management.  If nausea not resolved in 15 minutes, go to Step 2 prochlorperazine (COMPAZINE).  Irritant. For ordered doses up to 4 mg, give IV Push undiluted over 2-5 minutes.    Admin. Amount: 4 mg = 2 mL Conc: 4 mg/2 mL  Dispense Loc: [facility]  Infused Over: 2-5 Minutes  Volume: 2 mL               senna-docusate (SENOKOT-S;PERICOLACE) 8.6-50 MG per tablet 1-2 tablet  Dose: 1-2 tablet  Freq: 2 TIMES DAILY Route: PO  Start: 05/27/18 2140   Admin Instructions: Start with 1 tablet po BID. If no bowel movement in 24 hours, increase to 2 tablets po BID. Hold for loose stools.    Admin. Amount: 1-2 tablet  Last Admin: 06/04/18 0811  Dispense Loc: [facility]     (0912)-Not Given       (2148)-Not Given [C]        (0844)-Not Given       (2051)-Not Given [C]        (0800)-Not Given       (1941)-Not Given        (0942)-Not Given       (1929)-Not Given        0811 (2 tablet)-Given       (2055)-Not Given        (0814)-Not Given       (1913)-Not Given        (0850)-Not Given       [ ] 2000           sodium citrate-citric acid (BICITRA) solution 30 mL  Dose: 30 mL  Freq: DAILY Route:  PO  Start: 05/29/18 1100   Admin. Amount: 30 mL  Last Admin: 06/05/18 1845  Dispense Loc: [facility]  Volume: 30 mL     0914 (30 mL)-Given        (0827)-Not Given        (0800)-Not Given [C]       1837 (30 mL)-Given        1918 (30 mL)-Given        1731 (30 mL)-Given        1845 (30 mL)-Given        [ ] 1800           tacrolimus (PROGRAF BRAND) capsule 1 mg  Dose: 1 mg  Freq: 2 TIMES DAILY. Route: PO  Start: 05/28/18 0130   Admin Instructions: This order is specifically written for PROGRAF (BRAND NAME) capsules.    Admin. Amount: 1 capsule (1 × 1 mg capsule)  Last Admin: 06/06/18 0837  Dispense Loc: [facility]     0913 (1 mg)-Given       1857 (1 mg)-Given        0827 (1 mg)-Given       1816 (1 mg)-Given        0837 (1 mg)-Given       1837 (1 mg)-Given        0900 (1 mg)-Given       1917 (1 mg)-Given        0811 (1 mg)-Given       1731 (1 mg)-Given        0757 (1 mg)-Given       1845 (1 mg)-Given        0837 (1 mg)-Given       [ ] 1800          Future Medications  Medications 05/31/18 06/01/18 06/02/18 06/03/18 06/04/18 06/05/18 06/06/18       insulin glargine (LANTUS) injection 6 Units  Dose: 6 Units  Freq: DAILY WITH SUPPER Route: SC  Start: 06/06/18 1700   Admin. Amount: 6 Units  Dispense Loc: Contact Rx for dose  Volume: 3 mL           [ ] 1700          Discontinued Medications  Medications 05/31/18 06/01/18 06/02/18 06/03/18 06/04/18 06/05/18 06/06/18         Rate: 125 mL/hr   Freq: CONTINUOUS Route: IV  Start: 06/06/18 0800   End: 06/06/18 0757           0757-Med Discontinued         Dose: 20 mg  Freq: EVERY 12 HOURS Route: IV  Start: 06/02/18 1445   End: 06/04/18 0850   Admin Instructions: For ordered doses up to 40 mg, give IV Push undiluted over 1-2 minutes.    Admin. Amount: 20 mg = 2 mL Conc: 10 mg/mL  Last Admin: 06/04/18 0451  Dispense Loc: [facility]  Infused Over: 1-2 Minutes  Volume: 2 mL   Current Line: Peripheral IV 05/16/18 Right Hand      1616 (20 mg)-Given        0402  (20 mg)-Given       1556 (20 mg)-Given        0451 (20 mg)-Given       0850-Med Discontinued           Dose: 20 mg  Freq: 2 TIMES DAILY. Route: PO  Start: 06/04/18 1600   End: 06/05/18 0756   Admin. Amount: 1 tablet (1 × 20 mg tablet)  Last Admin: 06/04/18 1543  Dispense Loc: [facility]         1543 (20 mg)-Given        0756-Med Discontinued          Freq: EVERY MORNING BEFORE BREAKFAST Route: SC  Start: 06/04/18 0830   End: 06/06/18 0740   Admin Instructions: DOSE:  1 units per 20  grams of carbohydrate.  Only chart total amount of units given.  Do not give if pre-prandial glucose is less than 60 mg/dL.  If given at mealtime, must be administered 5 min before meal or immediately after.    Last Admin: 06/05/18 0809  Dispense Loc: Contact Rx for dose  Volume: 3 mL         0858 (2 Units)-Given        0809 (4 Units)-Given        0740-Med Discontinued  (0755)-Not Given             Freq: DAILY WITH SUPPER Route: SC  Start: 05/28/18 1700   End: 06/06/18 0740   Admin Instructions: DOSE:  1 units per 20  grams of carbohydrate. Only chart total amount of units given.  Do not give if pre-prandial glucose is less than 60 mg/dL.  If given at mealtime, must be administered 5 min before meal or immediately after.    Last Admin: 06/05/18 1843  Dispense Loc: Contact Rx for dose  Volume: 3 mL     1859 (18 Units)-Given [C]        1824 (3 Units)-Given [C]        1928 (4 Units)-Given [C]        (1930)-Not Given [C]        (1723)-Not Given        1843 (1 Units)-Given        0740-Med Discontinued         Freq: DAILY WITH LUNCH Route: SC  Start: 05/28/18 1200   End: 06/06/18 0740   Admin Instructions: DOSE:  1 units per 20  grams of carbohydrate. Only chart total amount of units given.  Do not give if pre-prandial glucose is less than 60 mg/dL.  If given at mealtime, must be administered 5 min before meal or immediately after.    Last Admin: 06/03/18 1418  Dispense Loc: Contact Rx for dose  Volume: 3 mL     1502 (5 Units)-Given  [C]        (1420)-Not Given        1313 (3 Units)-Given        1418 (2 Units)-Given        (1244)-Not Given        (1324)-Not Given [C]        0740-Med Discontinued         Freq: EVERY MORNING BEFORE BREAKFAST Route: SC  Start: 05/29/18 0730   End: 06/04/18 0816   Admin Instructions: DOSE:  1 units per 12  grams of carbohydrate.  Only chart total amount of units given.  Do not give if pre-prandial glucose is less than 60 mg/dL.  If given at mealtime, must be administered 5 min before meal or immediately after.    Last Admin: 06/03/18 0943  Dispense Loc: Contact Rx for dose  Volume: 3 mL     1023 (8 Units)-Given [C]        1016 (12 Units)-Given        0945 (9 Units)-Given        0943 (3 Units)-Given        0816-Med Discontinued                 Dose: 12 Units  Freq: DAILY WITH SUPPER Route: SC  Start: 06/05/18 1700   End: 06/06/18 0741   Admin. Amount: 12 Units  Last Admin: 06/05/18 1842  Dispense Loc: [facility]  Volume: 3 mL          1842 (12 Units)-Given        0741-Med Discontinued         Dose: 16 Units  Freq: EVERY MORNING Route: SC  Start: 06/04/18 0800   End: 06/04/18 1136   Admin. Amount: 16 Units  Last Admin: 06/04/18 0813  Dispense Loc: Contact Rx for dose  Volume: 3 mL         0813 (16 Units)-Given       1136-Med Discontinued           Dose: 500 mg  Freq: 4 TIMES DAILY PRN Route: PO  PRN Reason: muscle spasms  Start: 05/28/18 0015   End: 06/05/18 1609   Admin. Amount: 1 tablet (1 × 500 mg tablet)  Last Admin: 06/01/18 0027  Dispense Loc: [facility] ADS [facility]      0027 (500 mg)-Given           1609-Med Discontinued     Medications 05/31/18 06/01/18 06/02/18 06/03/18 06/04/18 06/05/18 06/06/18               INTERAGENCY TRANSFER FORM - NOTES (H&P, Discharge Summary, Consults, Procedures, Therapies)   5/27/2018                        8A: [phone]            History & Physicals     No notes of this type exist for this encounter.         Discharge Summaries      Discharge Summaries signed by  Dominguez Márquez MD at 6/6/2018 10:09 AM      Author:  Dominguez Márquez MD Service:  Hospitalist Author Type:  Physician    Filed:  6/6/2018 10:09 AM Date of Service:  6/5/2018 10:26 PM Creation Time:  6/5/2018 11:36 PM    Status:  Signed :  Dominguez Márquez MD (Physician)         Admit Date:     05/27/2018   Discharge Date:  6/6/18     DATE OF ADMISSION: 05/27/2018   DATE OF PLANNED DISCHARGE: 06/06/2018      HISTORY AND HOSPITAL COURSE:  Familia Irving is a 73-year-old man with a remote history of gout, status post recent cervical spine decompression and fusion by Dr. Posey, who was admitted from his transitional care unit for the evaluation of fever, confusion, and generalized arthralgias, particularly involving his wrist and left knee.  The patient had been unable to bear weight over the previous 24 hours.      The concern on admission was that the patient might have a septic left knee.  He subsequently underwent an aspiration of the left knee as well as his right wrist, both of which revealed intracellular monosodium urate crystals, consistent with acute gouty arthritis.  The patient's uric acid level was elevated at 10.  CRP was over 250.  The patient initially received 1 dose of IV vancomycin because of concern regarding septic joints.  This was not restarted as his clinical picture subsequently seemed to be most consistent with oligoarticular acute gout flare.  Blood cultures were subsequently obtained and were unremarkable.  In the meantime, the patient was started empirically on Solu-Medrol.  He received 1 dose of 120 mg IV with marked improvement in his generalized arthralgias the next morning.  He subsequently received 5 more days of prednisone 40 mg a day with continued improvement in his generalized arthralgias.      The patient did have a fairly complex hospital course, which is as summarized below.   1.  Acute gout flare involving multiple joints including both wrists, left knee,  and both ankles with marked improvement with Solu-Medrol, followed by prednisone.  Blood cultures were subsequently negative.  Of note, the patient did have late growth of diphtheroids from his right wrist aspirate.  This was reviewed informally with Infectious Disease and felt most likely to represent contaminant and not requiring further treatment.  As above, he received only 1 dose of antibiotics, vancomycin, at the time of admission.   2.  Acute kidney injury.  The patient with history of transplanted kidney.  Baseline creatinine is 1.8-1.9.  Creatinine did reach a peak of 2.8.  He was seen by Nephrology who felt that he was experiencing nonoliguric acute renal failure likely secondary to acute inflammation as well as vancomycin and preadmission use of furosemide and lisinopril.  The patient's renal function was tracked very closely and on the day prior to discharge, his creatinine was down to 1.75.  Prior to admission antihypertensive medications, amlodipine and lisinopril, were discontinued secondary to acute kidney injury and relatively low blood pressure and were not required during the hospitalization.   3.  Volume overload.  The patient did develop generalized edema secondary to IV fluids for the treatment of volume depletion and acute kidney injury.  His prior to admission furosemide was held as noted above, but was restarted 4 days prior to discharge with excellent diuresis.  The patient did develop hypernatremia subsequent to diuretic therapy and reduced oral liquid intake. He was encouraged to drink water and actually did drink approximately 500 cc during a one hour period on the morning of discharge.  It is less likely that the hypernatremia is related to a renal concentrating defect or DI.  Lasix remains on hold   His prior to admission furosemide dose was 20 mg twice daily.   4.  Confusion.  The patient was noted by family to be acutely confused prior to admission.  Though in retrospect, he had been  mildly confused since his discharge from the hospital following his cervical spine surgery.  This was attributed to multiple factors including opioids and acute inflammation. Mental status did gradually improve, at or close to baseline at the time of discharge.   5.  Hypocalcemia with ionized calcium level of 3.0 noted on 05/31/2018.  This was associated with a slightly elevated phosphorus level, a very elevated PTH level and a low vitamin D level.  This was felt to represent hypovitamin D syndrome, likely secondary to chronic kidney disease and did improve with calcium supplementation, vitamin D supplementation and calcitriol.   6.  History of kidney transplant.  Creatinine, as above, did elevate to 2.8 prior to returning to baseline prior to discharge.  He was seen by Nephrology who recommended continuation of his current transplant medication doses.   7.  Diabetes mellitus.  Blood glucoses predictably were quite elevated shortly after admission secondary to steroids, but did improve significantly towards the end of his hospital course.  He did require a bump in his Lantus as well as the short-term institution of NPH in the setting of prednisone.  Blood glucoses were in the 100s prior to discharge.  Insulin orders are as below.  I would add that at baseline, the patient only takes Lantus insulin once daily and does not use prandial insulin.   8.  Urinary retention.  The patient did develop urinary retention in the setting of acute illness and confusion, requiring short-term St catheter placement.  St catheter was discontinued on the day prior to discharge with postvoid residual volumes less than 100 mL.   9.  Pressure injury over sacrum and buttocks, present on admission, which was followed by the wound nurse.  Current wound care orders will be noted on the discharge instruction sheet.   10.  Oropharyngeal dysphagia likely secondary to acute illness.  The patient was followed by Speech Pathology and also was  noted to be able to tolerate a regular diet.   11.  Hypertension.  The patient's blood pressure was relatively low at the time of presentation to the hospital.  Accordingly, amlodipine, furosemide and lisinopril were held.  Lisinopril and amlodipine were not restarted.  It is recommended by Rheumatology that at some point, as the blood pressure and renal function remain stable, he would be a candidate for the institution of losartan versus lisinopril as losartan has urate lowering properties.   12.  Status post recent anterior-posterior multilevel cervical spine decompression with instrumentation, C3 to T1 on 05/16/2018.  The patient continues to wear a cervical collar.  There are no limitations in activity.      Though the patient's functional status has improved considerably since admission, he remains debilitated from his recent surgery and subsequent acute medical illness.  He therefore will discharge to the [facility] TCU for continued therapy and monitoring of his medical status.  The patient and family were in agreement with this plan.      DISCHARGE DIAGNOSES:   1.  Acute oligoarticular gouty flare involving both wrists, left knee and both ankles with marked improvement with intravenous followed by oral steroids.  He has been off steroids for 4 days at the time of discharge.   2.  Status post recent cervical decompression with fusion as noted above.   3.  Acute kidney injury in a patient with a transplanted kidney, likely secondary to volume depletion, acute inflammatory state and preoperative use of lisinopril and furosemide, and possibly to single dose of vancomycin, resolved.   4.  Fluid overload secondary to IV fluids. Improved with IV followed by po lasix,  however, with subsequent hypernatremia which is limiting continued use of lasix, as noted above  5.  Confusion, likely representing delirium from recent cervical spine surgery, acute inflammatory illness as well as oxycodone use prior to this  admission, improved.   6.  Hypocalcemia associated with hypovitaminosis D secondary to kidney failure, improved with calcium and vitamin D supplementation.   7.  Diabetes mellitus type 2 with elevated blood glucoses in the setting of systemic steroid use, trending down to baseline at the time of discharge.   8.  Urinary retention, resolved.   9.  Sacral wound prior to admission secondary to pressure, stable during this hospitalization.   10.  Hypertension, stable currently off prior to admission lisinopril and amlodipine, with recommendation as noted above to consider losartan in place of lisinopril in the future.      DISCHARGE MEDICATIONS:    1.  Tylenol 650 mg every 4 hours p.r.n. pain.   2.  Allopurinol 100 mg daily.     3.  Rocaltrol 0.25 mcg daily.   4.  Calcium carbonate 500 mg twice daily.   5.  Vitamin D3 2000 units daily.   6.  Folic acid 1 mg daily.   7.  Lantus insulin 6 units daily.     He will not be discharged on prandial insulin coverage.  8.  Mycophenolic acid enteric-coated 720 mg twice daily.   9.  Senna 1-2 times twice daily p.r.n.    10.  Bicitra solution 30 mL daily.    11.  Tacrolimus (Prograf brand) 1 mg twice daily.    12.  Loperamide 2 mg 4 times a day p.r.n. diarrhea.    13.  ASA  81 mg daily  14. Pravastatin 40 mg daily     DISCHARGE INSTRUCTIONS: The patient is to wear a [place] J cervical collar at all times.  Wound care to the sacral wound is to be done every other day.  Wound is to be washed with wound cleanser and gauze, No-Sting applied to the sacrum and allowed to dry.  The wound is covered with Mepilex.  The patient is to use a Steven-Stepan cushion when up to chair or in a wheelchair at all times.      The patient will have bi-weekly basic metabolic panel obtained. He will be encouraged to drink at least 2000 cc of fluid daily.  BG will be monitored qid and every 2 am. Tacrolimus levels will be obtained every Monday and will need to be called to the patient's primary nephrologist,   Es at the [facility].  A uric acid level should be obtained in 1 month with the goal of reducing the uric acid to less than 6.  The patient will follow up with Dr. Bass of the [facility].         DOMINGUEZ WELLS MD             18     Name:     FAMILIA SHI   MRN:      [id number]        Account:        [id number]   :      1945           Admit Date:     2018                                  Discharge Date:       Document: [id number]       cc: [facility][DS1.1]         Revision History        User Key Date/Time User Provider Type Action    > DS1.1 2018 10:09 AM Dominguez Wells MD Physician Sign     [N/A] 2018 11:36 PM Dominguez Wells MD Physician Edit                     Consult Notes      Consults by Maged Loyola MD at 2018 12:20 PM     Author:  Maged Loyola MD Service:  Rheumatology Author Type:  Physician    Filed:  2018  2:38 PM Date of Service:  2018 12:20 PM Creation Time:  2018 11:54 AM    Status:  Addendum :  Maged Loyola MD (Physician)     Consult Orders:    1. Rheumatology IP Consult: Patient to be seen: Routine - within 24 hours; renal transplant patient with polyarticular gout.; Consultant may enter orders: Yes [[id number]] ordered by Familia Dumont MD at 18 38                [facility] Rheumatology Consultation    Familia Shi MRN# [id number]   Age: 73 year old YOB: 1945     Date of Admission:  2018    Reason for consult: Gout       Requesting physician: Familia Dumont       Level of consult: Consult, follow and place orders             Assessment/Plan:   #.  Acute gout flare ([CA1.1]Oligo-[CA1.2]articular)  Arthrocentesis on admission consistent with inflammatory arthritis, with over 30,000 white blood cells (97% PMNs), and intracellular monosodium urate crystals present in left knee and right wrist.    Of note[CA1.1],[CA1.2]  "on admission he was started on IV vancomycin which was discontinued after crystals were found on joint aspiration.  So far, joint gram stain and cultures as well as blood cultures have been negative for evidence of infection. CRP remains elevated at >250. Patient states that his last gout flare was over[CA1.1] \"[CA1.2]50 years ago[CA1.1]\"[CA1.2].  He is not on any allopurinol[CA1.1], though he states that he has used this before.[CA1.2]  Serum uric acid is 10.0. No tophi noted on XR of knee. Physical examinatio[CA1.1]n is also negative for any tophi.[CA1.3]  Possible triggers include ASA (low dose), Lasix,[CA1.1] physiological stress given[CA1.3] recent infection and surgery.   He has received Solu-Medrol (125 mg) ×1 dose with[CA1.1] some i[CA1.3]mprovement.   Given presence of acute gout flare in the setting of CKD, he will need uric acid lowering therapy with a goal of SUA<6.   His CKD/worsening kidney function precludes him from getting any colchicine or NSAIDs.[CA1.1] Oligo-[CA1.2]articular involvement also precludes the use of intra-articular steroids.[CA1.1]       RECOMMENDATIONS:  -- Appreciate excellent cares by primary team   -- Recommend Prednisone 40 mg QDAY for 5 days  -- Commence Allopurinol 100 mg daily   Goal SUA <6. Recommend follow up with PCP in 3-4 weeks with serum uric acid measurement for titration of allopurinol  -- Monitor CRP, clinical status  -- Consider switching Lisinopril to Losartan (urate lowering property) - will defer this decision to Nephrology  -- Rheumatology will sign off today.    Please call if there are any questions    Thank you for this consultation.[CA1.2]     Patient seen and discussed with Dr. Loyola, who is in agreement with my assessment and recommedations. Please, see staff addendum for changes/additions to the plan.    Magdalene Velasquez MD  Internal Medicine[CA1.1]- Rheumatology[CA1.2]  PGY-3  [phone][CA1.1]    ATTENDING TIE IN NOTE:    I saw the patient with the " resident.  My exam and recommendations are as described.      Maged Loyola MD FACP    Rheumatic and Autoimmune Diseases[GB1.1]            Chief complaint:[CA1.1]   Multiple joint pains[CA1.3]          History of Present Illness:   Familia Irving is a 73 year old male with a medical history significant for kidney transplantation thousand and 6 currently on tacrolimus and Myfortic, hypertension, remote history of gout, diabetes, and cervical myelopathy; admitted with knee and wrist joint pain, with concerns for left knee septic arthritis.  Rheumatology consulted today for concern of acute gout flare.[CA1.1]       Patient was recently admitted for multi-level cervical decompression and fusion, he was discharged stable to TCU for rehab. According to patient and his son, who was in his usual state of health until about 3 days ago when he noted left knee swelling, right wrist swelling, and severe pain in his left knee and right wrist.  He also had a fever as high as 101.  Patient was initially taken to an emergency room in [place], and then transferred to the [place].  Patient complains of left knee and right wrist pain, with some erythema around his left knee.  He denies any pain in any other joints.  Denies any rashes.  Patient has not experienced any localized swellings in his first toes, or his elbows.  He denies any headaches.  He denies any chest or abdominal symptoms.  According to patient, he last had a gout flare about 50 years ago, and has not had any since then until now.  He states that he was previously on allopurinol, but had stopped this after 6 months.     While here in the hospital, he had received 125 mg of Solu-Medrol IV.  Patient states that his pain is much reduced, and his wrist feels less swollen.  He feels like his range of motion in his right wrist and left knee have improved.  He has not had any fevers or chills.[CA1.3]          Review of Symptoms:   10-point ROS  reviewed, & found negative w/ exceptions noted in the HPI.          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      Tinnitus        Past Surgical History:   Procedure Laterality Date     AMPUTATION Right     First and second partial amputation of toe.      AS OPEN TX KNEE DISLOCATION W REPAIR/RECONSTRUCTION       DECOMPRESSION, FUSION CERVICAL ANTERIOR THREE+ LEVELS, COMBINED N/A 2018    Procedure: COMBINED DECOMPRESSION, FUSION CERVICAL ANTERIOR THREE+ LEVELS;  Part 1: (anterior) Anterior Cervical Decompression Fusion C3-7, Cervical 7-Thoracic 1; Anterior Iliac Crest Bone Graft [place]  Part 2: (posterior) Posterior Interbody Spinal Fusion Cervical 3-Throracic 1/Thoracic 2; Laminectomies Cervical 3-7;  Surgeon: Brijesh Bass MD;  Location: [facility] OR     GRAFT BONE FROM ILIAC CREST N/A 2018    Procedure: GRAFT BONE FROM ILIAC CREST;;  Surgeon: Brijesh Bass MD;  Location: [facility] OR     OPTICAL TRACKING SYSTEM FUSION POSTERIOR CERVICAL THREE + LEVELS N/A 2018    Procedure: OPTICAL TRACKING SYSTEM FUSION POSTERIOR CERVICAL THREE + LEVELS;;  Surgeon: Brijesh Bass MD;  Location: [facility] OR     TRANSPLANT KIDNEY RECIPIENT  DONOR  2006[CA1.4]            Allergies:[CA1.1]     Allergies   Allergen Reactions     Heparin      Patient unsure of what reaction was, but it was severe reaction during his transplant and finally was determined that it was heparin[CA1.4]             Outpatient Medications:[CA1.1]       No current facility-administered medications on file prior to encounter.   Current Outpatient Prescriptions on File Prior to Encounter:  acetaminophen  (TYLENOL) 325 MG tablet Take 2 tablets (650 mg) by mouth every 4 hours as needed for other (multimodal surgical pain management along with NSAIDS and opioid medication as indicated based on pain control and physical function.)   amLODIPine (NORVASC) 10 MG tablet Take 10 mg by mouth daily   ASPIRIN PO Take 81 mg by mouth daily   FOLIC ACID PO Take 1 mg by mouth daily   FUROSEMIDE PO Take 20 mg by mouth 2 times daily    insulin glargine (LANTUS) 100 UNIT/ML injection Inject 30 Units Subcutaneous every morning    LISINOPRIL PO Take 40 mg by mouth At Bedtime    loperamide (IMODIUM) 2 MG capsule Take 4 mg by mouth as needed for diarrhea   MAGNESIUM OXIDE PO Take 420 mg by mouth Every Mon, Wed, Fri Morning   methocarbamol (ROBAXIN) 500 MG tablet Take 1 tablet (500 mg) by mouth 4 times daily as needed for muscle spasms   MYFORTIC (BRAND) 360 MG EC TABLET Take 720 mg by mouth 2 times daily    oxyCODONE IR (ROXICODONE) 5 MG tablet For pain of 1-5 give 5 mg, for pain of 6-10 give 10 mg every 3 hours as needed.   PRAVASTATIN SODIUM PO Take 40 mg by mouth At Bedtime    PROGRAF (BRAND) 1 MG CAPSULE Take 1 mg by mouth 2 times daily   senna-docusate (SENOKOT-S;PERICOLACE) 8.6-50 MG per tablet Take 1 tablet by mouth 2 times daily   VITAMIN D, CHOLECALCIFEROL, PO Take 400 Units by mouth daily[CA1.4]             Family History:[CA1.1]   No family history on file.[CA1.4]   Reviewed; no history of autoimmune disease.[GB1.2]          Social History:[CA1.1]     Social History     Social History     Marital status: Single     Spouse name: N/A     Number of children: 2     Years of education: N/A     Occupational History     retired      Social History Main Topics     Smoking status: Never Smoker     Smokeless tobacco: Never Used     Alcohol use No     Drug use: No     Sexual activity: Not on file     Other Topics Concern     Not on file     Social History Narrative    Lives alone in lower level apartment in [place].  Brother lives  "in same apartment building on main floor.  Has two children, son and Daughter.  Daughter lives in [place].  Son, Willard, lives local and is actively involved.[CA1.4]              Physical Exam:[CA1.1]   /45 (BP Location: Left arm)  Pulse 60  Temp 95.4  F (35.2  C) (Oral)  Resp 16  Ht 1.727 m (5' 8\")  SpO2 95%[CA1.4]  Temp (24hrs), Av.2  F (36.8  C), Min:95.4  F (35.2  C), Max:100  F (37.8  C)    General:  Alert, not in respiratory or painful distress, Not toxic looking, afebrile, not pale, not dehydrated,   HEENT: anicteric sclera, PERRL, EOMI, MMM,[CA1.1] Cervical collar in place[CA1.3]  CV: RRR, nl S1/S2, no S3/S4 appreciated, no m/r/g; intact & symmetric 3+ pulses (radial, DP);   Lungs: CTAB, no wheezing   Abd: Obese[CA1.1], soft[CA1.3], moves with respiration, BS+, not tender, not distended, no palpable organomegaly, no masses   : St[CA1.1] in place[CA1.3]  Ext: WWP,  BLE edema  Skin: no rashes, cyanosis, or jaundice  MSK:[CA1.1] Joint swelling in right wrist, no redness noted.  Right wrist is tender to touch, with reduced range of motion, mostly hampered by pain.    Left knee with reduced erythema (judging by pen markings from admission), reduced range of motion with flexion, complete examination stymied by pain.  Knee joint swelling present, with tenderness to palpation.     Psych[CA1.3]:[CA1.1] Stable[CA1.3]            Data:   Labs (all laboratory studies reviewed by me):   ROUTINE ICU LABS (Last four results)  CMP[CA1.1]  Recent Labs  Lab 18  0724 18  1645 18  0709 18  1808   * 143 147* 147*   POTASSIUM 5.2 4.3 4.1 4.1   CHLORIDE 113* 114* 115* 115*   CO2 17* 17* 21 22   ANIONGAP 16* 12 11 10   * 163* 156* 74   BUN 84* 63* 61* 63*   CR 2.74* 2.50* 2.46* 2.56*   GFRESTIMATED 23* 26* 26* 25*   GFRESTBLACK 28* 31* 31* 30*   BRAXTON 8.0* 8.3* 8.1* 8.0*   MAG 1.9  --   --  1.7   PROTTOTAL  --   --   --  5.2*   ALBUMIN  --   --   --  1.9*   BILITOTAL  --   --   --  " 1.7*   ALKPHOS  --   --   --  87   AST  --   --   --  45   ALT  --   --   --  40[CA1.4]     CBC[CA1.1]  Recent Labs  Lab 05/29/18  0724 05/28/18  0709 05/27/18  1808   WBC Canceled, Test credited 20.2* 18.7*   RBC Canceled, Test credited 3.01* 2.87*   HGB Canceled, Test credited 8.7* 8.2*   HCT Canceled, Test credited 28.0* 26.9*   MCV Canceled, Test credited 93 94   MCH Canceled, Test credited 28.9 28.6   MCHC Canceled, Test credited 31.1* 30.5*   RDW Canceled, Test credited 16.0* 16.0*   PLT Canceled, Test credited 171 163[CA1.4]     INR[CA1.1]  Recent Labs  Lab 05/27/18  1808   INR 1.45*[CA1.4]     Arterial Blood Gas[CA1.1]No lab results found in last 7 days.[CA1.4]    Imaging (all imaging studies reviewed by me):  LEFT KNEE ONE TO TWO VIEWS  5/27/2018 7:10 PM      FINDINGS: There is a side plate and screws affixed to the proximal  left femur and healed tibial plateau fracture deformity. No recent  fractures noted. Left knee joint alignment appears normal    CERVICAL SPINE TWO TO THREE VIEWS   5/27/2018 7:10 PM         IMPRESSION: Posterior spine fusion hardware from C2 down to T2 again  noted. Interbody fusion devices in the C3-C4, C4-C5, C5-C6 and C6-C7  interspaces again noted. Alignment of the cervical vertebrae remains  normal. Hardware is unchanged.[CA1.1]      Revision History        User Key Date/Time User Provider Type Action    > GB1.2 6/4/2018  2:38 PM Maged Loyola MD Physician Addend     GB1.1 5/29/2018 10:31 PM Maged Loyola MD Physician Sign     CA1.2 5/29/2018  2:59 PM Magdalene Velasquez MD Resident Sign     CA1.3 5/29/2018  1:25 PM Magdalene Velasquez MD Resident      CA1.4 5/29/2018 11:55 AM Magdalene Velasquez MD Resident      CA1.1 5/29/2018 11:54 AM Magdalene Velasquez MD Resident             Consults by Jo Bolton APRN CNS at 5/30/2018  2:10 PM     Author:  Jo Bolton APRN CNS Service:  Endocrinology Author Type:  Advanced Practice RN     Filed:  5/30/2018  4:13 PM Date of Service:  5/30/2018  2:10 PM Creation Time:  5/30/2018  1:57 PM    Status:  Signed :  Jo Bolton APRN CNS (Advanced Practice RN)     Consult Orders:    1. Endocrine Diabetes Adult IP Consult: Patient to be seen: Routine within 24 hrs; Call back #: [phone]; BG management on steroids.; Consultant may enter orders: Yes [[id number]] ordered by Familia Dumont MD at 05/30/18 0937     2. Endocrine Diabetes Adult IP Consult: Patient to be seen: Routine within 24 hrs; Call back #: [phone]; glycemic management recommendations; Consultant may enter orders: Yes [[id number]] ordered by Familia Dumont MD at 05/30/18 0933                Diabetes/Hyperglycemia Management Consult    Chief Complaint[JH1.1] increased hyperglycemia in setting of steroids[JH1.2]  Consult requested by:[JH1.1] Dr. Familia Dumont[JH1.2]  History of Present Illness Familia Irving is a 73 year old man with type 2 diabetes, renal transplant in 2006, hypertension, and cervical myelopathy, s/p cervical fusion on 5/16/18, admitted from TCU with complaint of concern for left knee septic arthritis.  He has been treated for polyarticular gout flare with high dose steroids, with some improvement in pain and mobility.  His hospital course has additionally been complicated by SALVADOR (creatinine higher yet today), dysphagia, intermittent confusion, urinary retention, and hyperglycemia.  On admission, on 5/27/2018, BG was in 70s.  Glargine held until 5/29, when BG had reached the 200s, subsequent to administration methylpred 125 mg in evening of 5/28.[JH1.1]  Had aspart high correction as well as aspart 1 unit per 5 grams carb[JH1.2].[JH1.1]  However, for the one meal recorded yesterday, only received 1 unit per 15 grams carbohydrate.  G[JH1.2]lucose up to 300s[JH1.1] since last night.[JH1.2]   Needing feeding by staff.[JH1.1]  Pt says he's hungry and tolerating food fine. Plans for speech visit this  "afternoon.  He walked with therapy today and did okay but complained of pain and fatigue.  He's been incontinent of stool and continues with St.    Pt recalls he was hallucinating.  He attributes his confusion and other complications to the many medications he is on.[JH1.2]    Recent Labs  Lab 05/30/18  1327 05/30/18  1151 05/30/18  0746 05/30/18  0641 05/30/18  0133 05/29/18  2210 05/29/18  1732 05/29/18  1721  05/29/18  0724  05/28/18  1645  05/28/18  0709  05/27/18  1808   GLC  --   --   --  323*  --   --   --  308*  --  267*  --  163*  --  156*  --  74   * 283* 291*  --  315* 327* 274*  --   < >  --   < >  --   < >  --   < >  --    < > = values in this interval not displayed.      Diabetes Type: type 2  Diabetes Duration: 13 years  Usual Diabetes Regimen:   BID BG monitoring frequency  Glargine 30 units in AM[JH1.1]  No rapid-acting insulin, nor any oral agents per patient, H&P, and PAC PharmD assessment in April[JH1.2]  Ability to [place] Prescribed Regimen: uncertain due to present mental status, but was independent before  Diabetes Control:   At home says BGs were variable (\" all over\")  Lab Results   Component Value Date    A1C 6.6 05/28/2018    A1C 7.8 04/18/2018     Diabetes Complications: retinopathy, peripheral neuropathy  History of DKA: no  Able to Detect Hypoglycemia: yes, occurs rarely  Usual Diabetes Care Provider: PCP  Factors Impacting Glucose Control: steroids, change in nutrition, mobility      Review of Systems  10 point ROS completed with pertinent positives and negatives noted in the HPI    Past medical, family and social histories are reviewed and updated.    Past Medical History  Past Medical History:   Diagnosis Date     Cervical kyphosis      Cervical stenosis of spinal canal     with myeopathy'     Degenerative joint disease     right     Diabetes (H)      Hyperlipidemia      Hypertension      Kidney replaced by transplant      Retinopathy     no vision in left eye 2/2 " "retinal occlusion.      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  Brother lives in same apartment building on main floor.  Has two children, son and Daughter.  Daughter lives in [place].  Son, Willard, lives in [place] and is actively involved.          Physical Exam[JH1.1]  /53 (BP Location: Left arm)  Pulse 65  Temp 97.5  F (36.4  C)  Resp 18  Ht 1.727 m (5' 8\")  Wt 78.1 kg (172 lb 1.6 oz)  SpO2 96%  BMI 26.17 kg/m2[JH1.3]    General:  pleasant man resting in bed supine in no distress.   HEENT: NC/AT, PER and anicteric, non-injected, oral mucous membranes sl dry  Neck: cervical collar in place   Lungs: unlabored respiration, no cough  ABD: rounded  Skin: warm and dry, no obvious lesions  Lymp:  no LE edema   Mental status:  alert, oriented to self, place, generally to situation  Psych: a bit guarded, but calm and engaged    Laboratory  Recent Labs   Lab Test  05/30/18   0641  05/29/18   1721   NA  140  143   POTASSIUM  4.4  4.6   CHLORIDE  109  110*   CO2  16*  18*   ANIONGAP  15*  15*   GLC  323*  308*   BUN  100*  89*   CR  2.84*  2.73*   BRAXTON  7.1*  7.7*     CBC RESULTS:   Recent Labs   Lab Test  05/30/18   0641   WBC  24.2*   RBC  3.24*   HGB  9.3*   HCT  29.2*   MCV  90   MCH  28.7   MCHC  31.8   RDW  15.4*   PLT  216       Liver Function Studies -   Recent Labs   Lab Test  05/27/18   1808   PROTTOTAL  5.2*   ALBUMIN  1.9*   BILITOTAL  1.7*   ALKPHOS  87   AST  45 "   ALT  40       Active Medications  Current Facility-Administered Medications   Medication     acetaminophen (TYLENOL) tablet 1,000 mg     allopurinol (ZYLOPRIM) tablet 100 mg     cholecalciferol (vitamin D3) tablet 400 Units     glucose gel 15-30 g    Or     dextrose 50 % injection 25-50 mL    Or     glucagon injection 1 mg     folic acid (FOLVITE) tablet 1 mg     insulin aspart (NovoLOG) inj (RAPID ACTING)     insulin aspart (NovoLOG) inj (RAPID ACTING)     insulin aspart (NovoLOG) inj (RAPID ACTING)     insulin aspart (NovoLOG) inj (RAPID ACTING)     insulin aspart (NovoLOG) inj (RAPID ACTING)     insulin aspart (NovoLOG) inj (RAPID ACTING)     insulin glargine (LANTUS) injection 30 Units     methocarbamol (ROBAXIN) tablet 500 mg     mycophenolic acid (MYFORTIC BRAND) EC tablet 720 mg     naloxone (NARCAN) injection 0.1-0.4 mg     ondansetron (ZOFRAN-ODT) ODT tab 4 mg    Or     ondansetron (ZOFRAN) injection 4 mg     predniSONE (DELTASONE) tablet 40 mg     senna-docusate (SENOKOT-S;PERICOLACE) 8.6-50 MG per tablet 1-2 tablet     sodium chloride 0.45% infusion     sodium citrate-citric acid (BICITRA) solution 30 mL     tacrolimus (PROGRAF BRAND) capsule 1 mg       Current Diet    Active Diet Order      Consistent Carbohydrate Diet 9830-0789 Calories: Moderate Consistent CHO (4-6 CHO units/meal)      Assessment  Familia Irving is a 73 year old man with type 2 diabetes, renal transplant in 2006, hypertension, and cervical myelopathy, s/p cervical fusion on 5/16/18, admitted with concern for left knee septic arthritis[JH1.1], being treated for gout with steroids and now hyperglycemic[JH1.2].      Plan  -add NPH 15 units ASAP, to be given with prednisone 40 mg   -aspart 1 unit per 5 grams carbohydrate (ha[JH1.1]d[JH1.2] not yet been dosed at this level, though was ordered yesterday)  -continue glargine 30 units qAM, though may require adjustment tomorrow[JH1.1] so dose d/c-ed until data review  tomorrow[JH1.2]  -aspart correction increased to 2 per 30  Mg/dL qAC, HS 0200[JH1.1]  -BG monitor qAC, HS 0200[JH1.2]    -PRN insulin infusion[JH1.1] if glucose remains >300    Discussed with patient, RN, medicine[JH1.2]    Jo BANERJEE [facility] [phone]    Diabetes Management Team job code: [id number][JH1.1]  I spent a total of 80 minutes bedside and on the inpatient unit managing the glycemic care of Familia Irving. Over 50% of my time on the unit was spent counseling the patient and/or coordinating care regarding steroid exacerbated hyperglycemia.  See note for details.[JH1.2]       Revision History        User Key Date/Time User Provider Type Action    > JH1.2 5/30/2018  4:13 PM Jo Bolton APRN CNS Advanced Practice RN Sign     JH1.3 5/30/2018  2:04 PM Jo Bolton APRN CNS Advanced Practice RN      JH1.1 5/30/2018  1:57 PM Jo Bolton APRN CNS Advanced Practice RN             Consults by Tad Pastor MD at 5/29/2018  4:47 PM     Author:  Tad Pastor MD Service:  Nephrology Author Type:  Physician    Filed:  5/29/2018  8:50 PM Date of Service:  5/29/2018  4:47 PM Creation Time:  5/29/2018  4:47 PM    Status:  Signed :  Tad Pastor MD (Physician)     Consult Orders:    1. Nephrology Kidney/Pancreas Transplant Adult IP Consult: Patient to be seen: Routine within 24 hrs; Call back #: [phone]; Transplant pt with SALVADOR, polyarticular gout.; Consultant may enter orders: Yes [[id number]] ordered by Familia Dumont MD at 05/29/18 0838                  Nephrology Initial Consult  May 29, 2018      Familia Irving MRN:[id number] YOB: 1945  Date of Admission:5/27/2018  Primary care provider: [facility]  Requesting physician: Wade Posey*    ASSESSMENT AND RECOMMENDATIONS:   1. DDKT - baseline cr 1.5-1.7 and currently with a non oliguric acute kidney injury likely related to tubular injury with inflammatory state / lisinopril use  / immunosuppression.  Rejection is less likely with adequate drug levels.   -- No indication for dialysis  -- Monitor weights  -- Agree with holding lasix, amlodipine, and lisinopril with softer bp's noted  -- Monitor renal function daily  -- Agree with ongoing mIV fluids (half normal saline or d5w until appetite and oral intake is improved).  Isotonic fluids only if clinically intravascularly volume depleted.    2. Immunosuppression - on tacrolimus 1 mg bid and myfortic 720 mg bid.  No changes at this time.    3. Gout - currently on prednisone burst and starting allopurinol.  OK to use losartan instead of lisinopril in the future with resolution of SALVADOR episode.    4. Metabolic acidosis - recommend starting bicitra 30 meq daily    5. Fever- likely related to gout flare.  Continue to monitor.    Recommendations were communicated to primary team via note    Seen and discussed with Dr. Darby Walker MD   [phone]      REASON FOR CONSULT: history of kidney transplant    HISTORY OF PRESENT ILLNESS:  Familia Irving is a 73 year old man with history of end stage kidney disease presumed secondary to diabetes status post  donor kidney transplant 2016 that has a baseline cr 1.5-1.7 and is seen in consultation for acute kidney injury.  [facility] and [facility] chronically followed by Dr. Suggs.  His immunosuppression is tacrolimus 1 mg bid and myfortic 720 mg bid.  Information from the HPI is currently limited due to medication side effects from narcotics, per son's report.  The son is present at bedside.  The patient additionally has a medical history significant for hypertension- on lisinopril 40 mg qhs, amlodipine 10 mg daily, and furosemide 20 mg bid.  He also notes a remote history of gout, with the last episode 50 years prior.  He has degenerative disc disease and was hospitalized for elective cervical spinal fusion from -.  The post operative course is  notable for dysphagia and he received steroids for this and at discharge was tolerating a regular diet.  He was discharged to a rehab facility where he acutely developed fever and immobility due to joint pain in multiple joints (left knee and wrists notably).  ED from [facility] noted elevated wbc count to 16.5.  Orthopedics aspirated the right wrist and left knee which was consistent with gout.  He was seen by rheumatology today and noted to have possible triggers with aspirin, lasix, and recent surgery.  He has received solumedrol and feels remarkably improved.  Rheumatology recommends 5 day course of prednisone and allopurinol with uric acid goal less than 6.  His creatinine on discharge was as baseline but he is admitted with a creatinine of 2.6 and he is currently non oliguric.  Prograf level yesterday was 7.1.  He notes poor appetite and intake at the rehab facility and was maintained on bp medications.  He denies use of nsaids.  UA on admission was unremarkable.  The patient notes edema but denies dyspnea or chest pain. Fever curve has improved and infectious studies thus far negative.    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      Tinnitus        Past Surgical History:   Procedure Laterality Date     AMPUTATION Right     First and second partial amputation of toe.      AS OPEN TX KNEE DISLOCATION W REPAIR/RECONSTRUCTION  1980's     DECOMPRESSION, FUSION CERVICAL ANTERIOR THREE+ LEVELS, COMBINED N/A 5/16/2018    Procedure: COMBINED DECOMPRESSION, FUSION CERVICAL ANTERIOR THREE+ LEVELS;  Part 1: (anterior) Anterior Cervical Decompression Fusion C3-7, Cervical 7-Thoracic 1;  Anterior Iliac Crest Bone Graft [place]  Part 2: (posterior) Posterior Interbody Spinal Fusion Cervical 3-Throracic 1/Thoracic 2; Laminectomies Cervical 3-7;  Surgeon: Brijesh Bass MD;  [facility]     GRAFT BONE FROM ILIAC CREST N/A 2018    Procedure: GRAFT BONE FROM ILIAC CREST;;  Surgeon: Brijesh Bass MD;  [facility]     OPTICAL TRACKING SYSTEM FUSION POSTERIOR CERVICAL THREE + LEVELS N/A 2018    Procedure: OPTICAL TRACKING SYSTEM FUSION POSTERIOR CERVICAL THREE + LEVELS;;  Surgeon: Brijesh Bass MD;  [facility]     TRANSPLANT KIDNEY RECIPIENT  DONOR  2006        MEDICATIONS:  PTA Meds  Prior to Admission medications    Medication Sig Last Dose Taking? Auth Provider   acetaminophen (TYLENOL) 325 MG tablet Take 2 tablets (650 mg) by mouth every 4 hours as needed for other (multimodal surgical pain management along with NSAIDS and opioid medication as indicated based on pain control and physical function.)   Ebonie Lopez APRN CNP   amLODIPine (NORVASC) 10 MG tablet Take 10 mg by mouth daily   Reported, Patient   ASPIRIN PO Take 81 mg by mouth daily   Reported, Patient   FOLIC ACID PO Take 1 mg by mouth daily   Reported, Patient   FUROSEMIDE PO Take 20 mg by mouth 2 times daily    Reported, Patient   insulin glargine (LANTUS) 100 UNIT/ML injection Inject 30 Units Subcutaneous every morning    Reported, Patient   LISINOPRIL PO Take 40 mg by mouth At Bedtime    Reported, Patient   loperamide (IMODIUM) 2 MG capsule Take 4 mg by mouth as needed for diarrhea   Unknown, Entered By History   MAGNESIUM OXIDE PO Take 420 mg by mouth Every Mon, Wed, Fri Morning   Unknown, Entered By History   methocarbamol (ROBAXIN) 500 MG tablet Take 1 tablet (500 mg) by mouth 4 times daily as needed for muscle spasms   Ebonie Lopez APRN CNP   MYFORTIC (BRAND) 360 MG EC TABLET Take 720 mg by mouth 2 times daily    Reported, Patient   oxyCODONE IR  (ROXICODONE) 5 MG tablet For pain of 1-5 give 5 mg, for pain of 6-10 give 10 mg every 3 hours as needed.   Ebonie Lopez APRN CNP   PRAVASTATIN SODIUM PO Take 40 mg by mouth At Bedtime    Reported, Patient   PROGRAF (BRAND) 1 MG CAPSULE Take 1 mg by mouth 2 times daily   Reported, Patient   senna-docusate (SENOKOT-S;PERICOLACE) 8.6-50 MG per tablet Take 1 tablet by mouth 2 times daily   Ebonie Lopez APRN CNP   VITAMIN D, CHOLECALCIFEROL, PO Take 400 Units by mouth daily   Unknown, Entered By History      Current Meds    acetaminophen  1,000 mg Oral Q8H     allopurinol  100 mg Oral Daily     cholecalciferol  400 Units Oral Daily     folic acid (FOLVITE) tablet 1 mg  1 mg Oral Daily     insulin aspart  1-10 Units Subcutaneous TID AC     insulin aspart  1-7 Units Subcutaneous At Bedtime     insulin aspart   Subcutaneous QAM AC     insulin aspart   Subcutaneous Daily with lunch     insulin aspart   Subcutaneous Daily with supper     insulin glargine  30 Units Subcutaneous QAM AC     mycophenolic acid  720 mg Oral BID IS     predniSONE  40 mg Oral Daily     senna-docusate  1-2 tablet Oral BID     sodium citrate-citric acid  30 mL Oral Daily     tacrolimus  1 mg Oral BID IS     Infusion Meds    NaCl 150 mL/hr at 05/29/18 1140       ALLERGIES:    Allergies   Allergen Reactions     Heparin      Patient unsure of what reaction was, but it was severe reaction during his transplant and finally was determined that it was heparin       REVIEW OF SYSTEMS:  A comprehensive of systems was negative except as noted above.    SOCIAL HISTORY:   Social History     Social History     Marital status: Single     Spouse name: N/A     Number of children: 2     Years of education: N/A     Occupational History     retired      Social History Main Topics     Smoking status: Never Smoker     Smokeless tobacco: Never Used     Alcohol use No     Drug use: No     Sexual activity: Not on file     Other Topics Concern     Not on file  "    Social History Narrative    Lives alone in lower level apartment in [place].  Brother lives in same apartment building on main floor.  Has two children, son and Daughter.  Daughter lives in [place].  Son, Willard, lives local and is actively involved.      Reviewed with patient     FAMILY MEDICAL HISTORY:   No family history of kidney disease  Reviewed with patient     PHYSICAL EXAM:   Temp  Av.4  F (36.9  C)  Min: 95.2  F (35.1  C)  Max: 100  F (37.8  C)      Pulse  Av.6  Min: 60  Max: 77 Resp  Avg: 15.5  Min: 12  Max: 18  SpO2  Av.8 %  Min: 95 %  Max: 99 %       BP 99/47 (BP Location: Left arm)  Pulse 60  Temp 95.2  F (35.1  C) (Oral)  Resp 16  Ht 1.727 m (5' 8\")  SpO2 99%   Date 18 0700 - 18 0659   Shift 1523-5158 3351-9466 6811-1787 24 Hour Total   I  N  T  A  K  E   Shift Total       O  U  T  P  U  T   Urine 100   100    Shift Total 100   100   Weight (kg)            Admit Weight:  (unable to stand, unwilling to roll to get out hover ketan)     GENERAL APPEARANCE: no distress,  awake  EYES: no scleral icterus, pupils equal  Lymphatics: no cervical or supraclavicular LAD  Pulmonary: lungs clear to auscultation with equal breath sounds bilaterally, no clubbing  CV: regular rhythm, normal rate, no rub   - JVD none   - Edema 2+  GI: soft, nontender, normal bowel sounds  MS: no evidence of inflammation in joints, no muscle tenderness  : + castanon  SKIN: no rash, warm, dry, no cyanosis  NEURO: face symmetric, no asterixis     LABS:   CMP  Recent Labs  Lab 18  0724 18  1645 18  0709 18  1808   * 143 147* 147*   POTASSIUM 5.2 4.3 4.1 4.1   CHLORIDE 113* 114* 115* 115*   CO2 17* 17* 21 22   ANIONGAP 16* 12 11 10   * 163* 156* 74   BUN 84* 63* 61* 63*   CR 2.74* 2.50* 2.46* 2.56*   GFRESTIMATED 23* 26* 26* 25*   GFRESTBLACK 28* 31* 31* 30*   BRAXTON 8.0* 8.3* 8.1* 8.0*   MAG 1.9  --   --  1.7   PROTTOTAL  --   --   --  5.2*   ALBUMIN  --   --   --  1.9* "   BILITOTAL  --   --   --  1.7*   ALKPHOS  --   --   --  87   AST  --   --   --  45   ALT  --   --   --  40     CBC  Recent Labs  Lab 05/29/18  0724 05/28/18  0709 05/27/18  1808   HGB Canceled, Test credited 8.7* 8.2*   WBC Canceled, Test credited 20.2* 18.7*   RBC Canceled, Test credited 3.01* 2.87*   HCT Canceled, Test credited 28.0* 26.9*   MCV Canceled, Test credited 93 94   MCH Canceled, Test credited 28.9 28.6   MCHC Canceled, Test credited 31.1* 30.5*   RDW Canceled, Test credited 16.0* 16.0*   PLT Canceled, Test credited 171 163     INR  Recent Labs  Lab 05/27/18  1808   INR 1.45*     ABGNo lab results found in last 7 days.   URINE STUDIES  Recent Labs   Lab Test  05/27/18 2000 04/18/18   1334   COLOR  Yellow  Yellow   APPEARANCE  Clear  Slightly Cloudy   URINEGLC  Negative  Negative   URINEBILI  Negative  Negative   URINEKETONE  Negative  Negative   SG  1.010  1.013   UBLD  Negative  Negative   URINEPH  5.0  5.0   PROTEIN  10*  Negative   NITRITE  Negative  Negative   LEUKEST  Negative  Negative   RBCU  1  1   WBCU  1  3     No lab results found.  PTH  No lab results found.  IRON STUDIES  No lab results found.    IMAGING:  All imaging studies reviewed by me.     Iraj Walker MD[VV1.1]    Patient was seen and evaluated by me,[SR1.1] Tad Pastor MD[SR1.2]. I have reviewed the note and agree with the the plan of care as documented by the fellow.  SALVADOR, total body gout vs others. The picture is most consistent with gout although infectious etiology is still on the differential. The marked improvement with steroids is more in keeping with gout. Will keep low threshold to re-look for infections. Suggest daily prednisone 40 mg per rheumatology followed by a taper over couple of weeks.   Hold Ace-I, ok to replace with ARB when suitable. Will reevaluate the diuretic needs.[SR1.1]        Revision History        User Key Date/Time User Provider Type Action    > SR1.2 5/29/2018  8:50 PM Tad Pastor,  MD Physician Sign     SR1.1 5/29/2018  8:47 PM Tad Pastor MD Physician      VV1.1 5/29/2018  5:11 PM Iraj Walker MD Physician Sign            Consults by La Rand MD at 5/27/2018  6:49 PM     Author:  La Rand MD Service:  Orthopedics Author Type:  Resident    Filed:  5/27/2018 10:37 PM Date of Service:  5/27/2018  6:49 PM Creation Time:  5/27/2018  6:48 PM    Status:  Attested :  La Rand MD (Resident)    Cosigner:  Wade Posey MD at 5/29/2018  1:09 PM        Attestation signed by Wade Posey MD at 5/29/2018  1:09 PM        Attestation:  Physician Attestation   IWade, personally examined and evaluated this patient.  I discussed the patient with the medical student and/or resident and care team, and agree with the assessment and plan of care as documented in the note of 5/27/18 [date].      I personally reviewed vital signs, medications, labs and imaging.    Key findings: Right Wrist and Left Knee Swelling, aspirates consistent with gout.  Recommend Rheum Eval.  We will follow cultures and will wash out the joints if anything returns positive on culture results, otherwise will treat for presumed gout.    Wade Posey MD  Date of Service (when I saw the patient): 05/29/18                               [facility], Orthopaedic Surgery Consultation    Familia Irving MRN# [id number]   Age: 73 year old YOB: 1945     Date of Admission:  5/27/2018    Reason for consult: Fever, confusion, swollen knee       Requesting physician: Francisco         Assessment and Plan:   Assessment:[GT1.1]  74yo male 12 days s/p multi-level cervical decompression and fusion with right hand and forearm cellulitis and concern for left knee septic joint and infected tibial instrumentation.[GT1.2]     Plan:[GT1.1]  -Admit to ortho with medicine consult, appreciate recs.   -Continue Vancomycin  -IVF  120cc/hour for hydration.  -Hold insulin while NPO.  -Follow up cell count and culture on left knee aspirate.   -Ok for diet now, NPO at 11pm  -Plan for OR in am for left knee I&D and removal of tibial plate. Consent signed by patient in presence of son.[GT1.2]     ADDENDUM:  Patient is now expressing concern about financial coverage and does not want surgery here if it is not going to be covered. Medicare would likely cover this because this is not an elective procedure and is an urgent procedure for irrigation and debridement for infection. [facility] and [facility]. Patient said he needs an hour to think about this.[GT1.3]           History of Present Illness:   Patient was seen and examined by me. History, PMH, Meds, SH, complete ROS (10 organ systems) and PE reviewed with patient and prior medical records.      72yo male with PMH of kidney transplant in 2006, and has cervical myelopathy who is now s/p combined anterior and posterior multilevel cervical spine decompression and instrumented fusion (C3-T1) on 5/16/18 by Dr. Bass. He was discharged to a TCU on 5/21/18. He was noted to have fever and confusion that began this morning and labs revealed leukocytosis.[GT1.1] He states he has pain in all of his joints but[GT1.2] has also recently developed pain, redness and swelling of the left knee[GT1.1] that has been present since arriving to the TCU, no[GT1.2] associated trauma.[GT1.1]  He has been unable to weight bear over the past 24 hours. Denies chest pain, SOB, abdominal pain, n/v/d.    No new vision changes but has baseline blindness in left eye.[GT1.2]     Pertinent PSH of[GT1.4] tibial plateau fracture[GT1.2] treated with internal fixation in the 1980's.[GT1.4]     States he[GT1.2] history of gout[GT1.4] and last had a flare 30 years ago. 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      Tinnitus[GT1.5]              Past Surgical History:[GT1.1]     Past Surgical History:   Procedure Laterality Date     AMPUTATION Right     First and second partial amputation of toe.      AS OPEN TX KNEE DISLOCATION W REPAIR/RECONSTRUCTION       DECOMPRESSION, FUSION CERVICAL ANTERIOR THREE+ LEVELS, COMBINED N/A 2018    Procedure: COMBINED DECOMPRESSION, FUSION CERVICAL ANTERIOR THREE+ LEVELS;  Part 1: (anterior) Anterior Cervical Decompression Fusion C3-7, Cervical 7-Thoracic 1; Anterior Iliac Crest Bone Graft [place]  Part 2: (posterior) Posterior Interbody Spinal Fusion Cervical 3-Throracic 1/Thoracic 2; Laminectomies Cervical 3-7;  Surgeon: Brijesh Bass MD;  [facility]     GRAFT BONE FROM ILIAC CREST N/A 2018    Procedure: GRAFT BONE FROM ILIAC CREST;;  Surgeon: Brijesh Bass MD;  [facility]     OPTICAL TRACKING SYSTEM FUSION POSTERIOR CERVICAL THREE + LEVELS N/A 2018    Procedure: OPTICAL TRACKING SYSTEM FUSION POSTERIOR CERVICAL THREE + LEVELS;;  Surgeon: Brijesh Bass MD;  [facility]     TRANSPLANT KIDNEY RECIPIENT  DONOR  2006[GT1.5]             Social History:     Social History     Social History     Marital status: Single     Spouse name: N/A     Number of children: 2     Years of education: N/A     Occupational History     retired      Social History Main Topics     Smoking status: Never Smoker     Smokeless tobacco: Never Used     Alcohol use No     Drug use: No     Sexual activity: Not Asked     Other Topics Concern     None     Social History Narrative    Lives alone in lower level apartment in [place].  Brother lives in same  apartment building on main floor.  Has two children, son and Daughter.  Daughter lives in [place].  Son, Willard, lives local and is actively involved.              Family History:[GT1.1]   No personal or family history of bleeding or clotting disorders or complications with general anesthesia.[GT1.4]            Medications:[GT1.1]     No current facility-administered medications for this encounter.      Current Outpatient Prescriptions   Medication Sig     acetaminophen (TYLENOL) 325 MG tablet Take 2 tablets (650 mg) by mouth every 4 hours as needed for other (multimodal surgical pain management along with NSAIDS and opioid medication as indicated based on pain control and physical function.)     amLODIPine (NORVASC) 10 MG tablet Take 10 mg by mouth daily     ASPIRIN PO Take 81 mg by mouth daily     FOLIC ACID PO Take 1 mg by mouth daily     FUROSEMIDE PO Take 20 mg by mouth 2 times daily      insulin glargine (LANTUS) 100 UNIT/ML injection Inject 30 Units Subcutaneous every morning      LISINOPRIL PO Take 40 mg by mouth At Bedtime      loperamide (IMODIUM) 2 MG capsule Take 4 mg by mouth as needed for diarrhea     MAGNESIUM OXIDE PO Take 420 mg by mouth Every Mon, Wed, Fri Morning     methocarbamol (ROBAXIN) 500 MG tablet Take 1 tablet (500 mg) by mouth 4 times daily as needed for muscle spasms     MYFORTIC (BRAND) 360 MG EC TABLET Take 720 mg by mouth 2 times daily      oxyCODONE IR (ROXICODONE) 5 MG tablet For pain of 1-5 give 5 mg, for pain of 6-10 give 10 mg every 3 hours as needed.     PRAVASTATIN SODIUM PO Take 40 mg by mouth At Bedtime      PROGRAF (BRAND) 1 MG CAPSULE Take 1 mg by mouth 2 times daily     senna-docusate (SENOKOT-S;PERICOLACE) 8.6-50 MG per tablet Take 1 tablet by mouth 2 times daily     VITAMIN D, CHOLECALCIFEROL, PO Take 400 Units by mouth daily[GT1.5]             Allergies:[GT1.1]      Allergies   Allergen Reactions     Heparin      Patient unsure of what reaction was, but it was severe reaction  during his transplant and finally was determined that it was heparin[GT1.5]            Review of Systems:   A comprehensive 10 point review of systems (constitutional, ENT, cardiac, peripheral vascular, respiratory, GI, , Musculoskeletal, skin, Neurological) was performed and found to be negative except as described in this note.           Physical Exam:   COMPLETE EXAMINATION:   VITAL SIGNS:[GT1.1] /59  Pulse 73  Temp 99.1  F (37.3  C) (Oral)  Resp 16  SpO2 98%[GT1.5]  RESP:[GT1.1] Non labored breathing[GT1.2]  ABD:[GT1.1] Benign[GT1.2]  SKIN:[GT1.1] see msk exam below[GT1.2]   LYMPHATIC:[GT1.1]  Grossly normal[GT1.2]  NEURO:[GT1.1]  Grossly normal blind in left eye at baseline.[GT1.2]   VASCULAR:[GT1.1] Satisfactory perfusion of all extremities[GT1.2]  MUSCULOSKELETAL:[GT1.1]     Right arm with diffuse erythema and edema on the volar and dorsal surfaces extending up the forearm. No wrist effusion. Minimal Wrist ROM (patient states baseline arthritis).[GT1.2]     Cervical spine:[GT1.4] Anterior and posterior incisions well approximated without erythema or drainage. No fluctuance palpable.[GT1.2]   SILT C5 - C8 dermatomal distributions.  2+ radial pulses, fingers wwp with brisk cap refill.               C5:    Shoulder abduction[GT1.4] 4[GT1.2]/5 L and[GT1.4] 4[GT1.2]/5 R strength                                               C6:     Biceps L[GT1.4] 4[GT1.2]/5 and R[GT1.4] 4[GT1.2]/5 strength             Wrist Extension L[GT1.4] 4[GT1.2]/5 and R[GT1.4] 4[GT1.2]/5 strength                                              C7:     Elbow Extension  L[GT1.4] 4[GT1.2]/5  R[GT1.4] 3[GT1.2]/5[GT1.4] due to cellulitis on arm[GT1.2] and                                                         Wrist Flexion  L[GT1.4] 4[GT1.2]/5 L R[GT1.4] 4[GT1.2]/5 strength                                              C8:       L[GT1.4] 4[GT1.2]/5 and R[GT1.4] 3+[GT1.2]/5              T1:     Finger abduction L[GT1.4] 4[GT1.2]/5  and R[GT1.4] 4[GT1.2]/5                                              Sensation: intact to light touch C5 - T1 distribution BUE[GT1.4].[GT1.2]    Lumbar spine:  Sensation: intact to light touch L3-S1 distribution BLE,[GT1.4] faintly palpable[GT1.2] DP pulses foot wwp with brisk cap refill.                                                                                          L2-3: Hip flexion[GT1.4] 2+/5[GT1.2] L and[GT1.4] 3/5[GT1.2] R strength                                                                                         L4/5:  Foot dorsiflexion L[GT1.4] 5[GT1.2]/5  R[GT1.4] 5[GT1.2]/5 and                                                         EHL dorsiflexion L[GT1.4] 5[GT1.2]/5 L R[GT1.4] 5[GT1.2]/5 strength                                              S1:  Plantarflexion/Peroneal Muscles  L[GT1.4]5[GT1.2]/5 and R[GT1.4] 5[GT1.2]/5                                           Lower extremities: left knee with erythema[GT1.4] about the knee that is splotchy and not well demarcated This is severely[GT1.2] TTP[GT1.4] and[GT1.2] warm[GT1.4]. Very TTP along incision and along region superficial to tibial plate. 3+ knee[GT1.2] effusion. Tolerates[GT1.4] Very minimal knee ROM from 10 - 30 deg.[GT1.2] Palpable DP pulses bilaterally. Fires TA/GSC/EHL[GT1.4] able to perform SLR on the right, limited by pain on the left.[GT1.2]             Data:   All pertinent laboratory data reviewed  All imaging studies reviewed by me.[GT1.1]    Cervical spine XR: stable posterior spine instrumentation and interbody fusion as compared to postoperative XR. No significant disc space collapse or listhesis.     Left knee XR: Lateral proximal tibial plate. No comparison available, however, significantly decreased density of the lateral proximal tibial bone beneath the plate. No halos around screws to suggests loosening of instrumentation. Significant calcification of the popliteal and posterior tibial arteries. Soft tissue swelling  and knee effusion.[GT1.4]     UE Ultrasound: no upper extremity DVT.[GT1.2]     Recent Labs   Lab Test  18   1808  18   0955  18   0736   18   0400  18   1718   HGB  8.2*  9.5*  10.4*   < >  9.0*  9.6*  9.7*   WBC  18.7*   --    --    --   9.6  12.1*    < > = values in this interval not displayed.     No results for input(s): FTYP, FNEU, FOTH, FCOL, FAPR, FWBC in the last 28572 hours.    Signed:    This consultation[GT1.1] has been[GT1.4] discussed with [GT1.1] Kalpesh[GT1.4], Attending Physician.[GT1.1]    La Rand PGY-4  Orthopedic Surgery  [phone][GT1.4]         Revision History        User Key Date/Time User Provider Type Action    > GT1.3 2018 10:37 PM La Rand MD Resident Sign     GT1.2 2018  9:31 PM La Rand MD Resident Sign     GT1.4 2018  7:21 PM La Rand MD Resident      GT1.5 2018  6:49 PM La Rand MD Resident      GT1.1 2018  6:48 PM La Rand MD Resident             Consults by Alisson Mccain MSW at 2018 10:35 AM     Author:  Alisson Mccain MSW Service:  Social Work Author Type:      Filed:  2018 12:32 PM Date of Service:  2018 10:35 AM Creation Time:  2018 10:35 AM    Status:  Addendum :  Alisson Mccain MSW ()         Social Work: Assessment with Discharge Plan    Patient Name:  Familia Irving  :  1945  Age:  73 year old  MRN:  [id number]  Risk/Complexity Score:   6  Completed assessment with:  10A IDT, Pt, Admissions at Pending sale to [facility] [phone]    Presenting Information   Reason for Referral:  Discharge plan  Date of Intake:  May 29, 2018  Referral Source:  Physician  Decision Maker:  pt  Alternate Decision Maker:  Daughter is listed on Health Care Directive- Silvia Mohr  Health Care Directive:  Copy in Chart  Living Situation:  House  Previous Functional Status:   Assistance with Other:  pt was at TCU at [facility] in [facility]. Pt does not want to return there...  Patient and family understanding of hospitalization:  Seeking assessment of fever, ? Infection if related to recent spine surgery  Cultural/Language/Spiritual Considerations:   [facility]  Adjustment to Illness:  Has some confusion    Physical Health  Reason for Admission:[MK1.1]    1. Fever and chills    2. Pyogenic arthritis of left knee joint, due to unspecified organism (H)    3. Cellulitis of right arm[MK1.2]      Services Needed/Recommended:  TCU-pt requesting referral to [facility] [phone] F: [phone]    Mental Health/Chemical Dependency  Diagnosis:  None noted  Support/Services in Place:  None noted  Services Needed/Recommended:  None noted    Support System  Significant relationship at present time:  Yes, adult children. Son lives in [place], Daughter Silvia lives in [place]  Family of origin is available for support:  Friends, [facility] community  Other support available:   None noted  Gaps in support system:  None noted  Patient is caregiver to:  None     Provider Information   Primary Care Physician:  Clinic, [facility]   [phone]   Clinic:  [facility]      :  None noted    Financial   Income Source:  Pension, social security  Financial Concerns:   None noted  Insurance:    Payor/Plan Subscriber Name Rel Member # Group #   COMMERCIAL - [facility] MEDIC* JC SHI  [id number]        [id number]       Discharge Plan   Patient and family discharge goal:  DC to [facility]. Pt does not want to return to [facility]  Provided education on discharge plan:  YES  Patient agreeable to discharge plan:  YES  A list of Medicare Certified Facilities was provided to the patient and/or family to encourage patient choice. Patient's choices for facility are:  [facility]  Will NH provide Skilled rehabilitation or complex medical:  YES  General  information regarding anticipated insurance coverage and possible out of pocket cost was discussed. Patient and patient's family are aware patient may incur the cost of transportation to the facility, pending insurance payment: YES  Barriers to discharge:  None noted    Discharge Recommendations   Anticipated Disposition:  Facility:  [facility]  Transportation Needs:  Family:  anticipate family will provide transportatin    Additional comments   Pt is known to writer from previous hospitalization. Pt had gone to [facility], pt states he does not want to return there but is agreeable to having referral sent to [facility]. Writer left voicemail message and faxed referral information, await assessment response. Pt and family worried that his VA will not pay for this hospitalization as it is not certain pt's kidney function and gout is not related to /caused by pt's recent spine surgery.  RNCC Jacki Bobo is following through with VA, pt has authorization for [facility] hospitalization through June 1, 2018.    Pt's PAS from recent hospital stay should still be valid: [id number] as pt did not return to community from SNF.[MK1.1]    1231 Addendum:  Writer spoke w/Tacho, pt's son. Writer provided support, validation and reassurance about DC plan. Tacho processed some of his emotional response to his father's rehospitalization. Tacho and Silvia support their father in [facility]. They had no complaints of care at [facility], but pt is now [facility]. SW con't to follow[MK1.3]     Revision History        User Key Date/Time User Provider Type Action    > MK1.3 5/29/2018 12:32 PM Alisson Mccain MSW  Addend     MK1.2 5/29/2018 11:03 AM Alisson Mccain MSW  Sign     MK1.1 5/29/2018 10:35 AM Alisson Mccain MSW              Consults by Juan Morales MD at 5/28/2018 12:24 AM     Author:   Juan Morales MD Service:  Hospitalist Author Type:  Resident    Filed:  5/28/2018  1:53 AM Date of Service:  5/28/2018 12:24 AM Creation Time:  5/28/2018 12:24 AM    Status:  Attested :  Juan Morales MD (Resident)    Cosigner:  Dominguez Márquez MD at 5/28/2018 11:36 AM         Consult Orders:    1. [facility] to be seen: Routine within 24 hrs; Call back #: [phone]; pre op clearance. Surgery planned 5/28/18.; Consultant may enter orders: Yes [[id number]] ordered by La Rand MD at 05/27/18 2139           Attestation signed by Dominguez Márquez MD at 5/28/2018 11:36 AM        Pt was seen, consultation reviewed.                               [facility] [place]    Internal Medicine Consultation     Date of Admission:  5/27/2018    Assessment & Plan   Familia Irving is a 73 year old male with history of kidney transplantation (2006), hypertension, gout, diabetes mellitus, and cervical myelopathy s/p recent combined anterior and posterior multilevel cervical spine decompression and instrumented fusion (C3-T1) on 5/16/18 by Dr. Bass who presented to outside hospital with concern for left knee septic arthritis in the setting of[JS1.1] implanted[JS1.2] tibial hardware. Medicine is consulted for management of co-morbidities and pre-operative clearance.     Concern for left knee septic arthritis  - Plan is for OR in AM for I&D and removal of tibial plate   - Patient has been started on IV vancomycin[JS1.1]  - Gram stain and culture pending, synovial fluid WBC count 30K nearly all PMN's   - Blood cultures drawn   - Pain control   - If he develops interval focal neurologic deficits, would have low threshold to scan brain for seeding of infection given reported intermittent confusion, especially if blood cultures return positive, his neuro exam was limited by patient cooperation this  evening[JS1.2]    Pre-op assessment   Patient has a Revised Cardiac Risk Index Score of 2 due to insulin dependence and elevated creatinine, making him considered an elevated risk for MACE. He has approximately a 6.6% risk of MACE. That being said, he has[JS1.1] had[JS1.2] good functional capacity[JS1.1] (at least prior to requiring rehabilitation from his recent neck surgery he could perform easily > 4 METS)[JS1.2] and no active ACS or CHF that would be a major contraindication to urgent surgery. Would recommend proceeding with surgery.     Acute kidney injury[JS1.1]  He reports recent baseline creatinine ~ 1.9. Patient is poor historian but reports being on and off furosemide recently, unclear indication. No diagnosis of CHF. May have contributed to SALVADOR in the setting of infection.   - Agree with holding lisinopril, will hold furosemide as well given hypernatremia, poor oral intake, and SALVADOR  - UA appears bland   - Consider ultrasound of transplanted kidney is not improving with IVF  - Recheck in AM  - Tacrolimus level (supratherapeutic calcineurin inhibitors can cause SALVADOR), he will have gotten his evening dose late which may affect the level and require recheck rather than dose adjustment     Hypernatremia  - Switched fluids to 1/2 NS overnight to treat intravascular depletion/SALVADOR as well as give additional free water to treat sodium[JS1.2]     DM2[JS1.1]  - Pt unsure if he used glargine today, FSBG consistently ~ 70 here, hold insulin, D5 in fluids for now     Chronic anemia   - Stable, no signs of bleeding, likely multifactorial[JS1.2]     Hypertension[JS1.1]  - Continue amlodipine, hold lisinopril[JS1.2]     FEN:[JS1.1] NPO for surgery, D5 1/2 NS @ 150 cc/hr overnight[JS1.2]   CODE:[JS1.1] Full[JS1.2]  Prophy:[JS1.1] SCD's[JS1.2]   Dispo: Admitted to orthopedic service, medicine is consulted    Chad Morales MD  [facility]     Reason for Consult   Co-management[JS1.1] / Pre op clearance[JS1.2]  "    Primary Care Physician   [facility]    Chief Complaint   Knee pain, feve[JS1.1]r[JS1.2]    History is obtained from the patient    History of Present Illness   Mr. Irving is a 73 year old male with PMH of kidney transplant, DM2, hypertension, gout, and has cervical myelopathy who is now s/p combined anterior and posterior multilevel cervical spine decompression and instrumented fusion (C3-T1) on 5/16/18 by Dr. Bass. He was discharged to a TCU on 5/21/18.     He was[JS1.1] in his usual state of health working on rehab up until yesterday. He was then[JS1.2] noted to have fever and confusion that began[JS1.1] suddenly[JS1.2] this morning[JS1.1]. L[JS1.2]abs revealed leukocytosis. He states he has pain in all of his joints but has also recently developed pain, redness, and swelling of the left knee that has been present since arriving to the TCU, no associated trauma.  He has been unable to weight bear over the past 24 hours.[JS1.1] He has been having tingling \"all over\" as well not able to localize it any further at this time. 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      Tinnitus[JS1.3]        Past Surgical History   I have reviewed this patient's surgical history and updated it with pertinent information if needed.[JS1.1]  Past Surgical History:   Procedure Laterality Date     AMPUTATION Right     First and second partial amputation of toe.      AS " OPEN TX KNEE DISLOCATION W REPAIR/RECONSTRUCTION       DECOMPRESSION, FUSION CERVICAL ANTERIOR THREE+ LEVELS, COMBINED N/A 2018    Procedure: COMBINED DECOMPRESSION, FUSION CERVICAL ANTERIOR THREE+ LEVELS;  Part 1: (anterior) Anterior Cervical Decompression Fusion C3-7, Cervical 7-Thoracic 1; Anterior Iliac Crest Bone Graft [place]  Part 2: (posterior) Posterior Interbody Spinal Fusion Cervical 3-Throracic 1/Thoracic 2; Laminectomies Cervical 3-7;  Surgeon: Brijesh Bass MD;  Location: [facility] OR     GRAFT BONE FROM ILIAC CREST N/A 2018    Procedure: GRAFT BONE FROM ILIAC CREST;;  Surgeon: Brijesh Bass MD;  Location: [facility] OR     OPTICAL TRACKING SYSTEM FUSION POSTERIOR CERVICAL THREE + LEVELS N/A 2018    Procedure: OPTICAL TRACKING SYSTEM FUSION POSTERIOR CERVICAL THREE + LEVELS;;  Surgeon: Brijesh Bass MD;  Location: [facility] OR     TRANSPLANT KIDNEY RECIPIENT  DONOR  2006[JS1.4]       Prior to Admission Medications   Prior to Admission Medications   Prescriptions Last Dose Informant Patient Reported? Taking?   ASPIRIN PO   Yes No   Sig: Take 81 mg by mouth daily   FOLIC ACID PO   Yes No   Sig: Take 1 mg by mouth daily   FUROSEMIDE PO   Yes No   Sig: Take 20 mg by mouth 2 times daily    LISINOPRIL PO   Yes No   Sig: Take 40 mg by mouth At Bedtime    MAGNESIUM OXIDE PO   Yes No   Sig: Take 420 mg by mouth Every Mon, Wed, Fri Morning   MYFORTIC (BRAND) 360 MG EC TABLET   Yes No   Sig: Take 720 mg by mouth 2 times daily    PRAVASTATIN SODIUM PO   Yes No   Sig: Take 40 mg by mouth At Bedtime    PROGRAF (BRAND) 1 MG CAPSULE   Yes No   Sig: Take 1 mg by mouth 2 times daily   VITAMIN D, CHOLECALCIFEROL, PO   Yes No   Sig: Take 400 Units by mouth daily   acetaminophen (TYLENOL) 325 MG tablet   No No   Sig: Take 2 tablets (650 mg) by mouth every 4 hours as needed for other (multimodal surgical pain management along with NSAIDS and opioid medication as indicated  based on pain control and physical function.)   amLODIPine (NORVASC) 10 MG tablet   Yes No   Sig: Take 10 mg by mouth daily   insulin glargine (LANTUS) 100 UNIT/ML injection   Yes No   Sig: Inject 30 Units Subcutaneous every morning    loperamide (IMODIUM) 2 MG capsule   Yes No   Sig: Take 4 mg by mouth as needed for diarrhea   methocarbamol (ROBAXIN) 500 MG tablet   No No   Sig: Take 1 tablet (500 mg) by mouth 4 times daily as needed for muscle spasms   oxyCODONE IR (ROXICODONE) 5 MG tablet   No No   Sig: For pain of 1-5 give 5 mg, for pain of 6-10 give 10 mg every 3 hours as needed.   senna-docusate (SENOKOT-S;PERICOLACE) 8.6-50 MG per tablet   No No   Sig: Take 1 tablet by mouth 2 times daily      Facility-Administered Medications: None     Allergies   Allergies   Allergen Reactions     Heparin      Patient unsure of what reaction was, but it was severe reaction during his transplant and finally was determined that it was heparin       Social History   I have reviewed this patient's social history and updated it with pertinent information if needed. Familia Irving[JS1.1]  reports that he has never smoked. He has never used smokeless tobacco. He reports that he does not drink alcohol or use illicit drugs.[JS1.4]    Family History   I have reviewed this patient's family history and updated it with pertinent information if needed.[JS1.1]   No family history on file.[JS1.4]    Review of Systems   The 10 point Review of Systems is negative other than noted in the HPI.    Physical Exam   Temp: 98.5  F (36.9  C) Temp src: Oral BP: 131/61 Pulse: 73 Heart Rate: 76 Resp: 18 SpO2: 99 % O2 Device: None (Room air)    Vital Signs with Ranges  Temp:  [98.5  F (36.9  C)-99.1  F (37.3  C)] 98.5  F (36.9  C)  Pulse:  [73] 73  Heart Rate:  [76] 76  Resp:  [16-18] 18  BP: (110-131)/(59-61) 131/61  SpO2:  [98 %-99 %] 99 %  0 lbs 0 oz    Constitutional:[JS1.1] No apparent distress, c collar in place[JS1.2]  Eyes:[JS1.1] Non icteric,  PERRL[JS1.2]  ENT:[JS1.1] MMM[JS1.2]  Respiratory:[JS1.1] CTAB without wheezes or crackles[JS1.2]  Cardiovascular:[JS1.1] RRR, normal S1 S2, no murmurs[JS1.2]   GI:[JS1.1] NABS, soft, non tender[JS1.2]  Skin:[JS1.1] Mild cellulitic changes over left knee, marked, no stigmata of endocarditis[JS1.2]   Musculoskeletal:[JS1.1] Warm, red, tender left knee[JS1.2]  Neurologic:[JS1.1] Alert and oriented, moving all extremities spontaneously, exam limited by patient cooperation, no numbness noted, not willing to do strength testing without better pain control[JS1.2]     Data[JS1.1]   Results for orders placed or performed during the hospital encounter of 05/27/18 (from the past 24 hour(s))   Blood culture   Result Value Ref Range    Specimen Description Blood Left Arm     Special Requests Aerobic and anaerobic bottles received     Culture Micro PENDING    CBC with platelets differential   Result Value Ref Range    WBC 18.7 (H) 4.0 - 11.0 10e9/L    RBC Count 2.87 (L) 4.4 - 5.9 10e12/L    Hemoglobin 8.2 (L) 13.3 - 17.7 g/dL    Hematocrit 26.9 (L) 40.0 - 53.0 %    MCV 94 78 - 100 fl    MCH 28.6 26.5 - 33.0 pg    MCHC 30.5 (L) 31.5 - 36.5 g/dL    RDW 16.0 (H) 10.0 - 15.0 %    Platelet Count 163 150 - 450 10e9/L    Diff Method Automated Method     % Neutrophils 85.2 %    % Lymphocytes 7.6 %    % Monocytes 6.5 %    % Eosinophils 0.0 %    % Basophils 0.1 %    % Immature Granulocytes 0.6 %    Nucleated RBCs 0 0 /100    Absolute Neutrophil 16.0 (H) 1.6 - 8.3 10e9/L    Absolute Lymphocytes 1.4 0.8 - 5.3 10e9/L    Absolute Monocytes 1.2 0.0 - 1.3 10e9/L    Absolute Eosinophils 0.0 0.0 - 0.7 10e9/L    Absolute Basophils 0.0 0.0 - 0.2 10e9/L    Abs Immature Granulocytes 0.1 0 - 0.4 10e9/L    Absolute Nucleated RBC 0.0    Comprehensive metabolic panel   Result Value Ref Range    Sodium 147 (H) 133 - 144 mmol/L    Potassium 4.1 3.4 - 5.3 mmol/L    Chloride 115 (H) 94 - 109 mmol/L    Carbon Dioxide 22 20 - 32 mmol/L    Anion Gap 10 3 - 14  mmol/L    Glucose 74 70 - 99 mg/dL    Urea Nitrogen 63 (H) 7 - 30 mg/dL    Creatinine 2.56 (H) 0.66 - 1.25 mg/dL    GFR Estimate 25 (L) >60 mL/min/1.7m2    GFR Estimate If Black 30 (L) >60 mL/min/1.7m2    Calcium 8.0 (L) 8.5 - 10.1 mg/dL    Bilirubin Total 1.7 (H) 0.2 - 1.3 mg/dL    Albumin 1.9 (L) 3.4 - 5.0 g/dL    Protein Total 5.2 (L) 6.8 - 8.8 g/dL    Alkaline Phosphatase 87 40 - 150 U/L    ALT 40 0 - 70 U/L    AST 45 0 - 45 U/L   CRP inflammation   Result Value Ref Range    CRP Inflammation 247.0 (H) 0.0 - 8.0 mg/L   Erythrocyte sedimentation rate auto   Result Value Ref Range    Sed Rate 103 (H) 0 - 20 mm/h   INR   Result Value Ref Range    INR 1.45 (H) 0.86 - 1.14   Lactic acid   Result Value Ref Range    Lactic Acid 0.8 0.7 - 2.0 mmol/L   Lipase   Result Value Ref Range    Lipase 36 (L) 73 - 393 U/L   Magnesium   Result Value Ref Range    Magnesium 1.7 1.6 - 2.3 mg/dL   Troponin I   Result Value Ref Range    Troponin I ES <0.015 0.000 - 0.045 ug/L   Blood culture   Result Value Ref Range    Specimen Description Blood Right Hand     Special Requests Aerobic and anaerobic bottles received     Culture Micro PENDING    XR Knee Left 1/2 Views    Narrative    LEFT KNEE ONE TO TWO VIEWS  5/27/2018 7:10 PM     COMPARISON: None.    HISTORY: Left knee swelling and pain.    FINDINGS: There is a side plate and screws affixed to the proximal  left femur and healed tibial plateau fracture deformity. No recent  fractures noted. Left knee joint alignment appears normal.     BYRON TENORIO MD   Cervical spine XR, 2-3 views    Narrative    CERVICAL SPINE TWO TO THREE VIEWS   5/27/2018 7:10 PM     COMPARISON: Two view cervical spine 5/20/2018.    HISTORY: Recent cervical fusion.        Impression    IMPRESSION: Posterior spine fusion hardware from C2 down to T2 again  noted. Interbody fusion devices in the C3-C4, C4-C5, C5-C6 and C6-C7  interspaces again noted. Alignment of the cervical vertebrae remains  normal. Hardware is  unchanged.     BYRON TENORIO MD   Chest  XR, 1 view portable    Narrative    CHEST PORTABLE ONE VIEW  5/27/2018 7:12 PM     COMPARISON: None.    HISTORY: Fever.    FINDINGS: The cardiac silhouette, pulmonary vasculature, lungs and  pleural spaces are within normal limits.      Impression    IMPRESSION: Clear lungs.    BYRON TENORIO MD   US Upper Extremity Venous Duplex Bilat    Narrative    US UPPER EXTREMITY VENOUS DUPLEX BILATERAL 5/27/2018 7:44 PM     HISTORY: Bilateral arm swelling after recent neck surgery.    TECHNIQUE: Real-time ultrasound with compression. Color flow imaging  and Doppler waveform analysis.    COMPARISON: None.    FINDINGS:     Right side: Normal blood flow and/or compressibility throughout the  right internal jugular, subclavian, axillary, cephalic, basilic,  brachial, radial and ulnar veins.    Left side: Left internal jugular vein is not visualized, in part due  to the presence of a C-collar. Normal blood flow and/or  compressibility throughout the left subclavian, axillary, cephalic,  basilic, brachial, radial and ulnar veins.      Impression    IMPRESSION: No evidence for deep or superficial vein thrombosis in the  upper extremities bilaterally.      MONSTER PENNY MD   UA with Microscopic   Result Value Ref Range    Color Urine Yellow     Appearance Urine Clear     Glucose Urine Negative NEG^Negative mg/dL    Bilirubin Urine Negative NEG^Negative    Ketones Urine Negative NEG^Negative mg/dL    Specific Gravity Urine 1.010 1.003 - 1.035    Blood Urine Negative NEG^Negative    pH Urine 5.0 5.0 - 7.0 pH    Protein Albumin Urine 10 (A) NEG^Negative mg/dL    Urobilinogen mg/dL Normal 0.0 - 2.0 mg/dL    Nitrite Urine Negative NEG^Negative    Leukocyte Esterase Urine Negative NEG^Negative    Source Midstream Urine     WBC Urine 1 0 - 5 /HPF    RBC Urine 1 0 - 2 /HPF    Bacteria Urine Few (A) NEG^Negative /HPF    Amorphous Crystals Few (A) NEG^Negative /HPF   Urine Culture   Result Value Ref  Range    Specimen Description Midstream Urine     Special Requests Specimen received in preservative     Culture Micro PENDING    Cell count with differential fluid   Result Value Ref Range    Body Fluid Analysis Source Synovial fluid     % Neutrophils Fluid 97 %    % Mono/Macro Fluid 3 %    Color Fluid Yellow     Appearance Fluid Cloudy     WBC Fluid 09201 /uL   Crystal ID synovial fluid   Result Value Ref Range    Crystal Analysis (A) NOCRYS^No clincally significant crystals seen.     Positive for intracellular crystals, consistent with monosodium urate crystals.   Anaerobic bacterial culture   Result Value Ref Range    Specimen Description Synovial fluid Aspirate     Special Requests Received in anaerobic tubes.     Culture Micro PENDING    Gram stain   Result Value Ref Range    Specimen Description Synovial fluid Aspirate     Gram Stain       No organisms seen  Result confirmed by cytospin preparation.      Gram Stain Many  WBC'S seen  predominantly PMN's       Gram Stain CALLED FELICIA MONROE MD 5.27.18 2205 WR.     Gram Stain       Gram stain slide reviewed at the [facility]   Glucose by meter   Result Value Ref Range    Glucose 71 70 - 99 mg/dL[JS1.5]              Revision History        User Key Date/Time User Provider Type Action    > JS1.5 5/28/2018  1:53 AM Juan Morales MD Resident Sign     JS1.2 5/28/2018  1:21 AM Juan Morales MD Resident      JS1.4 5/28/2018 12:39 AM Juan Morales MD Resident      JS1.3 5/28/2018 12:38 AM Juan Morales MD Resident      JS1.1 5/28/2018 12:24 AM Juan Morales MD Resident                      Progress Notes - Physician (Notes for yesterday and today)      Progress Notes by Jo Bolton APRN CNS at 6/6/2018 10:06 AM     Author:  Jo Bolton APRN CNS Service:  Endocrinology Author Type:  Advanced Practice RN    Filed:  6/6/2018 10:07 AM Date of Service:  6/6/2018 10:06  AM Creation Time:  6/6/2018 10:06 AM    Status:  Signed :  Jo Bolton APRN CNS (Advanced Practice RN)         Diabetes team brief note-  Active orders were updated this morning to fixed meal dose aspart and reduced glargine.  We defer to primary team for discharge diabetes regimen.    Jo Bolton CHRISS VALENTINE [phone][JH1.1]           Revision History        User Key Date/Time User Provider Type Action    > JH1.1 6/6/2018 10:07 AM Jo Bolton APRN CNS Advanced Practice RN Sign            Progress Notes by Dominguez Márquez MD at 6/6/2018  9:45 AM     Author:  Dominguez Márquez MD Service:  Hospitalist Author Type:  Physician    Filed:  6/6/2018  9:54 AM Date of Service:  6/6/2018  9:45 AM Creation Time:  6/6/2018  9:45 AM    Status:  Signed :  Dominguez Márquez MD (Physician)         Pt feels well, is anxious to d/c to TCU  Generalized joint pain continues to improve    Appetite good, Pt states he is drinking a normal amount  Denies excessive thirst  No diarrhea  PVR less than 100  UO recorded at 1500 cc yesterday, but pt was also incontinent        VSS    Results for JC SHI (MRN [id number]) as of 6/6/2018 09:47   Ref. Range 6/5/2018 22:10 6/6/2018 02:11 6/6/2018 02:46 6/6/2018 07:01 6/6/2018 07:28   Glucose Latest Ref Range: 70 - 99 mg/dL 177 (H) 73 72 81 76     Alert, fully oriented  Oral mucosa moist  Lungs clear  CV rrr  And soft  1 + LE edema both LE  Both wrists remain mod swollen, non-tender      Results for JC SHI (MRN [id number]) as of 6/6/2018 09:47   Ref. Range 6/6/2018 07:01   Sodium Latest Ref Range: 133 - 144 mmol/L 151 (H)   Potassium Latest Ref Range: 3.4 - 5.3 mmol/L 4.2   Chloride Latest Ref Range: 94 - 109 mmol/L 121 (H)   Carbon Dioxide Latest Ref Range: 20 - 32 mmol/L 21   Urea Nitrogen Latest Ref Range: 7 - 30 mg/dL 63 (H)   Creatinine Latest Ref Range: 0.66 - 1.25 mg/dL 1.57 (H)   GFR Estimate Latest Ref Range: >60 mL/min/1.7m2 44  (L)   GFR Estimate If Black Latest Ref Range: >60 mL/min/1.7m2 53 (L)   Calcium Latest Ref Range: 8.5 - 10.1 mg/dL 7.8 (L)   Anion Gap Latest Ref Range: 3 - 14 mmol/L 9       Assessment    1)  Acute polyarthritis secondary to gout, improved. Suspect late positive culture (diptheroids) R wrist represents a contaminant  2)  SALVADOR. Cr 2.8 max. Continues to improve.  S/p DDKT 2006.  Pt with hypernatremia, likely related to recent lasix, decreased oral intake.  less likely DI or post SALVADOR concentrating defect  3)  S/P A/P cervical fusion 5/16.    4)  Oral pharyngeal dysphagia 2nd to #1, stable   5)  Anemia 2nd to acute illness and recent acute blood loss.     7)  Type II DM. BG intermittently low, endocrine has been adjusting dose  8)  HTN, off PTA amlodipine and lisinopril  9)  generalized edema, likely secondary to IV fluids, SALVADOR, improved, with excellent diuresis with IV followed by po lasix. Lasix now on hold  10)  Hypocalcemia, secondary to low Vit D, on Vit D an calcium replacement replacement.  Stable    Plan  Pt is medically stable for d/c to TCU  Hypernatremia is a concern. I suspect Pt is simply not drinking sufficient amount of 02    I and staff encouraged him to drink water this am. He was noted to drink approximately 12 ounces without difficulty  Discussed this concerns with accepting NP, who is ok with Pt d/c, with close attention to intake and BMP   [DS1.1]     Revision History        User Key Date/Time User Provider Type Action    > DS1.1 6/6/2018  9:54 AM Dominguez Márquez MD Physician Sign            Progress Notes by Dominguez Márquez MD at 6/5/2018  1:18 PM     Author:  Dominguez Márquez MD Service:  Hospitalist Author Type:  Physician    Filed:  6/5/2018  1:44 PM Date of Service:  6/5/2018  1:18 PM Creation Time:  6/5/2018  1:18 PM    Status:  Addendum :  Dominguez Márquez MD (Physician)         Pt states he is feeling stronger overall  Notes occ diarrhea, wants  i[DS1.1]modium  Appetite fair  Ambulating with walker  States generalized joint pain is improving    VSS  BP 110s-160s/  Afebrile  BG 80s-mid 100s  UO 4275 yesterday    Alert, in NAD, oriented to person, place, general circumstances  Lungs clear  CV rrr no M  Abd soft  Mild swelling both wrists, improved  1-2 + ankle and pedal edema B     Crystal Analysis Positive for intracellular crystals, consistent with monosodium urate crystals. (A)      Anaerobic bacterial culture [[id number]] (Abnormal) Collected: 05/28/18 0737     Order Status: Completed Lab Status: Preliminary result Updated: 06/05/18 1112     Specimen: Synovial fluid from Arm, Right       Specimen Description Synovial fluid Right Wrist      Special Requests Not received in an anaerobic transport container.      Culture Micro On day 8, isolated in broth only:   Gram positive bacilli resembling diphtheroids   Possible aerobic organism, aerotolerance testing in progress. Await final report.    (A)       Results for JC SHI (MRN [id number]) as of 6/5/2018 13:36   Ref. Range 6/5/2018 06:23   Sodium Latest Ref Range: 133 - 144 mmol/L 149 (H)   Potassium Latest Ref Range: 3.4 - 5.3 mmol/L 4.4   Chloride Latest Ref Range: 94 - 109 mmol/L 116 (H)   Carbon Dioxide Latest Ref Range: 20 - 32 mmol/L 21   Urea Nitrogen Latest Ref Range: 7 - 30 mg/dL 74 (H)   Creatinine Latest Ref Range: 0.66 - 1.25 mg/dL 1.75 (H)   GFR Estimate Latest Ref Range: >60 mL/min/1.7m2 38 (L)   GFR Estimate If Black Latest Ref Range: >60 mL/min/1.7m2 47 (L)   Calcium Latest Ref Range: 8.5 - 10.1 mg/dL 7.9 (L)   Anion Gap Latest Ref Range: 3 - 14 mmol/L 12   Glucose Latest Ref Range: 70 - 99 mg/dL 173 (H)     Assessment    1)  Acute polyarthritis secondary to gout, improved. Suspect late positive culture (diptheroids) R wrist represents a contaminant  2)  SALVADOR. Cr 2.8 max. Continues to improve.  S/p DDKT 2006.     3)  S/P A/P cervical fusion 5/16.    4)  Oral pharyngeal dysphagia 2nd to #1,  stable   5)  Anemia 2nd to acute illness and recent acute blood loss.     7)  Type II DM. BG intermittently low, endocrine has been adjusting dose  8)  HTN, on PTA amlodipine   9)  generalized edema, likely secondary to IV fluids, SALVADOR, improved, with excellent diuresis with IV followed by po lasix  10)  Hypocalcemia, secondary to low Vit D, on Vit D an calcium replacement replacement.  Stable    Plan  Hold lasix today, repeat BMP, review UO today  Stool for C diff[DS1.2]  St to be removed today[DS1.3]  Monitor R wrist for signs of infection, no further evaluation at this time (reviewed with ID informally)[DS1.2]  Endocrine is adjusting insulin[DS1.3]   Possible d/c to[DS1.2] NH[DS1.3] tomorrow   [DS1.2]     Revision History        User Key Date/Time User Provider Type Action    > [N/A] 6/5/2018  1:44 PM Dominguez Márquez MD Physician Addend     DS1.3 6/5/2018  1:42 PM Dominguez Márquez MD Physician Addend     DS1.2 6/5/2018  1:35 PM Dominguez Márquez MD Physician      DS1.1 6/5/2018  1:19 PM Dominguez Márquez MD Physician Sign            Progress Notes by Jo Bolton APRN CNS at 6/5/2018 11:35 AM     Author:  Jo Bolton APRN CNS Service:  Endocrinology Author Type:  Advanced Practice RN    Filed:  6/5/2018 11:39 AM Date of Service:  6/5/2018 11:35 AM Creation Time:  6/5/2018 11:35 AM    Status:  Signed :  Jo Bolton APRN CNS (Advanced Practice RN)                                   Diabetes Consult Daily  Progress Note          Assessment/Plan:   Familia Irving is a 73 year old man with type 2 diabetes, renal transplant in 2006, hypertension, and cervical myelopathy, s/p cervical fusion on 5/16/18, admitted with concern for left knee septic arthritis, being treated for gout with steroids, leading to significant hyperglycemia.       Hypoglycemia again yesterday evening, though had only 1 unit aspart at lunch.  Bill is convinced the low BG correlates with  diarrhea.     Plan  -glargine will decrease from 16 units to approx 12 today  -aspart for meals and snacks:  1unit/20g CHO (likely convert to fixed dose for discharge)  -aspart correction medium intensity qAC, HS 0200  -BG monitor qAC, HS 0200         Outpatient diabetes follow up: likely w/ PCP  Plan discussed with patient and bedside RN, and Dr. Márquez.           Interval History:     The last 24 hours progress and nursing notes reviewed.  Jose Alberto complains primarily of loose stool.  His favorite foods, like mashed potatoes, are causing near instantaneous diarrhea.  Generally, he's bored with room service.  So, boredom combined with the loose stool means he's still eating less than usual.  At home ate quite steadily and heartily and managed any loose stool with immodium and acetaminophen.  Creatinine continues to trend down.  Getting up to walk 1-2 times/day    Planning for discharge to TCU on Wed- [facility].  Per Dr. Márquez, they will not carb count there    Prednisone 40 mg finished 6/2.      PTA Regimen: Lantus 30 units every morning, BID glucose monitoring        Recent Labs  Lab 06/05/18  0623 06/04/18  2150 06/04/18  1844 06/04/18  1828 06/04/18  1812 06/04/18  1756 06/04/18  1736  06/04/18  0644  06/03/18  0646  06/02/18  0622  06/01/18  0537  05/31/18  1008   *  --   --   --   --   --   --   --  101*  --  90  --  100*  --  156*  --  155*   BGM  --  167* 100* 82 84 70 52*  < >  --   < >  --   < >  --   < >  --   < >  --    < > = values in this interval not displayed.            Review of Systems:   See interval hx          Medications:       Active Diet Order      Consistent Carbohydrate Diet [id number] Calories: Moderate Consistent CHO (4-6 CHO units/meal)     Physical Exam:  Gen: Alert, resting in bed, in NAD   HEENT: NC/AT, mucous membranes are moist  Neck: cervical collar remains in place  Resp: Unlabored  Neuro:oriented x3, communication tangential at times  /65 (BP Location: Right arm)   "Pulse 67  Temp 97.3  F (36.3  C) (Oral)  Resp 18  Ht 1.727 m (5' 8\")  Wt 71 kg (156 lb 9.6 oz)  SpO2 100%  BMI 23.81 kg/m2           Data:     Lab Results   Component Value Date    A1C 6.6 05/28/2018    A1C 7.8 04/18/2018            Recent Labs   Lab Test  06/05/18   0623  06/04/18   0644   NA  149*  146*   POTASSIUM  4.4  4.4   CHLORIDE  116*  115*   CO2  21  21   ANIONGAP  12  10   GLC  173*  101*   BUN  74*  88*   CR  1.75*  1.93*   BRAXTON  7.9*  7.7*     CBC RESULTS:   Recent Labs   Lab Test  06/02/18   0622  05/31/18   1008   WBC   --   Canceled, Test credited   RBC   --   Canceled, Test credited   HGB  10.0*  Canceled, Test credited   HCT   --   Canceled, Test credited   MCV   --   Canceled, Test credited   MCH   --   Canceled, Test credited   MCHC   --   Canceled, Test credited   RDW   --   Canceled, Test credited   PLT   --   Canceled, Test credited     Jo Bolton APRN -[id number][JH1.1]    Diabetes Management job code 0243             Revision History        User Key Date/Time User Provider Type Action    > JH1.1 6/5/2018 11:39 AM Jo Bolton APRN CNS Advanced Practice RN Sign                  Procedure Notes     No notes of this type exist for this encounter.      Progress Notes - Therapies (Notes from 06/03/18 through 06/06/18)     No notes of this type exist for this encounter.                                          INTERAGENCY TRANSFER FORM - LAB / IMAGING / EKG / EMG RESULTS   5/27/2018                       UR 8A: [phone]            Unresulted Labs     None         Lab Results - 3 Days      Glucose by meter [[id number]]  Resulted: 06/06/18 0746, Result status: Final result    Ordering provider: Familia Dumont MD  06/06/18 0728 Resulting lab: POINT OF CARE TEST, GLUCOSE    Specimen Information    Type Source Collected On     06/06/18 0728          Components       Value Reference Range Flag Lab   Glucose 76 70 - 99 mg/dL  170            Basic metabolic panel [[id number]] " (Abnormal)  Resulted: 06/06/18 0746, Result status: Final result    Ordering provider: Dominguez Márquez MD  06/06/18 0000 Resulting lab: [facility]    Specimen Information    Type Source Collected On   Blood  06/06/18 0701          Components       Value Reference Range Flag Lab   Sodium 151 133 - 144 mmol/L H 13   Potassium 4.2 3.4 - 5.3 mmol/L  13   Chloride 121 94 - 109 mmol/L H 13   Carbon Dioxide 21 20 - 32 mmol/L  13   Anion Gap 9 3 - 14 mmol/L  13   Glucose 81 70 - 99 mg/dL  13   Urea Nitrogen 63 7 - 30 mg/dL H 13   Creatinine 1.57 0.66 - 1.25 mg/dL H 13   GFR Estimate 44 >60 mL/min/1.7m2 L 13   Comment:  Non  GFR Calc   GFR Estimate If Black 53 >60 mL/min/1.7m2 L 13   Comment:  African American GFR Calc   Calcium 7.8 8.5 - 10.1 mg/dL L 13            Glucose by meter [[id number]]  Resulted: 06/06/18 0302, Result status: Final result    Ordering provider: Familia Dumont MD  06/06/18 0246 Resulting lab: POINT OF CARE TEST, GLUCOSE    Specimen Information    Type Source Collected On     06/06/18 0246          Components       Value Reference Range Flag Lab   Glucose 72 70 - 99 mg/dL  170            Glucose by meter [[id number]]  Resulted: 06/06/18 0226, Result status: Final result    Ordering provider: Familia Dumont MD  06/06/18 0211 Resulting lab: POINT OF CARE TEST, GLUCOSE    Specimen Information    Type Source Collected On     06/06/18 0211          Components       Value Reference Range Flag Lab   Glucose 73 70 - 99 mg/dL  170            Glucose by meter [[id number]] (Abnormal)  Resulted: 06/05/18 2223, Result status: Final result    Ordering provider: Familia Dumont MD  06/05/18 2210 Resulting lab: POINT OF CARE TEST, GLUCOSE    Specimen Information    Type Source Collected On     06/05/18 2210          Components       Value Reference Range Flag Lab   Glucose 177 70 - 99 mg/dL H 170            Glucose by meter [[id number]] (Abnormal)   Resulted: 06/05/18 1744, Result status: Final result    Ordering provider: Familia Dumont MD  06/05/18 1730 Resulting lab: POINT OF CARE TEST, GLUCOSE    Specimen Information    Type Source Collected On     06/05/18 1730          Components       Value Reference Range Flag Lab   Glucose 147 70 - 99 mg/dL H 170            Glucose by meter [[id number]] (Abnormal)  Resulted: 06/05/18 1345, Result status: Final result    Ordering provider: Familia Dumont MD  06/05/18 1332 Resulting lab: POINT OF CARE TEST, GLUCOSE    Specimen Information    Type Source Collected On     06/05/18 1332          Components       Value Reference Range Flag Lab   Glucose 170 70 - 99 mg/dL H 170   Comment:  /RN Notified            Anaerobic bacterial culture [[id number]] (Abnormal)  Resulted: 06/05/18 1112, Result status: Preliminary result    Ordering provider: Wade Posey MD  05/28/18 0658 Resulting lab: [facility]    Specimen Information    Type Source Collected On   Synovial fluid Arm, Right 05/28/18 0737   Comment:  Right Wrist          Components       Value Reference Range Flag Lab   Specimen Description Synovial fluid Right Wrist      Special Requests Not received in an anaerobic transport container.   75   Culture Micro --  A 225   Result:         On day 8, isolated in broth only:  Gram positive bacilli resembling diphtheroids  Possible aerobic organism, aerotolerance testing in progress. Await final report.     Culture Micro --   225   Result:         Critical Value/Significant Value, preliminary result only, called to and read back by   Miguel Mckee RN. (6/5/18 @ 1112)JR              Basic metabolic panel [[id number]] (Abnormal)  Resulted: 06/05/18 0700, Result status: Final result    Ordering provider: Dominguez Márquez MD  06/05/18 0000 Resulting lab: [facility]    Specimen Information    Type Source Collected On   Blood  06/05/18 0623           Components       Value Reference Range Flag Lab   Sodium 149 133 - 144 mmol/L H 13   Potassium 4.4 3.4 - 5.3 mmol/L  13   Chloride 116 94 - 109 mmol/L H 13   Carbon Dioxide 21 20 - 32 mmol/L  13   Anion Gap 12 3 - 14 mmol/L  13   Glucose 173 70 - 99 mg/dL H 13   Urea Nitrogen 74 7 - 30 mg/dL H 13   Creatinine 1.75 0.66 - 1.25 mg/dL H 13   GFR Estimate 38 >60 mL/min/1.7m2 L 13   Comment:  Non  GFR Calc   GFR Estimate If Black 47 >60 mL/min/1.7m2 L 13   Comment:  African American GFR Calc   Calcium 7.9 8.5 - 10.1 mg/dL L 13            Glucose by meter [[id number]] (Abnormal)  Resulted: 06/05/18 0621, Result status: Final result    Ordering provider: Familia Dumont MD  06/04/18 2150 Resulting lab: POINT OF CARE TEST, GLUCOSE    Specimen Information    Type Source Collected On     06/04/18 2150          Components       Value Reference Range Flag Lab   Glucose 167 70 - 99 mg/dL H 170            Glucose by meter [[id number]]  Resulted: 06/04/18 1858, Result status: Final result    Ordering provider: Familia Dumont MD  06/04/18 1756 Resulting lab: POINT OF CARE TEST, GLUCOSE    Specimen Information    Type Source Collected On     06/04/18 1756          Components       Value Reference Range Flag Lab   Glucose 70 70 - 99 mg/dL  170            Glucose by meter [[id number]]  Resulted: 06/04/18 1858, Result status: Final result    Ordering provider: Familia Dumont MD  06/04/18 1812 Resulting lab: POINT OF CARE TEST, GLUCOSE    Specimen Information    Type Source Collected On     06/04/18 1812          Components       Value Reference Range Flag Lab   Glucose 84 70 - 99 mg/dL  170            Glucose by meter [[id number]]  Resulted: 06/04/18 1858, Result status: Final result    Ordering provider: Familia Dumont MD  06/04/18 1828 Resulting lab: POINT OF CARE TEST, GLUCOSE    Specimen Information    Type Source Collected On     06/04/18 1828          Components       Value  Reference Range Flag Lab   Glucose 82 70 - 99 mg/dL  170            Glucose by meter [[id number]] (Abnormal)  Resulted: 06/04/18 1858, Result status: Final result    Ordering provider: Familia Dumont MD  06/04/18 1844 Resulting lab: POINT OF CARE TEST, GLUCOSE    Specimen Information    Type Source Collected On     06/04/18 1844          Components       Value Reference Range Flag Lab   Glucose 100 70 - 99 mg/dL H 170            Glucose by meter [[id number]] (Abnormal)  Resulted: 06/04/18 1753, Result status: Final result    Ordering provider: Familia Dumont MD  06/04/18 1721 Resulting lab: POINT OF CARE TEST, GLUCOSE    Specimen Information    Type Source Collected On     06/04/18 1721          Components       Value Reference Range Flag Lab   Glucose 51 70 - 99 mg/dL L 170            Glucose by meter [[id number]] (Abnormal)  Resulted: 06/04/18 1753, Result status: Final result    Ordering provider: Familia Dumont MD  06/04/18 1736 Resulting lab: POINT OF CARE TEST, GLUCOSE    Specimen Information    Type Source Collected On     06/04/18 1736          Components       Value Reference Range Flag Lab   Glucose 52 70 - 99 mg/dL L 170            Glucose by meter [[id number]] (Abnormal)  Resulted: 06/04/18 1753, Result status: Final result    Ordering provider: Familia Dumont MD  06/04/18 1130 Resulting lab: POINT OF CARE TEST, GLUCOSE    Specimen Information    Type Source Collected On     06/04/18 1130          Components       Value Reference Range Flag Lab   Glucose 141 70 - 99 mg/dL H 170            Glucose by meter [[id number]] (Abnormal)  Resulted: 06/04/18 1753, Result status: Final result    Ordering provider: Familia Dumont MD  06/04/18 0452 Resulting lab: POINT OF CARE TEST, GLUCOSE    Specimen Information    Type Source Collected On     06/04/18 0452          Components       Value Reference Range Flag Lab   Glucose 104 70 - 99 mg/dL H 170            Glucose by meter [[id number]]   Resulted: 06/04/18 1753, Result status: Final result    Ordering provider: Familia Dumont MD  06/04/18 0710 Resulting lab: POINT OF CARE TEST, GLUCOSE    Specimen Information    Type Source Collected On     06/04/18 0710          Components       Value Reference Range Flag Lab   Glucose 98 70 - 99 mg/dL  170            Basic metabolic panel [[id number]] (Abnormal)  Resulted: 06/04/18 0712, Result status: Final result    Ordering provider: Familia Dumont MD  06/04/18 0000 Resulting lab: [facility]    Specimen Information    Type Source Collected On   Blood  06/04/18 0644          Components       Value Reference Range Flag Lab   Sodium 146 133 - 144 mmol/L H 13   Potassium 4.4 3.4 - 5.3 mmol/L  13   Chloride 115 94 - 109 mmol/L H 13   Carbon Dioxide 21 20 - 32 mmol/L  13   Anion Gap 10 3 - 14 mmol/L  13   Glucose 101 70 - 99 mg/dL H 13   Urea Nitrogen 88 7 - 30 mg/dL H 13   Creatinine 1.93 0.66 - 1.25 mg/dL H 13   GFR Estimate 34 >60 mL/min/1.7m2 L 13   Comment:  Non  GFR Calc   GFR Estimate If Black 42 >60 mL/min/1.7m2 L 13   Comment:  African American GFR Calc   Calcium 7.7 8.5 - 10.1 mg/dL L 13            Calcium ionized whole blood [[id number]] (Abnormal)  Resulted: 06/04/18 0655, Result status: Final result    Ordering provider: Familia Dumont MD  06/04/18 0000 Resulting lab: [facility]    Specimen Information    Type Source Collected On   Blood  06/04/18 0644          Components       Value Reference Range Flag Lab   Calcium Ionized Whole Blood 4.2 4.4 - 5.2 mg/dL L 13            Glucose by meter [[id number]] (Abnormal)  Resulted: 06/03/18 2144, Result status: Final result    Ordering provider: Familia Dumont MD  06/03/18 2132 Resulting lab: POINT OF CARE TEST, GLUCOSE    Specimen Information    Type Source Collected On     06/03/18 2132          Components       Value Reference Range Flag Lab   Glucose 117  70 - 99 mg/dL H 170   Comment:  Dr/RN Notified            Glucose by meter [[id number]] (Abnormal)  Resulted: 06/03/18 2022, Result status: Final result    Ordering provider: Familia Dumont MD  06/03/18 1849 Resulting lab: POINT OF CARE TEST, GLUCOSE    Specimen Information    Type Source Collected On     06/03/18 1849          Components       Value Reference Range Flag Lab   Glucose 44 70 - 99 mg/dL    Comment:  Dr/RN Notified            Glucose by meter [[id number]] (Abnormal)  Resulted: 06/03/18 2022, Result status: Final result    Ordering provider: Familia Dumont MD  06/03/18 1901 Resulting lab: POINT OF CARE TEST, GLUCOSE    Specimen Information    Type Source Collected On     06/03/18 1901          Components       Value Reference Range Flag Lab   Glucose 57 70 - 99 mg/dL L 170   Comment:  Dr/RN Notified            Glucose by meter [[id number]]  Resulted: 06/03/18 2022, Result status: Final result    Ordering provider: Familia Dumont MD  06/03/18 1916 Resulting lab: POINT OF CARE TEST, GLUCOSE    Specimen Information    Type Source Collected On     06/03/18 1916          Components       Value Reference Range Flag Lab   Glucose 86 70 - 99 mg/dL  170   Comment:  Dr/RN Notified            Glucose by meter [[id number]]  Resulted: 06/03/18 2022, Result status: Final result    Ordering provider: Familia Dumont MD  06/03/18 1938 Resulting lab: POINT OF CARE TEST, GLUCOSE    Specimen Information    Type Source Collected On     06/03/18 1938          Components       Value Reference Range Flag Lab   Glucose 98 70 - 99 mg/dL  170            Glucose by meter [[id number]] (Abnormal)  Resulted: 06/03/18 2022, Result status: Final result    Ordering provider: Familia Dumont MD  06/03/18 2005 Resulting lab: POINT OF CARE TEST, GLUCOSE    Specimen Information    Type Source Collected On     06/03/18 2005          Components       Value Reference Range Flag Lab   Glucose 109 70 - 99 mg/dL  H 170            Tacrolimus level [[id number]]  Resulted: 06/03/18 1253, Result status: Final result    Ordering provider: Familia Dumont MD  06/03/18 0000 Resulting lab: [facility]    Specimen Information    Type Source Collected On   Blood  06/03/18 0646          Components       Value Reference Range Flag Lab   Tacrolimus Last Dose Not Provided   75   Tacrolimus Level 6.2 5.0 - 15.0 ug/L  75   Comment:         Tacrolimus Reference Range  Kidney Transplant  Pediatric                      ug/L    0-3 months post transplant   10-12    3-6 months post transplant   8-10    6-12 months post transplant  6-8    >12 months post transplant   4-7  Adult    0-6 months post transplant   8-10    6-12 months post transplant  6-8    >12 months post transplant   4-6    >5 years post transplant     3-5  Heart Transplant  Pediatric    0-12 months post transplant  10-15    >12 months post transplant   5-10  Adult    0-3 months post transplant   10-15    3-6 months post transplant   8-12    6-12 months post transplant  6-12    >12 months post transplant   6-10  Lung Transplant    0-12 months post transplant  10-15    >12 months post transplant   8-12  Liver Transplant  Pediatric    0-3 months post transplant   10-15    3-6 months post transplant   8-10    >6 months post transplant    6-8  Adult    0-3 months post transplant   10-12    3-6 months post transplant   8-10    >6 months post transplant    6-8  Pancrea  s Transplant    0-6 months post transplant   8-10    >6 months post transplant    5-8  This test was developed and its performance characteristics determined by the   [facility],  Special Chemistry Laboratory. It has   not been cleared or approved by the FDA. The laboratory is regulated under   CLIA as qualified to perform high-complexity testing. This test is used for   clinical purposes. It should not be regarded as investigational or for   research.               Glucose by meter [[id number]]  Resulted: 06/03/18 1249, Result status: Final result    Ordering provider: Familia Dumont MD  06/03/18 1237 Resulting lab: POINT OF CARE TEST, GLUCOSE    Specimen Information    Type Source Collected On     06/03/18 1237          Components       Value Reference Range Flag Lab   Glucose 96 70 - 99 mg/dL  170            Glucose by meter [[id number]] (Abnormal)  Resulted: 06/03/18 1147, Result status: Final result    Ordering provider: Familia Dumont MD  06/03/18 1134 Resulting lab: POINT OF CARE TEST, GLUCOSE    Specimen Information    Type Source Collected On     06/03/18 1134          Components       Value Reference Range Flag Lab   Glucose 107 70 - 99 mg/dL H 170            Glucose by meter [[id number]]  Resulted: 06/03/18 0855, Result status: Final result    Ordering provider: Familia Dumont MD  06/03/18 0843 Resulting lab: POINT OF CARE TEST, GLUCOSE    Specimen Information    Type Source Collected On     06/03/18 0843          Components       Value Reference Range Flag Lab   Glucose 73 70 - 99 mg/dL  170            Albumin level [[id number]] (Abnormal)  Resulted: 06/03/18 0821, Result status: Final result    Ordering provider: Familia Dumont MD  06/03/18 0646 Resulting lab: [facility]    Specimen Information    Type Source Collected On     06/03/18 0646          Components       Value Reference Range Flag Lab   Albumin 1.9 3.4 - 5.0 g/dL L 13            Basic metabolic panel [[id number]] (Abnormal)  Resulted: 06/03/18 0712, Result status: Final result    Ordering provider: Familia Dumont MD  06/03/18 0000 Resulting lab: [facility]    Specimen Information    Type Source Collected On   Blood  06/03/18 0646          Components       Value Reference Range Flag Lab   Sodium 144 133 - 144 mmol/L  13   Potassium 4.6 3.4 - 5.3 mmol/L  13   Chloride 114 94 - 109 mmol/L H 13   Carbon Dioxide  18 20 - 32 mmol/L L 13   Anion Gap 12 3 - 14 mmol/L  13   Glucose 90 70 - 99 mg/dL  13   Urea Nitrogen 99 7 - 30 mg/dL H 13   Creatinine 2.26 0.66 - 1.25 mg/dL H 13   GFR Estimate 29 >60 mL/min/1.7m2 L 13   Comment:  Non  GFR Calc   GFR Estimate If Black 35 >60 mL/min/1.7m2 L 13   Comment:  African American GFR Calc   Calcium 7.6 8.5 - 10.1 mg/dL L 13            Calcium ionized whole blood [[id number]] (Abnormal)  Resulted: 06/03/18 0657, Result status: Final result    Ordering provider: Familia Dumont MD  06/03/18 0000 Resulting lab: [facility]    Specimen Information    Type Source Collected On   Blood  06/03/18 0646          Components       Value Reference Range Flag Lab   Calcium Ionized Whole Blood 4.2 4.4 - 5.2 mg/dL L 13            Glucose by meter [[id number]] (Abnormal)  Resulted: 06/03/18 0245, Result status: Final result    Ordering provider: Familia Dumont MD  06/03/18 0232 Resulting lab: POINT OF CARE TEST, GLUCOSE    Specimen Information    Type Source Collected On     06/03/18 0232          Components       Value Reference Range Flag Lab   Glucose 159 70 - 99 mg/dL H 170            Testing Performed By     Lab - Abbreviation Name Director Address Valid Date Range    13 - Unknown [facility] Unknown [address] [facility] [id number] 01/15/15 0916 - Present    75 - Unknown [facility] Unknown [address] [facility] [address] 01/15/15 1019 - Present    170 - Unknown POINT OF CARE TEST, GLUCOSE Unknown Unknown 10/31/11 1114 - Present    225 - Unknown [facility] Unknown [address] [facility] [address] 12/19/14 0954 - Present            Encounter-Level Documents:     There are no encounter-level documents.      Order-Level Documents:     There are no order-level documents.

## 2018-05-27 NOTE — ED PROVIDER NOTES
History     Chief Complaint   Patient presents with     Fever     after neck surgery two weeks ago: confusion this morning     HPI  Familia Irving is a 73 year old male who presents with fever to 101.5  and confusion starting this morning. He was seen at an ED in [place] and apparently had labs there including and elevated WBC of 16.5 but was sent without labs or paperwork.. He had recent AP cervical fusion surgery here. He currently has no pain in the neck. He has had bilateral hand and wrist swelling and pain since the surgery. More recently he has developed pain, redness and swelling of his left knee. He denies headache, visual changes, cough, sputum, shortness of breath, nausea or vomiting. He has had some loose stool. He has no difficulty with urination or pain over his transplant kidney. He is unhappy with pain management st his current nursing home.    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      Tinnitus        PAST SURGICAL HISTORY:   Past Surgical History:   Procedure Laterality Date     AMPUTATION Right     First and second partial amputation of toe.      AS OPEN TX KNEE DISLOCATION W REPAIR/RECONSTRUCTION  1980's     DECOMPRESSION, FUSION CERVICAL ANTERIOR THREE+ LEVELS, COMBINED N/A 5/16/2018    Procedure: COMBINED DECOMPRESSION, FUSION CERVICAL ANTERIOR THREE+ LEVELS;  Part 1: (anterior) Anterior Cervical Decompression Fusion C3-7, Cervical 7-Thoracic 1; Anterior Iliac Crest Bone Graft Flushing  Part 2: (posterior) Posterior Interbody Spinal Fusion Cervical 3-Throracic 1/Thoracic 2; Laminectomies Cervical 3-7;  Surgeon: Brijesh Bass MD;  Location: [facility] OR     GRAFT BONE FROM ILIAC CREST N/A  2018    Procedure: GRAFT BONE FROM ILIAC CREST;;  Surgeon: Brijesh Bass MD;  Location: [facility] OR     OPTICAL TRACKING SYSTEM FUSION POSTERIOR CERVICAL THREE + LEVELS N/A 2018    Procedure: OPTICAL TRACKING SYSTEM FUSION POSTERIOR CERVICAL THREE + LEVELS;;  Surgeon: Brijesh Bass MD;  Location: [facility] OR     TRANSPLANT KIDNEY RECIPIENT  DONOR  2006       FAMILY HISTORY: No family history on file.    SOCIAL HISTORY:   Social History   Substance Use Topics     Smoking status: Never Smoker     Smokeless tobacco: Never Used     Alcohol use No         I have reviewed the Medications, Allergies, Past Medical and Surgical History, and Social History in the Epic system.    Review of Systems   Constitutional: Positive for fever. Negative for chills.   HENT: Negative for congestion and trouble swallowing.    Eyes: Negative for visual disturbance.   Respiratory: Negative for cough, shortness of breath and wheezing.    Cardiovascular: Negative for chest pain.   Gastrointestinal: Positive for diarrhea. Negative for abdominal pain, nausea and vomiting.   Genitourinary: Negative for dysuria.   Musculoskeletal: Positive for joint swelling. Negative for back pain and neck pain.   Skin: Negative for rash.   Neurological: Positive for weakness and numbness. Negative for light-headedness and headaches.   Hematological: Negative for adenopathy.   Psychiatric/Behavioral: Positive for confusion.       Physical Exam   BP: 110/59  Pulse: 73  Temp: 99.1  F (37.3  C)  Resp: 16  SpO2: 98 %      Physical Exam   Constitutional: He appears well-developed and well-nourished. He appears listless. No distress.   HENT:   Head: Normocephalic and atraumatic.   Right Ear: External ear normal.   Left Ear: External ear normal.   Nose: Nose normal.   Mouth/Throat: Oropharynx is clear and moist. No oropharyngeal exudate.   Eyes: EOM are normal. Pupils are equal, round, and reactive to light. No scleral icterus.    Neck:   Neck in Fence J collar. Anterior and posterior incisions are clean and dry, no redness.   Cardiovascular: Normal rate, regular rhythm and normal heart sounds.  Exam reveals no friction rub.    No murmur heard.  Pulmonary/Chest: Effort normal and breath sounds normal. He has no wheezes. He has no rales.   Abdominal: Soft. Bowel sounds are normal. There is no tenderness. There is no rebound and no guarding.   Musculoskeletal: He exhibits edema (RUE 3+, LUE 1+ bilateral LE 1+).        Left knee: He exhibits decreased range of motion, swelling, effusion and erythema. Tenderness found.        Legs:  Neurological: He appears listless. No cranial nerve deficit or sensory deficit. He exhibits normal muscle tone. GCS eye subscore is 4. GCS verbal subscore is 5. GCS motor subscore is 6.   Strength 3+ UE/4/5LE bilaterally. No sensory deficits.   Skin: Skin is warm and dry.   Psychiatric: He has a normal mood and affect. His behavior is normal.   Nursing note and vitals reviewed.      ED Course     ED Course     Procedures        Labs/Imaging    Results for orders placed or performed during the hospital encounter of 05/27/18 (from the past 24 hour(s))   Blood culture   Result Value Ref Range    Specimen Description Blood Left Arm     Special Requests Aerobic and anaerobic bottles received     Culture Micro PENDING    CBC with platelets differential   Result Value Ref Range    WBC 18.7 (H) 4.0 - 11.0 10e9/L    RBC Count 2.87 (L) 4.4 - 5.9 10e12/L    Hemoglobin 8.2 (L) 13.3 - 17.7 g/dL    Hematocrit 26.9 (L) 40.0 - 53.0 %    MCV 94 78 - 100 fl    MCH 28.6 26.5 - 33.0 pg    MCHC 30.5 (L) 31.5 - 36.5 g/dL    RDW 16.0 (H) 10.0 - 15.0 %    Platelet Count 163 150 - 450 10e9/L    Diff Method Automated Method     % Neutrophils 85.2 %    % Lymphocytes 7.6 %    % Monocytes 6.5 %    % Eosinophils 0.0 %    % Basophils 0.1 %    % Immature Granulocytes 0.6 %    Nucleated RBCs 0 0 /100    Absolute Neutrophil 16.0 (H) 1.6 - 8.3 10e9/L     Absolute Lymphocytes 1.4 0.8 - 5.3 10e9/L    Absolute Monocytes 1.2 0.0 - 1.3 10e9/L    Absolute Eosinophils 0.0 0.0 - 0.7 10e9/L    Absolute Basophils 0.0 0.0 - 0.2 10e9/L    Abs Immature Granulocytes 0.1 0 - 0.4 10e9/L    Absolute Nucleated RBC 0.0    Comprehensive metabolic panel   Result Value Ref Range    Sodium 147 (H) 133 - 144 mmol/L    Potassium 4.1 3.4 - 5.3 mmol/L    Chloride 115 (H) 94 - 109 mmol/L    Carbon Dioxide 22 20 - 32 mmol/L    Anion Gap 10 3 - 14 mmol/L    Glucose 74 70 - 99 mg/dL    Urea Nitrogen 63 (H) 7 - 30 mg/dL    Creatinine 2.56 (H) 0.66 - 1.25 mg/dL    GFR Estimate 25 (L) >60 mL/min/1.7m2    GFR Estimate If Black 30 (L) >60 mL/min/1.7m2    Calcium 8.0 (L) 8.5 - 10.1 mg/dL    Bilirubin Total 1.7 (H) 0.2 - 1.3 mg/dL    Albumin 1.9 (L) 3.4 - 5.0 g/dL    Protein Total 5.2 (L) 6.8 - 8.8 g/dL    Alkaline Phosphatase 87 40 - 150 U/L    ALT 40 0 - 70 U/L    AST 45 0 - 45 U/L   CRP inflammation   Result Value Ref Range    CRP Inflammation 247.0 (H) 0.0 - 8.0 mg/L   Erythrocyte sedimentation rate auto   Result Value Ref Range    Sed Rate 103 (H) 0 - 20 mm/h   INR   Result Value Ref Range    INR 1.45 (H) 0.86 - 1.14   Lactic acid   Result Value Ref Range    Lactic Acid 0.8 0.7 - 2.0 mmol/L   Lipase   Result Value Ref Range    Lipase 36 (L) 73 - 393 U/L   Magnesium   Result Value Ref Range    Magnesium 1.7 1.6 - 2.3 mg/dL   Troponin I   Result Value Ref Range    Troponin I ES <0.015 0.000 - 0.045 ug/L   Blood culture   Result Value Ref Range    Specimen Description Blood Right Hand     Special Requests Aerobic and anaerobic bottles received     Culture Micro PENDING    XR Knee Left 1/2 Views    Narrative    LEFT KNEE ONE TO TWO VIEWS  5/27/2018 7:10 PM     COMPARISON: None.    HISTORY: Left knee swelling and pain.    FINDINGS: There is a side plate and screws affixed to the proximal  left femur and healed tibial plateau fracture deformity. No recent  fractures noted. Left knee joint alignment  appears normal.     BYRON TENORIO MD   Cervical spine XR, 2-3 views    Narrative    CERVICAL SPINE TWO TO THREE VIEWS   5/27/2018 7:10 PM     COMPARISON: Two view cervical spine 5/20/2018.    HISTORY: Recent cervical fusion.        Impression    IMPRESSION: Posterior spine fusion hardware from C2 down to T2 again  noted. Interbody fusion devices in the C3-C4, C4-C5, C5-C6 and C6-C7  interspaces again noted. Alignment of the cervical vertebrae remains  normal. Hardware is unchanged.     BYRON TENORIO MD   Chest  XR, 1 view portable    Narrative    CHEST PORTABLE ONE VIEW  5/27/2018 7:12 PM     COMPARISON: None.    HISTORY: Fever.    FINDINGS: The cardiac silhouette, pulmonary vasculature, lungs and  pleural spaces are within normal limits.      Impression    IMPRESSION: Clear lungs.    BYRON TENORIO MD   US Upper Extremity Venous Duplex Bilat    Narrative    US UPPER EXTREMITY VENOUS DUPLEX BILATERAL 5/27/2018 7:44 PM     HISTORY: Bilateral arm swelling after recent neck surgery.    TECHNIQUE: Real-time ultrasound with compression. Color flow imaging  and Doppler waveform analysis.    COMPARISON: None.    FINDINGS:     Right side: Normal blood flow and/or compressibility throughout the  right internal jugular, subclavian, axillary, cephalic, basilic,  brachial, radial and ulnar veins.    Left side: Left internal jugular vein is not visualized, in part due  to the presence of a C-collar. Normal blood flow and/or  compressibility throughout the left subclavian, axillary, cephalic,  basilic, brachial, radial and ulnar veins.      Impression    IMPRESSION: No evidence for deep or superficial vein thrombosis in the  upper extremities bilaterally.       UA with Microscopic   Result Value Ref Range    Color Urine Yellow     Appearance Urine Clear     Glucose Urine Negative NEG^Negative mg/dL    Bilirubin Urine Negative NEG^Negative    Ketones Urine Negative NEG^Negative mg/dL    Specific Gravity Urine 1.010 1.003 - 1.035     Blood Urine Negative NEG^Negative    pH Urine 5.0 5.0 - 7.0 pH    Protein Albumin Urine 10 (A) NEG^Negative mg/dL    Urobilinogen mg/dL Normal 0.0 - 2.0 mg/dL    Nitrite Urine Negative NEG^Negative    Leukocyte Esterase Urine Negative NEG^Negative    Source Midstream Urine     WBC Urine 1 0 - 5 /HPF    RBC Urine 1 0 - 2 /HPF    Bacteria Urine Few (A) NEG^Negative /HPF    Amorphous Crystals Few (A) NEG^Negative /HPF       Assessments & Plan (with Medical Decision Making)   Impression:  Middle aged male with a history of kidney transplant and recent multilevel cervical discectomy and iliac crest bone graft fixation. He presents with fever to 101.5 today, minimal confusion. He has a new red, warm and tender left knee. The left knee has had prior hardware fixaxtion of a tibial plateau fracture and has residual hardware in place. He has generalized anasarca consistent with his low albumen (he had significant blood loss with his surgery, but he has asymmetric swelling of the right forearm and wrist. He has no evidence of DVT in the upper extremities on doppler US of both UE. He has no evidence of post operative pneumonia on CXR. His neck incisions appear clean and dry without warmth or erythema. Cervical alignment on XR appears stable. He has elevated WBC and markedly elevated CRP. He has no evidence of UTI. Most likely cause of the fever is suspected to be the left knee with either hardware infection, septic arthritis or acute gouty arthritis. Cellulitis of the RUE is also possible. Surgical site infection in the neck appears lower on the differential. At this point he has been evaluated by orthopedics in the ED, who did arthrocentesis of the left knee. They will admit him to their service with plan for probable left knee hardware removal in the AM.    I have reviewed the nursing notes.    I have reviewed the findings, diagnosis, plan and need for follow up with the patient.    New Prescriptions    No medications on  file       Final diagnoses:   Fever and chills   Pyogenic arthritis of left knee joint, due to unspecified organism (H)   Cellulitis of right arm       5/27/2018   [facility], [place], EMERGENCY DEPARTMENT     Nathan Singh MD  05/27/18 2907

## 2018-05-27 NOTE — ED NOTES
Bed: IN01  Expected date:   Expected time:   Means of arrival:   Comments:  CC:   Fever, altered mental status post cervical fusion  ETA:   3952  Sender/Contact Info:     [place] in [place]       Wants Callback (Y/N)?  Plan:     Call taken by:  Ozzy KING  Patient Name:  Familia alexandre

## 2018-05-27 NOTE — IP AVS SNAPSHOT
[facility]    [address]    [facility] [place] [address]    Phone:  [phone]                                       After Visit Summary   5/27/2018    Familia Irving    MRN: [id number]           After Visit Summary Signature Page     I have received my discharge instructions, and my questions have been answered. I have discussed any challenges I see with this plan with the nurse or doctor.    ..........................................................................................................................................  Patient/Patient Representative Signature      ..........................................................................................................................................  Patient Representative Print Name and Relationship to Patient    ..................................................               ................................................  Date                                            Time    ..........................................................................................................................................  Reviewed by Signature/Title    ...................................................              ..............................................  Date                                                            Time

## 2018-05-27 NOTE — LETTER
Transition Communication Hand-off for Care Transitions to Next Level of Care Provider    Name: Familia Irving  : 1945  MRN #: [id number]  Primary Care Provider: [facility]     Primary Clinic: [facility]     Reason for Hospitalization:  Fever and chills [R50.9]  Cellulitis of right arm [L03.113]  Pyogenic arthritis of left knee joint, due to unspecified organism (H) [M00.9]  Gout [M10.9]  Admit Date/Time: 2018  5:56 PM  Discharge Date: 18  Payor Source: Payor: COMMERCIAL / Plan: [facility] / Product Type: Indemnity /            Reason for Communication Hand-off Referral: Other discharge plan     Discharge Plan: [place] [facility] [phone]  Discharge Plan:       Most Recent Value    Concerns To Be Addressed home safety concerns, compliance issue concerns, coping/stress concerns           Concern for non-adherence with plan of care:   Y/N N  Discharge Needs Assessment:  Needs       Most Recent Value    Anticipated Changes Related to Illness inability to care for self, inability to care for someone else    Equipment Currently Used at Home cane, straight    Transportation Available car, family or friend will provide          Follow-up plan:  Future Appointments  Date Time Provider Department Center   2018 2:00 PM Ruby Betts Pt, PT [facility] [place]   2018 6:30 AM [facility] OVERFLOW [facility] [place]   2018 10:45 AM Ruby Betts Pt, PT [facility] [place]   2018 2:45 PM Ruby Betts Pt, PT [facility] [place]   2018 11:45 AM Brijesh Bass MD [facility]   2018 10:30 AM Brijesh Bass MD [facility]       Any outstanding tests or procedures:            VICTOR HUGO Barba  8A/10A Ortho/Med/Surg and [facility] [place] [facility]    [phone]  [email or web address]    AVS/Discharge Summary is the source of truth; this is a helpful guide for improved communication of patient story

## 2018-05-27 NOTE — IP AVS SNAPSHOT
MRN:[id number]                      After Visit Summary   5/27/2018    Familia Irving    MRN: [id number]           Thank you!     Thank you for choosing [facility] for your care. Our goal is always to provide you with excellent care. Hearing back from our patients is one way we can continue to improve our services. Please take a few minutes to complete the written survey that you may receive in the mail after you visit with us. Thank you!        Patient Information     Date Of Birth          1945        Designated Caregiver       Most Recent Value    Caregiver    Will someone help with your care after discharge? no [not at this time-but thinking he will go back to TCU. ]      About your hospital stay     You were admitted on:  May 27, 2018 You last received care in the:  [facility]    You were discharged on:  June 6, 2018       Who to Call     For medical emergencies, please call 911.  For non-urgent questions about your medical care, please call your primary care provider or clinic, [phone]          Attending Provider     Provider Specialty    Nathan Singh MD Emergency Medicine    [place], Familia TOBIN MD Internal Medicine       Primary Care Provider Office Phone # Fax #    [facility] [phone]      After Care Instructions     Activity - Up with nursing assistance           Advance Diet as Tolerated       Follow this diet upon discharge: Orders Placed This Encounter      Room Service      Consistent Carbohydrate Diet 2839-9851 Calories: Moderate Consistent CHO (4-6 CHO units/meal)            Fall precautions           General info for SNF       Length of Stay Estimate: Short Term Care: Estimated # of Days <30  Condition at Discharge: Improving  Level of care:skilled   Rehabilitation Potential: Good  Admission H&P remains valid and up-to-date: Yes  Recent Chemotherapy: N/A  Use Nursing Home Standing Orders: Yes      BMP, CBC  every Monday and  Thursday  Tacrolimus level every Monday  Magnesium level every Monday    Please call labs to Pt's Nephrologist at the [facility]    Cervical collar at all times    BG qid and every 2 am    Encourage Pt to drink at least 2000 cc of fluid daily    Wound Care    Sacral wound: Every other day, wash with wound cleanser and gauze. Apply No sting to sacrum and coccyx and allow to dry. Cover with Mepilex, taking care that foam is in contact with all skin, not draped across the jean paul cleft.   Pulsate mattress ordered due to limited ability to turn on side.  Keep HOB less than 30 degrees unless eating  Patient to use a Geomatt cushion when up to chair or wheelchair at all times            Mantoux instructions       Give two-step Mantoux (PPD) Per Facility Policy Yes                  Your next 10 appointments already scheduled     2018 11:45 AM CDT   (Arrive by 11:15 AM)   RETURN NECK with Brijesh Bass MD   [facility] ([facility])    [address] [place] [id number] [phone]            Aug 09, 2018 10:30 AM CDT   (Arrive by 10:00 AM)   RETURN NECK with Brijesh Bass MD   [facility] ([facility])    [address] [place] [id number] [phone]              Additional Services     Occupational Therapy Adult Consult       Evaluate and treat as clinically indicated.    Reason:  Weakness secondary to polyarticular gout, C spine surgery            Physical Therapy Adult Consult       Evaluate and treat as clinically indicated.    Reason: weakness secondary to polyarticular gout, recent C spine surgery                  Pending Results     Date and Time Order Name Status Description    2018 0658 Anaerobic bacterial culture Preliminary     2018 Anaerobic bacterial culture Preliminary             Statement of Approval     Ordered          18  "1011  I have reviewed and agree with all the recommendations and orders detailed in this document.  EFFECTIVE NOW     Approved and electronically signed by:  Dominguez Márquez MD           06/05/18 6216  I have reviewed and agree with all the recommendations and orders detailed in this document.  EFFECTIVE NOW     Approved and electronically signed by:  Dominguez Márquez MD             Admission Information     Date & Time Provider Department Dept. Phone    5/27/2018 Familia Dumont MD  8A [phone]      Your Vitals Were     Blood Pressure Pulse Temperature Respirations Height Weight    177/63 (BP Location: Left arm) 67 96.3  F (35.7  C) (Oral) 16 1.727 m (5' 8\") 72.6 kg (160 lb 1.6 oz)    Pulse Oximetry BMI (Body Mass Index)                99% 24.34 kg/m2          MyChart Information     [employer] gives you secure access to your electronic health record. If you see a primary care provider, you can also send messages to your care team and make appointments. If you have questions, please call your primary care clinic.  If you do not have a primary care provider, please call [phone] and they will assist you.        Care EveryWhere ID     This is your Care EveryWhere ID. This could be used by other organizations to access your [facility] medical records  [id number]        Equal Access to Services     ARSENIO CHANG AH: Hadii briana benítezo Solizandro, waaxda luqadaha, qaybta kaalmada adeegchuy, paulino kwon. So [facility] [phone].    ATENCIÓN: Si habla español, tiene a moser disposición servicios gratuitos de asistencia lingüística. Llame al [phone].    We comply with applicable federal civil rights laws and [place] laws. We do not discriminate on the basis of race, color, national origin, age, disability, sex, sexual orientation, or gender identity.               Review of your medicines      START taking        Dose / Directions    allopurinol 100 MG tablet   Commonly " known as:  ZYLOPRIM   Used for:  Acute gouty arthritis        Dose:  100 mg   Take 1 tablet (100 mg) by mouth daily   Quantity:  30 tablet   Refills:  0       calcitRIOL 0.25 MCG capsule   Commonly known as:  ROCALTROL   Used for:  Hypovitaminosis D        Dose:  0.25 mcg   Take 1 capsule (0.25 mcg) by mouth daily   Quantity:  30 capsule   Refills:  0       calcium carbonate 500 MG chewable tablet   Commonly known as:  TUMS   Used for:  Hypocalcemia        Dose:  1 chew tab   Take 1 tablet (500 mg) by mouth 2 times daily   Quantity:  150 tablet   Refills:  0       insulin glargine 100 UNIT/ML injection   Commonly known as:  LANTUS   Replaces:  insulin glargine 100 UNIT/ML injection        Dose:  6 Units   Inject 6 Units Subcutaneous daily (with dinner)   Refills:  0       sodium citrate-citric acid 500-334 MG/5ML solution   Commonly known as:  BICITRA   Used for:  Kidney replaced by transplant        Dose:  30 mL   Take 30 mLs by mouth daily   Quantity:  1800 mL   Refills:  0         CONTINUE these medicines which may have CHANGED, or have new prescriptions. If we are uncertain of the size of tablets/capsules you have at home, strength may be listed as something that might have changed.        Dose / Directions    cholecalciferol 2000 units tablet   This may have changed:    - medication strength  - how much to take   Used for:  Hypocalcemia        Dose:  2000 Units   Take 2,000 Units by mouth daily   Quantity:  30 tablet   Refills:  0       loperamide 2 MG capsule   Commonly known as:  IMODIUM   This may have changed:  when to take this   Used for:  Diarrhea, unspecified type        Dose:  2 mg   Take 1 capsule (2 mg) by mouth 4 times daily as needed for diarrhea   Quantity:  20 capsule   Refills:  0       tacrolimus 1 MG capsule   This may have changed:  Another medication with the same name was removed. Continue taking this medication, and follow the directions you see here.   Used for:  Kidney replaced by  transplant        Dose:  1 mg   Take 1 capsule (1 mg) by mouth 2 times daily   Quantity:  240 capsule   Refills:  0         CONTINUE these medicines which have NOT CHANGED        Dose / Directions    acetaminophen 325 MG tablet   Commonly known as:  TYLENOL   Used for:  S/P cervical spinal fusion        Dose:  650 mg   Take 2 tablets (650 mg) by mouth every 4 hours as needed for other (multimodal surgical pain management along with NSAIDS and opioid medication as indicated based on pain control and physical function.)   Quantity:  100 tablet   Refills:  0       ASPIRIN PO        Dose:  81 mg   Take 81 mg by mouth daily   Refills:  0       FOLIC ACID PO        Dose:  1 mg   Take 1 mg by mouth daily   Refills:  0       MAGNESIUM OXIDE PO        Dose:  420 mg   Take 420 mg by mouth daily   Refills:  0       mycophenolic acid 360 MG EC tablet        Dose:  720 mg   Take 720 mg by mouth 2 times daily   Refills:  0       PRAVASTATIN SODIUM PO        Dose:  40 mg   Take 40 mg by mouth At Bedtime   Refills:  0       senna-docusate 8.6-50 MG per tablet   Commonly known as:  SENOKOT-S;PERICOLACE   Used for:  S/P cervical spinal fusion        Dose:  1 tablet   Take 1 tablet by mouth 2 times daily   Quantity:  100 tablet   Refills:  0         STOP taking     amLODIPine 10 MG tablet   Commonly known as:  NORVASC           benzoyl peroxide 10 % Lotn lotion           clindamycin 1 % lotion   Commonly known as:  CLEOCIN T           FUROSEMIDE PO           insulin glargine 100 UNIT/ML injection   Commonly known as:  LANTUS   Replaced by:  insulin glargine 100 UNIT/ML injection           LISINOPRIL PO           methocarbamol 500 MG tablet   Commonly known as:  ROBAXIN           OXYCODONE HCL PO           SODIUM BICARBONATE PO                Where to get your medicines      Some of these will need a paper prescription and others can be bought over the counter. Ask your nurse if you have questions.     You don't need a prescription for  these medications     allopurinol 100 MG tablet    calcitRIOL 0.25 MCG capsule    calcium carbonate 500 MG chewable tablet    cholecalciferol 2000 units tablet    insulin glargine 100 UNIT/ML injection    loperamide 2 MG capsule    sodium citrate-citric acid 500-334 MG/5ML solution    tacrolimus 1 MG capsule                Protect others around you: Learn how to safely use, store and throw away your medicines at www.disposemymeds.org.             Medication List: This is a list of all your medications and when to take them. Check marks below indicate your daily home schedule. Keep this list as a reference.      Medications           Morning Afternoon Evening Bedtime As Needed    acetaminophen 325 MG tablet   Commonly known as:  TYLENOL   Take 2 tablets (650 mg) by mouth every 4 hours as needed for other (multimodal surgical pain management along with NSAIDS and opioid medication as indicated based on pain control and physical function.)   Last time this was given:  1,000 mg on 6/6/2018  8:42 AM                                allopurinol 100 MG tablet   Commonly known as:  ZYLOPRIM   Take 1 tablet (100 mg) by mouth daily   Last time this was given:  100 mg on 6/6/2018  8:37 AM                                ASPIRIN PO   Take 81 mg by mouth daily                                calcitRIOL 0.25 MCG capsule   Commonly known as:  ROCALTROL   Take 1 capsule (0.25 mcg) by mouth daily   Last time this was given:  0.25 mcg on 6/5/2018  6:45 PM                                calcium carbonate 500 MG chewable tablet   Commonly known as:  TUMS   Take 1 tablet (500 mg) by mouth 2 times daily                                cholecalciferol 2000 units tablet   Take 2,000 Units by mouth daily   Last time this was given:  2,000 Units on 6/6/2018  8:37 AM                                FOLIC ACID PO   Take 1 mg by mouth daily   Last time this was given:  1 mg on 6/6/2018  8:37 AM                                insulin glargine 100  UNIT/ML injection   Commonly known as:  LANTUS   Inject 6 Units Subcutaneous daily (with dinner)   Last time this was given:  12 Units on 6/5/2018  6:42 PM                                loperamide 2 MG capsule   Commonly known as:  IMODIUM   Take 1 capsule (2 mg) by mouth 4 times daily as needed for diarrhea   Last time this was given:  2 mg on 6/5/2018  7:40 PM                                MAGNESIUM OXIDE PO   Take 420 mg by mouth daily   Last time this was given:  400 mg on 5/28/2018  8:01 AM                                mycophenolic acid 360 MG EC tablet   Take 720 mg by mouth 2 times daily   Last time this was given:  720 mg on 6/6/2018  8:37 AM                                PRAVASTATIN SODIUM PO   Take 40 mg by mouth At Bedtime                                senna-docusate 8.6-50 MG per tablet   Commonly known as:  SENOKOT-S;PERICOLACE   Take 1 tablet by mouth 2 times daily   Last time this was given:  2 tablets on 6/4/2018  8:11 AM                                sodium citrate-citric acid 500-334 MG/5ML solution   Commonly known as:  BICITRA   Take 30 mLs by mouth daily   Last time this was given:  30 mLs on 6/5/2018  6:45 PM                                tacrolimus 1 MG capsule   Take 1 capsule (1 mg) by mouth 2 times daily   Last time this was given:  1 mg on 6/6/2018  8:37 AM

## 2018-05-27 NOTE — CONSULTS
[facility], Orthopaedic Surgery Consultation    Familia Irving MRN# [id number]   Age: 73 year old YOB: 1945     Date of Admission:  5/27/2018    Reason for consult: Fever, confusion, swollen knee       Requesting physician: Francisco         Assessment and Plan:   Assessment:  74yo male 12 days s/p multi-level cervical decompression and fusion with right hand and forearm cellulitis and concern for left knee septic joint and infected tibial instrumentation.     Plan:  -Admit to ortho with medicine consult, appreciate recs.   -Continue Vancomycin  -IVF 120cc/hour for hydration.  -Hold insulin while NPO.  -Follow up cell count and culture on left knee aspirate.   -Ok for diet now, NPO at 11pm  -Plan for OR in am for left knee I&D and removal of tibial plate. Consent signed by patient in presence of son.     ADDENDUM:  Patient is now expressing concern about financial coverage and does not want surgery here if it is not going to be covered. Medicare would likely cover this because this is not an elective procedure and is an urgent procedure for irrigation and debridement for infection. [facility] and [facility] Patient said he needs an hour to think about this.           History of Present Illness:   Patient was seen and examined by me. History, PMH, Meds, SH, complete ROS (10 organ systems) and PE reviewed with patient and prior medical records.      74yo male with PMH of kidney transplant in 2006, and has cervical myelopathy who is now s/p combined anterior and posterior multilevel cervical spine decompression and instrumented fusion (C3-T1) on 5/16/18 by Dr. Bass. He was discharged to a TCU on 5/21/18. He was noted to have fever and confusion that began this morning and labs revealed leukocytosis. He states he has pain in all of his joints but has also recently developed pain, redness and swelling of the left knee that has been present since  arriving to the TCU, no associated trauma.  He has been unable to weight bear over the past 24 hours. Denies chest pain, SOB, abdominal pain, n/v/d.    No new vision changes but has baseline blindness in left eye.     Pertinent PSH of tibial plateau fracture treated with internal fixation in the .     States he history of gout and last had a flare 30 years ago. Cannot remember which joint.          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      Tinnitus              Past Surgical History:     Past Surgical History:   Procedure Laterality Date     AMPUTATION Right     First and second partial amputation of toe.      AS OPEN TX KNEE DISLOCATION W REPAIR/RECONSTRUCTION       DECOMPRESSION, FUSION CERVICAL ANTERIOR THREE+ LEVELS, COMBINED N/A 2018    Procedure: COMBINED DECOMPRESSION, FUSION CERVICAL ANTERIOR THREE+ LEVELS;  Part 1: (anterior) Anterior Cervical Decompression Fusion C3-7, Cervical 7-Thoracic 1; Anterior Iliac Crest Bone Graft [place]  Part 2: (posterior) Posterior Interbody Spinal Fusion Cervical 3-Throracic 1/Thoracic 2; Laminectomies Cervical 3-7;  Surgeon: Brijesh Bass MD;  Location: [facility] OR     GRAFT BONE FROM ILIAC CREST N/A 2018    Procedure: GRAFT BONE FROM ILIAC CREST;;  Surgeon: Brijesh Bass MD;  Location: [facility] OR     OPTICAL TRACKING SYSTEM FUSION POSTERIOR CERVICAL THREE + LEVELS N/A 2018    Procedure: OPTICAL TRACKING SYSTEM FUSION POSTERIOR CERVICAL THREE + LEVELS;;  Surgeon: Brijesh Bass MD;  Location: [facility] OR     TRANSPLANT KIDNEY RECIPIENT  DONOR  2006             Social History:     Social History     Social History      Marital status: Single     Spouse name: N/A     Number of children: 2     Years of education: N/A     Occupational History     retired      Social History Main Topics     Smoking status: Never Smoker     Smokeless tobacco: Never Used     Alcohol use No     Drug use: No     Sexual activity: Not Asked     Other Topics Concern     None     Social History Narrative    Lives alone in lower level apartment in [place].  Brother lives in same apartment building on main floor.  Has two children, son and Daughter.  Daughter lives in [place].  SonWillard, lives local and is actively involved.              Family History:   No personal or family history of bleeding or clotting disorders or complications with general anesthesia.            Medications:     No current facility-administered medications for this encounter.      Current Outpatient Prescriptions   Medication Sig     acetaminophen (TYLENOL) 325 MG tablet Take 2 tablets (650 mg) by mouth every 4 hours as needed for other (multimodal surgical pain management along with NSAIDS and opioid medication as indicated based on pain control and physical function.)     amLODIPine (NORVASC) 10 MG tablet Take 10 mg by mouth daily     ASPIRIN PO Take 81 mg by mouth daily     FOLIC ACID PO Take 1 mg by mouth daily     FUROSEMIDE PO Take 20 mg by mouth 2 times daily      insulin glargine (LANTUS) 100 UNIT/ML injection Inject 30 Units Subcutaneous every morning      LISINOPRIL PO Take 40 mg by mouth At Bedtime      loperamide (IMODIUM) 2 MG capsule Take 4 mg by mouth as needed for diarrhea     MAGNESIUM OXIDE PO Take 420 mg by mouth Every Mon, Wed, Fri Morning     methocarbamol (ROBAXIN) 500 MG tablet Take 1 tablet (500 mg) by mouth 4 times daily as needed for muscle spasms     MYFORTIC (BRAND) 360 MG EC TABLET Take 720 mg by mouth 2 times daily      oxyCODONE IR (ROXICODONE) 5 MG tablet For pain of 1-5 give 5 mg, for pain of 6-10 give 10 mg every 3 hours as needed.     PRAVASTATIN  SODIUM PO Take 40 mg by mouth At Bedtime      PROGRAF (BRAND) 1 MG CAPSULE Take 1 mg by mouth 2 times daily     senna-docusate (SENOKOT-S;PERICOLACE) 8.6-50 MG per tablet Take 1 tablet by mouth 2 times daily     VITAMIN D, CHOLECALCIFEROL, PO Take 400 Units by mouth daily             Allergies:      Allergies   Allergen Reactions     Heparin      Patient unsure of what reaction was, but it was severe reaction during his transplant and finally was determined that it was heparin            Review of Systems:   A comprehensive 10 point review of systems (constitutional, ENT, cardiac, peripheral vascular, respiratory, GI, , Musculoskeletal, skin, Neurological) was performed and found to be negative except as described in this note.           Physical Exam:   COMPLETE EXAMINATION:   VITAL SIGNS: /59  Pulse 73  Temp 99.1  F (37.3  C) (Oral)  Resp 16  SpO2 98%  RESP: Non labored breathing  ABD: Benign  SKIN: see msk exam below   LYMPHATIC:  Grossly normal  NEURO:  Grossly normal blind in left eye at baseline.   VASCULAR: Satisfactory perfusion of all extremities  MUSCULOSKELETAL:     Right arm with diffuse erythema and edema on the volar and dorsal surfaces extending up the forearm. No wrist effusion. Minimal Wrist ROM (patient states baseline arthritis).     Cervical spine: Anterior and posterior incisions well approximated without erythema or drainage. No fluctuance palpable.   SILT C5 - C8 dermatomal distributions.  2+ radial pulses, fingers wwp with brisk cap refill.               C5:    Shoulder abduction 4/5 L and 4/5 R strength                                               C6:     Biceps L 4/5 and R 4/5 strength             Wrist Extension L 4/5 and R 4/5 strength                                              C7:     Elbow Extension  L 4/5  R 3/5 due to cellulitis on arm and                                                         Wrist Flexion  L 4/5 L R 4/5 strength                                               C8:       L 4/5 and R 3+/5              T1:     Finger abduction L 4/5 and R 4/5                                              Sensation: intact to light touch C5 - T1 distribution BUE.    Lumbar spine:  Sensation: intact to light touch L3-S1 distribution BLE, faintly palpable DP pulses foot wwp with brisk cap refill.                                                                                          L2-3: Hip flexion 2+/5 L and 3/5 R strength                                                                                         L4/5:  Foot dorsiflexion L 5/5  R 5/5 and                                                         EHL dorsiflexion L 5/5 L R 5/5 strength                                              S1:  Plantarflexion/Peroneal Muscles  L5/5 and R 5/5                                           Lower extremities: left knee with erythema about the knee that is splotchy and not well demarcated This is severely TTP and warm. Very TTP along incision and along region superficial to tibial plate. 3+ knee effusion. Tolerates Very minimal knee ROM from 10 - 30 deg. Palpable DP pulses bilaterally. Fires TA/GSC/EHL able to perform SLR on the right, limited by pain on the left.             Data:   All pertinent laboratory data reviewed  All imaging studies reviewed by me.    Cervical spine XR: stable posterior spine instrumentation and interbody fusion as compared to postoperative XR. No significant disc space collapse or listhesis.     Left knee XR: Lateral proximal tibial plate. No comparison available, however, significantly decreased density of the lateral proximal tibial bone beneath the plate. No halos around screws to suggests loosening of instrumentation. Significant calcification of the popliteal and posterior tibial arteries. Soft tissue swelling and knee effusion.     UE Ultrasound: no upper extremity DVT.     Recent Labs   Lab Test  05/27/18   1808  05/21/18   0955  05/20/18   0743    05/17/18   0400  05/16/18   1718   HGB  8.2*  9.5*  10.4*   < >  9.0*  9.6*  9.7*   WBC  18.7*   --    --    --   9.6  12.1*    < > = values in this interval not displayed.     No results for input(s): FTYP, FNEU, FOTH, FCOL, FAPR, FWBC in the last 09392 hours.    Signed:    This consultation has been discussed with Dr. Posey, Attending Physician.    [place] PGY-4  Orthopedic Surgery  [phone]

## 2018-05-28 ENCOUNTER — APPOINTMENT (OUTPATIENT)
Dept: GENERAL RADIOLOGY | Facility: CLINIC | Age: 73
DRG: 554 | End: 2018-05-28
Attending: ORTHOPAEDIC SURGERY
Payer: MEDICARE

## 2018-05-28 ENCOUNTER — SURGERY (OUTPATIENT)
Age: 73
End: 2018-05-28

## 2018-05-28 LAB
ANION GAP SERPL CALCULATED.3IONS-SCNC: 11 MMOL/L (ref 3–14)
ANION GAP SERPL CALCULATED.3IONS-SCNC: 12 MMOL/L (ref 3–14)
BACTERIA SPEC CULT: NORMAL
BUN SERPL-MCNC: 61 MG/DL (ref 7–30)
BUN SERPL-MCNC: 63 MG/DL (ref 7–30)
CALCIUM SERPL-MCNC: 8.1 MG/DL (ref 8.5–10.1)
CALCIUM SERPL-MCNC: 8.3 MG/DL (ref 8.5–10.1)
CHLORIDE SERPL-SCNC: 114 MMOL/L (ref 94–109)
CHLORIDE SERPL-SCNC: 115 MMOL/L (ref 94–109)
CO2 SERPL-SCNC: 17 MMOL/L (ref 20–32)
CO2 SERPL-SCNC: 21 MMOL/L (ref 20–32)
CREAT SERPL-MCNC: 2.46 MG/DL (ref 0.66–1.25)
CREAT SERPL-MCNC: 2.5 MG/DL (ref 0.66–1.25)
CRP SERPL-MCNC: 268 MG/L (ref 0–8)
CRYSTALS SNV MICRO: ABNORMAL
CRYSTALS SNV MICRO: ABNORMAL
ERYTHROCYTE [DISTWIDTH] IN BLOOD BY AUTOMATED COUNT: 16 % (ref 10–15)
GFR SERPL CREATININE-BSD FRML MDRD: 26 ML/MIN/1.7M2
GFR SERPL CREATININE-BSD FRML MDRD: 26 ML/MIN/1.7M2
GLUCOSE BLDC GLUCOMTR-MCNC: 111 MG/DL (ref 70–99)
GLUCOSE BLDC GLUCOMTR-MCNC: 136 MG/DL (ref 70–99)
GLUCOSE BLDC GLUCOMTR-MCNC: 146 MG/DL (ref 70–99)
GLUCOSE BLDC GLUCOMTR-MCNC: 163 MG/DL (ref 70–99)
GLUCOSE BLDC GLUCOMTR-MCNC: 169 MG/DL (ref 70–99)
GLUCOSE BLDC GLUCOMTR-MCNC: 225 MG/DL (ref 70–99)
GLUCOSE BLDC GLUCOMTR-MCNC: 71 MG/DL (ref 70–99)
GLUCOSE SERPL-MCNC: 156 MG/DL (ref 70–99)
GLUCOSE SERPL-MCNC: 163 MG/DL (ref 70–99)
GRAM STN SPEC: NORMAL
HBA1C MFR BLD: 6.6 % (ref 0–5.6)
HCT VFR BLD AUTO: 28 % (ref 40–53)
HGB BLD-MCNC: 8.7 G/DL (ref 13.3–17.7)
Lab: NORMAL
MCH RBC QN AUTO: 28.9 PG (ref 26.5–33)
MCHC RBC AUTO-ENTMCNC: 31.1 G/DL (ref 31.5–36.5)
MCV RBC AUTO: 93 FL (ref 78–100)
PLATELET # BLD AUTO: 171 10E9/L (ref 150–450)
POTASSIUM SERPL-SCNC: 4.1 MMOL/L (ref 3.4–5.3)
POTASSIUM SERPL-SCNC: 4.3 MMOL/L (ref 3.4–5.3)
RBC # BLD AUTO: 3.01 10E12/L (ref 4.4–5.9)
SODIUM SERPL-SCNC: 143 MMOL/L (ref 133–144)
SODIUM SERPL-SCNC: 147 MMOL/L (ref 133–144)
SPECIMEN SOURCE: NORMAL
TACROLIMUS BLD-MCNC: 7.1 UG/L (ref 5–15)
TME LAST DOSE: NORMAL H
URATE SERPL-MCNC: 10 MG/DL (ref 3.5–7.2)
WBC # BLD AUTO: 20.2 10E9/L (ref 4–11)

## 2018-05-28 PROCEDURE — A9270 NON-COVERED ITEM OR SERVICE: HCPCS | Mod: GY | Performed by: INTERNAL MEDICINE

## 2018-05-28 PROCEDURE — 87076 CULTURE ANAEROBE IDENT EACH: CPT | Performed by: ORTHOPAEDIC SURGERY

## 2018-05-28 PROCEDURE — 87075 CULTR BACTERIA EXCEPT BLOOD: CPT | Performed by: ORTHOPAEDIC SURGERY

## 2018-05-28 PROCEDURE — A9270 NON-COVERED ITEM OR SERVICE: HCPCS | Mod: GY | Performed by: STUDENT IN AN ORGANIZED HEALTH CARE EDUCATION/TRAINING PROGRAM

## 2018-05-28 PROCEDURE — 87181 SC STD AGAR DILUTION PER AGT: CPT | Performed by: ORTHOPAEDIC SURGERY

## 2018-05-28 PROCEDURE — 25800025 ZZH RX 258: Performed by: INTERNAL MEDICINE

## 2018-05-28 PROCEDURE — 93010 ELECTROCARDIOGRAM REPORT: CPT | Performed by: INTERNAL MEDICINE

## 2018-05-28 PROCEDURE — 86140 C-REACTIVE PROTEIN: CPT | Performed by: INTERNAL MEDICINE

## 2018-05-28 PROCEDURE — 83036 HEMOGLOBIN GLYCOSYLATED A1C: CPT | Performed by: INTERNAL MEDICINE

## 2018-05-28 PROCEDURE — 87205 SMEAR GRAM STAIN: CPT | Performed by: ORTHOPAEDIC SURGERY

## 2018-05-28 PROCEDURE — 80048 BASIC METABOLIC PNL TOTAL CA: CPT | Performed by: INTERNAL MEDICINE

## 2018-05-28 PROCEDURE — 89060 EXAM SYNOVIAL FLUID CRYSTALS: CPT | Performed by: ORTHOPAEDIC SURGERY

## 2018-05-28 PROCEDURE — 80197 ASSAY OF TACROLIMUS: CPT | Performed by: INTERNAL MEDICINE

## 2018-05-28 PROCEDURE — 25000131 ZZH RX MED GY IP 250 OP 636 PS 637: Performed by: INTERNAL MEDICINE

## 2018-05-28 PROCEDURE — 99233 SBSQ HOSP IP/OBS HIGH 50: CPT | Performed by: INTERNAL MEDICINE

## 2018-05-28 PROCEDURE — 25000132 ZZH RX MED GY IP 250 OP 250 PS 637: Performed by: INTERNAL MEDICINE

## 2018-05-28 PROCEDURE — 93005 ELECTROCARDIOGRAM TRACING: CPT

## 2018-05-28 PROCEDURE — 25000128 H RX IP 250 OP 636: Performed by: INTERNAL MEDICINE

## 2018-05-28 PROCEDURE — 84550 ASSAY OF BLOOD/URIC ACID: CPT | Performed by: INTERNAL MEDICINE

## 2018-05-28 PROCEDURE — 00000146 ZZHCL STATISTIC GLUCOSE BY METER IP

## 2018-05-28 PROCEDURE — 36415 COLL VENOUS BLD VENIPUNCTURE: CPT | Performed by: INTERNAL MEDICINE

## 2018-05-28 PROCEDURE — 40000280 XR SURGERY CARM FLUORO GREATER THAN 5 MIN

## 2018-05-28 PROCEDURE — 27210995 ZZH RX 272: Performed by: INTERNAL MEDICINE

## 2018-05-28 PROCEDURE — 12000001 ZZH R&B MED SURG/OB UMMC

## 2018-05-28 PROCEDURE — 25000132 ZZH RX MED GY IP 250 OP 250 PS 637: Performed by: STUDENT IN AN ORGANIZED HEALTH CARE EDUCATION/TRAINING PROGRAM

## 2018-05-28 PROCEDURE — 25000128 H RX IP 250 OP 636: Performed by: STUDENT IN AN ORGANIZED HEALTH CARE EDUCATION/TRAINING PROGRAM

## 2018-05-28 PROCEDURE — 87070 CULTURE OTHR SPECIMN AEROBIC: CPT | Performed by: ORTHOPAEDIC SURGERY

## 2018-05-28 PROCEDURE — 85027 COMPLETE CBC AUTOMATED: CPT | Performed by: INTERNAL MEDICINE

## 2018-05-28 RX ORDER — PRAVASTATIN SODIUM 20 MG
40 TABLET ORAL AT BEDTIME
Status: DISCONTINUED | OUTPATIENT
Start: 2018-05-28 | End: 2018-05-28

## 2018-05-28 RX ORDER — MYCOPHENOLIC ACID 360 MG/1
720 TABLET, DELAYED RELEASE ORAL
Status: DISCONTINUED | OUTPATIENT
Start: 2018-05-28 | End: 2018-06-06 | Stop reason: HOSPADM

## 2018-05-28 RX ORDER — METHYLPREDNISOLONE SODIUM SUCCINATE 125 MG/2ML
125 INJECTION, POWDER, LYOPHILIZED, FOR SOLUTION INTRAMUSCULAR; INTRAVENOUS ONCE
Status: COMPLETED | OUTPATIENT
Start: 2018-05-28 | End: 2018-05-28

## 2018-05-28 RX ORDER — TACROLIMUS 1 MG/1
1 CAPSULE, GELATIN COATED ORAL
Status: DISCONTINUED | OUTPATIENT
Start: 2018-05-28 | End: 2018-06-06 | Stop reason: HOSPADM

## 2018-05-28 RX ORDER — SODIUM CHLORIDE 450 MG/100ML
INJECTION, SOLUTION INTRAVENOUS CONTINUOUS
Status: DISCONTINUED | OUTPATIENT
Start: 2018-05-28 | End: 2018-05-31

## 2018-05-28 RX ORDER — NICOTINE POLACRILEX 4 MG
15-30 LOZENGE BUCCAL
Status: DISCONTINUED | OUTPATIENT
Start: 2018-05-28 | End: 2018-05-28

## 2018-05-28 RX ORDER — FUROSEMIDE 20 MG
20 TABLET ORAL 2 TIMES DAILY
Status: DISCONTINUED | OUTPATIENT
Start: 2018-05-28 | End: 2018-05-28

## 2018-05-28 RX ORDER — DEXTROSE MONOHYDRATE 25 G/50ML
25-50 INJECTION, SOLUTION INTRAVENOUS
Status: DISCONTINUED | OUTPATIENT
Start: 2018-05-28 | End: 2018-06-06 | Stop reason: HOSPADM

## 2018-05-28 RX ORDER — DEXTROSE, SODIUM CHLORIDE, SODIUM LACTATE, POTASSIUM CHLORIDE, AND CALCIUM CHLORIDE 5; .6; .31; .03; .02 G/100ML; G/100ML; G/100ML; G/100ML; G/100ML
1000 INJECTION, SOLUTION INTRAVENOUS ONCE
Status: DISCONTINUED | OUTPATIENT
Start: 2018-05-28 | End: 2018-05-28

## 2018-05-28 RX ORDER — MAGNESIUM OXIDE 400 MG/1
400 TABLET ORAL
Status: DISCONTINUED | OUTPATIENT
Start: 2018-05-28 | End: 2018-05-28

## 2018-05-28 RX ORDER — MYCOPHENOLIC ACID 360 MG/1
720 TABLET, DELAYED RELEASE ORAL 2 TIMES DAILY
Status: DISCONTINUED | OUTPATIENT
Start: 2018-05-28 | End: 2018-05-28

## 2018-05-28 RX ORDER — FOLIC ACID 1 MG/1
1 TABLET ORAL DAILY
Status: DISCONTINUED | OUTPATIENT
Start: 2018-05-28 | End: 2018-06-06 | Stop reason: HOSPADM

## 2018-05-28 RX ORDER — AMLODIPINE BESYLATE 10 MG/1
10 TABLET ORAL DAILY
Status: DISCONTINUED | OUTPATIENT
Start: 2018-05-28 | End: 2018-05-29

## 2018-05-28 RX ORDER — DEXTROSE MONOHYDRATE 25 G/50ML
25-50 INJECTION, SOLUTION INTRAVENOUS
Status: DISCONTINUED | OUTPATIENT
Start: 2018-05-28 | End: 2018-05-28

## 2018-05-28 RX ORDER — OXYCODONE HYDROCHLORIDE 5 MG/1
5 TABLET ORAL EVERY 4 HOURS PRN
Status: DISCONTINUED | OUTPATIENT
Start: 2018-05-28 | End: 2018-05-28

## 2018-05-28 RX ORDER — NICOTINE POLACRILEX 4 MG
15-30 LOZENGE BUCCAL
Status: DISCONTINUED | OUTPATIENT
Start: 2018-05-28 | End: 2018-06-06 | Stop reason: HOSPADM

## 2018-05-28 RX ORDER — TACROLIMUS 1 MG/1
1 CAPSULE, GELATIN COATED ORAL 2 TIMES DAILY
Status: DISCONTINUED | OUTPATIENT
Start: 2018-05-28 | End: 2018-05-28

## 2018-05-28 RX ORDER — HYDROMORPHONE HYDROCHLORIDE 1 MG/ML
0.3 INJECTION, SOLUTION INTRAMUSCULAR; INTRAVENOUS; SUBCUTANEOUS
Status: DISCONTINUED | OUTPATIENT
Start: 2018-05-28 | End: 2018-05-28

## 2018-05-28 RX ORDER — METHOCARBAMOL 500 MG/1
500 TABLET, FILM COATED ORAL 4 TIMES DAILY PRN
Status: DISCONTINUED | OUTPATIENT
Start: 2018-05-28 | End: 2018-06-05

## 2018-05-28 RX ADMIN — TACROLIMUS 1 MG: 1 CAPSULE, GELATIN COATED ORAL at 07:56

## 2018-05-28 RX ADMIN — FOLIC ACID 1 MG: 1 TABLET ORAL at 07:59

## 2018-05-28 RX ADMIN — TACROLIMUS 1 MG: 1 CAPSULE, GELATIN COATED ORAL at 18:24

## 2018-05-28 RX ADMIN — SODIUM CHLORIDE: 4.5 INJECTION, SOLUTION INTRAVENOUS at 09:55

## 2018-05-28 RX ADMIN — MYCOPHENOLIC ACID 720 MG: 360 TABLET, DELAYED RELEASE ORAL at 08:01

## 2018-05-28 RX ADMIN — OXYCODONE HYDROCHLORIDE 2.5 MG: 5 TABLET ORAL at 20:04

## 2018-05-28 RX ADMIN — MYCOPHENOLIC ACID 720 MG: 360 TABLET, DELAYED RELEASE ORAL at 18:24

## 2018-05-28 RX ADMIN — Medication 400 UNITS: at 07:59

## 2018-05-28 RX ADMIN — DEXTROSE AND SODIUM CHLORIDE: 5; 450 INJECTION, SOLUTION INTRAVENOUS at 01:43

## 2018-05-28 RX ADMIN — MAGNESIUM OXIDE TAB 400 MG (241.3 MG ELEMENTAL MG) 400 MG: 400 (241.3 MG) TAB at 08:01

## 2018-05-28 RX ADMIN — SODIUM CHLORIDE: 4.5 INJECTION, SOLUTION INTRAVENOUS at 17:46

## 2018-05-28 RX ADMIN — METHYLPREDNISOLONE SODIUM SUCCINATE 125 MG: 125 INJECTION, POWDER, FOR SOLUTION INTRAMUSCULAR; INTRAVENOUS at 09:54

## 2018-05-28 RX ADMIN — AMLODIPINE BESYLATE 10 MG: 10 TABLET ORAL at 08:01

## 2018-05-28 RX ADMIN — SODIUM CHLORIDE, POTASSIUM CHLORIDE, SODIUM LACTATE AND CALCIUM CHLORIDE 1000 ML: 600; 310; 30; 20 INJECTION, SOLUTION INTRAVENOUS at 01:08

## 2018-05-28 RX ADMIN — OXYCODONE HYDROCHLORIDE 5 MG: 5 TABLET ORAL at 07:55

## 2018-05-28 ASSESSMENT — ACTIVITIES OF DAILY LIVING (ADL)
COGNITION: 0 - NO COGNITION ISSUES REPORTED
RETIRED_COMMUNICATION: 0-->UNDERSTANDS/COMMUNICATES WITHOUT DIFFICULTY
FALL_HISTORY_WITHIN_LAST_SIX_MONTHS: NO
BATHING: 0-->INDEPENDENT
ADLS_ACUITY_SCORE: 13
ADLS_ACUITY_SCORE: 13
ADLS_ACUITY_SCORE: 15
TOILETING: 0-->INDEPENDENT
ADLS_ACUITY_SCORE: 15
SWALLOWING: 0-->SWALLOWS FOODS/LIQUIDS WITHOUT DIFFICULTY
DRESS: 0-->INDEPENDENT
TRANSFERRING: 0-->INDEPENDENT
RETIRED_EATING: 0-->INDEPENDENT
AMBULATION: 1-->ASSISTIVE EQUIPMENT
ADLS_ACUITY_SCORE: 15

## 2018-05-28 NOTE — PROGRESS NOTES
Orthopedic Surgery Progress Note    Subjective:   Feeling unchanged compared to yesterday. Persistent pain in left knee and right wrist/forearm.     Exam:  /51 (BP Location: Left arm)  Pulse 69  Temp 99.6  F (37.6  C) (Oral)  Resp 16  SpO2 97%  Gen: Awake, alert, NAD  Resp: breathing equal and non-labored  Extremities:  UE: Right wrist swelling, warmth, mild erythema, not well demarcated. Most TTP about wrist joint and some TTP over forearm.   LE: Left knee with erythema in same distribution as yesterday's exam, no change. Still warm. Still TTP over the lateral incision but also TTP diffusely throughout the knee. Tolerates only minimal ROM 10 - 30deg. Left ankle pain and mild swelling. Tolerates only minimal ROM from 15deg PF to 10 deg short of neutral DF.    Labs:    Recent Labs  Lab 05/28/18  0709 05/27/18  1808 05/21/18  0955   WBC 20.2* 18.7*  --    HGB 8.7* 8.2* 9.5*    163  --        Recent Labs  Lab 05/28/18  0709 05/27/18  1808 05/21/18  0955   * 147* 143   POTASSIUM 4.1 4.1 4.5   CHLORIDE 115* 115* 113*   CO2 21 22 17*   BUN 61* 63* 55*   CR 2.46* 2.56* 1.60*   * 74 227*   MAG  --  1.7  --        Recent Labs  Lab 05/27/18  1808   INR 1.45*     Assessment:  72yo male 12 days s/p multi-level cervical decompression and fusion with right wrist pain and swelling and left knee pain and swelling concerning for gout of right wrist and left knee in setting of remote proximal tibial plating.     Infection is still a consideration in light of leukocytosis and elevated inflammatory markers, however, has two painful joints and left knee aspirate showed cell count of 30,000 and monosodium urate crystals.     Right wrist aspiration performed this am. Only 0.5cc of fluid obtained but appeared to be sanguinous and tophaceous with chunky white material.     PROCEDURE: Verbal consent was obtained. The right wrist was prepped with chloroprep solution. The G6 portal was used and an 18 gauge needle  was inserted into the wrist joint. Position in joint was confirmed on mini c arm fluoro.  0.5cc of blood and white tophaceous material was aspirated. Tolerated procedure well without immediate complication.      Plan:  -Admit to ortho with medicine consult. Cancelled Rheum consult.  -Discussed with Dr. Márquez and presentation is consistent with gout. Will dc vancomycin, treat with IV steroids and monitor for clinical improvement.   -Patient and son state that if more ongoing inpatient management is needed, [facility] This was offered last night prior to admission, but the patient elected to stay here at the [facility].   -SALVADOR: appreciate medicine recommendations on management.   -Infection vs gout: follow up right wrist aspiration and follow up blood cultures and left knee cultures. Dc vanco.  -Activity: WBAT BLE and BUE  -Continue 120cc/hour for hydration.  -Hold insulin while NPO.  -Diet: NPO pending aspiration results.   -No plan for operative debridement at this time.     If questions arise, please page me at the number below before paging the orthopedic resident on call. Thank you!    La Rand MD PGY-4  Orthopedic Surgery  [phone]

## 2018-05-28 NOTE — CONSULTS
[facility], [place]    Internal Medicine Consultation     Date of Admission:  5/27/2018    Assessment & Plan   Familia Irving is a 73 year old male with history of kidney transplantation (2006), hypertension, gout, diabetes mellitus, and cervical myelopathy s/p recent combined anterior and posterior multilevel cervical spine decompression and instrumented fusion (C3-T1) on 5/16/18 by Dr. Bass who presented to outside hospital with concern for left knee septic arthritis in the setting of implanted tibial hardware. Medicine is consulted for management of co-morbidities and pre-operative clearance.     Concern for left knee septic arthritis  - Plan is for OR in AM for I&D and removal of tibial plate   - Patient has been started on IV vancomycin  - Gram stain and culture pending, synovial fluid WBC count 30K nearly all PMN's   - Blood cultures drawn   - Pain control   - If he develops interval focal neurologic deficits, would have low threshold to scan brain for seeding of infection given reported intermittent confusion, especially if blood cultures return positive, his neuro exam was limited by patient cooperation this evening    Pre-op assessment   Patient has a Revised Cardiac Risk Index Score of 2 due to insulin dependence and elevated creatinine, making him considered an elevated risk for MACE. He has approximately a 6.6% risk of MACE. That being said, he has had good functional capacity (at least prior to requiring rehabilitation from his recent neck surgery he could perform easily > 4 METS) and no active ACS or CHF that would be a major contraindication to urgent surgery. Would recommend proceeding with surgery.     Acute kidney injury  He reports recent baseline creatinine ~ 1.9. Patient is poor historian but reports being on and off furosemide recently, unclear indication. No diagnosis of CHF. May have contributed to SALVADOR in the setting of infection.   - Agree with holding  lisinopril, will hold furosemide as well given hypernatremia, poor oral intake, and SALVADOR  - UA appears bland   - Consider ultrasound of transplanted kidney is not improving with IVF  - Recheck in AM  - Tacrolimus level (supratherapeutic calcineurin inhibitors can cause SALVADOR), he will have gotten his evening dose late which may affect the level and require recheck rather than dose adjustment     Hypernatremia  - Switched fluids to 1/2 NS overnight to treat intravascular depletion/SALVADOR as well as give additional free water to treat sodium     DM2  - Pt unsure if he used glargine today, FSBG consistently ~ 70 here, hold insulin, D5 in fluids for now     Chronic anemia   - Stable, no signs of bleeding, likely multifactorial     Hypertension  - Continue amlodipine, hold lisinopril     FEN: NPO for surgery, D5 1/2 NS @ 150 cc/hr overnight   CODE: Full  Prophy: SCD's   Dispo: Admitted to orthopedic service, medicine is consulted    Chad Morales MD  [facility]     Reason for Consult   Co-management / Pre op clearance     Primary Care Physician   [facility]    Chief Complaint   Knee pain, fever    History is obtained from the patient    History of Present Illness   Mr. Irving is a 73 year old male with PMH of kidney transplant, DM2, hypertension, gout, and has cervical myelopathy who is now s/p combined anterior and posterior multilevel cervical spine decompression and instrumented fusion (C3-T1) on 5/16/18 by Dr. Bass. He was discharged to a TCU on 5/21/18.     He was in his usual state of health working on rehab up until yesterday. He was then noted to have fever and confusion that began suddenly this morning. Labs revealed leukocytosis. He states he has pain in all of his joints but has also recently developed pain, redness, and swelling of the left knee that has been present since arriving to the TCU, no associated trauma.  He has been unable to weight bear over the past 24 hours. He has been  "having tingling \"all over\" as well not able to localize it any further at this time. He notes pain in his right shoulder as well but able to move it fairly easily \"if I get enough pain meds.\"     ROS is otherwise negative.     Past Medical History   I have reviewed this patient's medical history and updated it with pertinent information if needed.   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      Tinnitus        Past Surgical History   I have reviewed this patient's surgical history and updated it with pertinent information if needed.  Past Surgical History:   Procedure Laterality Date     AMPUTATION Right     First and second partial amputation of toe.      AS OPEN TX KNEE DISLOCATION W REPAIR/RECONSTRUCTION       DECOMPRESSION, FUSION CERVICAL ANTERIOR THREE+ LEVELS, COMBINED N/A 2018    Procedure: COMBINED DECOMPRESSION, FUSION CERVICAL ANTERIOR THREE+ LEVELS;  Part 1: (anterior) Anterior Cervical Decompression Fusion C3-7, Cervical 7-Thoracic 1; Anterior Iliac Crest Bone Graft [place]  Part 2: (posterior) Posterior Interbody Spinal Fusion Cervical 3-Throracic 1/Thoracic 2; Laminectomies Cervical 3-7;  Surgeon: Brijesh Bass MD;  Location: [facility] OR     GRAFT BONE FROM ILIAC CREST N/A 2018    Procedure: GRAFT BONE FROM ILIAC CREST;;  Surgeon: Brijesh Bass MD;  Location: [facility] OR     OPTICAL TRACKING SYSTEM FUSION POSTERIOR CERVICAL THREE + LEVELS N/A 2018    Procedure: OPTICAL TRACKING SYSTEM FUSION POSTERIOR CERVICAL THREE + LEVELS;;  Surgeon: Brijesh Bass MD;  Location: [facility] OR     TRANSPLANT KIDNEY RECIPIENT  DONOR  2006       Prior to Admission Medications   Prior to Admission " Medications   Prescriptions Last Dose Informant Patient Reported? Taking?   ASPIRIN PO   Yes No   Sig: Take 81 mg by mouth daily   FOLIC ACID PO   Yes No   Sig: Take 1 mg by mouth daily   FUROSEMIDE PO   Yes No   Sig: Take 20 mg by mouth 2 times daily    LISINOPRIL PO   Yes No   Sig: Take 40 mg by mouth At Bedtime    MAGNESIUM OXIDE PO   Yes No   Sig: Take 420 mg by mouth Every Mon, Wed, Fri Morning   MYFORTIC (BRAND) 360 MG EC TABLET   Yes No   Sig: Take 720 mg by mouth 2 times daily    PRAVASTATIN SODIUM PO   Yes No   Sig: Take 40 mg by mouth At Bedtime    PROGRAF (BRAND) 1 MG CAPSULE   Yes No   Sig: Take 1 mg by mouth 2 times daily   VITAMIN D, CHOLECALCIFEROL, PO   Yes No   Sig: Take 400 Units by mouth daily   acetaminophen (TYLENOL) 325 MG tablet   No No   Sig: Take 2 tablets (650 mg) by mouth every 4 hours as needed for other (multimodal surgical pain management along with NSAIDS and opioid medication as indicated based on pain control and physical function.)   amLODIPine (NORVASC) 10 MG tablet   Yes No   Sig: Take 10 mg by mouth daily   insulin glargine (LANTUS) 100 UNIT/ML injection   Yes No   Sig: Inject 30 Units Subcutaneous every morning    loperamide (IMODIUM) 2 MG capsule   Yes No   Sig: Take 4 mg by mouth as needed for diarrhea   methocarbamol (ROBAXIN) 500 MG tablet   No No   Sig: Take 1 tablet (500 mg) by mouth 4 times daily as needed for muscle spasms   oxyCODONE IR (ROXICODONE) 5 MG tablet   No No   Sig: For pain of 1-5 give 5 mg, for pain of 6-10 give 10 mg every 3 hours as needed.   senna-docusate (SENOKOT-S;PERICOLACE) 8.6-50 MG per tablet   No No   Sig: Take 1 tablet by mouth 2 times daily      Facility-Administered Medications: None     Allergies   Allergies   Allergen Reactions     Heparin      Patient unsure of what reaction was, but it was severe reaction during his transplant and finally was determined that it was heparin       Social History   I have reviewed this patient's social history  and updated it with pertinent information if needed. Familia Irving  reports that he has never smoked. He has never used smokeless tobacco. He reports that he does not drink alcohol or use illicit drugs.    Family History   I have reviewed this patient's family history and updated it with pertinent information if needed.   No family history on file.    Review of Systems   The 10 point Review of Systems is negative other than noted in the HPI.    Physical Exam   Temp: 98.5  F (36.9  C) Temp src: Oral BP: 131/61 Pulse: 73 Heart Rate: 76 Resp: 18 SpO2: 99 % O2 Device: None (Room air)    Vital Signs with Ranges  Temp:  [98.5  F (36.9  C)-99.1  F (37.3  C)] 98.5  F (36.9  C)  Pulse:  [73] 73  Heart Rate:  [76] 76  Resp:  [16-18] 18  BP: (110-131)/(59-61) 131/61  SpO2:  [98 %-99 %] 99 %  0 lbs 0 oz    Constitutional: No apparent distress, c collar in place  Eyes: Non icteric, PERRL  ENT: MMM  Respiratory: CTAB without wheezes or crackles  Cardiovascular: RRR, normal S1 S2, no murmurs   GI: NABS, soft, non tender  Skin: Mild cellulitic changes over left knee, marked, no stigmata of endocarditis   Musculoskeletal: Warm, red, tender left knee  Neurologic: Alert and oriented, moving all extremities spontaneously, exam limited by patient cooperation, no numbness noted, not willing to do strength testing without better pain control     Data   Results for orders placed or performed during the hospital encounter of 05/27/18 (from the past 24 hour(s))   Blood culture   Result Value Ref Range    Specimen Description Blood Left Arm     Special Requests Aerobic and anaerobic bottles received     Culture Micro PENDING    CBC with platelets differential   Result Value Ref Range    WBC 18.7 (H) 4.0 - 11.0 10e9/L    RBC Count 2.87 (L) 4.4 - 5.9 10e12/L    Hemoglobin 8.2 (L) 13.3 - 17.7 g/dL    Hematocrit 26.9 (L) 40.0 - 53.0 %    MCV 94 78 - 100 fl    MCH 28.6 26.5 - 33.0 pg    MCHC 30.5 (L) 31.5 - 36.5 g/dL    RDW 16.0 (H) 10.0 - 15.0  %    Platelet Count 163 150 - 450 10e9/L    Diff Method Automated Method     % Neutrophils 85.2 %    % Lymphocytes 7.6 %    % Monocytes 6.5 %    % Eosinophils 0.0 %    % Basophils 0.1 %    % Immature Granulocytes 0.6 %    Nucleated RBCs 0 0 /100    Absolute Neutrophil 16.0 (H) 1.6 - 8.3 10e9/L    Absolute Lymphocytes 1.4 0.8 - 5.3 10e9/L    Absolute Monocytes 1.2 0.0 - 1.3 10e9/L    Absolute Eosinophils 0.0 0.0 - 0.7 10e9/L    Absolute Basophils 0.0 0.0 - 0.2 10e9/L    Abs Immature Granulocytes 0.1 0 - 0.4 10e9/L    Absolute Nucleated RBC 0.0    Comprehensive metabolic panel   Result Value Ref Range    Sodium 147 (H) 133 - 144 mmol/L    Potassium 4.1 3.4 - 5.3 mmol/L    Chloride 115 (H) 94 - 109 mmol/L    Carbon Dioxide 22 20 - 32 mmol/L    Anion Gap 10 3 - 14 mmol/L    Glucose 74 70 - 99 mg/dL    Urea Nitrogen 63 (H) 7 - 30 mg/dL    Creatinine 2.56 (H) 0.66 - 1.25 mg/dL    GFR Estimate 25 (L) >60 mL/min/1.7m2    GFR Estimate If Black 30 (L) >60 mL/min/1.7m2    Calcium 8.0 (L) 8.5 - 10.1 mg/dL    Bilirubin Total 1.7 (H) 0.2 - 1.3 mg/dL    Albumin 1.9 (L) 3.4 - 5.0 g/dL    Protein Total 5.2 (L) 6.8 - 8.8 g/dL    Alkaline Phosphatase 87 40 - 150 U/L    ALT 40 0 - 70 U/L    AST 45 0 - 45 U/L   CRP inflammation   Result Value Ref Range    CRP Inflammation 247.0 (H) 0.0 - 8.0 mg/L   Erythrocyte sedimentation rate auto   Result Value Ref Range    Sed Rate 103 (H) 0 - 20 mm/h   INR   Result Value Ref Range    INR 1.45 (H) 0.86 - 1.14   Lactic acid   Result Value Ref Range    Lactic Acid 0.8 0.7 - 2.0 mmol/L   Lipase   Result Value Ref Range    Lipase 36 (L) 73 - 393 U/L   Magnesium   Result Value Ref Range    Magnesium 1.7 1.6 - 2.3 mg/dL   Troponin I   Result Value Ref Range    Troponin I ES <0.015 0.000 - 0.045 ug/L   Blood culture   Result Value Ref Range    Specimen Description Blood Right Hand     Special Requests Aerobic and anaerobic bottles received     Culture Micro PENDING    XR Knee Left 1/2 Views    Narrative     LEFT KNEE ONE TO TWO VIEWS  5/27/2018 7:10 PM     COMPARISON: None.    HISTORY: Left knee swelling and pain.    FINDINGS: There is a side plate and screws affixed to the proximal  left femur and healed tibial plateau fracture deformity. No recent  fractures noted. Left knee joint alignment appears normal.     BYRON TENORIO MD   Cervical spine XR, 2-3 views    Narrative    CERVICAL SPINE TWO TO THREE VIEWS   5/27/2018 7:10 PM     COMPARISON: Two view cervical spine 5/20/2018.    HISTORY: Recent cervical fusion.        Impression    IMPRESSION: Posterior spine fusion hardware from C2 down to T2 again  noted. Interbody fusion devices in the C3-C4, C4-C5, C5-C6 and C6-C7  interspaces again noted. Alignment of the cervical vertebrae remains  normal. Hardware is unchanged.     BYRON TENORIO MD   Chest  XR, 1 view portable    Narrative    CHEST PORTABLE ONE VIEW  5/27/2018 7:12 PM     COMPARISON: None.    HISTORY: Fever.    FINDINGS: The cardiac silhouette, pulmonary vasculature, lungs and  pleural spaces are within normal limits.      Impression    IMPRESSION: Clear lungs.    BYRON TENORIO MD   US Upper Extremity Venous Duplex Bilat    Narrative    US UPPER EXTREMITY VENOUS DUPLEX BILATERAL 5/27/2018 7:44 PM     HISTORY: Bilateral arm swelling after recent neck surgery.    TECHNIQUE: Real-time ultrasound with compression. Color flow imaging  and Doppler waveform analysis.    COMPARISON: None.    FINDINGS:     Right side: Normal blood flow and/or compressibility throughout the  right internal jugular, subclavian, axillary, cephalic, basilic,  brachial, radial and ulnar veins.    Left side: Left internal jugular vein is not visualized, in part due  to the presence of a C-collar. Normal blood flow and/or  compressibility throughout the left subclavian, axillary, cephalic,  basilic, brachial, radial and ulnar veins.      Impression    IMPRESSION: No evidence for deep or superficial vein thrombosis in the  upper  extremities bilaterally.      MONSTER PENNY MD   UA with Microscopic   Result Value Ref Range    Color Urine Yellow     Appearance Urine Clear     Glucose Urine Negative NEG^Negative mg/dL    Bilirubin Urine Negative NEG^Negative    Ketones Urine Negative NEG^Negative mg/dL    Specific Gravity Urine 1.010 1.003 - 1.035    Blood Urine Negative NEG^Negative    pH Urine 5.0 5.0 - 7.0 pH    Protein Albumin Urine 10 (A) NEG^Negative mg/dL    Urobilinogen mg/dL Normal 0.0 - 2.0 mg/dL    Nitrite Urine Negative NEG^Negative    Leukocyte Esterase Urine Negative NEG^Negative    Source Midstream Urine     WBC Urine 1 0 - 5 /HPF    RBC Urine 1 0 - 2 /HPF    Bacteria Urine Few (A) NEG^Negative /HPF    Amorphous Crystals Few (A) NEG^Negative /HPF   Urine Culture   Result Value Ref Range    Specimen Description Midstream Urine     Special Requests Specimen received in preservative     Culture Micro PENDING    Cell count with differential fluid   Result Value Ref Range    Body Fluid Analysis Source Synovial fluid     % Neutrophils Fluid 97 %    % Mono/Macro Fluid 3 %    Color Fluid Yellow     Appearance Fluid Cloudy     WBC Fluid 38688 /uL   Crystal ID synovial fluid   Result Value Ref Range    Crystal Analysis (A) NOCRYS^No clincally significant crystals seen.     Positive for intracellular crystals, consistent with monosodium urate crystals.   Anaerobic bacterial culture   Result Value Ref Range    Specimen Description Synovial fluid Aspirate     Special Requests Received in anaerobic tubes.     Culture Micro PENDING    Gram stain   Result Value Ref Range    Specimen Description Synovial fluid Aspirate     Gram Stain       No organisms seen  Result confirmed by cytospin preparation.      Gram Stain Many  WBC'S seen  predominantly PMN's       Gram Stain CALLED FELICIA MONROE MD 5.27.18 2205 WR.     Gram Stain       Gram stain slide reviewed at the [facility]   Glucose by meter   Result  Value Ref Range    Glucose 71 70 - 99 mg/dL

## 2018-05-28 NOTE — PROGRESS NOTES
Clinical Nutrition Services Brief Note - (+) admission nutrition risk screen for weight loss    Patient is a 73 year old male with an admission nutrition risk screen (+) for unintentional weight loss of 10 lbs or more in the past 2 months.    Per documented weight history, patient's weight is up 8.1 kg x1 month and 3.9 kg x 2.5 months (see below). Current BMI within normal parameters at 23.20 kg/m^2.  Wt Readings from Last 10 Encounters:   05/16/18 69.2 kg (152 lb 8.9 oz)   04/18/18 61.1 kg (134 lb 12.8 oz)   03/01/18 65.3 kg (144 lb)     Due to inappropriate (+) admission nutrition risk screen for weight loss when no weight loss indicated per chart, full nutrition assessment not completed at this time. Will follow per protocol or RD available by consult if further nutrition intervention warranted sooner.     Ara Snell RD, LD  Weekend/On-call/Holiday Pager: [phone]

## 2018-05-28 NOTE — PLAN OF CARE
Problem: Patient Care Overview  Goal: Plan of Care/Patient Progress Review  Outcome: No Change  Note for the night shift.  D:Pt admitted per cart to 1028-2 from the ER. Son with pt. Pt moved to the bed with the hover ketan. Pt refused to roll to get the ketan out or have his posterior side checked.  Tried many more times during the night to get pt to roll off the hover ketan and to check his skin. Pt refused due to pain. Pt yelled out in pain just touching any of his extremities.  Right arm swollen from fingers to above  the elbow. Very tender to touch. Very weak grasp.  Left arm not as swollen and not as tender. Both feet very tender, even moving the sheets over his feet, he would yell out. Right knee swollen and  light red/pink. Marker drawn on the knee around the area. No increase in the area over night.  Pt Grumpy. Pt better at the start and end of the shift . In the middle part of the night pt confused and paranoid. Tried to give his pills. He was sure we were trying to poison him. He was sure we wanted $1000.00 a pill for them and that we had stolen his car. Even with the son checking out the pill labels and telling him they were OK he would not take them. Tried several times tonight and refused. Son trying very hard to help convince him the pill were ok to take. Has ttroible swallowing per the pt and son, so pt also refused the pain pill at an earlier time. Called the house  For IV Dilaudid, but when trying to offer the pt the Dilaudid, he yelled at the nurse and kicked her out of the room. Refusing all his cares. This morning Dr in and aspirated from his right wrist. She canceled his surgery at 0630 this AM. Plans on following labs and aspiration results. Son and pt say his voiding is much more urgent, and goes before he can even tell anyone. They say this is new since surgery. Incont of urine at 0700. This AM still refusing to turn. DId finally ask and agree to pain pill at 0715 this AM. 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 Poor eyesight in the other. Pt LIKES the Bright lights on in his room ALL the time. Gave pt a soft touch call light since his hands are too sore and weak for regular call light. At times able to make needs known , the rest of the time very very paranoid. A: Maybe try condom cath on pt to keep him dry. Will  Not turn to be able to put on attends. P: Monitor closely.  Supportive cares. Medicate for pain as needed.

## 2018-05-28 NOTE — PLAN OF CARE
Problem: Patient Care Overview  Goal: Plan of Care/Patient Progress Review  Outcome: No Change    VS:   Stable no abnormalities noted. Note, elevated temp - tried to give PRN tylenol. Pt confused and unwilling to take. Will continue to monitor. No reports of SOB or chest pain. LS clear equal bilaterally, on room air.    Output:   Incontinence of urine - urine urgency. Can use urinal. Last Bm 5/28/18, passing gas.     Activity:   Bedrest - too painful to move. Refuses to turn and reposition   Skin: Intact, w/ the exception of a bedsore on buttocks. Open sore, odorous. Will clean and apply mepilex. WOC consult ordered.    Pain:   Moderate, mangaeable w/ oxy 2.5-5mg q4hs.    Neuro/CMS:   Denies any numbness or tingling. Pt is confused at times - disoriented x3     Dressing(s):   None    Diet:   Moderate consistent CHO, has difficulty swallowing. Can take pills whole but notice some difficulty, Speech consult ordered.    Equipment:   Collar, on at all times   IV's/Drains:   PIV R hand infusing 0.45 NS @ 125ml/hr, CDI   Plan:   Continue to monitor pt.    Additional Info:   Pt takes pill w/ applesauce and takes pills smallest to largest.

## 2018-05-28 NOTE — ED NOTES
[facility], [place]   ED Nurse to Floor Handoff     Familia Irving is a 73 year old male who speaks English and lives with others,  in a nursing home  They arrived in the ED by ambulance from Nursing Home    ED Chief Complaint: Fever (after neck surgery two weeks ago: confusion this morning)    ED Dx;   Final diagnoses:   Fever and chills   Pyogenic arthritis of left knee joint, due to unspecified organism (H)   Cellulitis of right arm         Needed?: No    Allergies:   Allergies   Allergen Reactions     Heparin      Patient unsure of what reaction was, but it was severe reaction during his transplant and finally was determined that it was heparin   .  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      Tinnitus       Baseline Mental status: other sl. confused  Current Mental Status changes: at basesline    Infection present or suspected this encounter: yes other left knee  Sepsis suspected: No  Isolation type: No active isolations     Activity level - Baseline/Home:  Total Care  Activity Level - Current:   Total Care    Bariatric equipment needed?: No    In the ED these meds were given:   Medications   sodium chloride 0.9% infusion (1,000 mLs Intravenous New Bag 5/27/18 2138)   vancomycin (VANCOCIN) 1000 mg in dextrose 5% 200 mL PREMIX (1,000 mg Intravenous New Bag 5/27/18 2138)   naloxone (NARCAN) injection 0.1-0.4 mg (not administered)   lactated ringers infusion (not administered)   acetaminophen (TYLENOL) tablet 650 mg (not administered)   ondansetron (ZOFRAN-ODT) ODT tab 4 mg (not administered)     Or   ondansetron (ZOFRAN) injection 4 mg (not administered)   senna-docusate  Pt advised (SENOKOT-S;PERICOLACE) 8.6-50 MG per tablet 1-2 tablet (not administered)   0.9% sodium chloride BOLUS (0 mLs Intravenous Stopped 5/27/18 2138)   lidocaine (PF) (XYLOCAINE) 2 % injection (  Given by Other Clinician 5/27/18 2056)       Drips running?  Yes    Home pump  No    Current LDAs  Peripheral IV 05/16/18 Right Hand (Active)   Number of days:11       Incision/Surgical Site 05/16/18 Anterior Neck (Active)   Number of days:11       Incision/Surgical Site 05/16/18 Left Hip (Active)   Number of days:11       Incision/Surgical Site 05/16/18 Anterior Neck (Active)   Number of days:11       Incision/Surgical Site 05/16/18 Posterior;Upper Back (Active)   Number of days:11       Labs results:   Labs Ordered and Resulted from Time of ED Arrival Up to the Time of Departure from the ED   CBC WITH PLATELETS DIFFERENTIAL - Abnormal; Notable for the following:        Result Value    WBC 18.7 (*)     RBC Count 2.87 (*)     Hemoglobin 8.2 (*)     Hematocrit 26.9 (*)     MCHC 30.5 (*)     RDW 16.0 (*)     Absolute Neutrophil 16.0 (*)     All other components within normal limits   COMPREHENSIVE METABOLIC PANEL - Abnormal; Notable for the following:     Sodium 147 (*)     Chloride 115 (*)     Urea Nitrogen 63 (*)     Creatinine 2.56 (*)     GFR Estimate 25 (*)     GFR Estimate If Black 30 (*)     Calcium 8.0 (*)     Bilirubin Total 1.7 (*)     Albumin 1.9 (*)     Protein Total 5.2 (*)     All other components within normal limits   CRP INFLAMMATION - Abnormal; Notable for the following:     CRP Inflammation 247.0 (*)     All other components within normal limits   ERYTHROCYTE SEDIMENTATION RATE AUTO - Abnormal; Notable for the following:     Sed Rate 103 (*)     All other components within normal limits   INR - Abnormal; Notable for the following:     INR 1.45 (*)     All other components within normal limits   LIPASE - Abnormal; Notable for the following:     Lipase 36 (*)     All other components within normal limits   ROUTINE  UA WITH MICROSCOPIC - Abnormal; Notable for the following:     Protein Albumin Urine 10 (*)     Bacteria Urine Few (*)     Amorphous Crystals Few (*)     All other components within normal limits   LACTIC ACID WHOLE BLOOD   MAGNESIUM   TROPONIN I   IP ASSIGN PROVIDER TEAM TO TREATMENT TEAM   VITAL SIGNS   ACTIVITY   NEURO CHECKS   NO INDWELLING URINARY CATHETER (MULTANI) PRESENT OR NEEDED   BLADDER SCAN   APPLY MI[place]I J CERVICAL COLLAR   APPLY PNEUMATIC COMPRESSION DEVICE (PCD)   BLOOD CULTURE   BLOOD CULTURE   URINE CULTURE AEROBIC BACTERIAL   CELL COUNT WITH DIFFERENTIAL FLUID   CRYSTAL ID SYNOVIAL FLUID   ANAEROBIC BACTERIAL CULTURE   FLUID CULTURE AEROBIC BACTERIAL   GRAM STAIN       Imaging Studies:   Recent Results (from the past 24 hour(s))   XR Knee Left 1/2 Views    Narrative    LEFT KNEE ONE TO TWO VIEWS  5/27/2018 7:10 PM     COMPARISON: None.    HISTORY: Left knee swelling and pain.    FINDINGS: There is a side plate and screws affixed to the proximal  left femur and healed tibial plateau fracture deformity. No recent  fractures noted. Left knee joint alignment appears normal.     BYRON TENORIO MD   Cervical spine XR, 2-3 views    Narrative    CERVICAL SPINE TWO TO THREE VIEWS   5/27/2018 7:10 PM     COMPARISON: Two view cervical spine 5/20/2018.    HISTORY: Recent cervical fusion.        Impression    IMPRESSION: Posterior spine fusion hardware from C2 down to T2 again  noted. Interbody fusion devices in the C3-C4, C4-C5, C5-C6 and C6-C7  interspaces again noted. Alignment of the cervical vertebrae remains  normal. Hardware is unchanged.     BYRON TENORIO MD   Chest  XR, 1 view portable    Narrative    CHEST PORTABLE ONE VIEW  5/27/2018 7:12 PM     COMPARISON: None.    HISTORY: Fever.    FINDINGS: The cardiac silhouette, pulmonary vasculature, lungs and  pleural spaces are within normal limits.      Impression    IMPRESSION: Clear lungs.    BYRON TENORIO MD   US Upper Extremity Venous Duplex Bilat     Narrative    US UPPER EXTREMITY VENOUS DUPLEX BILATERAL 5/27/2018 7:44 PM     HISTORY: Bilateral arm swelling after recent neck surgery.    TECHNIQUE: Real-time ultrasound with compression. Color flow imaging  and Doppler waveform analysis.    COMPARISON: None.    FINDINGS:     Right side: Normal blood flow and/or compressibility throughout the  right internal jugular, subclavian, axillary, cephalic, basilic,  brachial, radial and ulnar veins.    Left side: Left internal jugular vein is not visualized, in part due  to the presence of a C-collar. Normal blood flow and/or  compressibility throughout the left subclavian, axillary, cephalic,  basilic, brachial, radial and ulnar veins.      Impression    IMPRESSION: No evidence for deep or superficial vein thrombosis in the  upper extremities bilaterally.           Recent vital signs:   /59  Pulse 73  Temp 99.1  F (37.3  C) (Oral)  Resp 16  SpO2 98%    Cardiac Rhythm: Other N/A  Pt needs tele? No  Skin/wound Issues: Pressure sore coccyx,   recent cervical spine surgery,  red swollen left knee    Code Status: Full Code    Pain control: fair    Nausea control: pt had none    Abnormal labs/tests/findings requiring intervention: See epic    Family present during ED course? Yes   Family Comments/Social Situation comments: None    Tasks needing completion: None    Tawanna Vance RN  [facility]-- [phone] [place] [facility]  [phone] [facility]

## 2018-05-28 NOTE — PHARMACY-VANCOMYCIN DOSING SERVICE
Pharmacy Vancomycin Initial Note  Date of Service May 27, 2018  Patient's  1945  73 year old, male    Indication: Skin and Soft Tissue Infection    Current estimated CrCl = Estimated Creatinine Clearance: 25.5 mL/min (based on Cr of 2.56).    Creatinine for last 3 days  2018:  6:08 PM Creatinine 2.56 mg/dL    Recent Vancomycin Level(s) for last 3 days  No results found for requested labs within last 72 hours.      Vancomycin IV Administrations (past 72 hours)      No vancomycin orders with administrations in past 72 hours.                Nephrotoxins and other renal medications (Future)    Start     Dose/Rate Route Frequency Ordered Stop    18  vancomycin (VANCOCIN) 1000 mg in dextrose 5% 200 mL PREMIX      1,000 mg  200 mL/hr over 1 Hours Intravenous ONCE 18            Contrast Orders - past 72 hours     None                Plan:  1.  Start vancomycin  1 mg IV q h. Once in ER, anticipate q48h interval  2.  Goal Trough Level: 10-15 mg/L   3.  Pharmacy will check trough levels as appropriate in 1-3 Days.    4. Serum creatinine levels will be ordered daily for the first week of therapy and at least twice weekly for subsequent weeks.    5. Henderson method utilized to dose vancomycin therapy: Method 1    Theo Calvin

## 2018-05-28 NOTE — PROGRESS NOTES
Pt seen, circumstances of admission noted    Discussed with team, son and with ortho    IM consult from this am reviewed    Pt c/o severe pain l knee, R wrist, both ankles  Staff notes some difficulty swallowing, intermittent confusion  Son notes Pt's mental status has been off since C spine surgery 5/6/18  He has c/o urinary frequency and urgency since surgery    Pt and son are concerned re insurance coverage for stay here, and Pt has stated he would be more comfortable in [facility] setting-- has been notified    Exam  Sleepy, easily alerts, fully oriented, does not remember me from recent stay. Appears frail  Oral mucosa dry  Low grade T  BP 130s-140s/  HR 60s  BG low 100s    Lungs clear  CV rrr no M  Abd soft, non-tender  + marked tenderness with light palp R wrist, L knee and, both ankles. R wrist and both knees are edematous, warm  No calf tenderness B    Labs    L knee aspirate + monosodium urate crystals  R wrist aspirate  monosodium urate crystals  All cultures pending    Results for [facility] (MRN [id number]) as of 5/28/2018 11:12   Ref. Range 5/28/2018 07:09   Sodium Latest Ref Range: 133 - 144 mmol/L 147 (H)   Potassium Latest Ref Range: 3.4 - 5.3 mmol/L 4.1   Chloride Latest Ref Range: 94 - 109 mmol/L 115 (H)   Carbon Dioxide Latest Ref Range: 20 - 32 mmol/L 21   Urea Nitrogen Latest Ref Range: 7 - 30 mg/dL 61 (H)   Creatinine Latest Ref Range: 0.66 - 1.25 mg/dL 2.46 (H)   GFR Estimate Latest Ref Range: >60 mL/min/1.7m2 26 (L)   GFR Estimate If Black Latest Ref Range: >60 mL/min/1.7m2 31 (L)   Calcium Latest Ref Range: 8.5 - 10.1 mg/dL 8.1 (L)   Anion Gap Latest Ref Range: 3 - 14 mmol/L 11   Hemoglobin A1C Latest Ref Range: 0 - 5.6 % 6.6 (H)   CRP Inflammation Latest Ref Range: 0.0 - 8.0 mg/L 268.0 (H)   Glucose Latest Ref Range: 70 - 99 mg/dL 156 (H)   WBC Latest Ref Range: 4.0 - 11.0 10e9/L 20.2 (H)   Hemoglobin Latest Ref Range: 13.3 - 17.7 g/dL 8.7 (L)   Hematocrit Latest Ref Range:  "40.0 - 53.0 % 28.0 (L)   Platelet Count Latest Ref Range: 150 - 450 10e9/L 171   RBC Count Latest Ref Range: 4.4 - 5.9 10e12/L 3.01 (L)   MCV Latest Ref Range: 78 - 100 fl 93   MCH Latest Ref Range: 26.5 - 33.0 pg 28.9   MCHC Latest Ref Range: 31.5 - 36.5 g/dL 31.1 (L)   RDW Latest Ref Range: 10.0 - 15.0 % 16.0 (H)       Assessment    Acute polyarthritis, most c/w polyarticular gout, in Pt with history of gout. Was started empirically on IV Vanco on admission. All cultures pending    SALVADOR on CKD, in Pt who is s/p kidney transplant.  Likely secondary to volume depletion, ? Recent lasix and possibly, lisinopril. Low Tacrolimus at time of d/c one week ago.    DM type 2, normally on Lantus 30 units daily. BG relatively low since admission, likely secondary to decreased intake and SALVADOR. Expect significant increase in BG with administration of steroids    Dysphagia, likely secondary to recent C spine surgery    HTN, stable off lisinopril for now, on PTA amlodipine    Intermittent confusion, likely related to recent surgery, narcotics, and now, acute systemic illness      Plan  Solumedrol 120 mg IV one time dose today  Will likely need short course of oral steroids starting tomorrow, assuming good response to IV steroids today  Await multiple pending cultures, would hold on further antibiotics, particularly given SALVADOR  IV fluids, continue to hold lasix and lisinopril, monitor renal function closely  If renal function is not improved by tomorrow, will need to recheck Tacrolimus levels, consider Nephrology consult  Monitor intake/output  PVR  Speech path to see  Start prandial insulin only--anticipate need for resumption of Lantus  Mechanical DVT proph ( history of \"severe reaction\" to heparin during transplant)    Discussed at length with son.            "

## 2018-05-28 NOTE — PROGRESS NOTES
Upon admission to 10 A patient stated that he has a pre existing wound on his bottom, but was in too much pain to roll so writer could assess. Primary RN notified of wound.

## 2018-05-29 ENCOUNTER — APPOINTMENT (OUTPATIENT)
Dept: OCCUPATIONAL THERAPY | Facility: CLINIC | Age: 73
DRG: 554 | End: 2018-05-29
Attending: ORTHOPAEDIC SURGERY
Payer: MEDICARE

## 2018-05-29 ENCOUNTER — ANESTHESIA (OUTPATIENT)
Dept: MEDSURG UNIT | Facility: CLINIC | Age: 73
DRG: 554 | End: 2018-05-29
Payer: MEDICARE

## 2018-05-29 ENCOUNTER — APPOINTMENT (OUTPATIENT)
Dept: SPEECH THERAPY | Facility: CLINIC | Age: 73
DRG: 554 | End: 2018-05-29
Attending: INTERNAL MEDICINE
Payer: MEDICARE

## 2018-05-29 ENCOUNTER — ANESTHESIA EVENT (OUTPATIENT)
Dept: MEDSURG UNIT | Facility: CLINIC | Age: 73
DRG: 554 | End: 2018-05-29
Payer: MEDICARE

## 2018-05-29 ENCOUNTER — APPOINTMENT (OUTPATIENT)
Dept: PHYSICAL THERAPY | Facility: CLINIC | Age: 73
DRG: 554 | End: 2018-05-29
Attending: ORTHOPAEDIC SURGERY
Payer: MEDICARE

## 2018-05-29 LAB
ANION GAP SERPL CALCULATED.3IONS-SCNC: 15 MMOL/L (ref 3–14)
ANION GAP SERPL CALCULATED.3IONS-SCNC: 16 MMOL/L (ref 3–14)
BUN SERPL-MCNC: 84 MG/DL (ref 7–30)
BUN SERPL-MCNC: 89 MG/DL (ref 7–30)
CALCIUM SERPL-MCNC: 7.7 MG/DL (ref 8.5–10.1)
CALCIUM SERPL-MCNC: 8 MG/DL (ref 8.5–10.1)
CHLORIDE SERPL-SCNC: 110 MMOL/L (ref 94–109)
CHLORIDE SERPL-SCNC: 113 MMOL/L (ref 94–109)
CO2 SERPL-SCNC: 17 MMOL/L (ref 20–32)
CO2 SERPL-SCNC: 18 MMOL/L (ref 20–32)
CREAT SERPL-MCNC: 2.73 MG/DL (ref 0.66–1.25)
CREAT SERPL-MCNC: 2.74 MG/DL (ref 0.66–1.25)
CREAT UR-MCNC: 122 MG/DL
ERYTHROCYTE [DISTWIDTH] IN BLOOD BY AUTOMATED COUNT: 15.6 % (ref 10–15)
ERYTHROCYTE [DISTWIDTH] IN BLOOD BY AUTOMATED COUNT: NORMAL % (ref 10–15)
FRACT EXCRET NA UR+SERPL-RTO: 0.3 %
GFR SERPL CREATININE-BSD FRML MDRD: 23 ML/MIN/1.7M2
GFR SERPL CREATININE-BSD FRML MDRD: 23 ML/MIN/1.7M2
GLUCOSE BLDC GLUCOMTR-MCNC: 212 MG/DL (ref 70–99)
GLUCOSE BLDC GLUCOMTR-MCNC: 230 MG/DL (ref 70–99)
GLUCOSE BLDC GLUCOMTR-MCNC: 272 MG/DL (ref 70–99)
GLUCOSE BLDC GLUCOMTR-MCNC: 274 MG/DL (ref 70–99)
GLUCOSE BLDC GLUCOMTR-MCNC: 284 MG/DL (ref 70–99)
GLUCOSE BLDC GLUCOMTR-MCNC: 327 MG/DL (ref 70–99)
GLUCOSE SERPL-MCNC: 267 MG/DL (ref 70–99)
GLUCOSE SERPL-MCNC: 308 MG/DL (ref 70–99)
HCT VFR BLD AUTO: 27.9 % (ref 40–53)
HCT VFR BLD AUTO: NORMAL % (ref 40–53)
HGB BLD-MCNC: 8.8 G/DL (ref 13.3–17.7)
HGB BLD-MCNC: NORMAL G/DL (ref 13.3–17.7)
MAGNESIUM SERPL-MCNC: 1.9 MG/DL (ref 1.6–2.3)
MCH RBC QN AUTO: 28.6 PG (ref 26.5–33)
MCH RBC QN AUTO: NORMAL PG (ref 26.5–33)
MCHC RBC AUTO-ENTMCNC: 31.5 G/DL (ref 31.5–36.5)
MCHC RBC AUTO-ENTMCNC: NORMAL G/DL (ref 31.5–36.5)
MCV RBC AUTO: 91 FL (ref 78–100)
MCV RBC AUTO: NORMAL FL (ref 78–100)
PLATELET # BLD AUTO: 218 10E9/L (ref 150–450)
PLATELET # BLD AUTO: NORMAL 10E9/L (ref 150–450)
POTASSIUM SERPL-SCNC: 4.6 MMOL/L (ref 3.4–5.3)
POTASSIUM SERPL-SCNC: 5.2 MMOL/L (ref 3.4–5.3)
RBC # BLD AUTO: 3.08 10E12/L (ref 4.4–5.9)
RBC # BLD AUTO: NORMAL 10E12/L (ref 4.4–5.9)
SODIUM SERPL-SCNC: 143 MMOL/L (ref 133–144)
SODIUM SERPL-SCNC: 146 MMOL/L (ref 133–144)
SODIUM UR-SCNC: 19 MMOL/L
WBC # BLD AUTO: 23.5 10E9/L (ref 4–11)
WBC # BLD AUTO: NORMAL 10E9/L (ref 4–11)

## 2018-05-29 PROCEDURE — 36415 COLL VENOUS BLD VENIPUNCTURE: CPT | Performed by: ORTHOPAEDIC SURGERY

## 2018-05-29 PROCEDURE — 97165 OT EVAL LOW COMPLEX 30 MIN: CPT | Mod: GO | Performed by: OCCUPATIONAL THERAPIST

## 2018-05-29 PROCEDURE — 97535 SELF CARE MNGMENT TRAINING: CPT | Mod: GO | Performed by: OCCUPATIONAL THERAPIST

## 2018-05-29 PROCEDURE — 25000132 ZZH RX MED GY IP 250 OP 250 PS 637: Mod: GY | Performed by: INTERNAL MEDICINE

## 2018-05-29 PROCEDURE — 25000125 ZZHC RX 250: Performed by: INTERNAL MEDICINE

## 2018-05-29 PROCEDURE — 85027 COMPLETE CBC AUTOMATED: CPT | Performed by: ORTHOPAEDIC SURGERY

## 2018-05-29 PROCEDURE — A9270 NON-COVERED ITEM OR SERVICE: HCPCS | Mod: GY | Performed by: STUDENT IN AN ORGANIZED HEALTH CARE EDUCATION/TRAINING PROGRAM

## 2018-05-29 PROCEDURE — 36415 COLL VENOUS BLD VENIPUNCTURE: CPT | Performed by: INTERNAL MEDICINE

## 2018-05-29 PROCEDURE — 80048 BASIC METABOLIC PNL TOTAL CA: CPT | Performed by: INTERNAL MEDICINE

## 2018-05-29 PROCEDURE — 92610 EVALUATE SWALLOWING FUNCTION: CPT | Mod: GN

## 2018-05-29 PROCEDURE — 27210995 ZZH RX 272: Performed by: INTERNAL MEDICINE

## 2018-05-29 PROCEDURE — 25000131 ZZH RX MED GY IP 250 OP 636 PS 637: Performed by: INTERNAL MEDICINE

## 2018-05-29 PROCEDURE — 00000146 ZZHCL STATISTIC GLUCOSE BY METER IP

## 2018-05-29 PROCEDURE — 82570 ASSAY OF URINE CREATININE: CPT | Performed by: INTERNAL MEDICINE

## 2018-05-29 PROCEDURE — A9270 NON-COVERED ITEM OR SERVICE: HCPCS | Mod: GY | Performed by: INTERNAL MEDICINE

## 2018-05-29 PROCEDURE — 85027 COMPLETE CBC AUTOMATED: CPT | Performed by: INTERNAL MEDICINE

## 2018-05-29 PROCEDURE — 97530 THERAPEUTIC ACTIVITIES: CPT | Mod: GO | Performed by: OCCUPATIONAL THERAPIST

## 2018-05-29 PROCEDURE — G0463 HOSPITAL OUTPT CLINIC VISIT: HCPCS | Mod: 25

## 2018-05-29 PROCEDURE — 40000225 ZZH STATISTIC SLP WARD VISIT

## 2018-05-29 PROCEDURE — 25000132 ZZH RX MED GY IP 250 OP 250 PS 637: Performed by: STUDENT IN AN ORGANIZED HEALTH CARE EDUCATION/TRAINING PROGRAM

## 2018-05-29 PROCEDURE — 97602 WOUND(S) CARE NON-SELECTIVE: CPT

## 2018-05-29 PROCEDURE — 84300 ASSAY OF URINE SODIUM: CPT | Performed by: INTERNAL MEDICINE

## 2018-05-29 PROCEDURE — 99233 SBSQ HOSP IP/OBS HIGH 50: CPT | Performed by: INTERNAL MEDICINE

## 2018-05-29 PROCEDURE — 12000001 ZZH R&B MED SURG/OB UMMC

## 2018-05-29 PROCEDURE — 25000128 H RX IP 250 OP 636: Performed by: INTERNAL MEDICINE

## 2018-05-29 PROCEDURE — 40000133 ZZH STATISTIC OT WARD VISIT: Performed by: OCCUPATIONAL THERAPIST

## 2018-05-29 PROCEDURE — 97530 THERAPEUTIC ACTIVITIES: CPT | Mod: GP | Performed by: PHYSICAL THERAPIST

## 2018-05-29 PROCEDURE — 36416 COLLJ CAPILLARY BLOOD SPEC: CPT | Performed by: INTERNAL MEDICINE

## 2018-05-29 PROCEDURE — 40000193 ZZH STATISTIC PT WARD VISIT: Performed by: PHYSICAL THERAPIST

## 2018-05-29 PROCEDURE — 40000671 ZZH STATISTIC ANESTHESIA CASE

## 2018-05-29 PROCEDURE — 83735 ASSAY OF MAGNESIUM: CPT | Performed by: INTERNAL MEDICINE

## 2018-05-29 PROCEDURE — 97162 PT EVAL MOD COMPLEX 30 MIN: CPT | Mod: GP | Performed by: PHYSICAL THERAPIST

## 2018-05-29 RX ORDER — PREDNISONE 20 MG/1
40 TABLET ORAL DAILY
Status: DISCONTINUED | OUTPATIENT
Start: 2018-05-29 | End: 2018-06-03

## 2018-05-29 RX ORDER — ACETAMINOPHEN 500 MG
1000 TABLET ORAL EVERY 8 HOURS
Status: DISCONTINUED | OUTPATIENT
Start: 2018-05-29 | End: 2018-05-30

## 2018-05-29 RX ORDER — CITRIC ACID/SODIUM CITRATE 334-500MG
30 SOLUTION, ORAL ORAL DAILY
Status: DISCONTINUED | OUTPATIENT
Start: 2018-05-29 | End: 2018-06-06 | Stop reason: HOSPADM

## 2018-05-29 RX ORDER — ALLOPURINOL 100 MG/1
100 TABLET ORAL DAILY
Status: DISCONTINUED | OUTPATIENT
Start: 2018-05-29 | End: 2018-06-06 | Stop reason: HOSPADM

## 2018-05-29 RX ORDER — AMLODIPINE BESYLATE 10 MG/1
10 TABLET ORAL
Status: DISCONTINUED | OUTPATIENT
Start: 2018-05-29 | End: 2018-05-30

## 2018-05-29 RX ADMIN — MYCOPHENOLIC ACID 720 MG: 360 TABLET, DELAYED RELEASE ORAL at 17:32

## 2018-05-29 RX ADMIN — TACROLIMUS 1 MG: 1 CAPSULE, GELATIN COATED ORAL at 17:32

## 2018-05-29 RX ADMIN — ALLOPURINOL 100 MG: 100 TABLET ORAL at 17:32

## 2018-05-29 RX ADMIN — SODIUM CHLORIDE: 4.5 INJECTION, SOLUTION INTRAVENOUS at 10:12

## 2018-05-29 RX ADMIN — SODIUM CHLORIDE 500 ML: 9 INJECTION, SOLUTION INTRAVENOUS at 22:53

## 2018-05-29 RX ADMIN — SODIUM CHLORIDE: 4.5 INJECTION, SOLUTION INTRAVENOUS at 01:34

## 2018-05-29 RX ADMIN — PREDNISONE 40 MG: 20 TABLET ORAL at 17:32

## 2018-05-29 RX ADMIN — MYCOPHENOLIC ACID 720 MG: 360 TABLET, DELAYED RELEASE ORAL at 10:25

## 2018-05-29 RX ADMIN — FOLIC ACID 1 MG: 1 TABLET ORAL at 09:54

## 2018-05-29 RX ADMIN — ACETAMINOPHEN 1000 MG: 500 TABLET ORAL at 17:36

## 2018-05-29 RX ADMIN — SODIUM CHLORIDE: 4.5 INJECTION, SOLUTION INTRAVENOUS at 18:47

## 2018-05-29 RX ADMIN — TACROLIMUS 1 MG: 1 CAPSULE, GELATIN COATED ORAL at 09:53

## 2018-05-29 RX ADMIN — INSULIN ASPART 6 UNITS: 100 INJECTION, SOLUTION INTRAVENOUS; SUBCUTANEOUS at 18:41

## 2018-05-29 RX ADMIN — AMLODIPINE BESYLATE 10 MG: 10 TABLET ORAL at 09:53

## 2018-05-29 RX ADMIN — Medication 400 UNITS: at 09:52

## 2018-05-29 RX ADMIN — SODIUM CITRATE AND CITRIC ACID MONOHYDRATE 30 ML: 500; 334 SOLUTION ORAL at 17:33

## 2018-05-29 RX ADMIN — ACETAMINOPHEN 1000 MG: 500 TABLET ORAL at 09:52

## 2018-05-29 RX ADMIN — INSULIN GLARGINE 30 UNITS: 100 INJECTION, SOLUTION SUBCUTANEOUS at 10:26

## 2018-05-29 ASSESSMENT — ACTIVITIES OF DAILY LIVING (ADL)
ADLS_ACUITY_SCORE: 19
ADLS_ACUITY_SCORE: 15
ADLS_ACUITY_SCORE: 19
ADLS_ACUITY_SCORE: 13
ADLS_ACUITY_SCORE: 13
ADLS_ACUITY_SCORE: 19

## 2018-05-29 NOTE — PROGRESS NOTES
"Orthopedic Surgery Progress Note    Subjective:   States he can't tell if he has improved since starting steroids. Still with intermittent confusion. Thought he had been doing PT all along, states he is tired of doing the same thing over and over (repeat exams, blood draws etc.)    Exam:  /42 (BP Location: Left arm)  Pulse 77  Temp 97.5  F (36.4  C) (Oral)  Resp 14  Ht 1.727 m (5' 8\")  SpO2 96%  Gen: Awake, alert, NAD  Resp: breathing equal and non-labored  Extremities:  UE: Right wrist swelling, warmth, mild erythema, not well demarcated.Tolerates minimal ROM 10 deg flexion to neutral extension.    LE: Left knee with erythema in same distribution as yesterday's exam, no change. Still warm. Still TTP over the lateral incision but also TTP diffusely throughout the knee. Tolerates only minimal ROM 5 - 40deg but slightly improved from yesterday's exam. Left ankle pain and mild swelling. Tolerates only minimal ROM from 15deg PF to 10 deg short of neutral DF.    Labs:    Recent Labs  Lab 05/28/18  0709 05/27/18  1808   WBC 20.2* 18.7*   HGB 8.7* 8.2*    163       Recent Labs  Lab 05/28/18  1645 05/28/18  0709 05/27/18  1808    147* 147*   POTASSIUM 4.3 4.1 4.1   CHLORIDE 114* 115* 115*   CO2 17* 21 22   BUN 63* 61* 63*   CR 2.50* 2.46* 2.56*   * 156* 74   MAG  --   --  1.7       Recent Labs  Lab 05/27/18  1808   INR 1.45*     Assessment:  74yo male 12 days s/p multi-level cervical decompression and fusion with right wrist pain and swelling and left knee pain and swelling and right wrist pain and swelling 2/2 gout (aspirates positive for monosodium urate crystals). Slight positive response to steroids.       Plan:  -Admit to ortho with medicine consult.   -Continue steroids.   -Patient and son state that if more ongoing inpatient management is needed, [facility] was offered last night prior to admission, but the patient elected to stay here at the " [place].   -SALVADOR: follow up BMP today, appreciate medicine recommendations on management.   -Infection vs gout: follow up right wrist aspiration and follow up blood cultures and left knee cultures. Dc vanco.  -Activity: WBAT BLE and BUE  -PT/OT  -Continue 120cc/hour for hydration.  -Diet: Ok for regular diet.   -No plan for operative debridement at this time.     If questions arise, please page me at the number below before paging the orthopedic resident on call. Thank you!    La Rand MD PGY-4  Orthopedic Surgery  [phone]

## 2018-05-29 NOTE — PROGRESS NOTES
05/29/18 0800   General Information   Onset Date 05/28/18   Start of Care Date 05/29/18   Referring Physician Dr. Márquez   Patient Profile Review/OT: Additional Occupational Profile Info See Profile for full history and prior level of function   Patient/Family Goals Statement Be able to eat.   Swallowing Evaluation Bedside swallow evaluation   Behaviorial Observations Confused;Distractible   Mode of current nutrition Oral diet   Type of oral diet Regular;Thin liquid   Comments Patient was seen during prior hospital admission following his cervical fusion surgery.  Patient since then has been at transitional care unit.  Patient unclear in his history regarding recent swallowing function.  Reports that he has not had anything to eat or drink since he discharged from the TCU.  Patient has been on a regular texture diet and thin liquids since yesterday late morning.   Clinical Swallow Evaluation   Oral Musculature generally intact   Structural Abnormalities present  (miami J collar in place)   Dentition upper and lower dentures   Mucosal Quality dry   Mandibular Strength and Mobility intact   Oral Labial Strength and Mobility impaired pursing   Lingual Strength and Mobility WFL   Velar Elevation intact   Buccal Strength and Mobility intact   Laryngeal Function Cough;Throat clear;Swallow   Clinical Swallow Eval: Thin Liquid Texture Trial   Mode of Presentation, Thin Liquids straw;fed by clinician   Volume of Liquid or Food Presented small single swallows   Oral Phase of Swallow WFL   Pharyngeal Phase of Swallow throat clearing   Diagnostic Statement Throat clearing appeared to be habitual in nature.    Clinical Swallow Eval: Puree Solid Texture Trial   Mode of Presentation, Puree spoon;fed by clinician   Volume of Puree Presented tsp boluses   Oral Phase, Puree WFL   Pharyngeal Phase, Puree throat clearing   Diagnostic Statement Minimal habitual throat clear.   Clinical Swallow Eval: Solid Food Texture Trial   Mode of  Presentation, Solid fed by clinician   Volume of Solid Food Presented single bite of toast   Oral Phase, Solid WFL   Pharyngeal Phase, Solid throat clearing   Diagnostic Statement similar habitual appearing throat clearing.   Swallow Eval: Clinical Impressions   Skilled Criteria for Therapy Intervention Skilled criteria met.  Treatment indicated.   Functional Assessment Scale (FAS) 6   Treatment Diagnosis minimal oropharyngeal dysphagia   Diet texture recommendations Regular diet;Thin liquids   Recommended Feeding/Eating Techniques alternate between small bites and sips of food/liquid;maintain upright posture during/after eating for 30 mins   Therapy Frequency daily   Predicted Duration of Therapy Intervention (days/wks) 3 days   Anticipated Discharge Disposition extended care facility   Risks and Benefits of Treatment have been explained. Yes   Patient, family and/or staff in agreement with Plan of Care Yes   Clinical Impression Comments Dysphagia evaluation completed.  Patient presenting with very similar swallow function as compared to prior admission.  Patient was able to tolerate solid food textures and thin liquids without any overt signs and symptoms of aspiration.  Patient having intermittent throat clearing that is habitual for him.  Patient having some evident confusion with inability to fully express his recent swallowing status as well as his ability to tolerate home medications.  Recommend patient continue with regular texture diet and thin liquids.  Patient will require extra assistance to complete his meals given his hand weakness.  Will plan for additional follow-up tomorrow just to ensure consistent diet tolerance.   Total Evaluation Time   Total Evaluation Time (Minutes) 20

## 2018-05-29 NOTE — PLAN OF CARE
Problem: Patient Care Overview  Goal: Plan of Care/Patient Progress Review  Outcome: Improving        VS:   Stable   Output:   St placed, 100 mL output.  Patient was up to commode twice for BM   Activity:   Up with assist of 2.  Per wound RN patient should have head of bed no greater than 30 .  Patient should be in chair for meals.   Skin: Warm, hilda, dry   Pain:   Denied pain this shift.   Neuro/CMS:   Disoriented to situation as at times.  Baseline numbness is present.   Dressing(s):   Care completed by [facility] nurse   Diet:   Consistent carb   LDA:   St   Equipment:   Walker when out of bed.   Plan:   Continue to monitor and follow plan of care.   Additional Info:   Patient confused about his medications.  Requires reinforcement that they are being actively managed and may change while in the hospital.

## 2018-05-29 NOTE — CONSULTS
Nephrology Initial Consult  May 29, 2018      Familia Irving MRN:[id number] YOB: 1945  Date of Admission:2018  Primary care provider: [facility], [facility]  Requesting physician: Wade Posey*    ASSESSMENT AND RECOMMENDATIONS:   1. DDKT - baseline cr 1.5-1.7 and currently with a non oliguric acute kidney injury likely related to tubular injury with inflammatory state / lisinopril use / immunosuppression.  Rejection is less likely with adequate drug levels.   -- No indication for dialysis  -- Monitor weights  -- Agree with holding lasix, amlodipine, and lisinopril with softer bp's noted  -- Monitor renal function daily  -- Agree with ongoing mIV fluids (half normal saline or d5w until appetite and oral intake is improved).  Isotonic fluids only if clinically intravascularly volume depleted.    2. Immunosuppression - on tacrolimus 1 mg bid and myfortic 720 mg bid.  No changes at this time.    3. Gout - currently on prednisone burst and starting allopurinol.  OK to use losartan instead of lisinopril in the future with resolution of SALVADOR episode.    4. Metabolic acidosis - recommend starting bicitra 30 meq daily    5. Fever- likely related to gout flare.  Continue to monitor.    Recommendations were communicated to primary team via note    Seen and discussed with Dr. Darby Walker MD   [phone]      REASON FOR CONSULT: history of kidney transplant    HISTORY OF PRESENT ILLNESS:  Familia Irving is a 73 year old man with history of end stage kidney disease presumed secondary to diabetes status post  donor kidney transplant 2016 that has a baseline cr 1.5-1.7 and is seen in consultation for acute kidney injury.  [facility] and [facility] is chronically followed by Dr. Suggs.  His immunosuppression is tacrolimus 1 mg bid and myfortic 720 mg bid.  Information from the HPI is currently limited due to medication side effects from  narcotics, per son's report.  The son is present at bedside.  The patient additionally has a medical history significant for hypertension- on lisinopril 40 mg qhs, amlodipine 10 mg daily, and furosemide 20 mg bid.  He also notes a remote history of gout, with the last episode 50 years prior.  He has degenerative disc disease and was hospitalized for elective cervical spinal fusion from 5/16-5/21.  The post operative course is notable for dysphagia and he received steroids for this and at discharge was tolerating a regular diet.  He was discharged to a rehab facility where he acutely developed fever and immobility due to joint pain in multiple joints (left knee and wrists notably).  ED from [place] noted elevated wbc count to 16.5.  Orthopedics aspirated the right wrist and left knee which was consistent with gout.  He was seen by rheumatology today and noted to have possible triggers with aspirin, lasix, and recent surgery.  He has received solumedrol and feels remarkably improved.  Rheumatology recommends 5 day course of prednisone and allopurinol with uric acid goal less than 6.  His creatinine on discharge was as baseline but he is admitted with a creatinine of 2.6 and he is currently non oliguric.  Prograf level yesterday was 7.1.  He notes poor appetite and intake at the rehab facility and was maintained on bp medications.  He denies use of nsaids.  UA on admission was unremarkable.  The patient notes edema but denies dyspnea or chest pain. Fever curve has improved and infectious studies thus far negative.    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      Tinnitus        Past  Surgical History:   Procedure Laterality Date     AMPUTATION Right     First and second partial amputation of toe.      AS OPEN TX KNEE DISLOCATION W REPAIR/RECONSTRUCTION       DECOMPRESSION, FUSION CERVICAL ANTERIOR THREE+ LEVELS, COMBINED N/A 2018    Procedure: COMBINED DECOMPRESSION, FUSION CERVICAL ANTERIOR THREE+ LEVELS;  Part 1: (anterior) Anterior Cervical Decompression Fusion C3-7, Cervical 7-Thoracic 1; Anterior Iliac Crest Bone Graft Dorr  Part 2: (posterior) Posterior Interbody Spinal Fusion Cervical 3-Throracic 1/Thoracic 2; Laminectomies Cervical 3-7;  Surgeon: Brijesh Bass MD;  Location: [facility] OR     GRAFT BONE FROM ILIAC CREST N/A 2018    Procedure: GRAFT BONE FROM ILIAC CREST;;  Surgeon: Brijesh Bass MD;  Location: [facility] OR     OPTICAL TRACKING SYSTEM FUSION POSTERIOR CERVICAL THREE + LEVELS N/A 2018    Procedure: OPTICAL TRACKING SYSTEM FUSION POSTERIOR CERVICAL THREE + LEVELS;;  Surgeon: Brijesh Bass MD;  Location: [facility] OR     TRANSPLANT KIDNEY RECIPIENT  DONOR  2006        MEDICATIONS:  PTA Meds  Prior to Admission medications    Medication Sig Last Dose Taking? Auth Provider   acetaminophen (TYLENOL) 325 MG tablet Take 2 tablets (650 mg) by mouth every 4 hours as needed for other (multimodal surgical pain management along with NSAIDS and opioid medication as indicated based on pain control and physical function.)   Ebonie Lopez APRN CNP   amLODIPine (NORVASC) 10 MG tablet Take 10 mg by mouth daily   Reported, Patient   ASPIRIN PO Take 81 mg by mouth daily   Reported, Patient   FOLIC ACID PO Take 1 mg by mouth daily   Reported, Patient   FUROSEMIDE PO Take 20 mg by mouth 2 times daily    Reported, Patient   insulin glargine (LANTUS) 100 UNIT/ML injection Inject 30 Units Subcutaneous every morning    Reported, Patient   LISINOPRIL PO Take 40 mg by mouth At Bedtime    Reported, Patient   loperamide (IMODIUM) 2 MG capsule  Take 4 mg by mouth as needed for diarrhea   Unknown, Entered By History   MAGNESIUM OXIDE PO Take 420 mg by mouth Every Mon, Wed, Fri Morning   Unknown, Entered By History   methocarbamol (ROBAXIN) 500 MG tablet Take 1 tablet (500 mg) by mouth 4 times daily as needed for muscle spasms   Ebonie Lopez APRN CNP   MYFORTIC (BRAND) 360 MG EC TABLET Take 720 mg by mouth 2 times daily    Reported, Patient   oxyCODONE IR (ROXICODONE) 5 MG tablet For pain of 1-5 give 5 mg, for pain of 6-10 give 10 mg every 3 hours as needed.   Ebonie Lopez APRN CNP   PRAVASTATIN SODIUM PO Take 40 mg by mouth At Bedtime    Reported, Patient   PROGRAF (BRAND) 1 MG CAPSULE Take 1 mg by mouth 2 times daily   Reported, Patient   senna-docusate (SENOKOT-S;PERICOLACE) 8.6-50 MG per tablet Take 1 tablet by mouth 2 times daily   Ebonie Lopez APRN CNP   VITAMIN D, CHOLECALCIFEROL, PO Take 400 Units by mouth daily   Unknown, Entered By History      Current Meds    acetaminophen  1,000 mg Oral Q8H     allopurinol  100 mg Oral Daily     cholecalciferol  400 Units Oral Daily     folic acid (FOLVITE) tablet 1 mg  1 mg Oral Daily     insulin aspart  1-10 Units Subcutaneous TID AC     insulin aspart  1-7 Units Subcutaneous At Bedtime     insulin aspart   Subcutaneous QAM AC     insulin aspart   Subcutaneous Daily with lunch     insulin aspart   Subcutaneous Daily with supper     insulin glargine  30 Units Subcutaneous QAM AC     mycophenolic acid  720 mg Oral BID IS     predniSONE  40 mg Oral Daily     senna-docusate  1-2 tablet Oral BID     sodium citrate-citric acid  30 mL Oral Daily     tacrolimus  1 mg Oral BID IS     Infusion Meds    NaCl 150 mL/hr at 05/29/18 1140       ALLERGIES:    Allergies   Allergen Reactions     Heparin      Patient unsure of what reaction was, but it was severe reaction during his transplant and finally was determined that it was heparin       REVIEW OF SYSTEMS:  A comprehensive of systems was negative except  "as noted above.    SOCIAL HISTORY:   Social History     Social History     Marital status: Single     Spouse name: N/A     Number of children: 2     Years of education: N/A     Occupational History     retired      Social History Main Topics     Smoking status: Never Smoker     Smokeless tobacco: Never Used     Alcohol use No     Drug use: No     Sexual activity: Not on file     Other Topics Concern     Not on file     Social History Narrative    Lives alone in lower level apartment in [place].  Brother lives in same apartment building on main floor.  Has two children, son and Daughter.  Daughter lives in [place].  SonWillard, lives local and is actively involved.      Reviewed with patient     FAMILY MEDICAL HISTORY:   No family history of kidney disease  Reviewed with patient     PHYSICAL EXAM:   Temp  Av.4  F (36.9  C)  Min: 95.2  F (35.1  C)  Max: 100  F (37.8  C)      Pulse  Av.6  Min: 60  Max: 77 Resp  Avg: 15.5  Min: 12  Max: 18  SpO2  Av.8 %  Min: 95 %  Max: 99 %       BP 99/47 (BP Location: Left arm)  Pulse 60  Temp 95.2  F (35.1  C) (Oral)  Resp 16  Ht 1.727 m (5' 8\")  SpO2 99%   Date 18 0700 - 18 0659   Shift 6432-7832 0954-0265 2823-0011 24 Hour Total   I  N  T  A  K  E   Shift Total       O  U  T  P  U  T   Urine 100   100    Shift Total 100   100   Weight (kg)            Admit Weight:  (unable to stand, unwilling to roll to get out hover ketan)     GENERAL APPEARANCE: no distress,  awake  EYES: no scleral icterus, pupils equal  Lymphatics: no cervical or supraclavicular LAD  Pulmonary: lungs clear to auscultation with equal breath sounds bilaterally, no clubbing  CV: regular rhythm, normal rate, no rub   - JVD none   - Edema 2+  GI: soft, nontender, normal bowel sounds  MS: no evidence of inflammation in joints, no muscle tenderness  : + castanon  SKIN: no rash, warm, dry, no cyanosis  NEURO: face symmetric, no asterixis     LABS:   CMP  Recent Labs  Lab 18  0724 " 05/28/18  1645 05/28/18  0709 05/27/18  1808   * 143 147* 147*   POTASSIUM 5.2 4.3 4.1 4.1   CHLORIDE 113* 114* 115* 115*   CO2 17* 17* 21 22   ANIONGAP 16* 12 11 10   * 163* 156* 74   BUN 84* 63* 61* 63*   CR 2.74* 2.50* 2.46* 2.56*   GFRESTIMATED 23* 26* 26* 25*   GFRESTBLACK 28* 31* 31* 30*   BRAXTON 8.0* 8.3* 8.1* 8.0*   MAG 1.9  --   --  1.7   PROTTOTAL  --   --   --  5.2*   ALBUMIN  --   --   --  1.9*   BILITOTAL  --   --   --  1.7*   ALKPHOS  --   --   --  87   AST  --   --   --  45   ALT  --   --   --  40     CBC  Recent Labs  Lab 05/29/18  0724 05/28/18  0709 05/27/18  1808   HGB Canceled, Test credited 8.7* 8.2*   WBC Canceled, Test credited 20.2* 18.7*   RBC Canceled, Test credited 3.01* 2.87*   HCT Canceled, Test credited 28.0* 26.9*   MCV Canceled, Test credited 93 94   MCH Canceled, Test credited 28.9 28.6   MCHC Canceled, Test credited 31.1* 30.5*   RDW Canceled, Test credited 16.0* 16.0*   PLT Canceled, Test credited 171 163     INR  Recent Labs  Lab 05/27/18  1808   INR 1.45*     ABGNo lab results found in last 7 days.   URINE STUDIES  Recent Labs   Lab Test  05/27/18 2000 04/18/18   1334   COLOR  Yellow  Yellow   APPEARANCE  Clear  Slightly Cloudy   URINEGLC  Negative  Negative   URINEBILI  Negative  Negative   URINEKETONE  Negative  Negative   SG  1.010  1.013   UBLD  Negative  Negative   URINEPH  5.0  5.0   PROTEIN  10*  Negative   NITRITE  Negative  Negative   LEUKEST  Negative  Negative   RBCU  1  1   WBCU  1  3     No lab results found.  PTH  No lab results found.  IRON STUDIES  No lab results found.    IMAGING:  All imaging studies reviewed by me.     Iraj Walker, MD    Patient was seen and evaluated by me, Tad Pastor MD. I have reviewed the note and agree with the the plan of care as documented by the fellow.  SALVADOR, total body gout vs others. The picture is most consistent with gout although infectious etiology is still on the differential. The marked improvement with  steroids is more in keeping with gout. Will keep low threshold to re-look for infections. Suggest daily prednisone 40 mg per rheumatology followed by a taper over couple of weeks.   Hold Ace-I, ok to replace with ARB when suitable. Will reevaluate the diuretic needs.

## 2018-05-29 NOTE — PLAN OF CARE
Problem: Patient Care Overview  Goal: Plan of Care/Patient Progress Review    Dysphagia evaluation completed.  Patient presenting with very similar swallow function as compared to prior admission.  Patient was able to tolerate solid food textures and thin liquids without any overt signs and symptoms of aspiration.  Patient having intermittent throat clearing that is habitual for him.  Patient having some evident confusion with inability to fully express his recent swallowing status as well as his ability to tolerate home medications.  Recommend patient continue with regular texture diet and thin liquids.  Patient will require extra assistance to complete his meals given his hand weakness.  Will plan for additional follow-up tomorrow just to ensure consistent diet tolerance.

## 2018-05-29 NOTE — PLAN OF CARE
Problem: Patient Care Overview  Goal: Plan of Care/Patient Progress Review  Outcome: No Change    Shift report from [id number]    VS:   VSS, afebrile, disoriented, forgetful and needs cueing and reinforcement, easily overwhelmed, frustrated and anxious, intermittently confused, denies SOB or CP, on RA,    Output:   Voided 100cc at start of shift and was straight cathed for 650cc, no BM this shift, bladder scanned @ 0530 for 110, has not voided since start of shift   Activity:   Bedrest, repositioned q 2-3 hours with Ax2, pt has moderate pain in left knee and right hand when repositioning, not able to assist and needs cueing, has not been OOB this shift   Skin: Bruising, incision, scars and scabs, mild scrotal swelling, wound on coccyx, left knee swelling, right arm cellulitis    Pain:   Tolerable when not moving but moderate when repositioning   Neuro/CMS:   Baseline unchanged numbness in all extremities, weak strength, needs assistance with lifting legs, right hand swollen and painful, left knee redness and swelling improving, 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

## 2018-05-29 NOTE — PROGRESS NOTES
05/29/18 1057   Quick Adds   Type of Visit Initial Occupational Therapy Evaluation   Living Environment   Lives With alone   Living Arrangements house   Number of Stairs to Enter Home 6   Number of Stairs Within Home 0   Transportation Available car;family or friend will provide   Living Environment Comment Tub, no grab bars or shower chair   Self-Care   Usual Activity Tolerance moderate   Current Activity Tolerance fair   Equipment Currently Used at Home cane, straight   Activity/Exercise/Self-Care Comment Patient independent with self-cares/mobility with occassional use of cane   Functional Level Prior   Ambulation 1-->assistive equipment   Transferring 0-->independent   Toileting 0-->independent   Bathing 0-->independent   Dressing 0-->independent   Eating 0-->independent   Communication 0-->understands/communicates without difficulty   Swallowing 0-->swallows foods/liquids without difficulty   Cognition 0 - no cognition issues reported   Fall history within last six months no   Prior Functional Level Comment Patient independent with self-cares/mobility with occassional use of cane   General Information   Onset of Illness/Injury or Date of Surgery - Date 05/27/18   Patient/Family Goals Statement return home to baseline   Additional Occupational Profile Info/Pertinent History of Current Problem 74yo male 12 days s/p multi-level cervical decompression and fusion with right wrist pain and swelling and left knee pain and swelling and right wrist pain and swelling 2/2 gout (aspirates positive for monosodium urate crystals)   Precautions/Limitations spinal precautions   General Observations On RA, alert but mildly confused.     Cognitive Status Examination   Level of Consciousness alert   Able to Follow Commands success, 1-step commands   Cognitive Comment Son/patient report confusion with pain meds   Visual Perception   Visual Perception Comments Low vision/no vision in L eye, R eye also impaired   Sensory  Examination   Sensory Comments Slight tingling in L hand   Pain Assessment   Patient Currently in Pain Yes, see Vital Sign flowsheet   Integumentary/Edema   Integumentary/Edema Comments R hand/wrist with moderate edema; mild edema of L hand/wrist/forearm    Range of Motion (ROM)   ROM Comment R UE AROM limited at hand/wrist/elbow and shoulder, L UE some impaired movement of L hand/wrist   Strength   Strength Comments Limited overall weakness due to pain in B UE and LB, weakness from cervical surgery   Hand Strength   Hand Strength Comments Limited hand strength due to bilateral UB edema/pain   Mobility   Bed Mobility Bed mobility skill: Sit to supine;Bed mobility skill: Supine to sit   Bed Mobility Skill: Sit to Supine   Level of [place]: Sit/Supine moderate assist (50% patients effort)   Physical Assist/Nonphysical Assist: Sit/Supine 2 persons   Bed Mobility Skill: Supine to Sit   Level of [place]: Supine/Sit moderate assist (50% patients effort)   Transfer Skill: Bed to Chair/Chair to Bed   Level of [place]: Bed to Chair minimum assist (75% patients effort)   Physical Assist/Nonphysical Assist: Bed to Chair 2 persons   Assistive Device - Transfer Skill Bed to Chair Chair to Bed Rehab Eval other (see comments)  (platform)   Transfer Skill: Sit to Stand   Level of [place]: Sit/Stand moderate assist (50% patients effort)   Physical Assist/Nonphysical Assist: Sit/Stand 2 persons   Assistive Device for Transfer: Sit/Stand other (see comments)  (platform walker)   Transfer Skill: Toilet Transfer   Level of [place]: Toilet moderate assist (50% patients effort)   Toilet Transfer Skill Comments Bedside commode   Bathing   Level of [place] unable to perform   Upper Body Dressing   Level of [place]: Dress Upper Body unable to perform   Lower Body Dressing   Level of [place]: Dress Lower Body unable to perform   Toileting   Level of [place]: Toilet maximum assist (25% patients  "effort)   Grooming   Level of [place]: Grooming unable to perform   Eating/Self Feeding   Level of [place]: Eating moderate assist (50% patients effort)   Activities of Daily Living Analysis   Impairments Contributing to Impaired Activities of Daily Living balance impaired;cognition impaired;pain;post surgical precautions;ROM decreased;sensation decreased;strength decreased   General Therapy Interventions   Planned Therapy Interventions ADL retraining   Clinical Impression   Criteria for Skilled Therapeutic Interventions Met yes, treatment indicated   OT Diagnosis impaired self-cares/mobility   Influenced by the following impairments pain; decreased ROM   Assessment of Occupational Performance 1-3 Performance Deficits   Identified Performance Deficits dressing, bathing, toileting   Clinical Decision Making (Complexity) Low complexity   Therapy Frequency daily   Predicted Duration of Therapy Intervention (days/wks) 2-3 days   Anticipated Discharge Disposition Transitional Care Facility   Risks and Benefits of Treatment have been explained. Yes   Patient, Family & other staff in agreement with plan of care Yes   [facility] TM \"6 Clicks\"   2016, Trustees of [facility], under license to [employer].  All rights reserved.   6 Clicks Short Forms Daily Activity Inpatient Short Form   [facility]  \"6 Clicks\" Daily Activity Inpatient Short Form   1. Putting on and taking off regular lower body clothing? 2 - A Lot   2. Bathing (including washing, rinsing, drying)? 2 - A Lot   3. Toileting, which includes using toilet, bedpan or urinal? 2 - A Lot   4. Putting on and taking off regular upper body clothing? 2 - A Lot   5. Taking care of personal grooming such as brushing teeth? 2 - A Lot   6. Eating meals? 2 - A Lot   Daily Activity Raw Score (Score out of 24.Lower scores equate to lower levels of function) 12   Total Evaluation Time   Total Evaluation Time (Minutes) 8     "

## 2018-05-29 NOTE — PLAN OF CARE
Problem: Patient Care Overview  Goal: Plan of Care/Patient Progress Review  Discharge Planner PT   Patient plan for discharge: TCU, declines return to [facility]  Current status: Pt rolled sup>sit at EOB with mod A. Pt tx sit<>stand with mod-max A pending seat height, VCs and min A to correct posterior weight shift. Poor eccentric control with return to sit d/t decreased knee flexion ROM. Pt took ~10 steps x 2 along EOB with min A and FWW, VCs for sequencing and increased step length. Recommend nsg continue to promote OOB mobility with Ax2 and FWW.  Barriers to return to prior living situation: Decreased strength, stability, sensation and vision deficits, decreased ROM, endurance. Pt lives alone, stairs to enter.  Recommendations for discharge: TCU  Rationale for recommendations: Pt would benefit from rehab stay to progress IND with functional bed mobility, transfers, and amb.       Entered by: Amber Hung 05/29/2018 8:57 AM

## 2018-05-29 NOTE — PROGRESS NOTES
[facility] Nurse Inpatient Pressure Injury Assessment   Reason for consultation: Evaluate and treat sacral pressure injury      ASSESSMENT  Pressure Injury: on sacrum and buttocks , present on admission ,   Pressure Injury is Stage Deep Tissue Pressure Injury (DTPI)- anticipate this wound will continue to evolve over the next days to weeks.   Contributing factor of the pressure injury: pressure, shear and immobility  Status: initial assessment     TREATMENT PLAN  Sacral wound: Every other day, wash with wound cleanser and gauze. Apply No sting to sacrum and coccyx and allow to dry. Cover with Mepilex, taking care that foam is in contact with all skin, not draped across the  cleft.   Pulsate mattress ordered due to limited ability to turn on side.  Keep HOB less than 30 degrees unless eating  Patient to use a Geomatt cushion when up to chair or wheelchair at all times  Orders Written  [facility] Nurse follow-up plan:weekly  Nursing to notify the Provider(s) and re-consult the [facility] Nurse if wound(s) deteriorates or new skin concern.    Patient History  According to provider note(s): 1)  Acute polyarthritis with fever (left knee, right wrist, both ankles) with monosodium urate crystals on knee and wrist aspirate c/w polyarticular gout. WBC 75585. Cultures pending.  Off Vanco.  Gout with increase incidence in transplant patients with decrease uric acid clearance.  No pain at rest suggesting some benefit from steroids.   2)  SALVADOR.  Cr 2.74.  Cr 2.56  (1.6 ).  S/p DDKT .  Volume depletion considered (with lasix PTA) and lisinopril effect. On IV fluids.  Consider uric acid nephropathy, bladder outlet obstruction as contributing factors.   K high normal 5.2.  Making urine.   3)  S/P A/P cervical fusion .  No issues.   4)  Oral pharyngeal dysphagia 2nd to #1.  5)  Intermittent confusion attributed to recent surgery, opiates, acute febrile illness and subsequent steroids.  Neuro non lateralizing (good motor exam per  "spine).  Consider head CT if doesn't clear.  .   6)  Anemia 2nd to acute illness and recent acute blood loss.   Adequate.  7)  Type II DM with BG down to 70's on admit.  Hyperglycemia with IV solumedrol  .  On carb coverage and medium correction.  8)  HTN, on PTA amlodipine (off lasix and lisinopril).  Adequate.   9)  Urinary urgency and frequency 2nd to retention.  UA bland.   UC mixed braydon.   10)  HLD.   11)  PAC's on rhythm after cervical fusion.  Benign.     Objective Data  Containment of urine/stool: Continent of bladder, occassionally incontinent of stool    Current Diet/ Nutrition:    Active Diet Order      Consistent Carbohydrate Diet 2211-0529 Calories: Moderate Consistent CHO (4-6 CHO units/meal)    Output:   I/O last 3 completed shifts:  In: 60 [P.O.:60]  Out: 775 [Urine:775]    Risk Assessment:   Sensory Perception: 2-->very limited  Moisture: 3-->occasionally moist  Activity: 1-->bedfast  Mobility: 2-->very limited  Nutrition: 3-->adequate  Friction and Shear: 2-->potential problem  Shahriar Score: 13    Labs:   Recent Labs  Lab 18  0724 18  0709 18  1808   ALBUMIN  --   --  1.9*   HGB Canceled, Test credited 8.7* 8.2*   INR  --   --  1.45*   WBC Canceled, Test credited 20.2* 18.7*   A1C  --  6.6*  --    CRP  --  268.0* 247.0*       Physical Exam  Skin inspection: focused buttocks, sacrum, coccyx    Wound Location:  Sacrum  Wound History: Present on admission. Patient with limited mobility. Prefers HOB up high and has a difficult time resting on side due to neck brace.  Measurements (length x width x depth, in cm) Wound is a \"U\" shape starting over the sacrum and extending down over the coccyx and onto the buttocks within the  cleft.   Wound Base: mixed tissue bases: deep purple over the coccyx and sacrum, open areas with exposed dermis over the buttocks within the  cleft.  Tunneling N/A  Undermining N/A  Palpation of the wound bed: firm   Periwound skin: denuded  Color: " red  Temperature: normal   Drainage: none  Description of drainage: serosanguinous  Odor: none  Pain: denies     Interventions  Current support surface: Standard  Atmos Air mattress- Pulsate mattress on order, Geomatt cushion on order  Current off-loading measures: Foam padding and Pillows under calves  Visual inspection of wound(s) completed   Wound Care: was done per plan of care.  Supplies: floor stock  Educated provided: importance of repositioning  Education provided to: patient  and nurse  Discussed importance of:repositioning every 2 hours, off-loading pressure to wound, wearing off-loading boots, their role in pressure injury prevention, head elevation <30 degrees, off-loading mattress, nutrition on wound healing and moisture management  Discussed plan of care with Patient, Nurse and Physician    Adrienne Cole RN

## 2018-05-29 NOTE — PLAN OF CARE
"Problem: Patient Care Overview  Goal: Plan of Care/Patient Progress Review  Outcome: No Change     VS: Stable.  Denies chest pain and SOB.  LS clear equal bilaterally, on room air, diminished.    Output: Reports of urinary incontinece, penile and scrotal swelling. Bladder scan at 2200 reveled 490 mL of urine. Urinal placed and encouraged to void. If unable to void, writer will straight cath or inform oncoming nurse. Last Bm 5/28/18, passing gas. Refused bowel meds.    Activity: Pt wanted to \"get out of bed\".  Writer and CNA attempted to help Pt out of bed. Pt was very weak and jeanine to stand momentaliry bedside with a walker and strong assist. Unable to support weight on his own. Knee very painful after this episode.   Skin: Intact, except for sore on sacrum. Pt stated it was preexisting. Covered with Mepilex.  WOC consult ordered.    Pain: Moderate, mangaeable w/ oxy 2.5 q4hs.    Neuro/CMS: Denies any numbness or tingling. Pt is disoriented and confused.   Dressing(s): Mepilex to sacrum wound    Diet: Moderate consistent CHO, has difficulty swallowing. Can take pills whole but notice some difficulty, Speech consult ordered. Only wanted ice cream for dinner. Right hand is too painful to use, writer had to feed Pt.   Equipment: [place] J collar, on at all times   IV's/Drains: PIV R hand infusing 0.45 NS @ 125ml/hr, CDI   Plan: Continue to monitor pt.    Additional Info: Pt takes pills with applesauce. 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 Can see better with lights on. Continue to reorient.                                       "

## 2018-05-29 NOTE — PROGRESS NOTES
Care Coordinator Progress Note    Admission Date/Time:  5/27/2018  Attending MD:  Wade Posey*    Data  Chart reviewed, discussed with interdisciplinary team.   Patient was admitted for:    Fever and chills  Pyogenic arthritis of left knee joint, due to unspecified organism (H)  Cellulitis of right arm.    Coordination of Care and Referrals: Patient had concerns regarding VA insurance coverage. Patient is confused so this writer contacted the VA billing line to assist patient with hospital authorization. Waiting for call from VA to confirm authorization. RNCC will continue to follow.       Update: Spoke with Madhuri at the VA billing office. H&P to be faced to VA billing at [phone]. VA will take care of authorizing hospital visit.      Plan  Anticipated Discharge Date:  TBD  Anticipated Discharge Plan:  TBD    Jacki Bobo RN, BSN  Care Coordinator, 8A  Phone [phone]  Pager [phone]

## 2018-05-29 NOTE — CONSULTS
Social Work: Assessment with Discharge Plan    Patient Name:  Familia Irving  :  1945  Age:  73 year old  MRN:  [id number]  Risk/Complexity Score:   6  Completed assessment with:  10A IDT, Pt, Admissions at [facility] [phone]    Presenting Information   Reason for Referral:  Discharge plan  Date of Intake:  May 29, 2018  Referral Source:  Physician  Decision Maker:  pt  Alternate Decision Maker:  Daughter is listed on Health Care Directive- Silvia Mohr  Health Care Directive:  Copy in Chart  Living Situation:  House  Previous Functional Status:  Assistance with Other:  pt was at TCU at [facility]. Pt does not want to return there...  Patient and family understanding of hospitalization:  Seeking assessment of fever, ? Infection if related to recent spine surgery  Cultural/Language/Spiritual Considerations:   [facility]  Adjustment to Illness:  Has some confusion    Physical Health  Reason for Admission:    1. Fever and chills    2. Pyogenic arthritis of left knee joint, due to unspecified organism (H)    3. Cellulitis of right arm      Services Needed/Recommended:  TCU-pt requesting referral to [facility] [phone] F: [phone]    Mental Health/Chemical Dependency  Diagnosis:  None noted  Support/Services in Place:  None noted  Services Needed/Recommended:  None noted    Support System  Significant relationship at present time:  Yes, adult children. Son lives in [place], Daughter Silvia lives in [place]  Family of origin is available for support:  Friends, [facility] community  Other support available:   None noted  Gaps in support system:  None noted  Patient is caregiver to:  None     Provider Information   Primary Care Physician:  [facility], [facility]   [phone]   Clinic:  [facility]      :  None noted    Financial   Income Source:  Pension, social security  Financial Concerns:   None noted  Insurance:    Payor/Plan  Subscriber Name Rel Member # Group #   COMMERCIAL - [facility] MEDIC* JC [id number]  [id number]        [id number]       Discharge Plan   Patient and family discharge goal:  DC to [facility]. Pt does not want to return to [facility]  Provided education on discharge plan:  YES  Patient agreeable to discharge plan:  YES  A list of Medicare Certified Facilities was provided to the patient and/or family to encourage patient choice. Patient's choices for facility are:  [facility]  Will NH provide Skilled rehabilitation or complex medical:  YES  General information regarding anticipated insurance coverage and possible out of pocket cost was discussed. Patient and patient's family are aware patient may incur the cost of transportation to the facility, pending insurance payment: YES  Barriers to discharge:  None noted    Discharge Recommendations   Anticipated Disposition:  Facility:  [facility]  Transportation Needs:  Family:  anticipate family will provide transportatin    Additional comments   Pt is known to writer from previous hospitalization. Pt had gone to [facility], pt states he does not want to return there but is agreeable to having referral sent to [facility]. Writer left voicemail message and faxed referral information, await assessment response. Pt and family worried that his VA will not pay for this hospitalization as it is not certain pt's kidney function and gout is not related to /caused by pt's recent spine surgery.  RNCC Jacki Bobo is following through with VA, pt has authorization for [facility] hospitalization through June 1, 2018.    Pt's PAS from recent hospital stay should still be valid: [id number] as pt did not return to community from SNF.    1231 Addendum:  Writer spoke w/Tacho, pt's son. Writer provided support, validation and reassurance about DC plan. Tacho processed some of his emotional response to his father's rehospitalization. Tacho and Silvia support their [facility] They had no complaints of care at [facility], but pt is now stating preference for DC to [facility]. SW con't to follow

## 2018-05-29 NOTE — PLAN OF CARE
Problem: Patient Care Overview  Goal: Plan of Care/Patient Progress Review  Discharge Planner OT   Patient plan for discharge: Did not verbalize  Current status: Patient needs Mod A x 2 to stand, can transfer with Min A x 2 with platform walker.  Limited by pain in UB, edema, limited ROM of bilateral hands/wrist and R elbow/shoulder.  Patient reports confusion due to pain meds.  Barriers to return to prior living situation: Pain, decreased ROM, weakness, confusion impacting safety and independence with ADLs/mobility  Recommendations for discharge: TCU  Rationale for recommendations: Continued daily OT for maximum independence in ADLs/mobility       Entered by: Chayo Lopez 05/29/2018 12:15 PM

## 2018-05-29 NOTE — CONSULTS
"[facility] Rheumatology Consultation    Familia Irving MRN# [id number]   Age: 73 year old YOB: 1945     Date of Admission:  5/27/2018    Reason for consult: Gout       Requesting physician: Familia Dumont       Level of consult: Consult, follow and place orders             Assessment/Plan:   #.  Acute gout flare (Oligo-articular)  Arthrocentesis on admission consistent with inflammatory arthritis, with over 30,000 white blood cells (97% PMNs), and intracellular monosodium urate crystals present in left knee and right wrist.    Of note, on admission he was started on IV vancomycin which was discontinued after crystals were found on joint aspiration.  So far, joint gram stain and cultures as well as blood cultures have been negative for evidence of infection. CRP remains elevated at >250. Patient states that his last gout flare was over \"50 years ago\".  He is not on any allopurinol, though he states that he has used this before.  Serum uric acid is 10.0. No tophi noted on XR of knee. Physical examination is also negative for any tophi.  Possible triggers include ASA (low dose), Lasix, physiological stress given recent infection and surgery.   He has received Solu-Medrol (125 mg) ×1 dose with some improvement.   Given presence of acute gout flare in the setting of CKD, he will need uric acid lowering therapy with a goal of SUA<6.   His CKD/worsening kidney function precludes him from getting any colchicine or NSAIDs. Oligo-articular involvement also precludes the use of intra-articular steroids.       RECOMMENDATIONS:  -- Appreciate excellent cares by primary team   -- Recommend Prednisone 40 mg QDAY for 5 days  -- Commence Allopurinol 100 mg daily   Goal SUA <6. Recommend follow up with PCP in 3-4 weeks with serum uric acid measurement for titration of allopurinol  -- Monitor CRP, clinical status  -- Consider switching Lisinopril to Losartan (urate lowering property) - will defer this " decision to Nephrology  -- Rheumatology will sign off today.    Please call if there are any questions    Thank you for this consultation.     Patient seen and discussed with Dr. Loyola, who is in agreement with my assessment and recommedations. Please, see staff addendum for changes/additions to the plan.    Magdalene Velasquez MD  Internal Medicine- Rheumatology  PGY-3  [phone]    ATTENDING TIE IN NOTE:    I saw the patient with the resident.  My exam and recommendations are as described.      Maged Loyola MD FACP    Rheumatic and Autoimmune Diseases            Chief complaint:   Multiple joint pains          History of Present Illness:   Familia Irving is a 73 year old male with a medical history significant for kidney transplantation thousand and 6 currently on tacrolimus and Myfortic, hypertension, remote history of gout, diabetes, and cervical myelopathy; admitted with knee and wrist joint pain, with concerns for left knee septic arthritis.  Rheumatology consulted today for concern of acute gout flare.       Patient was recently admitted for multi-level cervical decompression and fusion, he was discharged stable to TCU for rehab. According to patient and his son, who was in his usual state of health until about 3 days ago when he noted left knee swelling, right wrist swelling, and severe pain in his left knee and right wrist.  He also had a fever as high as 101.  Patient was initially taken to an emergency room in [place], and then transferred to the [facility].  Patient complains of left knee and right wrist pain, with some erythema around his left knee.  He denies any pain in any other joints.  Denies any rashes.  Patient has not experienced any localized swellings in his first toes, or his elbows.  He denies any headaches.  He denies any chest or abdominal symptoms.  According to patient, he last had a gout flare about 50 years ago, and has not had any since then until now.  He  states that he was previously on allopurinol, but had stopped this after 6 months.     While here in the hospital, he had received 125 mg of Solu-Medrol IV.  Patient states that his pain is much reduced, and his wrist feels less swollen.  He feels like his range of motion in his right wrist and left knee have improved.  He has not had any fevers or chills.          Review of Symptoms:   10-point ROS reviewed, & found negative w/ exceptions noted in the HPI.          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      Tinnitus        Past Surgical History:   Procedure Laterality Date     AMPUTATION Right     First and second partial amputation of toe.      AS OPEN TX KNEE DISLOCATION W REPAIR/RECONSTRUCTION       DECOMPRESSION, FUSION CERVICAL ANTERIOR THREE+ LEVELS, COMBINED N/A 2018    Procedure: COMBINED DECOMPRESSION, FUSION CERVICAL ANTERIOR THREE+ LEVELS;  Part 1: (anterior) Anterior Cervical Decompression Fusion C3-7, Cervical 7-Thoracic 1; Anterior Iliac Crest Bone Graft [place]  Part 2: (posterior) Posterior Interbody Spinal Fusion Cervical 3-Throracic 1/Thoracic 2; Laminectomies Cervical 3-7;  Surgeon: Brijesh Bass MD;  Location: [facility] OR     GRAFT BONE FROM ILIAC CREST N/A 2018    Procedure: GRAFT BONE FROM ILIAC CREST;;  Surgeon: Brijesh Bass MD;  Location: [facility] OR     OPTICAL TRACKING SYSTEM FUSION POSTERIOR CERVICAL THREE + LEVELS N/A 2018    Procedure: OPTICAL TRACKING SYSTEM FUSION POSTERIOR CERVICAL THREE + LEVELS;;  Surgeon: Brijesh Bass MD;  Location: [facility] OR     TRANSPLANT KIDNEY RECIPIENT  DONOR  2006            Allergies:     Allergies    Allergen Reactions     Heparin      Patient unsure of what reaction was, but it was severe reaction during his transplant and finally was determined that it was heparin             Outpatient Medications:       No current facility-administered medications on file prior to encounter.   Current Outpatient Prescriptions on File Prior to Encounter:  acetaminophen (TYLENOL) 325 MG tablet Take 2 tablets (650 mg) by mouth every 4 hours as needed for other (multimodal surgical pain management along with NSAIDS and opioid medication as indicated based on pain control and physical function.)   amLODIPine (NORVASC) 10 MG tablet Take 10 mg by mouth daily   ASPIRIN PO Take 81 mg by mouth daily   FOLIC ACID PO Take 1 mg by mouth daily   FUROSEMIDE PO Take 20 mg by mouth 2 times daily    insulin glargine (LANTUS) 100 UNIT/ML injection Inject 30 Units Subcutaneous every morning    LISINOPRIL PO Take 40 mg by mouth At Bedtime    loperamide (IMODIUM) 2 MG capsule Take 4 mg by mouth as needed for diarrhea   MAGNESIUM OXIDE PO Take 420 mg by mouth Every Mon, Wed, Fri Morning   methocarbamol (ROBAXIN) 500 MG tablet Take 1 tablet (500 mg) by mouth 4 times daily as needed for muscle spasms   MYFORTIC (BRAND) 360 MG EC TABLET Take 720 mg by mouth 2 times daily    oxyCODONE IR (ROXICODONE) 5 MG tablet For pain of 1-5 give 5 mg, for pain of 6-10 give 10 mg every 3 hours as needed.   PRAVASTATIN SODIUM PO Take 40 mg by mouth At Bedtime    PROGRAF (BRAND) 1 MG CAPSULE Take 1 mg by mouth 2 times daily   senna-docusate (SENOKOT-S;PERICOLACE) 8.6-50 MG per tablet Take 1 tablet by mouth 2 times daily   VITAMIN D, CHOLECALCIFEROL, PO Take 400 Units by mouth daily             Family History:   No family history on file.   Reviewed; no history of autoimmune disease.          Social History:     Social History     Social History     Marital status: Single     Spouse name: N/A     Number of children: 2     Years of education: N/A     Occupational  "History     retired      Social History Main Topics     Smoking status: Never Smoker     Smokeless tobacco: Never Used     Alcohol use No     Drug use: No     Sexual activity: Not on file     Other Topics Concern     Not on file     Social History Narrative    Lives alone in lower level apartment in [place].  Brother lives in same apartment building on main floor.  Has two children, son and Daughter.  Daughter lives in [place].  Son, Willadr, lives local and is actively involved.              Physical Exam:   /45 (BP Location: Left arm)  Pulse 60  Temp 95.4  F (35.2  C) (Oral)  Resp 16  Ht 1.727 m (5' 8\")  SpO2 95%  Temp (24hrs), Av.2  F (36.8  C), Min:95.4  F (35.2  C), Max:100  F (37.8  C)    General:  Alert, not in respiratory or painful distress, Not toxic looking, afebrile, not pale, not dehydrated,   HEENT: anicteric sclera, PERRL, EOMI, MMM, Cervical collar in place  CV: RRR, nl S1/S2, no S3/S4 appreciated, no m/r/g; intact & symmetric 3+ pulses (radial, DP);   Lungs: CTAB, no wheezing   Abd: Obese, soft, moves with respiration, BS+, not tender, not distended, no palpable organomegaly, no masses   : St in place  Ext: WWP,  BLE edema  Skin: no rashes, cyanosis, or jaundice  MSK: Joint swelling in right wrist, no redness noted.  Right wrist is tender to touch, with reduced range of motion, mostly hampered by pain.    Left knee with reduced erythema (judging by pen markings from admission), reduced range of motion with flexion, complete examination stymied by pain.  Knee joint swelling present, with tenderness to palpation.     Psych: Stable            Data:   Labs (all laboratory studies reviewed by me):   ROUTINE ICU LABS (Last four results)  CMP  Recent Labs  Lab 18  0724 18  1645 18  0709 18  1808   * 143 147* 147*   POTASSIUM 5.2 4.3 4.1 4.1   CHLORIDE 113* 114* 115* 115*   CO2 17* 17* 21 22   ANIONGAP 16* 12 11 10   * 163* 156* 74   BUN 84* 63* 61* " 63*   CR 2.74* 2.50* 2.46* 2.56*   GFRESTIMATED 23* 26* 26* 25*   GFRESTBLACK 28* 31* 31* 30*   BRAXTON 8.0* 8.3* 8.1* 8.0*   MAG 1.9  --   --  1.7   PROTTOTAL  --   --   --  5.2*   ALBUMIN  --   --   --  1.9*   BILITOTAL  --   --   --  1.7*   ALKPHOS  --   --   --  87   AST  --   --   --  45   ALT  --   --   --  40     CBC  Recent Labs  Lab 05/29/18  0724 05/28/18  0709 05/27/18  1808   WBC Canceled, Test credited 20.2* 18.7*   RBC Canceled, Test credited 3.01* 2.87*   HGB Canceled, Test credited 8.7* 8.2*   HCT Canceled, Test credited 28.0* 26.9*   MCV Canceled, Test credited 93 94   MCH Canceled, Test credited 28.9 28.6   MCHC Canceled, Test credited 31.1* 30.5*   RDW Canceled, Test credited 16.0* 16.0*   PLT Canceled, Test credited 171 163     INR  Recent Labs  Lab 05/27/18  1808   INR 1.45*     Arterial Blood GasNo lab results found in last 7 days.    Imaging (all imaging studies reviewed by me):  LEFT KNEE ONE TO TWO VIEWS  5/27/2018 7:10 PM      FINDINGS: There is a side plate and screws affixed to the proximal  left femur and healed tibial plateau fracture deformity. No recent  fractures noted. Left knee joint alignment appears normal    CERVICAL SPINE TWO TO THREE VIEWS   5/27/2018 7:10 PM         IMPRESSION: Posterior spine fusion hardware from C2 down to T2 again  noted. Interbody fusion devices in the C3-C4, C4-C5, C5-C6 and C6-C7  interspaces again noted. Alignment of the cervical vertebrae remains  normal. Hardware is unchanged.

## 2018-05-30 ENCOUNTER — APPOINTMENT (OUTPATIENT)
Dept: SPEECH THERAPY | Facility: CLINIC | Age: 73
DRG: 554 | End: 2018-05-30
Payer: MEDICARE

## 2018-05-30 ENCOUNTER — APPOINTMENT (OUTPATIENT)
Dept: PHYSICAL THERAPY | Facility: CLINIC | Age: 73
DRG: 554 | End: 2018-05-30
Payer: MEDICARE

## 2018-05-30 PROBLEM — M10.9 ACUTE GOUT OF MULTIPLE SITES: Status: ACTIVE | Noted: 2018-05-27

## 2018-05-30 PROBLEM — N17.9 AKI (ACUTE KIDNEY INJURY) (H): Status: ACTIVE | Noted: 2018-05-30

## 2018-05-30 PROBLEM — T86.10 COMPLICATION OF TRANSPLANTED KIDNEY: Status: ACTIVE | Noted: 2018-05-30

## 2018-05-30 LAB
ANION GAP SERPL CALCULATED.3IONS-SCNC: 14 MMOL/L (ref 3–14)
ANION GAP SERPL CALCULATED.3IONS-SCNC: 15 MMOL/L (ref 3–14)
BUN SERPL-MCNC: 100 MG/DL (ref 7–30)
BUN SERPL-MCNC: 105 MG/DL (ref 7–30)
CALCIUM SERPL-MCNC: 6.8 MG/DL (ref 8.5–10.1)
CALCIUM SERPL-MCNC: 7.1 MG/DL (ref 8.5–10.1)
CHLORIDE SERPL-SCNC: 109 MMOL/L (ref 94–109)
CHLORIDE SERPL-SCNC: 110 MMOL/L (ref 94–109)
CO2 SERPL-SCNC: 16 MMOL/L (ref 20–32)
CO2 SERPL-SCNC: 17 MMOL/L (ref 20–32)
CREAT SERPL-MCNC: 2.74 MG/DL (ref 0.66–1.25)
CREAT SERPL-MCNC: 2.84 MG/DL (ref 0.66–1.25)
ERYTHROCYTE [DISTWIDTH] IN BLOOD BY AUTOMATED COUNT: 15.4 % (ref 10–15)
GFR SERPL CREATININE-BSD FRML MDRD: 22 ML/MIN/1.7M2
GFR SERPL CREATININE-BSD FRML MDRD: 23 ML/MIN/1.7M2
GLUCOSE BLDC GLUCOMTR-MCNC: 155 MG/DL (ref 70–99)
GLUCOSE BLDC GLUCOMTR-MCNC: 156 MG/DL (ref 70–99)
GLUCOSE BLDC GLUCOMTR-MCNC: 251 MG/DL (ref 70–99)
GLUCOSE BLDC GLUCOMTR-MCNC: 283 MG/DL (ref 70–99)
GLUCOSE BLDC GLUCOMTR-MCNC: 291 MG/DL (ref 70–99)
GLUCOSE BLDC GLUCOMTR-MCNC: 315 MG/DL (ref 70–99)
GLUCOSE SERPL-MCNC: 215 MG/DL (ref 70–99)
GLUCOSE SERPL-MCNC: 323 MG/DL (ref 70–99)
HCT VFR BLD AUTO: 29.2 % (ref 40–53)
HGB BLD-MCNC: 9.3 G/DL (ref 13.3–17.7)
LACTATE BLD-SCNC: 1.2 MMOL/L (ref 0.4–1.9)
MAGNESIUM SERPL-MCNC: 2 MG/DL (ref 1.6–2.3)
MCH RBC QN AUTO: 28.7 PG (ref 26.5–33)
MCHC RBC AUTO-ENTMCNC: 31.8 G/DL (ref 31.5–36.5)
MCV RBC AUTO: 90 FL (ref 78–100)
PLATELET # BLD AUTO: 216 10E9/L (ref 150–450)
POTASSIUM SERPL-SCNC: 4.2 MMOL/L (ref 3.4–5.3)
POTASSIUM SERPL-SCNC: 4.4 MMOL/L (ref 3.4–5.3)
RBC # BLD AUTO: 3.24 10E12/L (ref 4.4–5.9)
SODIUM SERPL-SCNC: 140 MMOL/L (ref 133–144)
SODIUM SERPL-SCNC: 141 MMOL/L (ref 133–144)
WBC # BLD AUTO: 24.2 10E9/L (ref 4–11)

## 2018-05-30 PROCEDURE — 40000193 ZZH STATISTIC PT WARD VISIT

## 2018-05-30 PROCEDURE — 25000131 ZZH RX MED GY IP 250 OP 636 PS 637: Performed by: INTERNAL MEDICINE

## 2018-05-30 PROCEDURE — 80048 BASIC METABOLIC PNL TOTAL CA: CPT | Performed by: INTERNAL MEDICINE

## 2018-05-30 PROCEDURE — A9270 NON-COVERED ITEM OR SERVICE: HCPCS | Mod: GY | Performed by: INTERNAL MEDICINE

## 2018-05-30 PROCEDURE — 99233 SBSQ HOSP IP/OBS HIGH 50: CPT | Performed by: INTERNAL MEDICINE

## 2018-05-30 PROCEDURE — 83735 ASSAY OF MAGNESIUM: CPT | Performed by: INTERNAL MEDICINE

## 2018-05-30 PROCEDURE — 97530 THERAPEUTIC ACTIVITIES: CPT | Mod: GP

## 2018-05-30 PROCEDURE — 25000125 ZZHC RX 250: Performed by: INTERNAL MEDICINE

## 2018-05-30 PROCEDURE — 25000132 ZZH RX MED GY IP 250 OP 250 PS 637: Mod: GY | Performed by: STUDENT IN AN ORGANIZED HEALTH CARE EDUCATION/TRAINING PROGRAM

## 2018-05-30 PROCEDURE — 85027 COMPLETE CBC AUTOMATED: CPT | Performed by: INTERNAL MEDICINE

## 2018-05-30 PROCEDURE — 36415 COLL VENOUS BLD VENIPUNCTURE: CPT | Performed by: INTERNAL MEDICINE

## 2018-05-30 PROCEDURE — 40000225 ZZH STATISTIC SLP WARD VISIT: Performed by: SPEECH-LANGUAGE PATHOLOGIST

## 2018-05-30 PROCEDURE — 12000001 ZZH R&B MED SURG/OB UMMC

## 2018-05-30 PROCEDURE — 00000146 ZZHCL STATISTIC GLUCOSE BY METER IP

## 2018-05-30 PROCEDURE — 25000131 ZZH RX MED GY IP 250 OP 636 PS 637: Performed by: CLINICAL NURSE SPECIALIST

## 2018-05-30 PROCEDURE — 27210995 ZZH RX 272: Performed by: INTERNAL MEDICINE

## 2018-05-30 PROCEDURE — 83605 ASSAY OF LACTIC ACID: CPT | Performed by: ORTHOPAEDIC SURGERY

## 2018-05-30 PROCEDURE — 92526 ORAL FUNCTION THERAPY: CPT | Mod: GN | Performed by: SPEECH-LANGUAGE PATHOLOGIST

## 2018-05-30 PROCEDURE — 97116 GAIT TRAINING THERAPY: CPT | Mod: GP

## 2018-05-30 PROCEDURE — 25000132 ZZH RX MED GY IP 250 OP 250 PS 637: Performed by: INTERNAL MEDICINE

## 2018-05-30 PROCEDURE — A9270 NON-COVERED ITEM OR SERVICE: HCPCS | Mod: GY | Performed by: STUDENT IN AN ORGANIZED HEALTH CARE EDUCATION/TRAINING PROGRAM

## 2018-05-30 RX ORDER — ACETAMINOPHEN 500 MG
1000 TABLET ORAL EVERY 6 HOURS
Status: DISCONTINUED | OUTPATIENT
Start: 2018-05-30 | End: 2018-06-06 | Stop reason: HOSPADM

## 2018-05-30 RX ADMIN — ACETAMINOPHEN 1000 MG: 500 TABLET ORAL at 01:19

## 2018-05-30 RX ADMIN — INSULIN GLARGINE 30 UNITS: 100 INJECTION, SOLUTION SUBCUTANEOUS at 09:18

## 2018-05-30 RX ADMIN — ALLOPURINOL 100 MG: 100 TABLET ORAL at 12:01

## 2018-05-30 RX ADMIN — TACROLIMUS 1 MG: 1 CAPSULE, GELATIN COATED ORAL at 12:05

## 2018-05-30 RX ADMIN — SODIUM CHLORIDE: 4.5 INJECTION, SOLUTION INTRAVENOUS at 19:20

## 2018-05-30 RX ADMIN — TACROLIMUS 1 MG: 1 CAPSULE, GELATIN COATED ORAL at 18:12

## 2018-05-30 RX ADMIN — MYCOPHENOLIC ACID 720 MG: 360 TABLET, DELAYED RELEASE ORAL at 12:08

## 2018-05-30 RX ADMIN — SODIUM CITRATE AND CITRIC ACID MONOHYDRATE 30 ML: 500; 334 SOLUTION ORAL at 12:09

## 2018-05-30 RX ADMIN — Medication 400 UNITS: at 12:03

## 2018-05-30 RX ADMIN — SODIUM CHLORIDE: 4.5 INJECTION, SOLUTION INTRAVENOUS at 12:52

## 2018-05-30 RX ADMIN — MYCOPHENOLIC ACID 720 MG: 360 TABLET, DELAYED RELEASE ORAL at 18:12

## 2018-05-30 RX ADMIN — FOLIC ACID 1 MG: 1 TABLET ORAL at 12:06

## 2018-05-30 RX ADMIN — INSULIN ASPART 8 UNITS: 100 INJECTION, SOLUTION INTRAVENOUS; SUBCUTANEOUS at 10:30

## 2018-05-30 RX ADMIN — PREDNISONE 40 MG: 20 TABLET ORAL at 11:57

## 2018-05-30 RX ADMIN — INSULIN HUMAN 15 UNITS: 100 INJECTION, SUSPENSION SUBCUTANEOUS at 13:31

## 2018-05-30 ASSESSMENT — ACTIVITIES OF DAILY LIVING (ADL)
ADLS_ACUITY_SCORE: 13

## 2018-05-30 NOTE — PLAN OF CARE
Problem: Patient Care Overview  Goal: Plan of Care/Patient Progress Review  Outcome: No Change  Alert but at times disoriented to time and situation. CMS and Neuros intact. Denies pain, nausea or SOB. Baseline tingling in fingertips. Lung sounds clear and equal bilateral. Pt trigger sepsis protocol this AM (low temp was likely in error.) Incontinent of BM overnight. St output low at only 185 mL overnight. Provider notified. IV patent and infusing. Pt sleeping in between cares. New soft touch call light ordered for pt and pt able to make needs known.

## 2018-05-30 NOTE — PROGRESS NOTES
"[facility] Internal Medicine Progress Note            Interval History:   Record reviewed.  Seen with RN.  Remains on 1/2NS at 150 cc/hr.  500 cc NS bolus last PM for decrease UO and SBP 90's.  UO over night 185 cc's with 75 cc output last 2 hrs.  Prednisone 40 mg daily since 5/29.  Persistent hyperglycemia with BG's high 200's to 300's.  Remains confused - having difficulty understanding situation.  Seemed more appropriate last PM.  Arthralgias persist but appear less severe.  No opiate since 5/28.  Mobilized to chair 5/29.  Not up yet this AM.  No CP, SOB, cough, nausea, reflux, abd pain/distention.  Incontinent of stool this AM.  St in place.          Medications:   All medications reviewed today          Physical Exam:   Blood pressure 117/53, pulse 65, temperature 97.5  F (36.4  C), resp. rate 18, height 1.727 m (5' 8\"), weight 78.1 kg (172 lb 1.6 oz), SpO2 96 %.    Intake/Output Summary (Last 24 hours) at 05/29/18 0803  Last data filed at 05/29/18 0030   Gross per 24 hour   Intake               60 ml   Output              775 ml   Net             -715 ml       General:  Alert.  Speech fluent. No distress.  Confused as to present circumstances   No  O2.     Heent:      Neck:    Skin:    Chest:  clear    Cardiac:  Reg without gallop, murmur.  Collar in place - unable to assess neck veins.      Abdomen:  Non distended, soft, non-tender.  BS normal.     Extremities:  Perfused.  1-2 plus pre tibial edema.  No  calf, posterior thigh tenderness to suggest DVT.   Residual swelling right wrist, left knee, left ankle with decrease painful ROM.      Neuro:  Non lateralizing.              Data:     Results for orders placed or performed during the hospital encounter of 05/27/18 (from the past 24 hour(s))   Rheumatology IP Consult: Patient to be seen: Routine - within 24 hours; renal transplant patient with polyarticular gout.; Consultant may enter orders: Yes    Narrative    Maged Loyola MD     5/29/2018 " "10:31 PM  [facility] Rheumatology Consultation    Familia Irving MRN# [id number]   Age: 73 year old YOB: 1945     Date of Admission:  5/27/2018    Reason for consult: Gout       Requesting physician: Familia Dumont       Level of consult: Consult, follow and place orders             Assessment/Plan:   #.  Acute gout flare (Oligo-articular)  Arthrocentesis on admission consistent with inflammatory   arthritis, with over 30,000 white blood cells (97% PMNs), and   intracellular monosodium urate crystals present in left knee and   right wrist.    Of note, on admission he was started on IV vancomycin which was   discontinued after crystals were found on joint aspiration.  So   far, joint gram stain and cultures as well as blood cultures have   been negative for evidence of infection. CRP remains elevated at   >250. Patient states that his last gout flare was over \"50 years   ago\".  He is not on any allopurinol, though he states that he has   used this before.  Serum uric acid is 10.0. No tophi noted on XR   of knee. Physical examination is also negative for any tophi.  Possible triggers include ASA (low dose), Lasix, physiological   stress given recent infection and surgery.   He has received Solu-Medrol (125 mg) ×1 dose with some   improvement.   Given presence of acute gout flare in the setting of CKD, he will   need uric acid lowering therapy with a goal of SUA<6.   His CKD/worsening kidney function precludes him from getting any   colchicine or NSAIDs. Oligo-articular involvement also precludes   the use of intra-articular steroids.       RECOMMENDATIONS:  -- Appreciate excellent cares by primary team   -- Recommend Prednisone 40 mg QDAY for 5 days  -- Commence Allopurinol 100 mg daily   Goal SUA <6. Recommend follow up with PCP in 3-4 weeks with   serum uric acid measurement for titration of allopurinol  -- Monitor CRP, clinical status  -- Consider switching Lisinopril to Losartan (urate " lowering   property) - will defer this decision to Nephrology  -- Rheumatology will sign off today.    Please call if there are any questions    Thank you for this consultation.     Patient seen and discussed with Dr. Loyola, who is in agreement   with my assessment and recommedations. Please, see staff addendum   for changes/additions to the plan.    Magdalene Velasquez MD  Internal Medicine- Rheumatology  PGY-3  [phone]    ATTENDING TIE IN NOTE:    I saw the patient with the resident.  My exam and recommendations   are as described.      Maged Loyola MD FACP    Rheumatic and Autoimmune Diseases            Chief complaint:   Multiple joint pains          History of Present Illness:   Familia Irving is a 73 year old male with a medical history   significant for kidney transplantation thousand and 6 currently   on tacrolimus and Myfortic, hypertension, remote history of gout,   diabetes, and cervical myelopathy; admitted with knee and wrist   joint pain, with concerns for left knee septic arthritis.  Rheumatology consulted today for concern of acute gout flare.       Patient was recently admitted for multi-level cervical   decompression and fusion, he was discharged stable to TCU for   rehab. According to patient and his son, who was in his usual   state of health until about 3 days ago when he noted left knee   swelling, right wrist swelling, and severe pain in his left knee   and right wrist.  He also had a fever as high as 101.  Patient   was initially taken to an emergency room in [place], and then   transferred to the [place].  Patient complains of left knee   and right wrist pain, with some erythema around his left knee.    He denies any pain in any other joints.  Denies any rashes.    Patient has not experienced any localized swellings in his first   toes, or his elbows.  He denies any headaches.  He denies any   chest or abdominal symptoms.  According to patient, he last had a    gout flare about 50 years ago, and has not had any since then   until now.  He states that he was previously on allopurinol, but   had stopped this after 6 months.     While here in the hospital, he had received 125 mg of Solu-Medrol   IV.  Patient states that his pain is much reduced, and his wrist   feels less swollen.  He feels like his range of motion in his   right wrist and left knee have improved.  He has not had any   fevers or chills.          Review of Symptoms:   10-point ROS reviewed, & found negative w/ exceptions noted in   the HPI.          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      Tinnitus        Past Surgical History:   Procedure Laterality Date     AMPUTATION Right     First and second partial amputation of toe.      AS OPEN TX KNEE DISLOCATION W REPAIR/RECONSTRUCTION  1980's     DECOMPRESSION, FUSION CERVICAL ANTERIOR THREE+ LEVELS, COMBINED   N/A 5/16/2018    Procedure: COMBINED DECOMPRESSION, FUSION CERVICAL ANTERIOR   THREE+ LEVELS;  Part 1: (anterior) Anterior Cervical   Decompression Fusion C3-7, Cervical 7-Thoracic 1; Anterior Iliac   Crest Bone Graft [place]  Part 2: (posterior) Posterior Interbody Spinal Fusion Cervical   3-Throracic 1/Thoracic 2; Laminectomies Cervical 3-7;  Surgeon:   Brijesh Bass MD;  Location: [facility] OR     GRAFT BONE FROM ILIAC CREST N/A 5/16/2018    Procedure: GRAFT BONE FROM ILIAC CREST;;  Surgeon: Brijesh Bass MD;  [facility]: [facility] OR     OPTICAL TRACKING SYSTEM FUSION POSTERIOR CERVICAL THREE +   LEVELS N/A 5/16/2018    Procedure: OPTICAL TRACKING SYSTEM FUSION POSTERIOR CERVICAL   THREE + LEVELS;;  Surgeon: Brijesh Bass MD;     [facility]: [facility] OR     TRANSPLANT KIDNEY RECIPIENT  DONOR  2006            Allergies:     Allergies   Allergen Reactions     Heparin      Patient unsure of what reaction was, but it was severe reaction   during his transplant and finally was determined that it was   heparin             Outpatient Medications:       No current facility-administered medications on file prior to   encounter.   Current Outpatient Prescriptions on File Prior to Encounter:  acetaminophen (TYLENOL) 325 MG tablet Take 2 tablets (650 mg) by   mouth every 4 hours as needed for other (multimodal surgical pain   management along with NSAIDS and opioid medication as indicated   based on pain control and physical function.)   amLODIPine (NORVASC) 10 MG tablet Take 10 mg by mouth daily   ASPIRIN PO Take 81 mg by mouth daily   FOLIC ACID PO Take 1 mg by mouth daily   FUROSEMIDE PO Take 20 mg by mouth 2 times daily    insulin glargine (LANTUS) 100 UNIT/ML injection Inject 30 Units   Subcutaneous every morning    LISINOPRIL PO Take 40 mg by mouth At Bedtime    loperamide (IMODIUM) 2 MG capsule Take 4 mg by mouth as needed   for diarrhea   MAGNESIUM OXIDE PO Take 420 mg by mouth Every Mon, Wed, Fri   Morning   methocarbamol (ROBAXIN) 500 MG tablet Take 1 tablet (500 mg) by   mouth 4 times daily as needed for muscle spasms   MYFORTIC (BRAND) 360 MG EC TABLET Take 720 mg by mouth 2 times   daily    oxyCODONE IR (ROXICODONE) 5 MG tablet For pain of 1-5 give 5 mg,   for pain of 6-10 give 10 mg every 3 hours as needed.   PRAVASTATIN SODIUM PO Take 40 mg by mouth At Bedtime    PROGRAF (BRAND) 1 MG CAPSULE Take 1 mg by mouth 2 times daily   senna-docusate (SENOKOT-S;PERICOLACE) 8.6-50 MG per tablet Take 1   tablet by mouth 2 times daily   VITAMIN D, CHOLECALCIFEROL, PO Take 400 Units by mouth daily             Family History:   No family history on file.          Social History:     Social History     Social History     Marital status: Single     " Spouse name: N/A     Number of children: 2     Years of education: N/A     Occupational History     retired      Social History Main Topics     Smoking status: Never Smoker     Smokeless tobacco: Never Used     Alcohol use No     Drug use: No     Sexual activity: Not on file     Other Topics Concern     Not on file     Social History Narrative    Lives alone in lower level apartment in [place].  Brother   lives in same apartment building on main floor.  Has two   children, son and Daughter.  Daughter lives in [place].  Son, Willard,   lives local and is actively involved.              Physical Exam:   /45 (BP Location: Left arm)  Pulse 60  Temp 95.4  F (35.2    C) (Oral)  Resp 16  Ht 1.727 m (5' 8\")  SpO2 95%  Temp (24hrs), Av.2  F (36.8  C), Min:95.4  F (35.2  C),   Max:100  F (37.8  C)    General:  Alert, not in respiratory or painful distress, Not   toxic looking, afebrile, not pale, not dehydrated,   HEENT: anicteric sclera, PERRL, EOMI, MMM, Cervical collar in   place  CV: RRR, nl S1/S2, no S3/S4 appreciated, no m/r/g; intact &   symmetric 3+ pulses (radial, DP);   Lungs: CTAB, no wheezing   Abd: Obese, soft, moves with respiration, BS+, not tender, not   distended, no palpable organomegaly, no masses   : St in place  Ext: WWP,  BLE edema  Skin: no rashes, cyanosis, or jaundice  MSK: Joint swelling in right wrist, no redness noted.  Right   wrist is tender to touch, with reduced range of motion, mostly   hampered by pain.    Left knee with reduced erythema (judging by pen markings from   admission), reduced range of motion with flexion, complete   examination stymied by pain.  Knee joint swelling present, with   tenderness to palpation.     Psych: Stable            Data:   Labs (all laboratory studies reviewed by me):   ROUTINE ICU LABS (Last four results)  CMP  Recent Labs  Lab 18  0724 18  1645 18  0709 18  1808   * 143 147* 147*   POTASSIUM 5.2 4.3 4.1 4.1 "   CHLORIDE 113* 114* 115* 115*   CO2 17* 17* 21 22   ANIONGAP 16* 12 11 10   * 163* 156* 74   BUN 84* 63* 61* 63*   CR 2.74* 2.50* 2.46* 2.56*   GFRESTIMATED 23* 26* 26* 25*   GFRESTBLACK 28* 31* 31* 30*   BRAXTON 8.0* 8.3* 8.1* 8.0*   MAG 1.9  --   --  1.7   PROTTOTAL  --   --   --  5.2*   ALBUMIN  --   --   --  1.9*   BILITOTAL  --   --   --  1.7*   ALKPHOS  --   --   --  87   AST  --   --   --  45   ALT  --   --   --  40     CBC  Recent Labs  Lab 05/29/18  0724 05/28/18  0709 05/27/18  1808   WBC Canceled, Test credited 20.2* 18.7*   RBC Canceled, Test credited 3.01* 2.87*   HGB Canceled, Test credited 8.7* 8.2*   HCT Canceled, Test credited 28.0* 26.9*   MCV Canceled, Test credited 93 94   MCH Canceled, Test credited 28.9 28.6   MCHC Canceled, Test credited 31.1* 30.5*   RDW Canceled, Test credited 16.0* 16.0*   PLT Canceled, Test credited 171 163     INR  Recent Labs  Lab 05/27/18  1808   INR 1.45*     Arterial Blood GasNo lab results found in last 7 days.    Imaging (all imaging studies reviewed by me):  LEFT KNEE ONE TO TWO VIEWS  5/27/2018 7:10 PM      FINDINGS: There is a side plate and screws affixed to the   proximal  left femur and healed tibial plateau fracture deformity. No   recent  fractures noted. Left knee joint alignment appears normal    CERVICAL SPINE TWO TO THREE VIEWS   5/27/2018 7:10 PM         IMPRESSION: Posterior spine fusion hardware from C2 down to T2   again  noted. Interbody fusion devices in the C3-C4, C4-C5, C5-C6 and   C6-C7  interspaces again noted. Alignment of the cervical vertebrae   remains  normal. Hardware is unchanged.    Nephrology Kidney/Pancreas Transplant Adult IP Consult: Patient to be seen: Routine within 24 hrs; Call back #: [phone]; Transplant pt with SALVADOR, polyarticular gout.; Consultant may enter orders: Yes    Narrative    Tad Pastor MD     5/29/2018  8:50 PM    Nephrology Initial Consult  May 29, 2018      Familia Irving MRN:[id number] Date of  Birth: 1945  Date of Admission:2018  Primary care provider: [facility]  Requesting physician: Wade Posey*    ASSESSMENT AND RECOMMENDATIONS:   1. DDKT - baseline cr 1.5-1.7 and currently with a non oliguric   acute kidney injury likely related to tubular injury with   inflammatory state / lisinopril use / immunosuppression.    Rejection is less likely with adequate drug levels.   -- No indication for dialysis  -- Monitor weights  -- Agree with holding lasix, amlodipine, and lisinopril with   softer bp's noted  -- Monitor renal function daily  -- Agree with ongoing mIV fluids (half normal saline or d5w until   appetite and oral intake is improved).  Isotonic fluids only if   clinically intravascularly volume depleted.    2. Immunosuppression - on tacrolimus 1 mg bid and myfortic 720 mg   bid.  No changes at this time.    3. Gout - currently on prednisone burst and starting allopurinol.    OK to use losartan instead of lisinopril in the future with   resolution of SALVADOR episode.    4. Metabolic acidosis - recommend starting bicitra 30 meq daily    5. Fever- likely related to gout flare.  Continue to monitor.    Recommendations were communicated to primary team via note    Seen and discussed with Dr. Darby Walker MD   [phone]      REASON FOR CONSULT: history of kidney transplant    HISTORY OF PRESENT ILLNESS:  Familia Irving is a 73 year old man with history of end stage   kidney disease presumed secondary to diabetes status post    donor kidney transplant 2016 that has a baseline cr   1.5-1.7 and is seen in consultation for acute kidney injury.  [facility] and he is   chronically followed by Dr. Suggs.  His immunosuppression is   tacrolimus 1 mg bid and myfortic 720 mg bid.  Information from   the HPI is currently limited due to medication side effects from   narcotics, per son's report.  The son is present at  bedside.  The   patient additionally has a medical history significant for   hypertension- on lisinopril 40 mg qhs, amlodipine 10 mg daily,   and furosemide 20 mg bid.  He also notes a remote history of   gout, with the last episode 50 years prior.  He has degenerative   disc disease and was hospitalized for elective cervical spinal   fusion from 5/16-5/21.  The post operative course is notable for   dysphagia and he received steroids for this and at discharge was   tolerating a regular diet.  He was discharged to a rehab facility   where he acutely developed fever and immobility due to joint pain   in multiple joints (left knee and wrists notably).  ED from   [facility] noted elevated wbc count to 16.5.  Orthopedics aspirated   the right wrist and left knee which was consistent with gout.  He   was seen by rheumatology today and noted to have possible   triggers with aspirin, lasix, and recent surgery.  He has   received solumedrol and feels remarkably improved.  Rheumatology   recommends 5 day course of prednisone and allopurinol with uric   acid goal less than 6.  His creatinine on discharge was as   baseline but he is admitted with a creatinine of 2.6 and he is   currently non oliguric.  Prograf level yesterday was 7.1.  He   notes poor appetite and intake at the rehab facility and was   maintained on bp medications.  He denies use of nsaids.  UA on   admission was unremarkable.  The patient notes edema but denies   dyspnea or chest pain. Fever curve has improved and infectious   studies thus far negative.    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      Tinnitus        Past  Surgical History:   Procedure Laterality Date     AMPUTATION Right     First and second partial amputation of toe.      AS OPEN TX KNEE DISLOCATION W REPAIR/RECONSTRUCTION       DECOMPRESSION, FUSION CERVICAL ANTERIOR THREE+ LEVELS, COMBINED   N/A 2018    Procedure: COMBINED DECOMPRESSION, FUSION CERVICAL ANTERIOR   THREE+ LEVELS;  Part 1: (anterior) Anterior Cervical   Decompression Fusion C3-7, Cervical 7-Thoracic 1; Anterior Iliac   Crest Bone Graft [place]  Part 2: (posterior) Posterior Interbody Spinal Fusion Cervical   3-Throracic 1/Thoracic 2; Laminectomies Cervical 3-7;  Surgeon:   Brijesh Bass MD;  [facility]     GRAFT BONE FROM ILIAC CREST N/A 2018    Procedure: GRAFT BONE FROM ILIAC CREST;;  Surgeon: Brijesh Bass MD;  [facility]     OPTICAL TRACKING SYSTEM FUSION POSTERIOR CERVICAL THREE +   LEVELS N/A 2018    Procedure: OPTICAL TRACKING SYSTEM FUSION POSTERIOR CERVICAL   THREE + LEVELS;;  Surgeon: Brijesh Bass MD;    [facility]     TRANSPLANT KIDNEY RECIPIENT  DONOR  2006        MEDICATIONS:  PTA Meds  Prior to Admission medications    Medication Sig Last Dose Taking? Auth Provider   acetaminophen (TYLENOL) 325 MG tablet Take 2 tablets (650 mg) by   mouth every 4 hours as needed for other (multimodal surgical pain   management along with NSAIDS and opioid medication as indicated   based on pain control and physical function.)   Ebonie Lopez APRN CNP   amLODIPine (NORVASC) 10 MG tablet Take 10 mg by mouth daily     Reported, Patient   ASPIRIN PO Take 81 mg by mouth daily   Reported, Patient   FOLIC ACID PO Take 1 mg by mouth daily   Reported, Patient   FUROSEMIDE PO Take 20 mg by mouth 2 times daily    Reported,   Patient   insulin glargine (LANTUS) 100 UNIT/ML injection Inject 30 Units   Subcutaneous every morning    Reported, Patient   LISINOPRIL PO Take 40 mg by mouth At Bedtime    Reported, Patient      loperamide (IMODIUM) 2 MG capsule Take 4 mg by mouth as needed   for diarrhea   Unknown, Entered By History   MAGNESIUM OXIDE PO Take 420 mg by mouth Every Mon, Wed, Fri   Morning   Unknown, Entered By History   methocarbamol (ROBAXIN) 500 MG tablet Take 1 tablet (500 mg) by   mouth 4 times daily as needed for muscle spasms   Ebonie Lopez APRN CNP   MYFORTIC (BRAND) 360 MG EC TABLET Take 720 mg by mouth 2 times   daily    Reported, Patient   oxyCODONE IR (ROXICODONE) 5 MG tablet For pain of 1-5 give 5 mg,   for pain of 6-10 give 10 mg every 3 hours as needed.   Ebonie Lopez APRN CNP   PRAVASTATIN SODIUM PO Take 40 mg by mouth At Bedtime    Reported,   Patient   PROGRAF (BRAND) 1 MG CAPSULE Take 1 mg by mouth 2 times daily     Reported, Patient   senna-docusate (SENOKOT-S;PERICOLACE) 8.6-50 MG per tablet Take 1   tablet by mouth 2 times daily   Ebonie Lopez APRN CNP   VITAMIN D, CHOLECALCIFEROL, PO Take 400 Units by mouth daily     Unknown, Entered By History      Current Meds    acetaminophen  1,000 mg Oral Q8H     allopurinol  100 mg Oral Daily     cholecalciferol  400 Units Oral Daily     folic acid (FOLVITE) tablet 1 mg  1 mg Oral Daily     insulin aspart  1-10 Units Subcutaneous TID AC     insulin aspart  1-7 Units Subcutaneous At Bedtime     insulin aspart   Subcutaneous QAM AC     insulin aspart   Subcutaneous Daily with lunch     insulin aspart   Subcutaneous Daily with supper     insulin glargine  30 Units Subcutaneous QAM AC     mycophenolic acid  720 mg Oral BID IS     predniSONE  40 mg Oral Daily     senna-docusate  1-2 tablet Oral BID     sodium citrate-citric acid  30 mL Oral Daily     tacrolimus  1 mg Oral BID IS     Infusion Meds    NaCl 150 mL/hr at 05/29/18 1140       ALLERGIES:    Allergies   Allergen Reactions     Heparin      Patient unsure of what reaction was, but it was severe reaction   during his transplant and finally was determined that it was   heparin  "      REVIEW OF SYSTEMS:  A comprehensive of systems was negative except as noted above.    SOCIAL HISTORY:   Social History     Social History     Marital status: Single     Spouse name: N/A     Number of children: 2     Years of education: N/A     Occupational History     retired      Social History Main Topics     Smoking status: Never Smoker     Smokeless tobacco: Never Used     Alcohol use No     Drug use: No     Sexual activity: Not on file     Other Topics Concern     Not on file     Social History Narrative    Lives alone in lower level apartment in [place].  Brother   lives in same apartment building on main floor.  Has two   children, son and Daughter.  Daughter lives in [place].  SonWillard,   lives local and is actively involved.      Reviewed with patient     FAMILY MEDICAL HISTORY:   No family history of kidney disease  Reviewed with patient     PHYSICAL EXAM:   Temp  Av.4  F (36.9  C)  Min: 95.2  F (35.1  C)  Max: 100  F   (37.8  C)      Pulse  Av.6  Min: 60  Max: 77 Resp  Avg: 15.5  Min: 12  Max:   18  SpO2  Av.8 %  Min: 95 %  Max: 99 %       BP 99/47 (BP Location: Left arm)  Pulse 60  Temp 95.2  F (35.1    C) (Oral)  Resp 16  Ht 1.727 m (5' 8\")  SpO2 99%   Date 18 0700 - 18 0659   Shift 7297-8140 1624-3233 8359-3559 24 Hour Total   I  N  T  A  K  E   Shift Total       O  U  T  P  U  T   Urine 100   100    Shift Total 100   100   Weight (kg)            Admit Weight:  (unable to stand, unwilling to roll to get out   hover ketan)     GENERAL APPEARANCE: no distress,  awake  EYES: no scleral icterus, pupils equal  Lymphatics: no cervical or supraclavicular LAD  Pulmonary: lungs clear to auscultation with equal breath sounds   bilaterally, no clubbing  CV: regular rhythm, normal rate, no rub   - JVD none   - Edema 2+  GI: soft, nontender, normal bowel sounds  MS: no evidence of inflammation in joints, no muscle tenderness  : + castanon  SKIN: no rash, warm, dry, no " cyanosis  NEURO: face symmetric, no asterixis     LABS:   CMP  Recent Labs  Lab 05/29/18  0724 05/28/18  1645 05/28/18  0709 05/27/18  1808   * 143 147* 147*   POTASSIUM 5.2 4.3 4.1 4.1   CHLORIDE 113* 114* 115* 115*   CO2 17* 17* 21 22   ANIONGAP 16* 12 11 10   * 163* 156* 74   BUN 84* 63* 61* 63*   CR 2.74* 2.50* 2.46* 2.56*   GFRESTIMATED 23* 26* 26* 25*   GFRESTBLACK 28* 31* 31* 30*   BRAXTON 8.0* 8.3* 8.1* 8.0*   MAG 1.9  --   --  1.7   PROTTOTAL  --   --   --  5.2*   ALBUMIN  --   --   --  1.9*   BILITOTAL  --   --   --  1.7*   ALKPHOS  --   --   --  87   AST  --   --   --  45   ALT  --   --   --  40     CBC  Recent Labs  Lab 05/29/18  0724 05/28/18  0709 05/27/18  1808   HGB Canceled, Test credited 8.7* 8.2*   WBC Canceled, Test credited 20.2* 18.7*   RBC Canceled, Test credited 3.01* 2.87*   HCT Canceled, Test credited 28.0* 26.9*   MCV Canceled, Test credited 93 94   MCH Canceled, Test credited 28.9 28.6   MCHC Canceled, Test credited 31.1* 30.5*   RDW Canceled, Test credited 16.0* 16.0*   PLT Canceled, Test credited 171 163     INR  Recent Labs  Lab 05/27/18  1808   INR 1.45*     ABGNo lab results found in last 7 days.   URINE STUDIES  Recent Labs   Lab Test  05/27/18 2000 04/18/18   1334   COLOR  Yellow  Yellow   APPEARANCE  Clear  Slightly Cloudy   URINEGLC  Negative  Negative   URINEBILI  Negative  Negative   URINEKETONE  Negative  Negative   SG  1.010  1.013   UBLD  Negative  Negative   URINEPH  5.0  5.0   PROTEIN  10*  Negative   NITRITE  Negative  Negative   LEUKEST  Negative  Negative   RBCU  1  1   WBCU  1  3     No lab results found.  PTH  No lab results found.  IRON STUDIES  No lab results found.    IMAGING:  All imaging studies reviewed by me.     Iraj Walker MD    Patient was seen and evaluated by me, Tad Pastor MD. I have   reviewed the note and agree with the the plan of care as   documented by the fellow.  SALVADOR, total body gout vs others. The picture is most  consistent   with gout although infectious etiology is still on the   differential. The marked improvement with steroids is more in   keeping with gout. Will keep low threshold to re-look for   infections. Suggest daily prednisone 40 mg per rheumatology   followed by a taper over couple of weeks.   Hold Ace-I, ok to replace with ARB when suitable. Will reevaluate   the diuretic needs.      Glucose by meter   Result Value Ref Range    Glucose 284 (H) 70 - 99 mg/dL   Glucose by meter   Result Value Ref Range    Glucose 272 (H) 70 - 99 mg/dL   Basic metabolic panel   Result Value Ref Range    Sodium 143 133 - 144 mmol/L    Potassium 4.6 3.4 - 5.3 mmol/L    Chloride 110 (H) 94 - 109 mmol/L    Carbon Dioxide 18 (L) 20 - 32 mmol/L    Anion Gap 15 (H) 3 - 14 mmol/L    Glucose 308 (H) 70 - 99 mg/dL    Urea Nitrogen 89 (H) 7 - 30 mg/dL    Creatinine 2.73 (H) 0.66 - 1.25 mg/dL    GFR Estimate 23 (L) >60 mL/min/1.7m2    GFR Estimate If Black 28 (L) >60 mL/min/1.7m2    Calcium 7.7 (L) 8.5 - 10.1 mg/dL   Glucose by meter   Result Value Ref Range    Glucose 274 (H) 70 - 99 mg/dL   Fractional excretion of sodium   Result Value Ref Range    Creatinine Urine 122 mg/dL    Sodium Urine mmol/L 19 mmol/L    %FENA 0.3 %   CBC with platelets   Result Value Ref Range    WBC 23.5 (H) 4.0 - 11.0 10e9/L    RBC Count 3.08 (L) 4.4 - 5.9 10e12/L    Hemoglobin 8.8 (L) 13.3 - 17.7 g/dL    Hematocrit 27.9 (L) 40.0 - 53.0 %    MCV 91 78 - 100 fl    MCH 28.6 26.5 - 33.0 pg    MCHC 31.5 31.5 - 36.5 g/dL    RDW 15.6 (H) 10.0 - 15.0 %    Platelet Count 218 150 - 450 10e9/L   Glucose by meter   Result Value Ref Range    Glucose 327 (H) 70 - 99 mg/dL   Glucose by meter   Result Value Ref Range    Glucose 315 (H) 70 - 99 mg/dL   Basic metabolic panel   Result Value Ref Range    Sodium 140 133 - 144 mmol/L    Potassium 4.4 3.4 - 5.3 mmol/L    Chloride 109 94 - 109 mmol/L    Carbon Dioxide 16 (L) 20 - 32 mmol/L    Anion Gap 15 (H) 3 - 14 mmol/L    Glucose  323 (H) 70 - 99 mg/dL    Urea Nitrogen 100 (H) 7 - 30 mg/dL    Creatinine 2.84 (H) 0.66 - 1.25 mg/dL    GFR Estimate 22 (L) >60 mL/min/1.7m2    GFR Estimate If Black 27 (L) >60 mL/min/1.7m2    Calcium 7.1 (L) 8.5 - 10.1 mg/dL   Magnesium   Result Value Ref Range    Magnesium 2.0 1.6 - 2.3 mg/dL   CBC with platelets   Result Value Ref Range    WBC 24.2 (H) 4.0 - 11.0 10e9/L    RBC Count 3.24 (L) 4.4 - 5.9 10e12/L    Hemoglobin 9.3 (L) 13.3 - 17.7 g/dL    Hematocrit 29.2 (L) 40.0 - 53.0 %    MCV 90 78 - 100 fl    MCH 28.7 26.5 - 33.0 pg    MCHC 31.8 31.5 - 36.5 g/dL    RDW 15.4 (H) 10.0 - 15.0 %    Platelet Count 216 150 - 450 10e9/L   Lactic acid level STAT for sepsis protocol   Result Value Ref Range    Lactate for Sepsis Protocol 1.2 0.4 - 1.9 mmol/L   Glucose by meter   Result Value Ref Range    Glucose 291 (H) 70 - 99 mg/dL                Assessment and Plan:   1)  Acute polyarthritis with fever (left knee, right wrist, both ankles) with monosodium urate crystals on knee and wrist aspirate c/w polyarticular gout. WBC 68985. Cultures neg to date. Off Vanco.  Improving slowly on steroids.   2)  SALVADOR.  Cr 2.84, Cr 2.74 5/29.  Cr 2.56 5/27 (1.6 5/21).  S/p DDKT 2006.  Volume depletion contributing (low FENA) (with lasix PTA) and lisinopril effect. On IV fluids.  Bladder outlet obstruction resolved with castanon.  Function appears to be plateau ing.   Making urine.   3)  S/P A/P cervical fusion 5/16.  No issues.   4)  Oral pharyngeal dysphagia 2nd to #1.  Tolerating diet.   5)  Intermittent confusion attributed to recent surgery, opiates (off since 5/28), acute febrile illness and subsequent steroids.  Neuro non lateralizing (good motor exam per spine).  Consider head CT if doesn't clear.  .   6)  Anemia 2nd to acute illness and recent acute blood loss.   Adequate.  7)  Type II DM with BG down to 70's on admit.  Hyperglycemia with IV solumedrol  5/28 followed by po steroids.  On carb coverage (1:5 grams), high correction  and resumed lantus 30.  .  8)  HTN, on PTA amlodipine (off lasix and lisinopril).  Adequate.   9)  Urine retention.  St in place.   UC mixed braydon.   10)  HLD.   11)  PAC's on rhythm after cervical fusion.  Benign.   12)  Pressure wound sacrum (evoloving).    PLAN:  1)  Continue IV fluid support.  Review with renal.    2)  Daily weights, I/O, close lab follow up.  3)  Prednisone 40 mg daily for 5 days.  Allopurinol 100 mg daily.  4)  DC oxycodone.   Scheduled ES tylenol q6h.   5)  Mechanical DVT prophylaxis.  Mobilize as able.   6)  Insulin regimen as above.  Consider infusion if does not improve with increase meal coverage.  Diabetes service to see.  7)  )  Speech and WOC follow up.   8) Patient reassurance.   Update family.    Disposition Plan   Expected discharge unclear pending course.  [facility] to cover stay per CC.      Entered: Familia Dumont 05/30/2018, 8:29 AM              Attestation:  I have reviewed today's vital signs, notes, medications, labs and imaging.     Familia Dumont MD

## 2018-05-30 NOTE — PLAN OF CARE
Problem: Patient Care Overview  Goal: Plan of Care/Patient Progress Review  Outcome: Improving      VS: Stable, BP Soft (107/51 @ 1730)   Output: St in, patent.  Patient was up to commode, had med, soft BM   Activity: Up with assist of 2 and a walker/gait belt. Patient should be in chair for meals (requires set up, but was able to partly feed himself for dinner).   Skin: Warm, hilda, dry. Edema (right hand/arm, left foot/ankle are most edematous.   Pain: Denied.   Neuro/CMS: Disoriented to time occasionally, aware of situation. Alert and cooperative.   Baseline numbness is present in bilateral toes.    Dressing(s): Care completed by [facility] nurse/ Mepilex to sacrum CDI.    Diet: Consistent carb   LDA: TERESITA St infusing 0.45 NS at 150 mL/hr   Equipment: Walker/gait belt when out of bed.   Plan: Continue to monitor and follow plan of care.   Additional Info: Takes meds with applesauce, smallest to largest. Does require reassurance, reminding, and explanation of meds and tasks prior to doing them. Pleasant and cooperative this kait.

## 2018-05-30 NOTE — PLAN OF CARE
Problem: Patient Care Overview  Goal: Plan of Care/Patient Progress Review  Discharge Planner PT   Patient plan for discharge: TCU  Current status: Pt ambulated 25' with CGA and platform walker. Decreased marcy, step length, VC to increase step length. Pt completed supine<>sit with Mod-Max Ax2, demonstrated good understanding of using logroll technique however moving very slowly. Sit<>stand with MinAx2 and platform walker. Required max A for scooting with chux pad. Pt left supine in bed with nursing in room.   Barriers to return to prior living situation: Pain, decreased ROM, decreased strength, decreased activity tolerance  Recommendations for discharge: TCU  Rationale for recommendations: to progress pt activity tolerance and functional mobility       Entered by: Faisal Gautam 05/30/2018 11:48 AM

## 2018-05-30 NOTE — PLAN OF CARE
Problem: Patient Care Overview  Goal: Plan of Care/Patient Progress Review  Outcome: Improving     VS: Stable, BP Soft    Output: St in. (Output low this shift (120 mL). Moonlighter notified, bolus ordered.  Patient was up to commode, had med. BM   Activity: Up with assist of 2 and a walker/gait belt. Patient should be in chair for meals (requires set up, but was able to partly feed himself for dinner).   Skin: Warm, hilda, dry. Edema (right hand/arm, left foot/ankle are most edematous.   Pain: Denied.   Neuro/CMS: Disoriented to situation as at times, also disorientated to time.  Baseline numbness is present.   Dressing(s): Care completed by [facility] nurse/ Mepilex to sacrum CDI.    Diet: Consistent carb   LDA: St, PIV infusing 0.45 NS at 150 mL/hr   Equipment: Walker/gait belt when out of bed.   Plan: Continue to monitor and follow plan of care.   Additional Info: Takes meds with applesauce, smallest to largest. Does require reassurance, reminding, and explanation of meds and tasks prior to doing them. Was much more cooperative this kait than last kait. Took all meds, and insulin willingly.

## 2018-05-30 NOTE — PROGRESS NOTES
"Orthopedic Surgery Progress Note    Subjective:   States he feels decreased pain in the joints. He states he did stand at bedside with PT yesterday.     Exam:  /53 (BP Location: Left arm)  Pulse 65  Temp 97.5  F (36.4  C)  Resp 18  Ht 1.727 m (5' 8\")  Wt 78.1 kg (172 lb 1.6 oz)  SpO2 96%  BMI 26.17 kg/m2  Gen: Awake, alert, NAD  Resp: breathing equal and non-labored  Extremities:  UE: Right wrist swelling, warmth, mild erythema decreased compared to yesterday's exam. Tolerates wrist ROM with less pain than yesterday.   LE: Left knee with decreased erythema compared to yesterday's exam. Decreased tenderness to palpation. Tolerates more ROM than prior exam 5 - 50deg.    Labs:    Recent Labs  Lab 05/30/18  0641 05/29/18  2143 05/29/18  0724 05/28/18  0709   WBC 24.2* 23.5* Canceled, Test credited 20.2*   HGB 9.3* 8.8* Canceled, Test credited 8.7*    218 Canceled, Test credited 171       Recent Labs  Lab 05/30/18  0641 05/29/18  1721 05/29/18  0724 05/28/18  1645  05/27/18  1808    143 146* 143  < > 147*   POTASSIUM 4.4 4.6 5.2 4.3  < > 4.1   CHLORIDE 109 110* 113* 114*  < > 115*   CO2 16* 18* 17* 17*  < > 22   * 89* 84* 63*  < > 63*   CR 2.84* 2.73* 2.74* 2.50*  < > 2.56*   * 308* 267* 163*  < > 74   MAG 2.0  --  1.9  --   --  1.7   < > = values in this interval not displayed.    Recent Labs  Lab 05/27/18  1808   INR 1.45*     Assessment:  72yo male 12 days s/p multi-level cervical decompression and fusion with right wrist pain and swelling and left knee pain and swelling and right wrist pain and swelling 2/2 gout (aspirates positive for monosodium urate crystals). Positive response to steroids.       Plan:  -Admit to ortho with medicine consult.   -Continue steroids, allopurinol   -SALVADOR: follow up BMP today, appreciate medicine recommendations on management.   -Infection vs gout: follow up right wrist aspiration and follow up blood cultures and left knee cultures. Dc " vanco.  -Activity: WBAT BLE and BUE  -PT/OT  -Continue 120cc/hour for hydration.  -Diet: Ok for regular diet.   -No plan for operative debridement at this time.     Discussed with Dr. Dumont and patient will now be a medicine primary patient due to ongoing medical issues and no surgical indication. Excellent cares appreciated. Please page with any questions or concerns or if there is concern for joint infection.     If questions arise, please page me at the number below before paging the orthopedic resident on call. Thank you!    La Rand MD PGY-4  Orthopedic Surgery  [phone]

## 2018-05-30 NOTE — PLAN OF CARE
"Problem: Patient Care Overview  Goal: Plan of Care/Patient Progress Review  Outcome: Therapy, progress towards functional goals is fair  Start of shift pt refused OT said he was in pain. Nursing offered pain med however pt started saying \"I don't know what I'm supposed to do, everybody seems very happy to do things for me but no one will tell me what I'm doing. Pt seemed confused just lying there with eyes closed. Pt is very anxious about his situation. Pt was unsure of medications he was taking, nursing tried to assure him that meds were correct. Dr Dumont did go see pt & assured him that everything was correct. Pt has been turned & positioned Q 2hrs. Maggie care done, pt is incontinent, having soft stool. Pt has to be fed slowly one spoon at a time with sips of milk, takes meds in apple sauce. Pt able to feed self after tray set up & needs to be sitting up all the way.      "

## 2018-05-30 NOTE — CONSULTS
Diabetes/Hyperglycemia Management Consult    Chief Complaint increased hyperglycemia in setting of steroids  Consult requested by: Dr. Familia Dumont  History of Present Illness Familia Irving is a 73 year old man with type 2 diabetes, renal transplant in 2006, hypertension, and cervical myelopathy, s/p cervical fusion on 5/16/18, admitted from TCU with complaint of concern for left knee septic arthritis.  He has been treated for polyarticular gout flare with high dose steroids, with some improvement in pain and mobility.  His hospital course has additionally been complicated by SALVADOR (creatinine higher yet today), dysphagia, intermittent confusion, urinary retention, and hyperglycemia.  On admission, on 5/27/2018, BG was in 70s.  Glargine held until 5/29, when BG had reached the 200s, subsequent to administration methylpred 125 mg in evening of 5/28.  Had aspart high correction as well as aspart 1 unit per 5 grams carb.  However, for the one meal recorded yesterday, only received 1 unit per 15 grams carbohydrate.  Glucose up to 300s since last night.   Needing feeding by staff.  Pt says he's hungry and tolerating food fine. Plans for speech visit this afternoon.  He walked with therapy today and did okay but complained of pain and fatigue.  He's been incontinent of stool and continues with St.    Pt recalls he was hallucinating.  He attributes his confusion and other complications to the many medications he is on.    Recent Labs  Lab 05/30/18  1327 05/30/18  1151 05/30/18  0746 05/30/18  0641 05/30/18  0133 05/29/18  2210 05/29/18  1732 05/29/18  1721  05/29/18  0724  05/28/18  1645  05/28/18  0709  05/27/18  1808   GLC  --   --   --  323*  --   --   --  308*  --  267*  --  163*  --  156*  --  74   * 283* 291*  --  315* 327* 274*  --   < >  --   < >  --   < >  --   < >  --    < > = values in this interval not displayed.      Diabetes Type: type 2  Diabetes Duration: 13 years  Usual Diabetes Regimen:  "  BID BG monitoring frequency  Glargine 30 units in AM  No rapid-acting insulin, nor any oral agents per patient, H&P, and PAC PharmD assessment in April  Ability to [place] Prescribed Regimen: uncertain due to present mental status, but was independent before  Diabetes Control:   At home says BGs were variable (\" all over\")  Lab Results   Component Value Date    A1C 6.6 05/28/2018    A1C 7.8 04/18/2018     Diabetes Complications: retinopathy, peripheral neuropathy  History of DKA: no  Able to Detect Hypoglycemia: yes, occurs rarely  Usual Diabetes Care Provider: PCP  Factors Impacting Glucose Control: steroids, change in nutrition, mobility      Review of Systems  10 point ROS completed with pertinent positives and negatives noted in the HPI    Past medical, family and social histories are reviewed and updated.    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  Brother lives in same apartment building on main floor.  Has two children, son and Daughter.  Daughter lives in [place].  Son, Willard, lives in [place] and is actively involved.  " "        Physical Exam  /53 (BP Location: Left arm)  Pulse 65  Temp 97.5  F (36.4  C)  Resp 18  Ht 1.727 m (5' 8\")  Wt 78.1 kg (172 lb 1.6 oz)  SpO2 96%  BMI 26.17 kg/m2    General:  pleasant man resting in bed supine in no distress.   HEENT: NC/AT, PER and anicteric, non-injected, oral mucous membranes sl dry  Neck: cervical collar in place   Lungs: unlabored respiration, no cough  ABD: rounded  Skin: warm and dry, no obvious lesions  Lymp:  no LE edema   Mental status:  alert, oriented to self, place, generally to situation  Psych: a bit guarded, but calm and engaged    Laboratory  Recent Labs   Lab Test  05/30/18   0641  05/29/18   1721   NA  140  143   POTASSIUM  4.4  4.6   CHLORIDE  109  110*   CO2  16*  18*   ANIONGAP  15*  15*   GLC  323*  308*   BUN  100*  89*   CR  2.84*  2.73*   BRAXOTN  7.1*  7.7*     CBC RESULTS:   Recent Labs   Lab Test  05/30/18   0641   WBC  24.2*   RBC  3.24*   HGB  9.3*   HCT  29.2*   MCV  90   MCH  28.7   MCHC  31.8   RDW  15.4*   PLT  216       Liver Function Studies -   Recent Labs   Lab Test  05/27/18   1808   PROTTOTAL  5.2*   ALBUMIN  1.9*   BILITOTAL  1.7*   ALKPHOS  87   AST  45   ALT  40       Active Medications  Current Facility-Administered Medications   Medication     acetaminophen (TYLENOL) tablet 1,000 mg     allopurinol (ZYLOPRIM) tablet 100 mg     cholecalciferol (vitamin D3) tablet 400 Units     glucose gel 15-30 g    Or     dextrose 50 % injection 25-50 mL    Or     glucagon injection 1 mg     folic acid (FOLVITE) tablet 1 mg     insulin aspart (NovoLOG) inj (RAPID ACTING)     insulin aspart (NovoLOG) inj (RAPID ACTING)     insulin aspart (NovoLOG) inj (RAPID ACTING)     insulin aspart (NovoLOG) inj (RAPID ACTING)     insulin aspart (NovoLOG) inj (RAPID ACTING)     insulin aspart (NovoLOG) inj (RAPID ACTING)     insulin glargine (LANTUS) injection 30 Units     methocarbamol (ROBAXIN) tablet 500 mg     mycophenolic acid (MYFORTIC BRAND) EC tablet 720 mg     " naloxone (NARCAN) injection 0.1-0.4 mg     ondansetron (ZOFRAN-ODT) ODT tab 4 mg    Or     ondansetron (ZOFRAN) injection 4 mg     predniSONE (DELTASONE) tablet 40 mg     senna-docusate (SENOKOT-S;PERICOLACE) 8.6-50 MG per tablet 1-2 tablet     sodium chloride 0.45% infusion     sodium citrate-citric acid (BICITRA) solution 30 mL     tacrolimus (PROGRAF BRAND) capsule 1 mg       Current Diet    Active Diet Order      Consistent Carbohydrate Diet 1844-3993 Calories: Moderate Consistent CHO (4-6 CHO units/meal)      Assessment  Familia Irving is a 73 year old man with type 2 diabetes, renal transplant in 2006, hypertension, and cervical myelopathy, s/p cervical fusion on 5/16/18, admitted with concern for left knee septic arthritis, being treated for gout with steroids and now hyperglycemic.      Plan  -add NPH 15 units ASAP, to be given with prednisone 40 mg   -aspart 1 unit per 5 grams carbohydrate (had not yet been dosed at this level, though was ordered yesterday)  -continue glargine 30 units qAM, though may require adjustment tomorrow so dose d/c-ed until data review tomorrow  -aspart correction increased to 2 per 30  Mg/dL qAC, HS 0200  -BG monitor qAC, HS 0200    -PRN insulin infusion if glucose remains >300    Discussed with patient, RN, medicine    Jo Bolton APRN -[id number]    Diabetes Management Team job code: [id number]  I spent a total of 80 minutes bedside and on the inpatient unit managing the glycemic care of Familia Irving. Over 50% of my time on the unit was spent counseling the patient and/or coordinating care regarding steroid exacerbated hyperglycemia.  See note for details.

## 2018-05-30 NOTE — PLAN OF CARE
Problem: Patient Care Overview  Goal: Plan of Care/Patient Progress Review  SLP:  Significant time spent reviewing plan for the session and encouraging Pt to attempt bites of food as Pt s anxiety was impacting.  Once Pt recalled SLP from previous stay he relaxed and agreed to trials.  Pt needed to be full assist for the meal but as SLP was leaving he requested to attempt himself.  Pt requesting small bites and liquid washes following every two bites.  Intermittent throat clearing noted throughout.  Trials were limited d/t time constraints.  Recommendations:  1. Continue Regular/thin. 2. Safe swallow strategies:  small bites/sips, alternate consistencies, sitting fully upright.  SLP to follow up tomorrow for diet tolerance.

## 2018-05-30 NOTE — PLAN OF CARE
"Problem: Patient Care Overview  Goal: Plan of Care/Patient Progress Review  OT:  Patient declined any activity due to pain all over, reported \"I am not doing anything until I speak with a doctor\".      "

## 2018-05-30 NOTE — PROGRESS NOTES
Social Work Services Progress Note    Hospital Day: 4    Collaborated with:  10 A IDT, Admissions at [facility] [phone] F: [phone] or [phone]    Data:  DC plan     Intervention:  Writer received voicemail message from pt's daughter asking for update on DC planning and informing writer she had to return to her home in [place]. Writer provided [facility]    [facility]. They did not receive it and requested information be faxed to different number at [phone]    Writer refaxed assessment information, await assessment response.    Assessment:  pt not medically stable for DC today. Per Dr Dumont, not certain when pt will be healthy enough for DC.    Plan:    Anticipated Disposition:  Facility:  [facility] [phone]    Barriers to d/c plan:  Medical stability, assessment process    Follow Up:  LUIS con't to follow

## 2018-05-31 ENCOUNTER — APPOINTMENT (OUTPATIENT)
Dept: PHYSICAL THERAPY | Facility: CLINIC | Age: 73
DRG: 554 | End: 2018-05-31
Payer: MEDICARE

## 2018-05-31 PROBLEM — E11.65 TYPE 2 DIABETES MELLITUS WITH HYPERGLYCEMIA, WITH LONG-TERM CURRENT USE OF INSULIN (H): Chronic | Status: ACTIVE | Noted: 2018-05-31

## 2018-05-31 PROBLEM — Z79.4 TYPE 2 DIABETES MELLITUS WITH HYPERGLYCEMIA, WITH LONG-TERM CURRENT USE OF INSULIN (H): Chronic | Status: ACTIVE | Noted: 2018-05-31

## 2018-05-31 LAB
ANION GAP SERPL CALCULATED.3IONS-SCNC: 14 MMOL/L (ref 3–14)
BUN SERPL-MCNC: 108 MG/DL (ref 7–30)
CA-I SERPL ISE-MCNC: 3 MG/DL (ref 4.4–5.2)
CALCIUM SERPL-MCNC: 6.8 MG/DL (ref 8.5–10.1)
CHLORIDE SERPL-SCNC: 110 MMOL/L (ref 94–109)
CO2 SERPL-SCNC: 13 MMOL/L (ref 20–32)
CREAT SERPL-MCNC: 2.74 MG/DL (ref 0.66–1.25)
ERYTHROCYTE [DISTWIDTH] IN BLOOD BY AUTOMATED COUNT: NORMAL % (ref 10–15)
GFR SERPL CREATININE-BSD FRML MDRD: 23 ML/MIN/1.7M2
GLUCOSE BLDC GLUCOMTR-MCNC: 119 MG/DL (ref 70–99)
GLUCOSE BLDC GLUCOMTR-MCNC: 124 MG/DL (ref 70–99)
GLUCOSE BLDC GLUCOMTR-MCNC: 125 MG/DL (ref 70–99)
GLUCOSE BLDC GLUCOMTR-MCNC: 145 MG/DL (ref 70–99)
GLUCOSE BLDC GLUCOMTR-MCNC: 149 MG/DL (ref 70–99)
GLUCOSE SERPL-MCNC: 155 MG/DL (ref 70–99)
HCT VFR BLD AUTO: NORMAL % (ref 40–53)
HGB BLD-MCNC: NORMAL G/DL (ref 13.3–17.7)
MCH RBC QN AUTO: NORMAL PG (ref 26.5–33)
MCHC RBC AUTO-ENTMCNC: NORMAL G/DL (ref 31.5–36.5)
MCV RBC AUTO: NORMAL FL (ref 78–100)
PLATELET # BLD AUTO: NORMAL 10E9/L (ref 150–450)
POTASSIUM SERPL-SCNC: 4.7 MMOL/L (ref 3.4–5.3)
RBC # BLD AUTO: NORMAL 10E12/L (ref 4.4–5.9)
SODIUM SERPL-SCNC: 137 MMOL/L (ref 133–144)
WBC # BLD AUTO: NORMAL 10E9/L (ref 4–11)

## 2018-05-31 PROCEDURE — 99207 ZZC CDG-MDM COMPONENT: MEETS LOW - DOWN CODED: CPT | Performed by: INTERNAL MEDICINE

## 2018-05-31 PROCEDURE — 25000131 ZZH RX MED GY IP 250 OP 636 PS 637: Performed by: INTERNAL MEDICINE

## 2018-05-31 PROCEDURE — 36416 COLLJ CAPILLARY BLOOD SPEC: CPT | Performed by: INTERNAL MEDICINE

## 2018-05-31 PROCEDURE — 99232 SBSQ HOSP IP/OBS MODERATE 35: CPT | Performed by: INTERNAL MEDICINE

## 2018-05-31 PROCEDURE — 25000125 ZZHC RX 250: Performed by: INTERNAL MEDICINE

## 2018-05-31 PROCEDURE — 82330 ASSAY OF CALCIUM: CPT | Performed by: INTERNAL MEDICINE

## 2018-05-31 PROCEDURE — 25000128 H RX IP 250 OP 636: Performed by: INTERNAL MEDICINE

## 2018-05-31 PROCEDURE — 40000193 ZZH STATISTIC PT WARD VISIT

## 2018-05-31 PROCEDURE — 80048 BASIC METABOLIC PNL TOTAL CA: CPT | Performed by: INTERNAL MEDICINE

## 2018-05-31 PROCEDURE — 27210995 ZZH RX 272: Performed by: INTERNAL MEDICINE

## 2018-05-31 PROCEDURE — A9270 NON-COVERED ITEM OR SERVICE: HCPCS | Mod: GY | Performed by: STUDENT IN AN ORGANIZED HEALTH CARE EDUCATION/TRAINING PROGRAM

## 2018-05-31 PROCEDURE — 25000132 ZZH RX MED GY IP 250 OP 250 PS 637: Mod: GY | Performed by: STUDENT IN AN ORGANIZED HEALTH CARE EDUCATION/TRAINING PROGRAM

## 2018-05-31 PROCEDURE — 85027 COMPLETE CBC AUTOMATED: CPT | Performed by: INTERNAL MEDICINE

## 2018-05-31 PROCEDURE — 25000132 ZZH RX MED GY IP 250 OP 250 PS 637: Performed by: INTERNAL MEDICINE

## 2018-05-31 PROCEDURE — 00000146 ZZHCL STATISTIC GLUCOSE BY METER IP

## 2018-05-31 PROCEDURE — 12000001 ZZH R&B MED SURG/OB UMMC

## 2018-05-31 PROCEDURE — A9270 NON-COVERED ITEM OR SERVICE: HCPCS | Mod: GY | Performed by: INTERNAL MEDICINE

## 2018-05-31 PROCEDURE — 97116 GAIT TRAINING THERAPY: CPT | Mod: GP

## 2018-05-31 RX ORDER — FUROSEMIDE 10 MG/ML
20 INJECTION INTRAMUSCULAR; INTRAVENOUS ONCE
Status: COMPLETED | OUTPATIENT
Start: 2018-05-31 | End: 2018-05-31

## 2018-05-31 RX ADMIN — ACETAMINOPHEN 1000 MG: 500 TABLET, FILM COATED ORAL at 09:26

## 2018-05-31 RX ADMIN — ACETAMINOPHEN 1000 MG: 500 TABLET, FILM COATED ORAL at 19:30

## 2018-05-31 RX ADMIN — MYCOPHENOLIC ACID 720 MG: 360 TABLET, DELAYED RELEASE ORAL at 09:13

## 2018-05-31 RX ADMIN — INSULIN ASPART 8 UNITS: 100 INJECTION, SOLUTION INTRAVENOUS; SUBCUTANEOUS at 10:23

## 2018-05-31 RX ADMIN — SODIUM CITRATE AND CITRIC ACID MONOHYDRATE 30 ML: 500; 334 SOLUTION ORAL at 09:14

## 2018-05-31 RX ADMIN — Medication 400 UNITS: at 09:11

## 2018-05-31 RX ADMIN — PREDNISONE 40 MG: 20 TABLET ORAL at 09:13

## 2018-05-31 RX ADMIN — SODIUM CHLORIDE: 4.5 INJECTION, SOLUTION INTRAVENOUS at 02:03

## 2018-05-31 RX ADMIN — TACROLIMUS 1 MG: 1 CAPSULE, GELATIN COATED ORAL at 18:57

## 2018-05-31 RX ADMIN — FUROSEMIDE 20 MG: 10 INJECTION, SOLUTION INTRAVENOUS at 09:51

## 2018-05-31 RX ADMIN — ALLOPURINOL 100 MG: 100 TABLET ORAL at 09:13

## 2018-05-31 RX ADMIN — MYCOPHENOLIC ACID 720 MG: 360 TABLET, DELAYED RELEASE ORAL at 18:57

## 2018-05-31 RX ADMIN — TACROLIMUS 1 MG: 1 CAPSULE, GELATIN COATED ORAL at 09:13

## 2018-05-31 RX ADMIN — ACETAMINOPHEN 1000 MG: 500 TABLET, FILM COATED ORAL at 15:00

## 2018-05-31 RX ADMIN — FUROSEMIDE 20 MG: 10 INJECTION, SOLUTION INTRAVENOUS at 22:50

## 2018-05-31 RX ADMIN — FOLIC ACID 1 MG: 1 TABLET ORAL at 09:13

## 2018-05-31 RX ADMIN — CALCIUM GLUCONATE 2 G: 98 INJECTION, SOLUTION INTRAVENOUS at 13:20

## 2018-05-31 ASSESSMENT — ACTIVITIES OF DAILY LIVING (ADL)
ADLS_ACUITY_SCORE: 12
ADLS_ACUITY_SCORE: 12
ADLS_ACUITY_SCORE: 13
ADLS_ACUITY_SCORE: 12

## 2018-05-31 NOTE — PLAN OF CARE
Problem: Patient Care Overview  Goal: Plan of Care/Patient Progress Review  OT 10A:  Cancel.  Pt. declined any in/out of bed activity, adamantly declining therapy at time of attempt 2/2 fatigue.

## 2018-05-31 NOTE — PROGRESS NOTES
"[facility] Internal Medicine Progress Note            Interval History:   Record reviewed.  Seen with RN.  Remains on 1/2NS at 150 cc/hr.  over night.   Prednisone 40 mg daily since 5/29.  Decrease arthralgias.  Ambulated 5/30.  No CP, SOB, cough, nausea, reflux, abd pain.  BM last night.  Has castanon.           Medications:   All medications reviewed today          Physical Exam:   Blood pressure 118/49, pulse 60, temperature 95.8  F (35.4  C), temperature source Oral, resp. rate 16, height 1.727 m (5' 8\"), weight 81.8 kg (180 lb 4.8 oz), SpO2 97 %.    Intake/Output Summary (Last 24 hours) at 05/29/18 0803  Last data filed at 05/29/18 0030   Gross per 24 hour   Intake               60 ml   Output              775 ml   Net             -715 ml       General:  Alert.  Speech fluent. Persistent decrease but improved mental clarity.  Weight up 8 pounds since 5/30.     Heent:      Neck:    Skin:    Chest:  clear    Cardiac:  Reg without gallop, murmur.  Collar in place - unable to assess neck veins.      Abdomen:  Non distended, soft, non-tender.  BS normal.     Extremities:  Perfused.  2 plus pre tibial edema.  No  calf, posterior thigh tenderness to suggest DVT.   Residual swelling right wrist, left knee, left ankle with further decrease painful ROM.      Neuro:  Non lateralizing.              Data:     Results for orders placed or performed during the hospital encounter of 05/27/18 (from the past 24 hour(s))   Endocrine Diabetes Adult IP Consult: Patient to be seen: Routine within 24 hrs; Call back #: [id number]; glycemic management recommendations; Consultant may enter orders: Yes    Narrative    Jo Bolton, APRN CNS     5/30/2018  4:13 PM  Diabetes/Hyperglycemia Management Consult    Chief Complaint increased hyperglycemia in setting of steroids  Consult requested by: Dr. Familia Dumont  History of Present Illness Familia HADLEY Irving is a 73 year old man   with type 2 diabetes, renal transplant in 2006, " hypertension, and   cervical myelopathy, s/p cervical fusion on 5/16/18, admitted   from TCU with complaint of concern for left knee septic   arthritis.  He has been treated for polyarticular gout flare with   high dose steroids, with some improvement in pain and mobility.    His hospital course has additionally been complicated by SALVADOR   (creatinine higher yet today), dysphagia, intermittent confusion,   urinary retention, and hyperglycemia.  On admission, on 5/27/2018, BG was in 70s.  Glargine held until   5/29, when BG had reached the 200s, subsequent to administration   methylpred 125 mg in evening of 5/28.  Had aspart high correction   as well as aspart 1 unit per 5 grams carb.  However, for the one   meal recorded yesterday, only received 1 unit per 15 grams   carbohydrate.  Glucose up to 300s since last night.   Needing feeding by staff.  Pt says he's hungry and tolerating   food fine. Plans for speech visit this afternoon.  He walked with   therapy today and did okay but complained of pain and fatigue.    He's been incontinent of stool and continues with St.    Pt recalls he was hallucinating.  He attributes his confusion and   other complications to the many medications he is on.    Recent Labs  Lab 05/30/18  1327 05/30/18  1151 05/30/18  0746 05/30/18  0641 05/30/18  0133 05/29/18  2210 05/29/18  1732 05/29/18  1721  05/29/18  0724  05/28/18  1645  05/28/18  0709  05/27/18  1808   GLC  --   --   --  323*  --   --   --  308*  --  267*  --  163*    --  156*  --  74   * 283* 291*  --  315* 327* 274*  --   < >  --   < >  --     < >  --   < >  --    < > = values in this interval not displayed.      Diabetes Type: type 2  Diabetes Duration: 13 years  Usual Diabetes Regimen:   BID BG monitoring frequency  Glargine 30 units in AM  No rapid-acting insulin, nor any oral agents per patient, H&P,   and PAC PharmD assessment in April  Ability to [place] Prescribed Regimen: uncertain due to present   mental  "status, but was independent before  Diabetes Control:   At home says BGs were variable (\" all over\")  Lab Results   Component Value Date    A1C 6.6 05/28/2018    A1C 7.8 04/18/2018     Diabetes Complications: retinopathy, peripheral neuropathy  History of DKA: no  Able to Detect Hypoglycemia: yes, occurs rarely  Usual Diabetes Care Provider: PCP  Factors Impacting Glucose Control: steroids, change in nutrition,   mobility      Review of Systems  10 point ROS completed with pertinent positives and negatives   noted in the HPI    Past medical, family and social histories are reviewed and   updated.    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  Brother   lives in same apartment building on main floor.  Has two   children, son and Daughter.  Daughter lives in [place].  Son, Willard,   lives in [place] and is actively involved.          Physical Exam  /53 (BP Location: Left arm)  Pulse 65  Temp 97.5  F (36.4    C)  Resp 18  Ht 1.727 m (5' 8\")  Wt 78.1 kg (172 lb 1.6 oz)    SpO2 96%  BMI 26.17 kg/m2    General:  pleasant man resting in bed " supine in no distress.   HEENT: NC/AT, PER and anicteric, non-injected, oral mucous   membranes sl dry  Neck: cervical collar in place   Lungs: unlabored respiration, no cough  ABD: rounded  Skin: warm and dry, no obvious lesions  Lymp:  no LE edema   Mental status:  alert, oriented to self, place, generally to   situation  Psych: a bit guarded, but calm and engaged    Laboratory  Recent Labs   Lab Test  05/30/18   0641  05/29/18   1721   NA  140  143   POTASSIUM  4.4  4.6   CHLORIDE  109  110*   CO2  16*  18*   ANIONGAP  15*  15*   GLC  323*  308*   BUN  100*  89*   CR  2.84*  2.73*   BRAXTON  7.1*  7.7*     CBC RESULTS:   Recent Labs   Lab Test  05/30/18   0641   WBC  24.2*   RBC  3.24*   HGB  9.3*   HCT  29.2*   MCV  90   MCH  28.7   MCHC  31.8   RDW  15.4*   PLT  216       Liver Function Studies -   Recent Labs   Lab Test  05/27/18   1808   PROTTOTAL  5.2*   ALBUMIN  1.9*   BILITOTAL  1.7*   ALKPHOS  87   AST  45   ALT  40       Active Medications  Current Facility-Administered Medications   Medication     acetaminophen (TYLENOL) tablet 1,000 mg     allopurinol (ZYLOPRIM) tablet 100 mg     cholecalciferol (vitamin D3) tablet 400 Units     glucose gel 15-30 g    Or     dextrose 50 % injection 25-50 mL    Or     glucagon injection 1 mg     folic acid (FOLVITE) tablet 1 mg     insulin aspart (NovoLOG) inj (RAPID ACTING)     insulin aspart (NovoLOG) inj (RAPID ACTING)     insulin aspart (NovoLOG) inj (RAPID ACTING)     insulin aspart (NovoLOG) inj (RAPID ACTING)     insulin aspart (NovoLOG) inj (RAPID ACTING)     insulin aspart (NovoLOG) inj (RAPID ACTING)     insulin glargine (LANTUS) injection 30 Units     methocarbamol (ROBAXIN) tablet 500 mg     mycophenolic acid (MYFORTIC BRAND) EC tablet 720 mg     naloxone (NARCAN) injection 0.1-0.4 mg     ondansetron (ZOFRAN-ODT) ODT tab 4 mg    Or     ondansetron (ZOFRAN) injection 4 mg     predniSONE (DELTASONE) tablet 40 mg     senna-docusate (SENOKOT-S;PERICOLACE) 8.6-50 MG  per tablet 1-2   tablet     sodium chloride 0.45% infusion     sodium citrate-citric acid (BICITRA) solution 30 mL     tacrolimus (PROGRAF BRAND) capsule 1 mg       Current Diet    Active Diet Order      Consistent Carbohydrate Diet 8242-5640 Calories: Moderate   Consistent CHO (4-6 CHO units/meal)      Assessment  Familia Irving is a 73 year old man with type 2 diabetes, renal   transplant in 2006, hypertension, and cervical myelopathy, s/p   cervical fusion on 5/16/18, admitted with concern for left knee   septic arthritis, being treated for gout with steroids and now   hyperglycemic.      Plan  -add NPH 15 units ASAP, to be given with prednisone 40 mg   -aspart 1 unit per 5 grams carbohydrate (had not yet been dosed   at this level, though was ordered yesterday)  -continue glargine 30 units qAM, though may require adjustment   tomorrow so dose d/c-ed until data review tomorrow  -aspart correction increased to 2 per 30  Mg/dL qAC, HS 0200  -BG monitor qAC, HS 0200    -PRN insulin infusion if glucose remains >300    Discussed with patient, RN, medicine    Jo Bolton APRN -[id number]    Diabetes Management Team job code: [id number]  I spent a total of 80 minutes bedside and on the inpatient unit   managing the glycemic care of Familia Irving. Over 50% of my   time on the unit was spent counseling the patient and/or   coordinating care regarding steroid exacerbated hyperglycemia.    See note for details.     Endocrine Diabetes Adult IP Consult: Patient to be seen: Routine within 24 hrs; Call back #: [phone]; BG management on steroids.; Consultant may enter orders: Yes    Narrative    Jo Bolton, APRN CNS     5/30/2018  4:13 PM  Diabetes/Hyperglycemia Management Consult    Chief Complaint increased hyperglycemia in setting of steroids  Consult requested by: Dr. Familia Dumont  History of Present Illness Familia Irving is a 73 year old man   with type 2 diabetes, renal transplant in 2006,  hypertension, and   cervical myelopathy, s/p cervical fusion on 5/16/18, admitted   from TCU with complaint of concern for left knee septic   arthritis.  He has been treated for polyarticular gout flare with   high dose steroids, with some improvement in pain and mobility.    His hospital course has additionally been complicated by SALVADOR   (creatinine higher yet today), dysphagia, intermittent confusion,   urinary retention, and hyperglycemia.  On admission, on 5/27/2018, BG was in 70s.  Glargine held until   5/29, when BG had reached the 200s, subsequent to administration   methylpred 125 mg in evening of 5/28.  Had aspart high correction   as well as aspart 1 unit per 5 grams carb.  However, for the one   meal recorded yesterday, only received 1 unit per 15 grams   carbohydrate.  Glucose up to 300s since last night.   Needing feeding by staff.  Pt says he's hungry and tolerating   food fine. Plans for speech visit this afternoon.  He walked with   therapy today and did okay but complained of pain and fatigue.    He's been incontinent of stool and continues with St.    Pt recalls he was hallucinating.  He attributes his confusion and   other complications to the many medications he is on.    Recent Labs  Lab 05/30/18  1327 05/30/18  1151 05/30/18  0746 05/30/18  0641 05/30/18  0133 05/29/18  2210 05/29/18  1732 05/29/18  1721  05/29/18  0724  05/28/18  1645  05/28/18  0709  05/27/18  1808   GLC  --   --   --  323*  --   --   --  308*  --  267*  --  163*    --  156*  --  74   * 283* 291*  --  315* 327* 274*  --   < >  --   < >  --     < >  --   < >  --    < > = values in this interval not displayed.      Diabetes Type: type 2  Diabetes Duration: 13 years  Usual Diabetes Regimen:   BID BG monitoring frequency  Glargine 30 units in AM  No rapid-acting insulin, nor any oral agents per patient, H&P,   and PAC PharmD assessment in April  Ability to [place] Prescribed Regimen: uncertain due to present   mental  "status, but was independent before  Diabetes Control:   At home says BGs were variable (\" all over\")  Lab Results   Component Value Date    A1C 6.6 05/28/2018    A1C 7.8 04/18/2018     Diabetes Complications: retinopathy, peripheral neuropathy  History of DKA: no  Able to Detect Hypoglycemia: yes, occurs rarely  Usual Diabetes Care Provider: PCP  Factors Impacting Glucose Control: steroids, change in nutrition,   mobility      Review of Systems  10 point ROS completed with pertinent positives and negatives   noted in the HPI    Past medical, family and social histories are reviewed and   updated.    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  Brother   lives in same apartment building on main floor.  Has two   children, son and Daughter.  Daughter lives in [place].  Son, Willard,   lives in [place] and is actively involved.          Physical Exam  /53 (BP Location: Left arm)  Pulse 65  Temp 97.5  F (36.4    C)  Resp 18  Ht 1.727 m (5' 8\")  Wt 78.1 kg (172 lb 1.6 oz)    SpO2 96%  BMI 26.17 kg/m2    General:  pleasant man resting in bed " per tablet 1-2   tablet     sodium chloride 0.45% infusion     sodium citrate-citric acid (BICITRA) solution 30 mL     tacrolimus (PROGRAF BRAND) capsule 1 mg       Current Diet    Active Diet Order      Consistent Carbohydrate Diet 4325-8726 Calories: Moderate   Consistent CHO (4-6 CHO units/meal)      Assessment  Familia Irving is a 73 year old man with type 2 diabetes, renal   transplant in 2006, hypertension, and cervical myelopathy, s/p   cervical fusion on 5/16/18, admitted with concern for left knee   septic arthritis, being treated for gout with steroids and now   hyperglycemic.      Plan  -add NPH 15 units ASAP, to be given with prednisone 40 mg   -aspart 1 unit per 5 grams carbohydrate (had not yet been dosed   at this level, though was ordered yesterday)  -continue glargine 30 units qAM, though may require adjustment   tomorrow so dose d/c-ed until data review tomorrow  -aspart correction increased to 2 per 30  Mg/dL qAC, HS 0200  -BG monitor qAC, HS 0200    -PRN insulin infusion if glucose remains >300    Discussed with patient, RN, medicine    Jo Bolton APRN -[id number]    Diabetes Management Team job code: [id number]  I spent a total of 80 minutes bedside and on the inpatient unit   managing the glycemic care of Familia Irving. Over 50% of my   time on the unit was spent counseling the patient and/or   coordinating care regarding steroid exacerbated hyperglycemia.    See note for details.     Glucose by meter   Result Value Ref Range    Glucose 283 (H) 70 - 99 mg/dL   Glucose by meter   Result Value Ref Range    Glucose 251 (H) 70 - 99 mg/dL   Basic metabolic panel   Result Value Ref Range    Sodium 141 133 - 144 mmol/L    Potassium 4.2 3.4 - 5.3 mmol/L    Chloride 110 (H) 94 - 109 mmol/L    Carbon Dioxide 17 (L) 20 - 32 mmol/L    Anion Gap 14 3 - 14 mmol/L    Glucose 215 (H) 70 - 99 mg/dL    Urea Nitrogen 105 (H) 7 - 30 mg/dL    Creatinine 2.74 (H) 0.66 - 1.25 mg/dL    GFR Estimate 23 (L) >60  mL/min/1.7m2    GFR Estimate If Black 28 (L) >60 mL/min/1.7m2    Calcium 6.8 (L) 8.5 - 10.1 mg/dL   Glucose by meter   Result Value Ref Range    Glucose 156 (H) 70 - 99 mg/dL   Glucose by meter   Result Value Ref Range    Glucose 155 (H) 70 - 99 mg/dL   Glucose by meter   Result Value Ref Range    Glucose 119 (H) 70 - 99 mg/dL                Assessment and Plan:   1)  Acute polyarthritis with fever (left knee, right wrist, both ankles) with monosodium urate crystals on knee and wrist aspirate c/w polyarticular gout. WBC 11478. Cultures neg to date. Off Vanco.  Improving on steroids with improved mobilization.   2)  SALVADOR. Cr 2.74 last PM,  Cr 2.84 AM 5/30, Cr 2.74 5/29.  Cr 2.56 5/27 (1.6 5/21).  S/p DDKT 2006.  Volume depletion contributing (low FENA) (with lasix PTA) and lisinopril effect. On IV fluids with increase edema.  Bladder outlet obstruction resolved with castanon. Improved with improved urine output.   3)  S/P A/P cervical fusion 5/16.  No issues.   4)  Oral pharyngeal dysphagia 2nd to #1.  Tolerating diet per speech with safe technique.   5)  Intermittent confusion attributed to recent surgery, opiates (off since 5/28), acute febrile illness and subsequent steroids.  Neuro non lateralizing (good motor exam per spine).  Overall improved.   6)  Anemia 2nd to acute illness and recent acute blood loss.   Adequate.  7)  Type II DM with BG down to 70's on admit.  Hyperglycemia with IV solumedrol  5/28 followed by po steroids.  Improved with diabetes team management.   8)  HTN, on PTA amlodipine (off lasix and lisinopril).  Adequate.   9)  Urine retention.  Castanon in place.   UC mixed braydon.   10)  HLD.   11)  PAC's on rhythm after cervical fusion.  Benign.   12)  Pressure wound sacrum (evoloving).    PLAN:  1)  SL IV.  Consider resumption of po lasix.    Review with renal.    2)  Daily weights, I/O, close lab follow up.  3)  Prednisone 40 mg daily for 5 days.  Allopurinol 100 mg daily.  4)  Scheduled ES tylenol  q6h.   5)  Mechanical DVT prophylaxis.  Continue to mobilize as able.   6)  Insulin regimen per diabetes team.   7)   Patient reassurance.   Update family.    Disposition Plan   Expected discharge unclear pending course.  [facility] to cover stay per CC.      Entered: Familia Dumont 05/31/2018, 8:51 AM              Attestation:  I have reviewed today's vital signs, notes, medications, labs and imaging.     Familia Dumont MD

## 2018-05-31 NOTE — PLAN OF CARE
"Problem: Patient Care Overview  Goal: Plan of Care/Patient Progress Review  Outcome: No Change  A&Ox4 with intermittent confusion. Pt reports this morning \"I feel so disoriented\". Pt sat up in a chair for breakfast- however refused to sit up for lunch, has also refused all therapies today. Pt keeps asking writer \"what are you doing to me? Just get it over with!\"  Pt states he is exhausted and feels like we are not getting anything accomplished.  Repositiong Q 3 hours when pt tolerates it. Mepilex dressing in place and changed on coccyx area. Pt had 2 BMs this morning, stool softeners held. Up with A of 2 and walker. Pt's Calcium ionized low this morning 3.0, currently being replaced, scheduled to be rechecked at 2000. Lab had difficulties drawing blood this AM due to edema- pt very frustrated with them. BGs today: 124, 155, 145.  +2 Edema in both arms and BLE, IV lasix administered this morning. Pt takes medications with applesauce 1 at a time. LS clear. On pulsate mattress, mepilex on coccyx area changed. [place] Collar in place, pt has soft touch call light within reach, is able to make needs known- continue with POC.     Urine output 450cc.  1445: pt agreed to sit up in chair to eat lunch- able to feed himself this afternoon.   "

## 2018-05-31 NOTE — PLAN OF CARE
Problem: Patient Care Overview  Goal: Plan of Care/Patient Progress Review  Discharge Planner PT   Patient plan for discharge: rehab  Current status: Pt found sitting upright in chair, agreeable to participate. Completed sit<>stand with Min Ax2 up to platform walker. Ambulated 60' with platform walker CGA. Pt noted no increases in pain. Returned to room, educated on completing sitting LE ex, pt demonstrated understanding of exercises. Left sitting upright in chair with all needs in reach.  Barriers to return to prior living situation: Impaired functional mobility  Recommendations for discharge: TCU  Rationale for recommendations: to improve pt safety and progress IND       Entered by: Faisal Gautam 05/31/2018 3:23 PM

## 2018-05-31 NOTE — PLAN OF CARE
Problem: Patient Care Overview  Goal: Plan of Care/Patient Progress Review  Attempted to see patient with lunch meal.  Patient declining multiple offers to get him set up for his male.  Patient reports that he has not had any swallowing difficulties and that he has been able to tolerate food textures presented.  Patient has had some noted confusion at times.  Arrangements made to observe at lunch meal tomorrow.

## 2018-05-31 NOTE — PROVIDER NOTIFICATION
Notified Moonlighter via text page that pt output was 210 for 8hrs (1720 input) and BP soft 116/53. No new orders received- instructed to monitor closely.

## 2018-05-31 NOTE — PROGRESS NOTES
"Social Work Services Progress Note    Hospital Day: 5    Collaborated with:  Admissions at [facility] in [place], [phone] F:[phone], pt    Data:  DC plan    Intervention:  Per Dr Dumont, anticipate pt will not be ready for DC until Mon/Tues next week (June 4 or June 5). Writer left voicemail message for Admissions at [facility] and updated pt's daughter Silvia. Silvia expresses concern that pt's mood is in decline because of his health status.     Writer met w/pt and identified role/reason for visit. Pt states he is not himself, identifies as being irritable but had some confusion w/writer's role \"We've been doing this lab draw for 45 minutes, how do you think I feel?\" There was no one present in room other than pt and writer.      He admits to finding patient experience difficult and writer did provide reassurance, validation and support to extent pt would allow. When writer began to explore and open up opportunity/space for pt to process his experience, pt responded with request to be \"left alone so I can relax\".  Writer honored this request.      Assessment:  pt's children identify that pt's mood is compromised due to pt's health status . They are supportive, realistic and appreciative of support of health care providers    Plan:    Anticipated Disposition:  Facility:  [facility] when pt is medically stable (6/4 or 6/5)    Barriers to d/c plan:  Prednisone, renal functions, blood sugars not stable    Follow Up:  [facility] con't to follow      0385 Addendum:  Writer received voicemail from Lynn in Admissions at [facility]. They still have not received initial referral. Writer faxed referral information via Epic. Await confirmation they received information and assessment response. [facility] con't to follow  "

## 2018-05-31 NOTE — PLAN OF CARE
Problem: Patient Care Overview  Goal: Plan of Care/Patient Progress Review  VS: BP somewhat soft 116/53 - vitals are otherwise stable and within normal limits. Lung sounds are diminished but clear.    Output: Urine output somewhat low- average of 26mL/hr on evening shift- moonlighter notified (see previous note). Passing flatus- bowel sounds normoactive.    Activity: Pt refused to be turned overnight but did allow assistance with weight shifting.    Skin: Warm, hilda, dry. Generalized Edema- bilateral upper extremities more significantly swollen. Incisions, scars, bruises. Wound to coccyx (COCO- covered with mepilex dressing)   Pain: Denied.   Neuro/CMS: Baseline numbness in toes. Alert and oriented X4 at night assessment.     Dressing(s): Mepilex to sacrum is clean/dry/intact.   Diet: Diabetic/carb consistent   LDA: PIV infusing 1/2 normal saline at 100mL/hr. St patent draining evelina colored urine.    Equipment: Pulsate mattress, IV pole, [place] J collar, Walker, Gait belt.   Plan: To be determined.    Additional Info: Refused tylenol overnight. Able to make needs known. Will continue with plan of care.

## 2018-05-31 NOTE — PLAN OF CARE
Problem: Patient Care Overview  Goal: Plan of Care/Patient Progress Review  PT: pt declined to participate in therapy this AM, rescheduled for 11:30 AM. Pt again refused to participate after encouragement and stated all he wanted to do was lay in bed. Will attempt again in afternoon.

## 2018-06-01 ENCOUNTER — APPOINTMENT (OUTPATIENT)
Dept: SPEECH THERAPY | Facility: CLINIC | Age: 73
DRG: 554 | End: 2018-06-01
Payer: MEDICARE

## 2018-06-01 LAB
ALBUMIN UR-MCNC: NEGATIVE MG/DL
ANION GAP SERPL CALCULATED.3IONS-SCNC: 13 MMOL/L (ref 3–14)
APPEARANCE UR: CLEAR
BACTERIA SPEC CULT: NO GROWTH
BILIRUB UR QL STRIP: NEGATIVE
BUN SERPL-MCNC: 113 MG/DL (ref 7–30)
CA-I SERPL ISE-MCNC: 3.8 MG/DL (ref 4.4–5.2)
CALCIUM SERPL-MCNC: 7 MG/DL (ref 8.5–10.1)
CHLORIDE SERPL-SCNC: 108 MMOL/L (ref 94–109)
CO2 SERPL-SCNC: 18 MMOL/L (ref 20–32)
COLOR UR AUTO: YELLOW
CREAT SERPL-MCNC: 2.76 MG/DL (ref 0.66–1.25)
DEPRECATED CALCIDIOL+CALCIFEROL SERPL-MC: 12 UG/L (ref 20–75)
GFR SERPL CREATININE-BSD FRML MDRD: 23 ML/MIN/1.7M2
GLUCOSE BLDC GLUCOMTR-MCNC: 114 MG/DL (ref 70–99)
GLUCOSE BLDC GLUCOMTR-MCNC: 125 MG/DL (ref 70–99)
GLUCOSE BLDC GLUCOMTR-MCNC: 133 MG/DL (ref 70–99)
GLUCOSE BLDC GLUCOMTR-MCNC: 158 MG/DL (ref 70–99)
GLUCOSE BLDC GLUCOMTR-MCNC: 167 MG/DL (ref 70–99)
GLUCOSE SERPL-MCNC: 156 MG/DL (ref 70–99)
GLUCOSE UR STRIP-MCNC: NEGATIVE MG/DL
HGB UR QL STRIP: NEGATIVE
KETONES UR STRIP-MCNC: NEGATIVE MG/DL
LEUKOCYTE ESTERASE UR QL STRIP: ABNORMAL
MAGNESIUM SERPL-MCNC: 1.9 MG/DL (ref 1.6–2.3)
MUCOUS THREADS #/AREA URNS LPF: PRESENT /LPF
NITRATE UR QL: NEGATIVE
PH UR STRIP: 5 PH (ref 5–7)
POTASSIUM SERPL-SCNC: 4.4 MMOL/L (ref 3.4–5.3)
PTH-INTACT SERPL-MCNC: 314 PG/ML (ref 18–80)
RBC #/AREA URNS AUTO: 1 /HPF (ref 0–2)
SODIUM SERPL-SCNC: 139 MMOL/L (ref 133–144)
SOURCE: ABNORMAL
SP GR UR STRIP: 1.01 (ref 1–1.03)
SPECIMEN SOURCE: NORMAL
SQUAMOUS #/AREA URNS AUTO: <1 /HPF (ref 0–1)
URATE CRY #/AREA URNS HPF: ABNORMAL /HPF
UROBILINOGEN UR STRIP-MCNC: NORMAL MG/DL (ref 0–2)
WBC #/AREA URNS AUTO: 2 /HPF (ref 0–5)

## 2018-06-01 PROCEDURE — 25000132 ZZH RX MED GY IP 250 OP 250 PS 637: Performed by: INTERNAL MEDICINE

## 2018-06-01 PROCEDURE — 25000131 ZZH RX MED GY IP 250 OP 636 PS 637: Performed by: INTERNAL MEDICINE

## 2018-06-01 PROCEDURE — A9270 NON-COVERED ITEM OR SERVICE: HCPCS | Mod: GY | Performed by: INTERNAL MEDICINE

## 2018-06-01 PROCEDURE — 83735 ASSAY OF MAGNESIUM: CPT | Performed by: INTERNAL MEDICINE

## 2018-06-01 PROCEDURE — 92526 ORAL FUNCTION THERAPY: CPT | Mod: GN

## 2018-06-01 PROCEDURE — 25000125 ZZHC RX 250: Performed by: INTERNAL MEDICINE

## 2018-06-01 PROCEDURE — A9270 NON-COVERED ITEM OR SERVICE: HCPCS | Mod: GY | Performed by: STUDENT IN AN ORGANIZED HEALTH CARE EDUCATION/TRAINING PROGRAM

## 2018-06-01 PROCEDURE — 36415 COLL VENOUS BLD VENIPUNCTURE: CPT | Performed by: INTERNAL MEDICINE

## 2018-06-01 PROCEDURE — 40000225 ZZH STATISTIC SLP WARD VISIT

## 2018-06-01 PROCEDURE — 25000128 H RX IP 250 OP 636: Performed by: INTERNAL MEDICINE

## 2018-06-01 PROCEDURE — 83970 ASSAY OF PARATHORMONE: CPT | Performed by: INTERNAL MEDICINE

## 2018-06-01 PROCEDURE — 00000146 ZZHCL STATISTIC GLUCOSE BY METER IP

## 2018-06-01 PROCEDURE — 12000001 ZZH R&B MED SURG/OB UMMC

## 2018-06-01 PROCEDURE — 82330 ASSAY OF CALCIUM: CPT | Performed by: INTERNAL MEDICINE

## 2018-06-01 PROCEDURE — 99233 SBSQ HOSP IP/OBS HIGH 50: CPT | Performed by: INTERNAL MEDICINE

## 2018-06-01 PROCEDURE — 25000132 ZZH RX MED GY IP 250 OP 250 PS 637: Performed by: STUDENT IN AN ORGANIZED HEALTH CARE EDUCATION/TRAINING PROGRAM

## 2018-06-01 PROCEDURE — 81001 URINALYSIS AUTO W/SCOPE: CPT | Performed by: INTERNAL MEDICINE

## 2018-06-01 PROCEDURE — 87086 URINE CULTURE/COLONY COUNT: CPT | Performed by: INTERNAL MEDICINE

## 2018-06-01 PROCEDURE — 80048 BASIC METABOLIC PNL TOTAL CA: CPT | Performed by: INTERNAL MEDICINE

## 2018-06-01 PROCEDURE — 82306 VITAMIN D 25 HYDROXY: CPT | Performed by: INTERNAL MEDICINE

## 2018-06-01 RX ORDER — CLINDAMYCIN PHOSPHATE 10 UG/ML
LOTION TOPICAL 2 TIMES DAILY
Status: ON HOLD | COMMUNITY
End: 2018-06-05

## 2018-06-01 RX ORDER — CALCITRIOL 0.25 UG/1
0.25 CAPSULE, LIQUID FILLED ORAL DAILY
Status: DISCONTINUED | OUTPATIENT
Start: 2018-06-01 | End: 2018-06-06 | Stop reason: HOSPADM

## 2018-06-01 RX ORDER — TACROLIMUS 0.5 MG/1
0.5 CAPSULE, GELATIN COATED ORAL 2 TIMES DAILY
Status: ON HOLD | COMMUNITY
End: 2018-06-05

## 2018-06-01 RX ORDER — TACROLIMUS 1 MG/1
1 CAPSULE, GELATIN COATED ORAL 2 TIMES DAILY
Status: ON HOLD | COMMUNITY
End: 2018-06-05

## 2018-06-01 RX ADMIN — MYCOPHENOLIC ACID 720 MG: 360 TABLET, DELAYED RELEASE ORAL at 08:27

## 2018-06-01 RX ADMIN — Medication 400 UNITS: at 08:27

## 2018-06-01 RX ADMIN — CALCIUM GLUCONATE 1 G: 98 INJECTION, SOLUTION INTRAVENOUS at 08:56

## 2018-06-01 RX ADMIN — ACETAMINOPHEN 1000 MG: 500 TABLET, FILM COATED ORAL at 21:31

## 2018-06-01 RX ADMIN — FOLIC ACID 1 MG: 1 TABLET ORAL at 08:27

## 2018-06-01 RX ADMIN — MYCOPHENOLIC ACID 720 MG: 360 TABLET, DELAYED RELEASE ORAL at 18:16

## 2018-06-01 RX ADMIN — ACETAMINOPHEN 1000 MG: 500 TABLET, FILM COATED ORAL at 16:31

## 2018-06-01 RX ADMIN — ACETAMINOPHEN 1000 MG: 500 TABLET, FILM COATED ORAL at 02:47

## 2018-06-01 RX ADMIN — VITAMIN D, TAB 1000IU (100/BT) 2000 UNITS: 25 TAB at 18:15

## 2018-06-01 RX ADMIN — TACROLIMUS 1 MG: 1 CAPSULE, GELATIN COATED ORAL at 08:27

## 2018-06-01 RX ADMIN — ALLOPURINOL 100 MG: 100 TABLET ORAL at 08:27

## 2018-06-01 RX ADMIN — ACETAMINOPHEN 1000 MG: 500 TABLET, FILM COATED ORAL at 10:15

## 2018-06-01 RX ADMIN — INSULIN ASPART 12 UNITS: 100 INJECTION, SOLUTION INTRAVENOUS; SUBCUTANEOUS at 10:16

## 2018-06-01 RX ADMIN — TACROLIMUS 1 MG: 1 CAPSULE, GELATIN COATED ORAL at 18:16

## 2018-06-01 RX ADMIN — CALCITRIOL 0.25 MCG: 0.25 CAPSULE ORAL at 18:16

## 2018-06-01 RX ADMIN — PREDNISONE 40 MG: 20 TABLET ORAL at 08:27

## 2018-06-01 RX ADMIN — METHOCARBAMOL 500 MG: 500 TABLET ORAL at 00:27

## 2018-06-01 ASSESSMENT — ACTIVITIES OF DAILY LIVING (ADL)
ADLS_ACUITY_SCORE: 12

## 2018-06-01 NOTE — PROGRESS NOTES
Diabetes Consult Daily  Progress Note          Assessment/Plan:   Familia Irving is a 73 year old man with type 2 diabetes, renal transplant in 2006, hypertension, and cervical myelopathy, s/p cervical fusion on 5/16/18, admitted with concern for left knee septic arthritis, being treated for gout with steroids, leading to significant hyperglycemia.       Glucoses are stable in the mid 100s     Plan  -continue NPH 15 units, given with prednisone 40 mg -final dose will be tomorrow.  -glargine 24 units qAM  -continue aspart 1 unit per 5 grams carbohydrate  -aspart correction increased to 2 per 30  Mg/dL qAC, HS 0200  -BG monitor qAC, HS 0200         Outpatient diabetes follow up: likely w/ PCP  Plan discussed with patient           Interval History:     The last 24 hours progress and nursing notes reviewed.  Jose Alberto reports feeling very uncomfortable today.  He requested that he be allowed to just go back to sleep.  Glucoses have been stable in the mid 100s.  Prednisone 40 mg daily continues. Note reads plan for 5 days= through 6/2  Creatinine stable: 2.7    PTA Regimen: Lantus 30 units every morning, BID glucose monitoring        Recent Labs  Lab 06/01/18  1330 06/01/18  0747 06/01/18  0537 06/01/18  0230 05/31/18  2212 05/31/18  1838 05/31/18  1250 05/31/18  1008  05/30/18  1630  05/30/18  0641  05/29/18  1721  05/29/18  0724   GLC  --   --  156*  --   --   --   --  155*  --  215*  --  323*  --  308*  --  267*   * 158*  --  133* 149* 125* 145*  --   < >  --   < >  --   < >  --   < >  --    < > = values in this interval not displayed.            Review of Systems:   See interval hx          Medications:       Active Diet Order      Consistent Carbohydrate Diet 7126-3047 Calories: Moderate Consistent CHO (4-6 CHO units/meal)     Physical Exam:  Gen: Drowsy, resting in bed, in NAD   HEENT: NC/AT, mucous membranes are moist  Neck: cervical collar remains in place  Resp:  "Unlabored  Neuro:oriented x3, communicating clearly  /59 (BP Location: Right leg)  Pulse 67  Temp 96  F (35.6  C) (Oral)  Resp 16  Ht 1.727 m (5' 8\")  Wt 74.6 kg (164 lb 6.4 oz)  SpO2 99%  BMI 25 kg/m2           Data:     Lab Results   Component Value Date    A1C 6.6 05/28/2018    A1C 7.8 04/18/2018            Recent Labs   Lab Test  06/01/18   0537  05/31/18   1008   NA  139  137   POTASSIUM  4.4  4.7   CHLORIDE  108  110*   CO2  18*  13*   ANIONGAP  13  14   GLC  156*  155*   BUN  113*  108*   CR  2.76*  2.74*   BRAXTON  7.0*  6.8*     CBC RESULTS:   Recent Labs   Lab Test  05/31/18   1008   WBC  Canceled, Test credited   RBC  Canceled, Test credited   HGB  Canceled, Test credited   HCT  Canceled, Test credited   MCV  Canceled, Test credited   MCH  Canceled, Test credited   MCHC  Canceled, Test credited   RDW  Canceled, Test credited   PLT  Canceled, Test credited     Dorothy Mendez PA-C [phone]  Diabetes Management job code [id number]          "

## 2018-06-01 NOTE — PLAN OF CARE
"Problem: Patient Care Overview  Goal: Plan of Care/Patient Progress Review  Outcome: Improving  A/O x 4. VSS. Movement is less painful, joints remain tender, extremities edematous. Pain meds offered, pt declined. Using soft call light. Calcium low, replaced via IV. Neuro's intact @ with baseline n/t. Strict I/O, pt needs assist to eat. St patent, clear evelina urine. Pt is frustrated when \"everything is on top of each other\", declined OT/PT today. BM x2 , light tan in color. Dsg to wound on coccyx changed, area opened, red,small amount slough.Mepilex pad soaked with brown/yellow drainage,  dsg changed per POC. Pt instructed in need to reposition q2h, and keeping HOB less than 30 degrees, and that he needs to stay off his back. Pt repositioned q2h, but keeps shifting self to back and asking HOB to be raised. [place]-J collar in place. Has specialty cushion for chair. Pt moves slowly with AO2 and walker. Continue POC.      "

## 2018-06-01 NOTE — PLAN OF CARE
Problem: Patient Care Overview  Goal: Individualization & Mutuality  Pt transferred to unit 8A from 10A @ 1449PM. Report taken from unit 10A DORA Tompkins. Pt settled into room and vital signs obtained. Pt is educated to the soft call light, and call light is within reach. Continue to monitor.

## 2018-06-01 NOTE — PROGRESS NOTES
Social Work Services Progress Note    Hospital Day: 6  Collaborated with:  10A IDT (pt moved to 8A), Admissions at [facility], Admissions at [facility] (Voicemail message left),  Pt's son    Data:  DC plan    Intervention:   Pt's son Tacho has requested additional referrals to TCU/SNFs in [facility] to be closer to where he lives in [place], [place].  Writer made referral to [facility] in [facility], referral to [facility] (pt was declined due to history). Writer left voicemail message with [facility] in [place] but have not had response. Assessment information has been faxed multiple times but they have not received assessment information.     Pt will be ready for DC 6/4 or 6/5, or possibly later due to abnormal lab values.     Assessment:  pt con't to have support of his adult children    Plan:    Anticipated Disposition:  TCU     Barriers to d/c plan:  Medical stability    Follow Up:  SW con't to follow

## 2018-06-01 NOTE — PLAN OF CARE
Problem: Patient Care Overview  Goal: Plan of Care/Patient Progress Review  Outcome: Improving  VSS, LS clear, BS active. Pain has been controlled with scheduled Tylenol.  Has been lucid and orientated this shift. Was cooperative with all meds and got up in the chair for dinner. Needed to be fed for dinner, BG's today (125,149). +2 Edema present in bilateral extremities. Urine output this shift has been 375mL. Lab attempted to draw ionized calcium this kait and was unable to.  Lab called and oncoming shift notified for follow up. Mepelix dressing to sacrum is CDI.  Up to the commode x 1 and had large loose BM. Bowel meds held. Call light with in reach and Pt jeanine to make needs known.

## 2018-06-01 NOTE — PLAN OF CARE
"Problem: Patient Care Overview  Goal: Plan of Care/Patient Progress Review  OT:  Pt. declined at time of attempt, reporting having a \"rough night\" with pain. Will reattempt later as appropriate.      "

## 2018-06-01 NOTE — PLAN OF CARE
Problem: Patient Care Overview  Goal: Plan of Care/Patient Progress Review  PT: Attempted to see pt during scheduled PT time, pt eating breakfast and receiving meds from RN, returned 15 minutes later, pt declining therapy 2/2 needing to use bathroom and let food settle. Cancel

## 2018-06-01 NOTE — PHARMACY-ADMISSION MEDICATION HISTORY
Admission medication history interview status for the 5/27/2018 admission is complete. See Epic admission navigator for allergy information, pharmacy, prior to admission medications and immunization status.     Medication history interview sources:  [facility], other electronic record    Changes made to PTA medication list (reason)  Added: clindamycin phosphate lotion, benzoyl peroxide (patient uses wash daily)  Deleted: none  Changed:   1. Lantus 30 units --> 15 units q AM  2. Oxycodone 5 mg prn --> 10 mg q 3h prn  3. Tacrolimus 1 mg BID --> 1.5 mg BID  4. Magnesium oxide MWF --> daily     Additional medication history information (including reliability of information, actions taken by pharmacist):  1. Medication list was gathered using med list faxed from [facility] and other electronic records ([facility], external pharmacy).   2. Was unable to gather information from patient and verify last doses.       Prior to Admission medications    Medication Sig Last Dose Taking? Auth Provider   acetaminophen (TYLENOL) 325 MG tablet Take 2 tablets (650 mg) by mouth every 4 hours as needed for other (multimodal surgical pain management along with NSAIDS and opioid medication as indicated based on pain control and physical function.) Unknown at Unknown time  Ebonie Lopez, APRN CNP   amLODIPine (NORVASC) 10 MG tablet Take 10 mg by mouth daily Unknown at Unknown time  Reported, Patient   ASPIRIN PO Take 81 mg by mouth daily Unknown at Unknown time  Reported, Patient   benzoyl peroxide 10 % LOTN lotion Apply topically daily Unknown at Unknown time  Unknown, Entered By History   clindamycin (CLEOCIN T) 1 % lotion Apply topically 2 times daily Unknown at Unknown time  Unknown, Entered By History   FOLIC ACID PO Take 1 mg by mouth daily Unknown at Unknown time  Reported, Patient   FUROSEMIDE PO Take 20 mg by mouth 2 times daily  Unknown at Unknown time  Reported, Patient   insulin glargine (LANTUS) 100 UNIT/ML  injection Inject 15 Units Subcutaneous every morning  Unknown at Unknown time  Reported, Patient   LISINOPRIL PO Take 40 mg by mouth At Bedtime  Unknown at Unknown time  Reported, Patient   loperamide (IMODIUM) 2 MG capsule Take 2 mg by mouth as needed for diarrhea  Unknown at Unknown time  Unknown, Entered By History   MAGNESIUM OXIDE PO Take 420 mg by mouth daily  Unknown at Unknown time  Unknown, Entered By History   methocarbamol (ROBAXIN) 500 MG tablet Take 1 tablet (500 mg) by mouth 4 times daily as needed for muscle spasms Unknown at Unknown time  Ebonie Lopez APRN CNP   MYFORTIC (BRAND) 360 MG EC TABLET Take 720 mg by mouth 2 times daily  Unknown at Unknown time  Reported, Patient   OXYCODONE HCL PO Take 10 mg by mouth every 3 hours as needed (pain) Unknown at Unknown time  Unknown, Entered By History   PRAVASTATIN SODIUM PO Take 40 mg by mouth At Bedtime  Unknown at Unknown time  Reported, Patient   PROGRAF (BRAND) 0.5 MG CAPSULE Take 0.5 mg by mouth 2 times daily Take together with 1 mg tacrolimus. Unknown at Unknown time  Unknown, Entered By History   PROGRAF (BRAND) 1 MG CAPSULE Take 1 mg by mouth 2 times daily Take together with 0.5 mg tacrolimus. Unknown at Unknown time  Unknown, Entered By History   senna-docusate (SENOKOT-S;PERICOLACE) 8.6-50 MG per tablet Take 1 tablet by mouth 2 times daily Unknown at Unknown time  Ebonie Lopez APRN CNP   SODIUM BICARBONATE PO Take 1,300 mg by mouth 2 times daily Unknown at Unknown time  Unknown, Entered By History   VITAMIN D, CHOLECALCIFEROL, PO Take 400 Units by mouth daily Unknown at Unknown time  Unknown, Entered By History         Medication history completed by: Lydia Tadeo

## 2018-06-01 NOTE — PROGRESS NOTES
"[facility] Internal Medicine Progress Note            Interval History:   Record reviewed.  Seen with RN.  Lasix 20 mg IV X 2 5/31.  I/O -1825 with 850 out last shift.  Clinically feeling better in general.  Mentally clearer.  Mobilizing ambulated 60 feet without appreciable arthralgias.  Day 4 prednisone 40 mg.  No postural dizziness.  Remains edematous.  No CP, SOB, cough.  Not hypoxemic.  Not orthopneic.  No nausea, reflux, abd pain.  BM 5/31.  Has castanon.  Swallow OK.          Medications:   All medications reviewed today          Physical Exam:   Blood pressure 129/50, pulse 67, temperature 95.8  F (35.4  C), temperature source Oral, resp. rate 20, height 1.727 m (5' 8\"), weight 74.6 kg (164 lb 6.4 oz), SpO2 97 %.    Intake/Output Summary (Last 24 hours) at 05/29/18 0803  Last data filed at 05/29/18 0030   Gross per 24 hour   Intake               60 ml   Output              775 ml   Net             -715 ml       General:  Alert. More appropriate.  Orientated to person, place, day, date.      Heent:      Neck:    Skin:    Chest:  clear    Cardiac:  Reg without gallop, murmur.  Collar in place - unable to assess neck veins.      Abdomen:  Non distended, soft, non-tender.  BS normal.     Extremities:  Perfused.  1-2 plus pre tibial edema.  No  calf, posterior thigh tenderness to suggest DVT.   Residual swelling right wrist, left knee, left ankle with further decrease painful ROM.      Neuro:  Non lateralizing.              Data:     Results for orders placed or performed during the hospital encounter of 05/27/18 (from the past 24 hour(s))   Basic metabolic panel   Result Value Ref Range    Sodium 137 133 - 144 mmol/L    Potassium 4.7 3.4 - 5.3 mmol/L    Chloride 110 (H) 94 - 109 mmol/L    Carbon Dioxide 13 (L) 20 - 32 mmol/L    Anion Gap 14 3 - 14 mmol/L    Glucose 155 (H) 70 - 99 mg/dL    Urea Nitrogen 108 (H) 7 - 30 mg/dL    Creatinine 2.74 (H) 0.66 - 1.25 mg/dL    GFR Estimate 23 (L) >60 mL/min/1.7m2    " GFR Estimate If Black 28 (L) >60 mL/min/1.7m2    Calcium 6.8 (L) 8.5 - 10.1 mg/dL   CBC with platelets   Result Value Ref Range    WBC Canceled, Test credited 4.0 - 11.0 10e9/L    RBC Count Canceled, Test credited 4.4 - 5.9 10e12/L    Hemoglobin Canceled, Test credited 13.3 - 17.7 g/dL    Hematocrit Canceled, Test credited 40.0 - 53.0 %    MCV Canceled, Test credited 78 - 100 fl    MCH Canceled, Test credited 26.5 - 33.0 pg    MCHC Canceled, Test credited 31.5 - 36.5 g/dL    RDW Canceled, Test credited 10.0 - 15.0 %    Platelet Count Canceled, Test credited 150 - 450 10e9/L   Calcium ionized   Result Value Ref Range    Calcium Ionized 3.0 (L) 4.4 - 5.2 mg/dL   Glucose by meter   Result Value Ref Range    Glucose 145 (H) 70 - 99 mg/dL   Glucose by meter   Result Value Ref Range    Glucose 125 (H) 70 - 99 mg/dL   Glucose by meter   Result Value Ref Range    Glucose 149 (H) 70 - 99 mg/dL   Calcium ionized   Result Value Ref Range    Calcium Ionized 3.8 (L) 4.4 - 5.2 mg/dL   Glucose by meter   Result Value Ref Range    Glucose 133 (H) 70 - 99 mg/dL   Basic metabolic panel   Result Value Ref Range    Sodium 139 133 - 144 mmol/L    Potassium 4.4 3.4 - 5.3 mmol/L    Chloride 108 94 - 109 mmol/L    Carbon Dioxide 18 (L) 20 - 32 mmol/L    Anion Gap 13 3 - 14 mmol/L    Glucose 156 (H) 70 - 99 mg/dL    Urea Nitrogen 113 (H) 7 - 30 mg/dL    Creatinine 2.76 (H) 0.66 - 1.25 mg/dL    GFR Estimate 23 (L) >60 mL/min/1.7m2    GFR Estimate If Black 28 (L) >60 mL/min/1.7m2    Calcium 7.0 (L) 8.5 - 10.1 mg/dL   Parathyroid Hormone Intact   Result Value Ref Range    Parathyroid Hormone Intact 314 (H) 18 - 80 pg/mL   Magnesium   Result Value Ref Range    Magnesium 1.9 1.6 - 2.3 mg/dL   Glucose by meter   Result Value Ref Range    Glucose 158 (H) 70 - 99 mg/dL                Assessment and Plan:   1)  Acute polyarthritis with fever (left knee, right wrist, both ankles) with monosodium urate crystals on knee and wrist aspirate c/w  polyarticular gout. WBC 09196. Cultures neg to date. Off Vanco.  Improving on steroids with improved mobilization.   2)  SALVADOR. Cr plateau 2.7 range.  Increase BUN potentially related to steroids, decrease intravascular volume with lasix.   S/p DDKT 2006.     3)  S/P A/P cervical fusion 5/16.  No issues.   4)  Oral pharyngeal dysphagia 2nd to #1.  Tolerating diet per speech with safe technique.   5)  Intermittent confusion attributed to recent surgery, opiates (off since 5/28), acute febrile illness and subsequent steroids.  Neuro non lateralizing (good motor exam per spine).  Continues to improve.    6)  Anemia 2nd to acute illness and recent acute blood loss.   Adequate.  7)  Type II DM with BG down to 70's on admit.  Hyperglycemia with IV solumedrol  5/28 followed by po steroids.  Improved with diabetes team management.   8)  HTN, on PTA amlodipine (off lasix and lisinopril).  Adequate.   9)  Urine retention.  Castanon in place.   UC mixed braydon.   10)  HLD.   11)  PAC's on rhythm after cervical fusion.  Benign.   12)  Pressure wound sacrum (evoloving).  13)  Hypocalcemia, on replacement.  Elevated PTH (basis for degree of elevation unclear).    PLAN:  1)  Review f/u labs with renal.  Decision regarding further lasix.     2)  Daily weights, I/O, close lab follow up.  Replace Ca. UA/UC with castanon.  3)  Prednisone 40 mg daily for 5 days (last dose 6/2).  Allopurinol 100 mg daily.  4)  Scheduled ES tylenol q6h.   5)  Mechanical DVT prophylaxis.  Continue to mobilize as able.   6)  Insulin regimen per diabetes team.   7)   Patient reassurance.   Update family.    Disposition Plan   Expected discharge unclear pending course.  [facility] to [facility] per CC.      Entered: Familia Dumont 06/01/2018, 8:43 AM              Attestation:  I have reviewed today's vital signs, notes, medications, labs and imaging.     Familia Dumont MD

## 2018-06-01 NOTE — PLAN OF CARE
Problem: Patient Care Overview  Goal: Plan of Care/Patient Progress Review  Speech Language Therapy Discharge Summary    Reason for therapy discharge:    All goals and outcomes met, no further needs identified.    Progress towards therapy goal(s). See goals on Care Plan in [facility] electronic health record for goal details.  Goals met    Therapy recommendation(s):    No further therapy is recommended.     Patient initially refusing to participate with new but when approached just a few minutes later he was agreeable.  Patient was able to tolerate regular textures and thin liquids without any overt signs and symptoms of aspiration.  Patient does have his baseline frequent throat clearing that occurred with all textures attempted.  Patient overall citing many difficulties and how hard everything is for him currently.  Patient encouraged to participate as much as possible to maximize his function.  No ongoing SLP intervention needed at this time.

## 2018-06-01 NOTE — PLAN OF CARE
"Problem: Patient Care Overview  Goal: Plan of Care/Patient Progress Review  PT: Pt refusing PT again this PM reporting \"I am not doing anything, I feel horrible\". Pt encouraged to participate, however continues to decline. Pt put on schedule for tomorrow.      "

## 2018-06-01 NOTE — PLAN OF CARE
Problem: Patient Care Overview  Goal: Plan of Care/Patient Progress Review  Outcome: Improving  VSS. LS-Clear. AxO4. CMS/neuros intact w/ baseline numb/tingling. PIV in RUE- SL. Carb-count by nurse. Having frequent stools, please hold bowel meds. Pt concerned w/ pain management, gave PRN robaxin and tylenol. Strict I/O. Pt repositioned throughout night, has better mobility w LUE vs RUE. Soft call light at HOB. [place]-J collar on all night. St patent output 650cc. Ionized calcium result 3.8, moonlighter notified. Will continue POC.    Briseida Estrada RN on 6/1/2018 at 5:05 AM

## 2018-06-02 ENCOUNTER — APPOINTMENT (OUTPATIENT)
Dept: OCCUPATIONAL THERAPY | Facility: CLINIC | Age: 73
DRG: 554 | End: 2018-06-02
Payer: MEDICARE

## 2018-06-02 LAB
ANION GAP SERPL CALCULATED.3IONS-SCNC: 11 MMOL/L (ref 3–14)
BACTERIA SPEC CULT: NO GROWTH
BUN SERPL-MCNC: 105 MG/DL (ref 7–30)
CA-I SERPL ISE-MCNC: 4.1 MG/DL (ref 4.4–5.2)
CALCIUM SERPL-MCNC: 7.2 MG/DL (ref 8.5–10.1)
CHLORIDE SERPL-SCNC: 110 MMOL/L (ref 94–109)
CO2 SERPL-SCNC: 19 MMOL/L (ref 20–32)
CREAT SERPL-MCNC: 2.46 MG/DL (ref 0.66–1.25)
GFR SERPL CREATININE-BSD FRML MDRD: 26 ML/MIN/1.7M2
GLUCOSE BLDC GLUCOMTR-MCNC: 101 MG/DL (ref 70–99)
GLUCOSE BLDC GLUCOMTR-MCNC: 107 MG/DL (ref 70–99)
GLUCOSE BLDC GLUCOMTR-MCNC: 136 MG/DL (ref 70–99)
GLUCOSE BLDC GLUCOMTR-MCNC: 184 MG/DL (ref 70–99)
GLUCOSE BLDC GLUCOMTR-MCNC: 61 MG/DL (ref 70–99)
GLUCOSE BLDC GLUCOMTR-MCNC: 83 MG/DL (ref 70–99)
GLUCOSE BLDC GLUCOMTR-MCNC: 92 MG/DL (ref 70–99)
GLUCOSE BLDC GLUCOMTR-MCNC: 93 MG/DL (ref 70–99)
GLUCOSE BLDC GLUCOMTR-MCNC: 97 MG/DL (ref 70–99)
GLUCOSE SERPL-MCNC: 100 MG/DL (ref 70–99)
HGB BLD-MCNC: 10 G/DL (ref 13.3–17.7)
Lab: NORMAL
MAGNESIUM SERPL-MCNC: 1.9 MG/DL (ref 1.6–2.3)
PHOSPHATE SERPL-MCNC: 5.1 MG/DL (ref 2.5–4.5)
POTASSIUM SERPL-SCNC: 4.5 MMOL/L (ref 3.4–5.3)
SODIUM SERPL-SCNC: 140 MMOL/L (ref 133–144)
SPECIMEN SOURCE: NORMAL

## 2018-06-02 PROCEDURE — 40000133 ZZH STATISTIC OT WARD VISIT: Performed by: OCCUPATIONAL THERAPIST

## 2018-06-02 PROCEDURE — 99233 SBSQ HOSP IP/OBS HIGH 50: CPT | Performed by: INTERNAL MEDICINE

## 2018-06-02 PROCEDURE — 97530 THERAPEUTIC ACTIVITIES: CPT | Mod: GO | Performed by: OCCUPATIONAL THERAPIST

## 2018-06-02 PROCEDURE — 84100 ASSAY OF PHOSPHORUS: CPT | Performed by: INTERNAL MEDICINE

## 2018-06-02 PROCEDURE — 25000128 H RX IP 250 OP 636: Performed by: INTERNAL MEDICINE

## 2018-06-02 PROCEDURE — 85018 HEMOGLOBIN: CPT | Performed by: INTERNAL MEDICINE

## 2018-06-02 PROCEDURE — 25000132 ZZH RX MED GY IP 250 OP 250 PS 637: Mod: GY | Performed by: INTERNAL MEDICINE

## 2018-06-02 PROCEDURE — 36415 COLL VENOUS BLD VENIPUNCTURE: CPT | Performed by: INTERNAL MEDICINE

## 2018-06-02 PROCEDURE — 12000001 ZZH R&B MED SURG/OB UMMC

## 2018-06-02 PROCEDURE — 82330 ASSAY OF CALCIUM: CPT | Performed by: INTERNAL MEDICINE

## 2018-06-02 PROCEDURE — 80048 BASIC METABOLIC PNL TOTAL CA: CPT | Performed by: INTERNAL MEDICINE

## 2018-06-02 PROCEDURE — 99207 ZZC CDG-MDM COMPONENT: MEETS HIGH - UP CODED: CPT | Performed by: INTERNAL MEDICINE

## 2018-06-02 PROCEDURE — 25000132 ZZH RX MED GY IP 250 OP 250 PS 637: Performed by: INTERNAL MEDICINE

## 2018-06-02 PROCEDURE — A9270 NON-COVERED ITEM OR SERVICE: HCPCS | Mod: GY | Performed by: INTERNAL MEDICINE

## 2018-06-02 PROCEDURE — 25000132 ZZH RX MED GY IP 250 OP 250 PS 637: Performed by: STUDENT IN AN ORGANIZED HEALTH CARE EDUCATION/TRAINING PROGRAM

## 2018-06-02 PROCEDURE — 25000125 ZZHC RX 250: Performed by: INTERNAL MEDICINE

## 2018-06-02 PROCEDURE — 83735 ASSAY OF MAGNESIUM: CPT | Performed by: INTERNAL MEDICINE

## 2018-06-02 PROCEDURE — A9270 NON-COVERED ITEM OR SERVICE: HCPCS | Mod: GY | Performed by: STUDENT IN AN ORGANIZED HEALTH CARE EDUCATION/TRAINING PROGRAM

## 2018-06-02 PROCEDURE — 25000131 ZZH RX MED GY IP 250 OP 636 PS 637: Performed by: INTERNAL MEDICINE

## 2018-06-02 PROCEDURE — 00000146 ZZHCL STATISTIC GLUCOSE BY METER IP

## 2018-06-02 RX ORDER — FUROSEMIDE 10 MG/ML
20 INJECTION INTRAMUSCULAR; INTRAVENOUS EVERY 12 HOURS
Status: DISCONTINUED | OUTPATIENT
Start: 2018-06-02 | End: 2018-06-04

## 2018-06-02 RX ADMIN — CALCIUM GLUCONATE 1 G: 98 INJECTION, SOLUTION INTRAVENOUS at 07:36

## 2018-06-02 RX ADMIN — ALLOPURINOL 100 MG: 100 TABLET ORAL at 08:39

## 2018-06-02 RX ADMIN — ACETAMINOPHEN 1000 MG: 500 TABLET, FILM COATED ORAL at 09:52

## 2018-06-02 RX ADMIN — ACETAMINOPHEN 1000 MG: 500 TABLET, FILM COATED ORAL at 16:29

## 2018-06-02 RX ADMIN — PREDNISONE 40 MG: 20 TABLET ORAL at 08:33

## 2018-06-02 RX ADMIN — MYCOPHENOLIC ACID 720 MG: 360 TABLET, DELAYED RELEASE ORAL at 18:37

## 2018-06-02 RX ADMIN — INSULIN ASPART 9 UNITS: 100 INJECTION, SOLUTION INTRAVENOUS; SUBCUTANEOUS at 09:45

## 2018-06-02 RX ADMIN — FUROSEMIDE 20 MG: 10 INJECTION, SOLUTION INTRAVENOUS at 16:16

## 2018-06-02 RX ADMIN — SODIUM CITRATE AND CITRIC ACID MONOHYDRATE 30 ML: 500; 334 SOLUTION ORAL at 18:37

## 2018-06-02 RX ADMIN — ACETAMINOPHEN 1000 MG: 500 TABLET, FILM COATED ORAL at 04:03

## 2018-06-02 RX ADMIN — TACROLIMUS 1 MG: 1 CAPSULE, GELATIN COATED ORAL at 08:37

## 2018-06-02 RX ADMIN — MYCOPHENOLIC ACID 720 MG: 360 TABLET, DELAYED RELEASE ORAL at 08:32

## 2018-06-02 RX ADMIN — CALCITRIOL 0.25 MCG: 0.25 CAPSULE ORAL at 18:37

## 2018-06-02 RX ADMIN — FOLIC ACID 1 MG: 1 TABLET ORAL at 08:38

## 2018-06-02 RX ADMIN — ACETAMINOPHEN 1000 MG: 500 TABLET, FILM COATED ORAL at 22:07

## 2018-06-02 RX ADMIN — TACROLIMUS 1 MG: 1 CAPSULE, GELATIN COATED ORAL at 18:37

## 2018-06-02 RX ADMIN — VITAMIN D, TAB 1000IU (100/BT) 2000 UNITS: 25 TAB at 08:39

## 2018-06-02 NOTE — PROGRESS NOTES
"[facility] Internal Medicine Progress Note            Interval History:   Record reviewed.  Seen with RN. Continues to improve clinically.  No further lasix diuresis 6/1.   over night.  Wt down 16 pounds 5/31-6/1.  Sitting at bedside.   Mentally clearer. Refused PT last PM.  Previously ambulated 60 feet without appreciable arthralgias.  Day 5 prednisone 40 mg. Today.  Clinically feels more stiff with decrease arthralgias.   No postural dizziness.  Remains edematous.  No CP, SOB at rest or orthopnea.  Dyspnea earlier with activity. No  cough.  Not hypoxemic.   No nausea, reflux, abd pain.  BM today.   Has castanon.  Swallow OK. Started on calcitriol by renal.          Medications:   All medications reviewed today          Physical Exam:   Blood pressure 136/49, pulse 67, temperature 95.6  F (35.3  C), temperature source Oral, resp. rate 16, height 1.727 m (5' 8\"), weight 74.6 kg (164 lb 6.4 oz), SpO2 100 %.    Intake/Output Summary (Last 24 hours) at 05/29/18 0803  Last data filed at 05/29/18 0030   Gross per 24 hour   Intake               60 ml   Output              775 ml   Net             -715 ml       General:  Alert. More appropriate.  Orientated to person, place, wrong day, date.  Weight up to 174 today per RN (164 6/1)    Heent:      Neck:    Skin:    Chest:  clear    Cardiac:  Reg without gallop, murmur.  Collar in place - unable to assess neck veins.      Abdomen:  Non distended, soft, non-tender.  BS normal.     Extremities:  Perfused.  2 plus pre tibial edema.  No  calf, posterior thigh tenderness to suggest DVT.   Residual swelling right wrist, left knee, left ankle with overall  decrease painful ROM.      Neuro:  Non lateralizing.              Data:     Results for orders placed or performed during the hospital encounter of 05/27/18 (from the past 24 hour(s))   Glucose by meter   Result Value Ref Range    Glucose 125 (H) 70 - 99 mg/dL   Glucose by meter   Result Value Ref Range    Glucose 114 " (H) 70 - 99 mg/dL   Glucose by meter   Result Value Ref Range    Glucose 61 (L) 70 - 99 mg/dL   Glucose by meter   Result Value Ref Range    Glucose 83 70 - 99 mg/dL   Glucose by meter   Result Value Ref Range    Glucose 92 70 - 99 mg/dL   Glucose by meter   Result Value Ref Range    Glucose 101 (H) 70 - 99 mg/dL   Basic metabolic panel   Result Value Ref Range    Sodium 140 133 - 144 mmol/L    Potassium 4.5 3.4 - 5.3 mmol/L    Chloride 110 (H) 94 - 109 mmol/L    Carbon Dioxide 19 (L) 20 - 32 mmol/L    Anion Gap 11 3 - 14 mmol/L    Glucose 100 (H) 70 - 99 mg/dL    Urea Nitrogen 105 (H) 7 - 30 mg/dL    Creatinine 2.46 (H) 0.66 - 1.25 mg/dL    GFR Estimate 26 (L) >60 mL/min/1.7m2    GFR Estimate If Black 31 (L) >60 mL/min/1.7m2    Calcium 7.2 (L) 8.5 - 10.1 mg/dL   Magnesium   Result Value Ref Range    Magnesium 1.9 1.6 - 2.3 mg/dL   Phosphorus   Result Value Ref Range    Phosphorus 5.1 (H) 2.5 - 4.5 mg/dL   Hemoglobin   Result Value Ref Range    Hemoglobin 10.0 (L) 13.3 - 17.7 g/dL   Calcium ionized   Result Value Ref Range    Calcium Ionized 4.1 (L) 4.4 - 5.2 mg/dL   Glucose by meter   Result Value Ref Range    Glucose 93 70 - 99 mg/dL   Glucose by meter   Result Value Ref Range    Glucose 107 (H) 70 - 99 mg/dL   UC neg.              Assessment and Plan:   1)  Acute polyarthritis with fever (left knee, right wrist, both ankles) with monosodium urate crystals on knee and wrist aspirate c/w polyarticular gout. WBC 92523. Cultures neg to date. Off Vanco.  Improving.  Last dose prednisone today.  Stiffness 2nd to edema fluid likely contributing.   2)  SALVADOR. Cr plateau 2.7 range. Improved.  S/p DDKT 2006.     3)  S/P A/P cervical fusion 5/16.  No issues.   4)  Oral pharyngeal dysphagia 2nd to #1.  Tolerating diet per speech with safe technique.   5)  Intermittent confusion attributed to recent surgery, opiates (off since 5/28), acute febrile illness and subsequent steroids.  Neuro non lateralizing (good motor exam per  spine).  Continues to clear.   6)  Anemia 2nd to acute illness and recent acute blood loss.   Adequate.  7)  Type II DM with BG down to 70's on admit.  Hyperglycemia with IV solumedrol  5/28 followed by po steroids.  Improved with diabetes team management.   8)  HTN, on PTA amlodipine (off lasix and lisinopril).  Adequate.   9)  Urine retention.  St in place.   UC mixed braydon.   10)  HLD.   11)  PAC's on rhythm after cervical fusion.  Benign.   12)  Pressure wound sacrum (evoloving).  13)  Hypocalcemia, on replacement.  Elevated PTH.  On calcitriol.  Elevated PO4.     PLAN:  1)  Resume IV lasix (reviewed with renal) 2)  Continue daily weights, I/O, close lab follow up.  Repeat calcium gluconate IV.   3)  Last dose prednisone today.   Allopurinol 100 mg daily.  4)  Scheduled ES tylenol q6h.   5)  Mechanical DVT prophylaxis.  Continue to mobilize as able.   6)  Insulin regimen per diabetes team.   7)   Patient reassurance.   Update family.    Disposition Plan   Expected discharge unclear pending course.  [facility] to [facility] stay per CC.      Entered: Familia Dumont 06/02/2018, 2:14 PM              Attestation:  I have reviewed today's vital signs, notes, medications, labs and imaging.     Familia Dumont MD

## 2018-06-02 NOTE — PLAN OF CARE
Problem: Patient Care Overview  Goal: Plan of Care/Patient Progress Review    VS: VS stable, afebrile, denies CP  BG @ 0230: 61, gave apple juice and pudding and increased to 101. Pt did not receive bedtime insulin    O2: 98% on RA, denies SOB   Output: St in place with adequate output   Last BM: 6/1/2018, passing gas   Activity: Repositioned    Skin: Edema in bilateral hands, pressure ulcer on coccyx (unable to view), incision/wound on lower neck    Pain: Tenderness/pain in bilateral hands due to gout, edema in bilateral hands, pt refused to elevate due to it increasing pain    CMS: Reports numbness at baseline    Dressing:    Diet: Regular but a total feed due to pain in hands; swallows pills with applesauce and needs to be sitting straight up when eating or drinking    LDA: PIV SL    Equipment: Modified walker with arm rests, pulsate mattress, [place] J Collar, soft touch call light, Geomatt cushion when in chair or W/C    Plan: Continue to monitor    Additional Info:

## 2018-06-02 NOTE — PROGRESS NOTES
Diabetes Consult Daily  Progress Note          Assessment/Plan:   Familia Irving is a 73 year old man with type 2 diabetes, renal transplant in 2006, hypertension, and cervical myelopathy, s/p cervical fusion on 5/16/18, admitted with concern for left knee septic arthritis, being treated for gout with steroids, leading to significant hyperglycemia.       Glucose in the low to mid 100s yesterday during the day, dropped to 61 overnight.  Today should be last day of prednisone (althought no end time on order).     Plan  -continue NPH 15 units, given with prednisone 40 mg -final dose this morning. We will now dc NPH.  -glargine decreased from 24 to 19 units qAM  -continue aspart 1 unit per 5 grams carbohydrate today-- anticipate decreasing dose to 1unit/10g CHO tomorrow.  -aspart correction decreased to high intensity qAC, HS 0200  -BG monitor qAC, HS 0200         Outpatient diabetes follow up: likely w/ PCP  Plan discussed with patient and bedside RN.           Interval History:     The last 24 hours progress and nursing notes reviewed.  Jose Alberto is feeling better today.  Yesterday he was very tired, he reports that he probably ate less overall during the day yesterday and physical activity was higher.  These factors likely contributed to drop in glucose overnight to 61.  He was asymptomatic with this mild hypoglycemia.  Prednisone 40 mg daily continues. Note reads plan for 5 days= through 6/2--last dose today.  Creatinine stable: down to 2.45 today.    PTA Regimen: Lantus 30 units every morning, BID glucose monitoring        Recent Labs  Lab 06/02/18  0819 06/02/18  0622 06/02/18  0318 06/02/18  0307 06/02/18  0252 06/02/18  0231 06/01/18  2132  06/01/18  0537  05/31/18  1008  05/30/18  1630  05/30/18  0641  05/29/18  1721   GLC  --  100*  --   --   --   --   --   --  156*  --  155*  --  215*  --  323*  --  308*   BGM 93  --  101* 92 83 61* 114*  < >  --   < >  --   < >  --   < >  --   < >  --   "  < > = values in this interval not displayed.            Review of Systems:   See interval hx          Medications:       Active Diet Order      Consistent Carbohydrate Diet 0889-7953 Calories: Moderate Consistent CHO (4-6 CHO units/meal)     Physical Exam:  Gen: Alert, sitting up in bed eating bfast with assistance, in NAD   HEENT: NC/AT, mucous membranes are moist  Neck: cervical collar remains in place  Resp: Unlabored  Neuro:oriented x3, communicating clearly  /49 (BP Location: Right leg)  Pulse 67  Temp 95.6  F (35.3  C) (Oral)  Resp 16  Ht 1.727 m (5' 8\")  Wt 74.6 kg (164 lb 6.4 oz)  SpO2 100%  BMI 25 kg/m2           Data:     Lab Results   Component Value Date    A1C 6.6 05/28/2018    A1C 7.8 04/18/2018            Recent Labs   Lab Test  06/02/18   0622  06/01/18   0537   NA  140  139   POTASSIUM  4.5  4.4   CHLORIDE  110*  108   CO2  19*  18*   ANIONGAP  11  13   GLC  100*  156*   BUN  105*  113*   CR  2.46*  2.76*   BRAXTON  7.2*  7.0*     CBC RESULTS:   Recent Labs   Lab Test  06/02/18   0622  05/31/18   1008   WBC   --   Canceled, Test credited   RBC   --   Canceled, Test credited   HGB  10.0*  Canceled, Test credited   HCT   --   Canceled, Test credited   MCV   --   Canceled, Test credited   MCH   --   Canceled, Test credited   MCHC   --   Canceled, Test credited   RDW   --   Canceled, Test credited   PLT   --   Canceled, Test credited     Dorothy Mendez PA-C [phone]  Diabetes Management job code [phone]          "

## 2018-06-02 NOTE — PLAN OF CARE
Problem: Patient Care Overview  Goal: Plan of Care/Patient Progress Review  Discharge Planner PT   Patient plan for discharge: TCU  Current status: Attempted to see pt twice.  He declined both session due to fatigue even after allowed to rest and revisited. Up in chair this afternoon, declined ambulation but agreeable to consider walking with nursing staff later tonight. Voiced concern about discharge plans  Barriers to return to prior living situation: very limited activity tolerance, safety, level of assist  Recommendations for discharge: TCU  Rationale for recommendations: PT will continue to address mobility and endurance and maximize strength while in-pt.        Entered by: Amrita Blankenship 06/02/2018 4:17 PM

## 2018-06-02 NOTE — PLAN OF CARE
Problem: Patient Care Overview  Goal: Plan of Care/Patient Progress Review  Discharge Planner OT   Patient plan for discharge: rehab  Current status: Issued red foam to build up eating utensils for self-feeding.  Issued foam block for B hand exercises, educated pt in use.    Barriers to return to prior living situation: level of assist needed for ADLs and transfers  Recommendations for discharge: TCU  Rationale for recommendations: patient would benefit from continued therapy to increase safety and independence with ADLs and transfers       Entered by: MIRTA ALMARAZ 06/02/2018 2:06 PM

## 2018-06-02 NOTE — PLAN OF CARE
"Problem: Patient Care Overview  Goal: Plan of Care/Patient Progress Review  Outcome: No Change    VS: /59 (BP Location: Right leg)  Pulse 67  Temp 96  F (35.6  C) (Oral)  Resp 16  Ht 1.727 m (5' 8\")  Wt 74.6 kg (164 lb 6.4 oz)  SpO2 99%  BMI 25 kg/m2     O2: Room air   Output: 650 castanon cathater   Last BM: 6/1, four times today   Activity: Up assist of 1-2 with walker   Skin: Pressure ulcer on coccyx   Pain: Scheduled tylenol   CMS: Intact   Dressing: NA   Diet: Regular, total feed   LDA: IV saline locked   Equipment: Pulsating matress   Plan: TBD   Additional Info: Very edenamous in hands and feet.  Unable to use hands and is total feed.  Has trouble swallowing.  Takes pills with applesauce.  Must be completely upright to swallow.             "

## 2018-06-02 NOTE — PLAN OF CARE
Problem: Patient Care Overview  Goal: Plan of Care/Patient Progress Review  Outcome: No Change    VS: stable   O2: RA   Output: adequate   Last BM: 6/2/18   Activity: Up with assist of 2 to the BR using mod. walker   Skin: Intact coccyx wound and incision   Pain: Managed with scheduled tylenol    CMS: Baseline numbness   Dressing: Needs to be changed   Diet: Regular , total feed   LDA: IV SL   Equipment: Modified walker, soft touch call light,[place] collar   Plan: TBD   Additional Info: Pt is total feed, needing to be awake,alert and sitting upright during meals.[place] collar on at all times. Extremities both upper and lower are edematous.Assist of 1-2 using mod. Walker.  BG covered per sliding scale and carb count. Medications with apple sauce. St in place.IV SL. Lasix 20 mg iv given by PM nurse ( (med was not available before 15:00). Will continue POC.

## 2018-06-03 ENCOUNTER — APPOINTMENT (OUTPATIENT)
Dept: PHYSICAL THERAPY | Facility: CLINIC | Age: 73
DRG: 554 | End: 2018-06-03
Payer: MEDICARE

## 2018-06-03 LAB
ALBUMIN SERPL-MCNC: 1.9 G/DL (ref 3.4–5)
ANION GAP SERPL CALCULATED.3IONS-SCNC: 12 MMOL/L (ref 3–14)
BUN SERPL-MCNC: 99 MG/DL (ref 7–30)
CA-I BLD-MCNC: 4.2 MG/DL (ref 4.4–5.2)
CALCIUM SERPL-MCNC: 7.6 MG/DL (ref 8.5–10.1)
CHLORIDE SERPL-SCNC: 114 MMOL/L (ref 94–109)
CO2 SERPL-SCNC: 18 MMOL/L (ref 20–32)
CREAT SERPL-MCNC: 2.26 MG/DL (ref 0.66–1.25)
GFR SERPL CREATININE-BSD FRML MDRD: 29 ML/MIN/1.7M2
GLUCOSE BLDC GLUCOMTR-MCNC: 107 MG/DL (ref 70–99)
GLUCOSE BLDC GLUCOMTR-MCNC: 109 MG/DL (ref 70–99)
GLUCOSE BLDC GLUCOMTR-MCNC: 117 MG/DL (ref 70–99)
GLUCOSE BLDC GLUCOMTR-MCNC: 159 MG/DL (ref 70–99)
GLUCOSE BLDC GLUCOMTR-MCNC: 44 MG/DL (ref 70–99)
GLUCOSE BLDC GLUCOMTR-MCNC: 57 MG/DL (ref 70–99)
GLUCOSE BLDC GLUCOMTR-MCNC: 73 MG/DL (ref 70–99)
GLUCOSE BLDC GLUCOMTR-MCNC: 86 MG/DL (ref 70–99)
GLUCOSE BLDC GLUCOMTR-MCNC: 96 MG/DL (ref 70–99)
GLUCOSE BLDC GLUCOMTR-MCNC: 98 MG/DL (ref 70–99)
GLUCOSE SERPL-MCNC: 90 MG/DL (ref 70–99)
POTASSIUM SERPL-SCNC: 4.6 MMOL/L (ref 3.4–5.3)
SODIUM SERPL-SCNC: 144 MMOL/L (ref 133–144)
TACROLIMUS BLD-MCNC: 6.2 UG/L (ref 5–15)
TME LAST DOSE: NORMAL H

## 2018-06-03 PROCEDURE — 25000131 ZZH RX MED GY IP 250 OP 636 PS 637: Performed by: INTERNAL MEDICINE

## 2018-06-03 PROCEDURE — A9270 NON-COVERED ITEM OR SERVICE: HCPCS | Mod: GY | Performed by: INTERNAL MEDICINE

## 2018-06-03 PROCEDURE — 00000146 ZZHCL STATISTIC GLUCOSE BY METER IP

## 2018-06-03 PROCEDURE — 25000132 ZZH RX MED GY IP 250 OP 250 PS 637: Performed by: INTERNAL MEDICINE

## 2018-06-03 PROCEDURE — 25000132 ZZH RX MED GY IP 250 OP 250 PS 637: Mod: GY | Performed by: INTERNAL MEDICINE

## 2018-06-03 PROCEDURE — 82040 ASSAY OF SERUM ALBUMIN: CPT | Performed by: INTERNAL MEDICINE

## 2018-06-03 PROCEDURE — 40000193 ZZH STATISTIC PT WARD VISIT

## 2018-06-03 PROCEDURE — 25000128 H RX IP 250 OP 636: Performed by: INTERNAL MEDICINE

## 2018-06-03 PROCEDURE — 82330 ASSAY OF CALCIUM: CPT | Performed by: INTERNAL MEDICINE

## 2018-06-03 PROCEDURE — 36415 COLL VENOUS BLD VENIPUNCTURE: CPT | Performed by: INTERNAL MEDICINE

## 2018-06-03 PROCEDURE — A9270 NON-COVERED ITEM OR SERVICE: HCPCS | Mod: GY | Performed by: STUDENT IN AN ORGANIZED HEALTH CARE EDUCATION/TRAINING PROGRAM

## 2018-06-03 PROCEDURE — 80048 BASIC METABOLIC PNL TOTAL CA: CPT | Performed by: INTERNAL MEDICINE

## 2018-06-03 PROCEDURE — 99233 SBSQ HOSP IP/OBS HIGH 50: CPT | Performed by: INTERNAL MEDICINE

## 2018-06-03 PROCEDURE — 12000001 ZZH R&B MED SURG/OB UMMC

## 2018-06-03 PROCEDURE — 97530 THERAPEUTIC ACTIVITIES: CPT | Mod: GP

## 2018-06-03 PROCEDURE — 25000132 ZZH RX MED GY IP 250 OP 250 PS 637: Performed by: STUDENT IN AN ORGANIZED HEALTH CARE EDUCATION/TRAINING PROGRAM

## 2018-06-03 PROCEDURE — 99207 ZZC CDG-MDM COMPONENT: MEETS HIGH - UP CODED: CPT | Performed by: INTERNAL MEDICINE

## 2018-06-03 PROCEDURE — 80197 ASSAY OF TACROLIMUS: CPT | Performed by: INTERNAL MEDICINE

## 2018-06-03 RX ADMIN — FOLIC ACID 1 MG: 1 TABLET ORAL at 09:00

## 2018-06-03 RX ADMIN — MYCOPHENOLIC ACID 720 MG: 360 TABLET, DELAYED RELEASE ORAL at 19:16

## 2018-06-03 RX ADMIN — MYCOPHENOLIC ACID 720 MG: 360 TABLET, DELAYED RELEASE ORAL at 09:00

## 2018-06-03 RX ADMIN — ACETAMINOPHEN 1000 MG: 500 TABLET, FILM COATED ORAL at 19:16

## 2018-06-03 RX ADMIN — VITAMIN D, TAB 1000IU (100/BT) 2000 UNITS: 25 TAB at 09:00

## 2018-06-03 RX ADMIN — DEXTROSE 15 G: 15 GEL ORAL at 19:02

## 2018-06-03 RX ADMIN — TACROLIMUS 1 MG: 1 CAPSULE, GELATIN COATED ORAL at 19:17

## 2018-06-03 RX ADMIN — FUROSEMIDE 20 MG: 10 INJECTION, SOLUTION INTRAVENOUS at 15:56

## 2018-06-03 RX ADMIN — ALLOPURINOL 100 MG: 100 TABLET ORAL at 09:00

## 2018-06-03 RX ADMIN — SODIUM CITRATE AND CITRIC ACID MONOHYDRATE 30 ML: 500; 334 SOLUTION ORAL at 19:18

## 2018-06-03 RX ADMIN — INSULIN ASPART 3 UNITS: 100 INJECTION, SOLUTION INTRAVENOUS; SUBCUTANEOUS at 09:43

## 2018-06-03 RX ADMIN — CALCITRIOL 0.25 MCG: 0.25 CAPSULE ORAL at 19:16

## 2018-06-03 RX ADMIN — TACROLIMUS 1 MG: 1 CAPSULE, GELATIN COATED ORAL at 09:00

## 2018-06-03 RX ADMIN — ACETAMINOPHEN 1000 MG: 500 TABLET, FILM COATED ORAL at 12:45

## 2018-06-03 RX ADMIN — FUROSEMIDE 20 MG: 10 INJECTION, SOLUTION INTRAVENOUS at 04:02

## 2018-06-03 RX ADMIN — DEXTROSE 15 G: 15 GEL ORAL at 18:53

## 2018-06-03 NOTE — PROGRESS NOTES
"                          Diabetes Consult Daily  Progress Note          Assessment/Plan:   Familia Irving is a 73 year old man with type 2 diabetes, renal transplant in 2006, hypertension, and cervical myelopathy, s/p cervical fusion on 5/16/18, admitted with concern for left knee septic arthritis, being treated for gout with steroids, leading to significant hyperglycemia.       Prednisone now finished.  Fasting BG down to 73 today.     Plan  -NPH now discontinued.  -glargine 19 units given today, but decreasing to 16 units tomorrow and request pt have 15-20g CHO snack at bedtime tonight to prevent overnight hypoglycemia.  -aspart for meals and snacks: decreased to 1unit/10g CHO (from 1 unit/5g) starting this morning with steroid now finished.  -aspart correction decreased to medium intensity qAC, HS 0200  -BG monitor qAC, HS 0200         Outpatient diabetes follow up: likely w/ PCP  Plan discussed with patient and bedside RN.           Interval History:     The last 24 hours progress and nursing notes reviewed.  Glucose was 90 on lab this morning, so glargine not changed. However, no bfast until 0845 and BG at that time was down to 73.  Jose Alberto reports having back discomfort today.  He has had \"upset stomach\" since last night.  Today appetite is decreased b/c of this.  He felt nausea earlier but this has now resolved and he was able to tolerate bfast.  Prednisone 40 mg finished 6/2.  Creatinine continues to trend down- 2.26 today.    PTA Regimen: Lantus 30 units every morning, BID glucose monitoring        Recent Labs  Lab 06/03/18  0843 06/03/18  0646 06/03/18  0232 06/02/18  2148 06/02/18  1839 06/02/18  1617 06/02/18  1223  06/02/18  0622  06/01/18  0537  05/31/18  1008  05/30/18  1630  05/30/18  0641   GLC  --  90  --   --   --   --   --   --  100*  --  156*  --  155*  --  215*  --  323*   BGM 73  --  159* 184* 136* 97 107*  < >  --   < >  --   < >  --   < >  --   < >  --    < > = values in this interval not " "displayed.            Review of Systems:   See interval hx          Medications:       Active Diet Order      Consistent Carbohydrate Diet 7509-8841 Calories: Moderate Consistent CHO (4-6 CHO units/meal)     Physical Exam:  Gen: Alert, resting in bed, in NAD   HEENT: NC/AT, mucous membranes are moist  Neck: cervical collar remains in place  Resp: Unlabored  Neuro:oriented x3, communicating clearly  /50 (BP Location: Left leg)  Pulse 67  Temp 95.7  F (35.4  C)  Resp 16  Ht 1.727 m (5' 8\")  Wt 76.7 kg (169 lb)  SpO2 100%  BMI 25.7 kg/m2           Data:     Lab Results   Component Value Date    A1C 6.6 05/28/2018    A1C 7.8 04/18/2018            Recent Labs   Lab Test  06/03/18   0646  06/02/18   0622   NA  144  140   POTASSIUM  4.6  4.5   CHLORIDE  114*  110*   CO2  18*  19*   ANIONGAP  12  11   GLC  90  100*   BUN  99*  105*   CR  2.26*  2.46*   BRAXTON  7.6*  7.2*     CBC RESULTS:   Recent Labs   Lab Test  06/02/18   0622  05/31/18   1008   WBC   --   Canceled, Test credited   RBC   --   Canceled, Test credited   HGB  10.0*  Canceled, Test credited   HCT   --   Canceled, Test credited   MCV   --   Canceled, Test credited   MCH   --   Canceled, Test credited   MCHC   --   Canceled, Test credited   RDW   --   Canceled, Test credited   PLT   --   Canceled, Test credited         Dorothy Mendez PA-C [phone]  Diabetes Management job code [id number]          "

## 2018-06-03 NOTE — PLAN OF CARE
"Problem: Patient Care Overview  Goal: Plan of Care/Patient Progress Review  Outcome: Improving    VS: /72 (BP Location: Right arm)  Pulse 67  Temp 96.7  F (35.9  C) (Oral)  Resp 16  Ht 1.727 m (5' 8\")  Wt 74.6 kg (164 lb 6.4 oz)  SpO2 100%  BMI 25 kg/m2     O2: Room air   Output: St catheter 725 ml   Last BM: 6/2 loose   Activity: Up as samanta, assist of 1 with walker.     Skin: Incisions   Pain: Mild pain managed with scheduled tylenol   CMS: Intact, hands and feet moderately - severely edenemous   Dressing: Clean, dry, intact.  Dressing on coccyx due to be changed 6/3   Diet: Regular, total feed   LDA: IV saline locked, right hand   Equipment: IV poll, walker, bed   Plan: TBD   Additional Info: Patient has difficulty swallowing.   Takes pills with applesauce or pudding.  Patient has pressure on ulcer on coccyx covered with mepilex to be changed 6/3.  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  Patient likes all lights on all the time.             "

## 2018-06-03 NOTE — PLAN OF CARE
"Problem: Infection, Risk/Actual (Adult)  Goal: Identify Related Risk Factors and Signs and Symptoms  Related risk factors and signs and symptoms are identified upon initiation of Human Response Clinical Practice Guideline (CPG).     VS: VS stable, afebrile, denies CP. BG @ 0200: 159   O2: 100% on RA, denies SOB   Output: St catheter in place with adequate output   Last BM: 6/3/2018, passing gas   Activity: Ax2 with repositioning; laying on L side to stay off coccyx. Ambulated to bathroom    Skin: Scab on R hand, coccyx wound, edema in bilateral arms & hands   Pain: Reported pain where coccyx wound is. Pt refused scheduled Tylenol saying that \"it does nothing. I haven't been able to sleep for days since they discontinued my pain medications\". However, observed pt sleeping frequently throughout night    CMS: Reports baseline numbness/tingling    Dressing: Cleaned & changed coccyx dressing; CDI   Diet: Regular    LDA: PIV in R hand SL    Equipment: Soft touch call light, [place] J collar    Plan: Continue to monitor   Additional Info:              "

## 2018-06-03 NOTE — PLAN OF CARE
Problem: Patient Care Overview  Goal: Plan of Care/Patient Progress Review  Outcome: Improving    VS: stable   O2: RA   Output: adequate   Last BM: 6/3/18   Activity: Up with assist of 1 using walker   Skin: Except incision,wound ,edema   Pain: Managed with tylenol 1000 mg po   CMS: Baseline numbness per pt report   Dressing: intact   Diet: regular   LDA:    Equipment: [place] J Collar   Plan: TBD   Additional Info: BG 73 before breakfast. Carb count covered - new parameter. St in place . Repositioned when in bed. Still needing help with meals. Medications given with apple sauce. IV SL.Will continue to monitor.

## 2018-06-03 NOTE — PROGRESS NOTES
"[facility] Internal Medicine Progress Note            Interval History:   Record reviewed.  Seen with RN.   Continues to improve clinically.  Resumed lasix diuresis 20 mg IV q 12h 6/2.  Neg 2 liters 6/2.  650 out last shift.  Weight 76.7 Kg (81.8 5/31).  Mentally clearer.  Continues to decline PT.  Up to BR without knee or ankle pain.  Previously ambulated 60 feet without appreciable arthralgias. Completed 5 days prednisone 40 mg daily 6/2.  Appears limited mainly by stiffness related to edema. No postural dizziness.   No CP, SOB or orthopnea.  No  cough.  Not hypoxemic.   No nausea, reflux, abd pain.  BM today.   Has castanon.  Swallow OK. Started on calcitriol by renal.          Medications:   All medications reviewed today          Physical Exam:   Blood pressure 127/50, pulse 67, temperature 95.7  F (35.4  C), resp. rate 16, height 1.727 m (5' 8\"), weight 76.7 kg (169 lb), SpO2 100 %.    Intake/Output Summary (Last 24 hours) at 05/29/18 0803  Last data filed at 05/29/18 0030   Gross per 24 hour   Intake               60 ml   Output              775 ml   Net             -715 ml   I/O this shift:  In: -   Out: 1400 [Urine:1400]      General:  Alert. More appropriate.  Orientated to person, place,  day, date.  Weight as above.     Heent:      Neck:    Skin:    Chest:  clear    Cardiac:  Reg without gallop, murmur.  Collar in place - unable to assess neck veins.      Abdomen:  Non distended, soft, non-tender.  BS normal.     Extremities:  Perfused.  1-2 plus pre tibial edema.  No  calf, posterior thigh tenderness to suggest DVT.   Residual swelling right wrist with residual mild painful ROM.  Improved ROM left knee, left ankle with decrease painful.     Neuro:              Data:     Results for orders placed or performed during the hospital encounter of 05/27/18 (from the past 24 hour(s))   Glucose by meter   Result Value Ref Range    Glucose 97 70 - 99 mg/dL   Glucose by meter   Result Value Ref Range    " Glucose 136 (H) 70 - 99 mg/dL   Glucose by meter   Result Value Ref Range    Glucose 184 (H) 70 - 99 mg/dL   Glucose by meter   Result Value Ref Range    Glucose 159 (H) 70 - 99 mg/dL   Tacrolimus level   Result Value Ref Range    Tacrolimus Last Dose Not Provided     Tacrolimus Level 6.2 5.0 - 15.0 ug/L   Basic metabolic panel   Result Value Ref Range    Sodium 144 133 - 144 mmol/L    Potassium 4.6 3.4 - 5.3 mmol/L    Chloride 114 (H) 94 - 109 mmol/L    Carbon Dioxide 18 (L) 20 - 32 mmol/L    Anion Gap 12 3 - 14 mmol/L    Glucose 90 70 - 99 mg/dL    Urea Nitrogen 99 (H) 7 - 30 mg/dL    Creatinine 2.26 (H) 0.66 - 1.25 mg/dL    GFR Estimate 29 (L) >60 mL/min/1.7m2    GFR Estimate If Black 35 (L) >60 mL/min/1.7m2    Calcium 7.6 (L) 8.5 - 10.1 mg/dL   Calcium ionized whole blood   Result Value Ref Range    Calcium Ionized Whole Blood 4.2 (L) 4.4 - 5.2 mg/dL   Albumin level   Result Value Ref Range    Albumin 1.9 (L) 3.4 - 5.0 g/dL   Glucose by meter   Result Value Ref Range    Glucose 73 70 - 99 mg/dL   Glucose by meter   Result Value Ref Range    Glucose 107 (H) 70 - 99 mg/dL   Glucose by meter   Result Value Ref Range    Glucose 96 70 - 99 mg/dL   UC neg.              Assessment and Plan:   1)  Acute polyarthritis with fever (left knee, right wrist, both ankles) with monosodium urate crystals on knee and wrist aspirate c/w polyarticular gout. WBC 85213. Cultures neg to date. Off Vanco.  Improving.  Last dose prednisone 6/2.   Stiffness 2nd to edema fluid likely major contributing factor to mobility compromise.   2)  SALVADOR. Cr 2.8 max. Continues to improve.  S/p DDKT 2006.     3)  S/P A/P cervical fusion 5/16.  No issues.   4)  Oral pharyngeal dysphagia 2nd to #1.  Tolerating diet per speech with safe technique.   5)  Intermittent confusion attributed to recent surgery, opiates (off since 5/28), acute febrile illness and subsequent steroids.  Neuro non lateralizing (good motor exam per spine).  Continues to clear.   6)   Anemia 2nd to acute illness and recent acute blood loss.   Adequate.  7)  Type II DM with BG down to 70's on admit.  Hyperglycemia with IV solumedrol  5/28 followed by po steroids.  Improved with diabetes team management.   8)  HTN, on PTA amlodipine (off lasix and lisinopril).  Adequate.   9)  Urine retention.  St in place.   UC mixed braydon.   10)  HLD.   11)  PAC's on rhythm after cervical fusion.  Benign.   12)  Pressure wound sacrum (evoloving).  13)  Hypocalcemia, on replacement.  Elevated PTH.  On calcitriol.  Elevated PO4.     PLAN:  1)  Continue  IV lasix (reviewed with renal)   2)  Continue daily weights, I/O, lab follow up.   3)  Monitor off prednisone. Allopurinol 100 mg daily.  4)  Scheduled ES tylenol q6h. Off opiates.  5)  Mechanical DVT prophylaxis.  Continue to mobilize as able.   6)  Insulin regimen per diabetes team (Off NPH, Lantus 19.  1 u/10 g CHO.  Medium correction).   7)   Patient reassurance. Son Updated.    Disposition Plan   Expected discharge to TCU later this week.      Entered: Familia Dumont 06/03/2018, 1:35 PM              Attestation:  I have reviewed today's vital signs, notes, medications, labs and imaging.     Familia Dumont MD

## 2018-06-04 ENCOUNTER — APPOINTMENT (OUTPATIENT)
Dept: PHYSICAL THERAPY | Facility: CLINIC | Age: 73
DRG: 554 | End: 2018-06-04
Payer: MEDICARE

## 2018-06-04 LAB
ANION GAP SERPL CALCULATED.3IONS-SCNC: 10 MMOL/L (ref 3–14)
BUN SERPL-MCNC: 88 MG/DL (ref 7–30)
CA-I BLD-MCNC: 4.2 MG/DL (ref 4.4–5.2)
CALCIUM SERPL-MCNC: 7.7 MG/DL (ref 8.5–10.1)
CHLORIDE SERPL-SCNC: 115 MMOL/L (ref 94–109)
CO2 SERPL-SCNC: 21 MMOL/L (ref 20–32)
CREAT SERPL-MCNC: 1.93 MG/DL (ref 0.66–1.25)
GFR SERPL CREATININE-BSD FRML MDRD: 34 ML/MIN/1.7M2
GLUCOSE BLDC GLUCOMTR-MCNC: 100 MG/DL (ref 70–99)
GLUCOSE BLDC GLUCOMTR-MCNC: 104 MG/DL (ref 70–99)
GLUCOSE BLDC GLUCOMTR-MCNC: 141 MG/DL (ref 70–99)
GLUCOSE BLDC GLUCOMTR-MCNC: 51 MG/DL (ref 70–99)
GLUCOSE BLDC GLUCOMTR-MCNC: 52 MG/DL (ref 70–99)
GLUCOSE BLDC GLUCOMTR-MCNC: 70 MG/DL (ref 70–99)
GLUCOSE BLDC GLUCOMTR-MCNC: 82 MG/DL (ref 70–99)
GLUCOSE BLDC GLUCOMTR-MCNC: 84 MG/DL (ref 70–99)
GLUCOSE BLDC GLUCOMTR-MCNC: 98 MG/DL (ref 70–99)
GLUCOSE SERPL-MCNC: 101 MG/DL (ref 70–99)
POTASSIUM SERPL-SCNC: 4.4 MMOL/L (ref 3.4–5.3)
SODIUM SERPL-SCNC: 146 MMOL/L (ref 133–144)

## 2018-06-04 PROCEDURE — 40000193 ZZH STATISTIC PT WARD VISIT: Performed by: PHYSICAL THERAPIST

## 2018-06-04 PROCEDURE — 97530 THERAPEUTIC ACTIVITIES: CPT | Mod: GP | Performed by: PHYSICAL THERAPIST

## 2018-06-04 PROCEDURE — 25000132 ZZH RX MED GY IP 250 OP 250 PS 637: Mod: GY | Performed by: STUDENT IN AN ORGANIZED HEALTH CARE EDUCATION/TRAINING PROGRAM

## 2018-06-04 PROCEDURE — 12000001 ZZH R&B MED SURG/OB UMMC

## 2018-06-04 PROCEDURE — 00000146 ZZHCL STATISTIC GLUCOSE BY METER IP

## 2018-06-04 PROCEDURE — A9270 NON-COVERED ITEM OR SERVICE: HCPCS | Mod: GY | Performed by: INTERNAL MEDICINE

## 2018-06-04 PROCEDURE — 36415 COLL VENOUS BLD VENIPUNCTURE: CPT | Performed by: INTERNAL MEDICINE

## 2018-06-04 PROCEDURE — 25000131 ZZH RX MED GY IP 250 OP 636 PS 637: Performed by: INTERNAL MEDICINE

## 2018-06-04 PROCEDURE — 99232 SBSQ HOSP IP/OBS MODERATE 35: CPT | Performed by: INTERNAL MEDICINE

## 2018-06-04 PROCEDURE — 25000132 ZZH RX MED GY IP 250 OP 250 PS 637: Performed by: INTERNAL MEDICINE

## 2018-06-04 PROCEDURE — A9270 NON-COVERED ITEM OR SERVICE: HCPCS | Mod: GY | Performed by: STUDENT IN AN ORGANIZED HEALTH CARE EDUCATION/TRAINING PROGRAM

## 2018-06-04 PROCEDURE — 82330 ASSAY OF CALCIUM: CPT | Performed by: INTERNAL MEDICINE

## 2018-06-04 PROCEDURE — 80048 BASIC METABOLIC PNL TOTAL CA: CPT | Performed by: INTERNAL MEDICINE

## 2018-06-04 PROCEDURE — 25000132 ZZH RX MED GY IP 250 OP 250 PS 637: Mod: GY | Performed by: INTERNAL MEDICINE

## 2018-06-04 PROCEDURE — 25000132 ZZH RX MED GY IP 250 OP 250 PS 637: Performed by: STUDENT IN AN ORGANIZED HEALTH CARE EDUCATION/TRAINING PROGRAM

## 2018-06-04 PROCEDURE — 97116 GAIT TRAINING THERAPY: CPT | Mod: GP | Performed by: PHYSICAL THERAPIST

## 2018-06-04 PROCEDURE — 25000128 H RX IP 250 OP 636: Performed by: INTERNAL MEDICINE

## 2018-06-04 RX ORDER — FUROSEMIDE 20 MG
20 TABLET ORAL
Status: DISCONTINUED | OUTPATIENT
Start: 2018-06-04 | End: 2018-06-05

## 2018-06-04 RX ORDER — CALCIUM CARBONATE 500 MG/1
500 TABLET, CHEWABLE ORAL 2 TIMES DAILY
Status: DISCONTINUED | OUTPATIENT
Start: 2018-06-04 | End: 2018-06-06 | Stop reason: HOSPADM

## 2018-06-04 RX ADMIN — ACETAMINOPHEN 1000 MG: 500 TABLET, FILM COATED ORAL at 06:57

## 2018-06-04 RX ADMIN — CALCITRIOL 0.25 MCG: 0.25 CAPSULE ORAL at 17:30

## 2018-06-04 RX ADMIN — DEXTROSE 15 G: 15 GEL ORAL at 17:40

## 2018-06-04 RX ADMIN — SENNOSIDES AND DOCUSATE SODIUM 2 TABLET: 8.6; 5 TABLET ORAL at 08:11

## 2018-06-04 RX ADMIN — ALLOPURINOL 100 MG: 100 TABLET ORAL at 08:11

## 2018-06-04 RX ADMIN — TACROLIMUS 1 MG: 1 CAPSULE, GELATIN COATED ORAL at 17:31

## 2018-06-04 RX ADMIN — ACETAMINOPHEN 1000 MG: 500 TABLET, FILM COATED ORAL at 18:48

## 2018-06-04 RX ADMIN — FUROSEMIDE 20 MG: 10 INJECTION, SOLUTION INTRAVENOUS at 04:51

## 2018-06-04 RX ADMIN — VITAMIN D, TAB 1000IU (100/BT) 2000 UNITS: 25 TAB at 08:11

## 2018-06-04 RX ADMIN — FUROSEMIDE 20 MG: 20 TABLET ORAL at 15:43

## 2018-06-04 RX ADMIN — MYCOPHENOLIC ACID 720 MG: 360 TABLET, DELAYED RELEASE ORAL at 08:11

## 2018-06-04 RX ADMIN — TACROLIMUS 1 MG: 1 CAPSULE, GELATIN COATED ORAL at 08:11

## 2018-06-04 RX ADMIN — MYCOPHENOLIC ACID 720 MG: 360 TABLET, DELAYED RELEASE ORAL at 17:30

## 2018-06-04 RX ADMIN — FOLIC ACID 1 MG: 1 TABLET ORAL at 08:11

## 2018-06-04 RX ADMIN — SODIUM CITRATE AND CITRIC ACID MONOHYDRATE 30 ML: 500; 334 SOLUTION ORAL at 17:31

## 2018-06-04 NOTE — PROGRESS NOTES
Diabetes Consult Daily  Progress Note          Assessment/Plan:   Familia Irving is a 73 year old man with type 2 diabetes, renal transplant in 2006, hypertension, and cervical myelopathy, s/p cervical fusion on 5/16/18, admitted with concern for left knee septic arthritis, being treated for gout with steroids, leading to significant hyperglycemia.       Low glucose last evening (40s).  Appetite remains low.     Plan  -glargine decreased from 19 to 16 units this morning. Will discontinue dose for now and we will reorder tomorrow morning after overnight glucoses are reviewed.  -aspart for meals and snacks: decreased to 1unit/12g yesterday at noon, decreased further to 1unit/20g CHO starting this morning.  -aspart correction medium intensity qAC, HS 0200  -BG monitor qAC, HS 0200         Outpatient diabetes follow up: likely w/ PCP  Plan discussed with patient and bedside RN, and Dr. Márquez.           Interval History:     The last 24 hours progress and nursing notes reviewed.  Jose Alberto is still feeling very uncomfortable due to pain in his back.  Stomach upset and nausea have resolved but his appetite remains low today. He had episode of diarrhea last night, Cdiff ordered.  BG dropped the 40s last night, despite 2 reductions in meal aspart earlier in the day.  He was asymptomatic with low glucose.  He cannot recall if he had a snack at HS (I had requested he have HS snack with larger dose of glargine on board).  Overnight BG was in the low 100s.  Creatinine continues to trend down steadily- 1.93 today.    Planning for discharge to TCU on Wed- placement pending.    Prednisone 40 mg finished 6/2.      PTA Regimen: Lantus 30 units every morning, BID glucose monitoring        Recent Labs  Lab 06/04/18  0644 06/03/18  2132 06/03/18  2005 06/03/18  1938 06/03/18  1916 06/03/18  1901 06/03/18  1849  06/03/18  0646  06/02/18  0622  06/01/18  0537  05/31/18  1008  05/30/18  1630   *  --   --   --   " --   --   --   --  90  --  100*  --  156*  --  155*  --  215*   BGM  --  117* 109* 98 86 57* 44*  < >  --   < >  --   < >  --   < >  --   < >  --    < > = values in this interval not displayed.            Review of Systems:   See interval hx          Medications:       Active Diet Order      Consistent Carbohydrate Diet 7596-8232 Calories: Moderate Consistent CHO (4-6 CHO units/meal)     Physical Exam:  Gen: Alert, resting in bed, in NAD   HEENT: NC/AT, mucous membranes are moist  Neck: cervical collar remains in place  Resp: Unlabored  Neuro:oriented x3, communicating clearly  /46 (BP Location: Left arm)  Pulse 67  Temp 96.3  F (35.7  C) (Oral)  Resp 16  Ht 1.727 m (5' 8\")  Wt 76.7 kg (169 lb)  SpO2 98%  BMI 25.7 kg/m2           Data:     Lab Results   Component Value Date    A1C 6.6 05/28/2018    A1C 7.8 04/18/2018            Recent Labs   Lab Test  06/04/18   0644  06/03/18   0646   NA  146*  144   POTASSIUM  4.4  4.6   CHLORIDE  115*  114*   CO2  21  18*   ANIONGAP  10  12   GLC  101*  90   BUN  88*  99*   CR  1.93*  2.26*   BRAXTON  7.7*  7.6*     CBC RESULTS:   Recent Labs   Lab Test  06/02/18   0622  05/31/18   1008   WBC   --   Canceled, Test credited   RBC   --   Canceled, Test credited   HGB  10.0*  Canceled, Test credited   HCT   --   Canceled, Test credited   MCV   --   Canceled, Test credited   MCH   --   Canceled, Test credited   MCHC   --   Canceled, Test credited   RDW   --   Canceled, Test credited   PLT   --   Canceled, Test credited       Dorothy Mendez PA-C [phone]  Diabetes Management job code [id number]          "

## 2018-06-04 NOTE — PROGRESS NOTES
"Social Work Services Progress Note    Hospital Day: 9    Collaborated with:  Pt, daughter Silvia, 8A IDT    Data:  DC plan    Intervention:   Pt has been declined from [facility] but is stating preference for rehab in [facility]. Writer and pt discussed [facility] and [facility]. [facility] is close to [facility].     Pt was accepting. Writer left voicemail message for pt's daughter Silvia with Update. Dr Márquez does not anticipate pt being ready for DC until Wed, 6/6/18.    Assessment:  anticipate pt will be ready for DC Wed. States preference for DC in [place] area    Plan:    Anticipated Disposition:  Facility:  TCU. Pt and family express preference for [place] area. Additional Referrals out to [facility] and [facility].     Barriers to d/c plan:  Medical stability     Follow Up:  SW con't to follow  \    1402: Addendum:  Writer received call from [facility], wanting to know how many Medicare days he has used. Per pt\"s chart, he DC'd from [facility] 5/21/18 and readmitted 5/27/18.     1638: Pt has been accepted to [facility], but after day 20,he would have co-insurance cost of $169.51/day.  Pt is only 10% service connected and would only be eligible for VA hospice care at [facility].   "

## 2018-06-04 NOTE — PLAN OF CARE
"Problem: Patient Care Overview  Goal: Plan of Care/Patient Progress Review  Outcome: No Change  OT:  Patient declining to participate at scheduled time, reporting \"just getting comfortable\" after being up and wanting to sleep.  Will continue to follow.      Maren MONROE        "

## 2018-06-04 NOTE — PLAN OF CARE
Problem: Infection, Risk/Actual (Adult)  Goal: Identify Related Risk Factors and Signs and Symptoms  Related risk factors and signs and symptoms are identified upon initiation of Human Response Clinical Practice Guideline (CPG).   Outcome: No Change        VS:   VSS.   Output:   Pt has had no BM this shift, Last BM early this AM on night shift. Pt has a patent Castanon cath   Activity:   Pt is a 1 person assist w/ walker. Was walking out of room this shift w/ PT, but was in bed most of this shift.   Skin: Pt has a non-blanchable area on coccyx, changed meplex dressing and applied barrier topical. Pt has +3 pitting edema in all extremities. Skin intact under [place] collar.   Pain:   Pt reports general discomfort, but declines PRNs when offered, Pt refused scheduled tylenol this shift.   Neuro/CMS:   Pt is A&OX4, weakness in all extremities, legs weaker than arms. Pt is very frustrated, feels helpless and has difficulty stating his needs.   Dressing(s):   Pt had meplex changed on coccyx this shift.   Diet:   PT has regular diet, ate all of breakfast and none of lunch   LDA:   Pt has a patent PIV in R hand, saline locked. Castanon catheter in place and patent   Equipment:   Pt has a walker and soft touch call light. [place] J collar.   Plan:   Pt likely to have castanon removed tomorrow and possible DC on Wednesday. Continue monitoring BG, edema and ambulation.   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 Pt is [place].

## 2018-06-04 NOTE — PROGRESS NOTES
Pt was seen, case reviewed with team, Dr GarciaDumont, son.    Pt reports feeling well, sates generalized joint pain improved, primarily with L hand and wrist pain  LE edema improved  No chest pain, SOB or dizziness  Large BM last night  Ambulating improved    Afebrile  BP 110s/  BG 80s-100  UO 2000 cc first shift today    Alert, fully oriented, pleasant  Lungs clear  CV rrr  Abd soft, non-distended  1 + ankle edema B  2 + L wrist edema, + mild pain with ROM  1 + R wrist edema      Results for CJ SHI (MRN [id number]) as of 6/4/2018 11:49   Ref. Range 6/4/2018 06:44   Sodium Latest Ref Range: 133 - 144 mmol/L 146 (H)   Potassium Latest Ref Range: 3.4 - 5.3 mmol/L 4.4   Chloride Latest Ref Range: 94 - 109 mmol/L 115 (H)   Carbon Dioxide Latest Ref Range: 20 - 32 mmol/L 21   Urea Nitrogen Latest Ref Range: 7 - 30 mg/dL 88 (H)   Creatinine Latest Ref Range: 0.66 - 1.25 mg/dL 1.93 (H)   GFR Estimate Latest Ref Range: >60 mL/min/1.7m2 34 (L)   GFR Estimate If Black Latest Ref Range: >60 mL/min/1.7m2 42 (L)   Calcium Latest Ref Range: 8.5 - 10.1 mg/dL 7.7 (L)   Anion Gap Latest Ref Range: 3 - 14 mmol/L 10   Calcium Ionized Whole Blood Latest Ref Range: 4.4 - 5.2 mg/dL 4.2 (L)   Glucose Latest Ref Range: 70 - 99 mg/dL 101 (H)       Assessment    1)  Acute polyarthritis secondary to gout, improved. Residual mild L wrist inflammation   2)  SALVADOR. Cr 2.8 max. Continues to improve.  S/p DDKT 2006.     3)  S/P A/P cervical fusion 5/16.    4)  Oral pharyngeal dysphagia 2nd to #1, stable   5)  Anemia 2nd to acute illness and recent acute blood loss.     7)  Type II DM. BG now trending down  8)  HTN, on PTA amlodipine (off lasix and lisinopril).   9)  Urine retention.  St in place.   UC mixed braydon.   10)  Hypocalcemia, secondary to low Vit D, on Vit D an calcium replacement replacement.  Stable    Plan  Convert IV to po lasix  Monitor Is/0s  Anticipate d/c [place] in am  Endocrine is adjusting insulin  Mobilize  Anticipate  d/c to TCU in 2 days

## 2018-06-04 NOTE — PLAN OF CARE
Problem: Infection, Risk/Actual (Adult)  Goal: Identify Related Risk Factors and Signs and Symptoms  Related risk factors and signs and symptoms are identified upon initiation of Human Response Clinical Practice Guideline (CPG).   Outcome: Improving    VS: Stable.   O2: RA.   Output: St with 2000 ml out.   Last BM: Multiple loose BMs 6/3/18, enteric precautions initiated, stool sample needed.   Activity: Ax1 with walker, [place] J collar.   Skin: Incision, wound, edematous extremities, pt refused full assessment.   Pain: Denies pain.   CMS: Intact.   Dressing:    Diet: Consistent CHO, carb count.   LDA: PIV right lower forearm SL.   Equipment: Walker, [place] j collar, soft touch call light within reach.   Plan: Continue to monitor.   Additional Info: Vision loss, keep lights on.

## 2018-06-04 NOTE — PLAN OF CARE
"Problem: Patient Care Overview  Goal: Plan of Care/Patient Progress Review  Outcome: Improving    VS: /57 (BP Location: Left arm)  Pulse 67  Temp 96.1  F (35.6  C) (Oral)  Resp 16  Ht 1.727 m (5' 8\")  Wt 76.7 kg (169 lb)  SpO2 100%  BMI 25.7 kg/m2     O2: Room air   Output: 950 castanon catheter   Last BM: 6/3 two watery BMs   Activity: Up as samanta, assist of 1 to bathroom.  Patient declined PT   Skin: Pressure ulcer on coccyx covered by mepilex. 2 mostly healed incisions.  One on anterior neck, one on back.   Pain: Scheduled tylenol   CMS: Intact.  Arms and legs moderately edenamous   Dressing: Clean, dry, intact   Diet: Regular - consistent carb   LDA: IV right hand   Equipment: Cervical collar   Plan: TBD   Additional Info: Patient had a hypoglycemic episode of 44 at 1900.  BG came up with 30g glucagon, juice, and food.      Patient had two very water and urgent stools.  The second was incontinent.             "

## 2018-06-05 ENCOUNTER — APPOINTMENT (OUTPATIENT)
Dept: PHYSICAL THERAPY | Facility: CLINIC | Age: 73
DRG: 554 | End: 2018-06-05
Payer: MEDICARE

## 2018-06-05 ENCOUNTER — APPOINTMENT (OUTPATIENT)
Dept: OCCUPATIONAL THERAPY | Facility: CLINIC | Age: 73
DRG: 554 | End: 2018-06-05
Payer: MEDICARE

## 2018-06-05 LAB
ANION GAP SERPL CALCULATED.3IONS-SCNC: 12 MMOL/L (ref 3–14)
BUN SERPL-MCNC: 74 MG/DL (ref 7–30)
CALCIUM SERPL-MCNC: 7.9 MG/DL (ref 8.5–10.1)
CHLORIDE SERPL-SCNC: 116 MMOL/L (ref 94–109)
CO2 SERPL-SCNC: 21 MMOL/L (ref 20–32)
CREAT SERPL-MCNC: 1.75 MG/DL (ref 0.66–1.25)
GFR SERPL CREATININE-BSD FRML MDRD: 38 ML/MIN/1.7M2
GLUCOSE BLDC GLUCOMTR-MCNC: 147 MG/DL (ref 70–99)
GLUCOSE BLDC GLUCOMTR-MCNC: 167 MG/DL (ref 70–99)
GLUCOSE BLDC GLUCOMTR-MCNC: 170 MG/DL (ref 70–99)
GLUCOSE BLDC GLUCOMTR-MCNC: 177 MG/DL (ref 70–99)
GLUCOSE SERPL-MCNC: 173 MG/DL (ref 70–99)
POTASSIUM SERPL-SCNC: 4.4 MMOL/L (ref 3.4–5.3)
SODIUM SERPL-SCNC: 149 MMOL/L (ref 133–144)

## 2018-06-05 PROCEDURE — A9270 NON-COVERED ITEM OR SERVICE: HCPCS | Mod: GY | Performed by: INTERNAL MEDICINE

## 2018-06-05 PROCEDURE — 80048 BASIC METABOLIC PNL TOTAL CA: CPT | Performed by: INTERNAL MEDICINE

## 2018-06-05 PROCEDURE — 40000193 ZZH STATISTIC PT WARD VISIT: Performed by: PHYSICAL THERAPIST

## 2018-06-05 PROCEDURE — 97530 THERAPEUTIC ACTIVITIES: CPT | Mod: GP | Performed by: PHYSICAL THERAPIST

## 2018-06-05 PROCEDURE — 97116 GAIT TRAINING THERAPY: CPT | Mod: GP | Performed by: PHYSICAL THERAPIST

## 2018-06-05 PROCEDURE — 97530 THERAPEUTIC ACTIVITIES: CPT | Mod: GO | Performed by: OCCUPATIONAL THERAPIST

## 2018-06-05 PROCEDURE — 25000132 ZZH RX MED GY IP 250 OP 250 PS 637: Performed by: INTERNAL MEDICINE

## 2018-06-05 PROCEDURE — 12000001 ZZH R&B MED SURG/OB UMMC

## 2018-06-05 PROCEDURE — 25000132 ZZH RX MED GY IP 250 OP 250 PS 637: Mod: GY | Performed by: INTERNAL MEDICINE

## 2018-06-05 PROCEDURE — 36415 COLL VENOUS BLD VENIPUNCTURE: CPT | Performed by: INTERNAL MEDICINE

## 2018-06-05 PROCEDURE — 40000133 ZZH STATISTIC OT WARD VISIT: Performed by: OCCUPATIONAL THERAPIST

## 2018-06-05 PROCEDURE — 25000131 ZZH RX MED GY IP 250 OP 636 PS 637: Performed by: INTERNAL MEDICINE

## 2018-06-05 PROCEDURE — 99232 SBSQ HOSP IP/OBS MODERATE 35: CPT | Performed by: INTERNAL MEDICINE

## 2018-06-05 PROCEDURE — G0463 HOSPITAL OUTPT CLINIC VISIT: HCPCS

## 2018-06-05 PROCEDURE — 97535 SELF CARE MNGMENT TRAINING: CPT | Mod: GO | Performed by: OCCUPATIONAL THERAPIST

## 2018-06-05 PROCEDURE — 25000132 ZZH RX MED GY IP 250 OP 250 PS 637: Mod: GY | Performed by: STUDENT IN AN ORGANIZED HEALTH CARE EDUCATION/TRAINING PROGRAM

## 2018-06-05 PROCEDURE — 25000131 ZZH RX MED GY IP 250 OP 636 PS 637: Performed by: CLINICAL NURSE SPECIALIST

## 2018-06-05 PROCEDURE — 00000146 ZZHCL STATISTIC GLUCOSE BY METER IP

## 2018-06-05 PROCEDURE — A9270 NON-COVERED ITEM OR SERVICE: HCPCS | Mod: GY | Performed by: STUDENT IN AN ORGANIZED HEALTH CARE EDUCATION/TRAINING PROGRAM

## 2018-06-05 RX ORDER — LOPERAMIDE HCL 2 MG
2 CAPSULE ORAL 4 TIMES DAILY PRN
Qty: 20 CAPSULE | DISCHARGE
Start: 2018-06-05 | End: 2018-06-11

## 2018-06-05 RX ORDER — CALCITRIOL 0.25 UG/1
0.25 CAPSULE, LIQUID FILLED ORAL DAILY
Qty: 30 CAPSULE | DISCHARGE
Start: 2018-06-05

## 2018-06-05 RX ORDER — TACROLIMUS 1 MG/1
1 CAPSULE, GELATIN COATED ORAL 2 TIMES DAILY
Qty: 240 CAPSULE | DISCHARGE
Start: 2018-06-05

## 2018-06-05 RX ORDER — CALCIUM CARBONATE 500 MG/1
1 TABLET, CHEWABLE ORAL 2 TIMES DAILY
Qty: 150 TABLET | DISCHARGE
Start: 2018-06-05

## 2018-06-05 RX ORDER — LOPERAMIDE HCL 2 MG
2 CAPSULE ORAL 4 TIMES DAILY PRN
Status: DISCONTINUED | OUTPATIENT
Start: 2018-06-05 | End: 2018-06-06 | Stop reason: HOSPADM

## 2018-06-05 RX ORDER — ALLOPURINOL 100 MG/1
100 TABLET ORAL DAILY
Qty: 30 TABLET | DISCHARGE
Start: 2018-06-06

## 2018-06-05 RX ORDER — CITRIC ACID/SODIUM CITRATE 334-500MG
30 SOLUTION, ORAL ORAL DAILY
Qty: 1800 ML | DISCHARGE
Start: 2018-06-05

## 2018-06-05 RX ADMIN — MYCOPHENOLIC ACID 720 MG: 360 TABLET, DELAYED RELEASE ORAL at 18:45

## 2018-06-05 RX ADMIN — SODIUM CITRATE AND CITRIC ACID MONOHYDRATE 30 ML: 500; 334 SOLUTION ORAL at 18:45

## 2018-06-05 RX ADMIN — LOPERAMIDE HYDROCHLORIDE 2 MG: 2 CAPSULE ORAL at 11:14

## 2018-06-05 RX ADMIN — LOPERAMIDE HYDROCHLORIDE 2 MG: 2 CAPSULE ORAL at 19:40

## 2018-06-05 RX ADMIN — FOLIC ACID 1 MG: 1 TABLET ORAL at 07:57

## 2018-06-05 RX ADMIN — CALCITRIOL 0.25 MCG: 0.25 CAPSULE ORAL at 18:45

## 2018-06-05 RX ADMIN — ACETAMINOPHEN 1000 MG: 500 TABLET, FILM COATED ORAL at 06:39

## 2018-06-05 RX ADMIN — INSULIN GLARGINE 12 UNITS: 100 INJECTION, SOLUTION SUBCUTANEOUS at 18:42

## 2018-06-05 RX ADMIN — TACROLIMUS 1 MG: 1 CAPSULE, GELATIN COATED ORAL at 07:57

## 2018-06-05 RX ADMIN — ALLOPURINOL 100 MG: 100 TABLET ORAL at 07:57

## 2018-06-05 RX ADMIN — ACETAMINOPHEN 1000 MG: 500 TABLET, FILM COATED ORAL at 18:45

## 2018-06-05 RX ADMIN — TACROLIMUS 1 MG: 1 CAPSULE, GELATIN COATED ORAL at 18:45

## 2018-06-05 RX ADMIN — MYCOPHENOLIC ACID 720 MG: 360 TABLET, DELAYED RELEASE ORAL at 07:57

## 2018-06-05 RX ADMIN — ACETAMINOPHEN 1000 MG: 500 TABLET, FILM COATED ORAL at 01:24

## 2018-06-05 RX ADMIN — VITAMIN D, TAB 1000IU (100/BT) 2000 UNITS: 25 TAB at 07:57

## 2018-06-05 NOTE — PROGRESS NOTES
Pt states he is feeling stronger overall  Notes occ diarrhea, wants imodium  Appetite fair  Ambulating with walker  States generalized joint pain is improving    VSS  BP 110s-160s/  Afebrile  BG 80s-mid 100s  UO 4275 yesterday    Alert, in NAD, oriented to person, place, general circumstances  Lungs clear  CV rrr no M  Abd soft  Mild swelling both wrists, improved  1-2 + ankle and pedal edema B     Crystal Analysis Positive for intracellular crystals, consistent with monosodium urate crystals. (A)      Anaerobic bacterial culture [[id number]] (Abnormal) Collected: 05/28/18 0737     Order Status: Completed Lab Status: Preliminary result Updated: 06/05/18 1112     Specimen: Synovial fluid from Arm, Right       Specimen Description Synovial fluid Right Wrist      Special Requests Not received in an anaerobic transport container.      Culture Micro On day 8, isolated in broth only:   Gram positive bacilli resembling diphtheroids   Possible aerobic organism, aerotolerance testing in progress. Await final report.    (A)       Results for JC SHI (MRN [id number]) as of 6/5/2018 13:36   Ref. Range 6/5/2018 06:23   Sodium Latest Ref Range: 133 - 144 mmol/L 149 (H)   Potassium Latest Ref Range: 3.4 - 5.3 mmol/L 4.4   Chloride Latest Ref Range: 94 - 109 mmol/L 116 (H)   Carbon Dioxide Latest Ref Range: 20 - 32 mmol/L 21   Urea Nitrogen Latest Ref Range: 7 - 30 mg/dL 74 (H)   Creatinine Latest Ref Range: 0.66 - 1.25 mg/dL 1.75 (H)   GFR Estimate Latest Ref Range: >60 mL/min/1.7m2 38 (L)   GFR Estimate If Black Latest Ref Range: >60 mL/min/1.7m2 47 (L)   Calcium Latest Ref Range: 8.5 - 10.1 mg/dL 7.9 (L)   Anion Gap Latest Ref Range: 3 - 14 mmol/L 12   Glucose Latest Ref Range: 70 - 99 mg/dL 173 (H)     Assessment    1)  Acute polyarthritis secondary to gout, improved. Suspect late positive culture (diptheroids) R wrist represents a contaminant  2)  SALVADOR. Cr 2.8 max. Continues to improve.  S/p DDKT 2006.     3)  S/P A/P  cervical fusion 5/16.    4)  Oral pharyngeal dysphagia 2nd to #1, stable   5)  Anemia 2nd to acute illness and recent acute blood loss.     7)  Type II DM. BG intermittently low, endocrine has been adjusting dose  8)  HTN, on PTA amlodipine   9)  generalized edema, likely secondary to IV fluids, SALVADOR, improved, with excellent diuresis with IV followed by po lasix  10)  Hypocalcemia, secondary to low Vit D, on Vit D an calcium replacement replacement.  Stable    Plan  Hold lasix today, repeat BMP, review UO today  Stool for C diff  St to be removed today  Monitor R wrist for signs of infection, no further evaluation at this time (reviewed with ID informally)  Endocrine is adjusting insulin   Possible d/c to NH tomorrow

## 2018-06-05 NOTE — PLAN OF CARE
Problem: Infection, Risk/Actual (Adult)  Goal: Identify Related Risk Factors and Signs and Symptoms  Related risk factors and signs and symptoms are identified upon initiation of Human Response Clinical Practice Guideline (CPG).   Outcome: No Change    VS: VSS   O2: Sats >90% on RA   Output: Due to void, castanon out at 0900. Encourage fluids   Last BM: 6/5 - requested imodium, Dr Yulisa sutton. Stool sample still needs to be collected   Activity: Up with Ax1 and walker, up in chair throughout the day. Reposition pt in bed   Skin: Swollen extremities, skin tears on coccyx covered with no sting barrier and mepilex.   Pain: Well managed with scheduled Tylenol   CMS: Intact - denies numbness or tingling   Dressing: CDI   Diet: Consistent CHO diet, fair diet   LDA: PIV, SL   Equipment: Cervical collar, chair cushion, pulsate mattress    Plan: Continue to monitor, possible dc tomorrow   Additional Info:

## 2018-06-05 NOTE — PLAN OF CARE
Problem: Patient Care Overview  Goal: Plan of Care/Patient Progress Review  Discharge Planner PT   Patient plan for discharge: TCU  Current status: Sit to standing from chair with min A x 1.  Pt ambulated 150' with FWW and min A x 1.  Standing to sitting at EOB with FWW and CGA x 1.  Sitting to supine with moderate assist x 1.  Pt needed assist of 2 to scoot/reposition in bed.    Barriers to return to prior living situation: Lives alone, stairs, level of assist, and safety.   Recommendations for discharge: TCU  Rationale for recommendations: Pt would benefit from further rehab for LE strengthening and increase independence with all functional mobility/gait.        Entered by: Ruby Betts 06/05/2018 11:38 AM

## 2018-06-05 NOTE — PLAN OF CARE
"Problem: Patient Care Overview  Goal: Plan of Care/Patient Progress Review  Discharge Planner OT   Patient plan for discharge: TCU, my daughter is handling that  Current status: Patient doing very well this morning- reports \"I had a come to [place] talk last night\".  Patient also reports feeling happy with management of blood sugar/Immodium according to home schedule.  Patient Min A for bed mobility, sit to stand with CGA/Min A depending on height of surface.  Ambulated to/from bathroom, completed toilet transfers, stood at sink to wash hand.  Patient provided education for UB exercises and HEP provided for hands.  Barriers to return to prior living situation: Weakness, limited of B UE due to edema, pain  Recommendations for discharge: TCU  Rationale for recommendations: Continued daily OT for maximum independence in ADLs/mobility       Entered by: Chayo Lopez 06/05/2018 9:32 AM           "

## 2018-06-05 NOTE — PROGRESS NOTES
Diabetes Consult Daily  Progress Note          Assessment/Plan:   Familia Irving is a 73 year old man with type 2 diabetes, renal transplant in 2006, hypertension, and cervical myelopathy, s/p cervical fusion on 5/16/18, admitted with concern for left knee septic arthritis, being treated for gout with steroids, leading to significant hyperglycemia.       Hypoglycemia again yesterday evening, though had only 1 unit aspart at lunch.  Jose Alberto is convinced the low BG correlates with diarrhea.     Plan  -glargine will decrease from 16 units to approx 12 today  -aspart for meals and snacks:  1unit/20g CHO (likely convert to fixed dose for discharge)  -aspart correction medium intensity qAC, HS 0200  -BG monitor qAC, HS 0200         Outpatient diabetes follow up: likely w/ PCP  Plan discussed with patient and bedside RN, and Dr. Márquez.           Interval History:     The last 24 hours progress and nursing notes reviewed.  Jose Alberto complains primarily of loose stool.  His favorite foods, like mashed potatoes, are causing near instantaneous diarrhea.  Generally, he's bored with room service.  So, boredom combined with the loose stool means he's still eating less than usual.  At home ate quite steadily and heartily and managed any loose stool with immodium and acetaminophen.  Creatinine continues to trend down.  Getting up to walk 1-2 times/day    Planning for discharge to TCU on Wed- [facility].  Per Dr. Márquez, they will not carb count there    Prednisone 40 mg finished 6/2.      PTA Regimen: Lantus 30 units every morning, BID glucose monitoring        Recent Labs  Lab 06/05/18  0623 06/04/18  2150 06/04/18  1844 06/04/18  1828 06/04/18  1812 06/04/18  1756 06/04/18  1736  06/04/18  0644  06/03/18  0646  06/02/18  0622  06/01/18  0537  05/31/18  1008   *  --   --   --   --   --   --   --  101*  --  90  --  100*  --  156*  --  155*   BGM  --  167* 100* 82 84 70 52*  < >  --   < >  --   < >  --  "  < >  --   < >  --    < > = values in this interval not displayed.            Review of Systems:   See interval hx          Medications:       Active Diet Order      Consistent Carbohydrate Diet 4961-2388 Calories: Moderate Consistent CHO (4-6 CHO units/meal)     Physical Exam:  Gen: Alert, resting in bed, in NAD   HEENT: NC/AT, mucous membranes are moist  Neck: cervical collar remains in place  Resp: Unlabored  Neuro:oriented x3, communication tangential at times  /65 (BP Location: Right arm)  Pulse 67  Temp 97.3  F (36.3  C) (Oral)  Resp 18  Ht 1.727 m (5' 8\")  Wt 71 kg (156 lb 9.6 oz)  SpO2 100%  BMI 23.81 kg/m2           Data:     Lab Results   Component Value Date    A1C 6.6 05/28/2018    A1C 7.8 04/18/2018            Recent Labs   Lab Test  06/05/18   0623  06/04/18   0644   NA  149*  146*   POTASSIUM  4.4  4.4   CHLORIDE  116*  115*   CO2  21  21   ANIONGAP  12  10   GLC  173*  101*   BUN  74*  88*   CR  1.75*  1.93*   BRAXTON  7.9*  7.7*     CBC RESULTS:   Recent Labs   Lab Test  06/02/18   0622  05/31/18   1008   WBC   --   Canceled, Test credited   RBC   --   Canceled, Test credited   HGB  10.0*  Canceled, Test credited   HCT   --   Canceled, Test credited   MCV   --   Canceled, Test credited   MCH   --   Canceled, Test credited   MCHC   --   Canceled, Test credited   RDW   --   Canceled, Test credited   PLT   --   Canceled, Test credited     Jo Bolton APRN [facility] [phone]    Diabetes Management job code [id number]          "

## 2018-06-05 NOTE — PLAN OF CARE
Problem: Infection, Risk/Actual (Adult)  Goal: Identify Related Risk Factors and Signs and Symptoms  Related risk factors and signs and symptoms are identified upon initiation of Human Response Clinical Practice Guideline (CPG).   Outcome: No Change    VS: Vitals labile, trending WDL    O2: Stable on room air mid to upper 90's   Output: Voids adequate amounts via castanon   Last BM: 6.4.18 (incontinent) unable to obtain sample   Activity: Up with assist of 1 and walker - in chair for meal repositioned as patient allowed   Skin: 3+ Edema in extremities - pressure ulcer to coccyx - dressing changed x1 minimal drainage. Pt declined having cervical collar removed   Pain: Denied pain through the evening   CMS: Numbness and tingling in BLUE pt reports has been this way since admission   Dressing: Mepilx to coccyx change, CDI   Diet: Consistent carb for diabetic pt - BS were 50 at 1700 slowly improved to 82 approx 1840 - 160 at HS   LDA: PIV saline locked   Equipment: Cervical collar    Plan: Manage BG, castanon to be dc'd tomorrow - Lasix for edema - potential dc Wednesday.    Additional Info: Blind in R eye Poor vision in L eye and [place]

## 2018-06-05 NOTE — PLAN OF CARE
Problem: Patient Care Overview  Goal: Plan of Care/Patient Progress Review  Outcome: No Change  A&O x4. CMS and Neuros intact. /56 at 0136. Pt denied pain overnight. Lung sounds clear and equal bilateral. Pt castanon is positional and draining well. Passing flatus. Last BM: 6/5. Scheduled medications administered as ordered. Pt slept majority of shift. Soft touch call light within reach and pt able to make needs known.

## 2018-06-05 NOTE — PROGRESS NOTES
Social Work Services Discharge Note      Patient Name:  Familia Irving     Anticipated Discharge Date: 6/6/18    Discharge Disposition:   TCU:  [facility] [phone] F: [phone]    Following MD:  Dr Nathalia Mesa and NP Mary Anne Rosario     Pre-Admission Screening (PAS) online form has been completed.  The Level of Care (LOC) is:  Determined  Confirmation Code is:  [id number]  Patient/caregiver informed of referral to [facility] Line for Pre-Admission Screening for skilled nursing facility (SNF) placement and to expect a phone call post discharge from SNF.     Additional Services/Equipment Arranged:  None noted     Patient / Family response to discharge plan:  agreeable     Persons notified of above discharge plan:  Pt, son Tacho, daughter Silvia, [facility] Admissions, Dr Márquez    Staff Discharge Instructions:  Please fax discharge orders and signed hard scripts for any controlled substances.  Please print a packet and send with patient.     CTS Handoff completed:  YES    Medicare Notice of Rights provided to the patient/family:  NO- [employer] Commercial plan is payor source for this hospitalization      Addendum:     Pt's son Tacho anticipates being at hospital on Wed, 6/6 around 11-12noon to assist with transportation. He and Silvia are aware that pt has 14 days left of Medicare SNF benefits and that his co-pay after day 14 will be $169.51/day.

## 2018-06-05 NOTE — PROGRESS NOTES
[facility] Nurse Inpatient Pressure Injury Assessment   Reason for consultation: Evaluate and treat sacral pressure injury      ASSESSMENT  Pressure Injury: on sacrum and buttocks , present on admission   Pressure Injury is Stage Unstageable- anticipate this wound will continue to evolve over the next days to weeks.   Contributing factor of the pressure injury: pressure, shear and immobility  Status: re-assessment     TREATMENT PLAN  Sacral wound: Every other day, wash with wound cleanser and gauze. Apply No sting to sacrum and coccyx and allow to dry. Cover with Mepilex, taking care that foam is in contact with all skin, not draped across the  cleft.   Pulsate mattress ordered due to limited ability to turn on side.  Keep HOB less than 30 degrees unless eating  Patient to use a Geomatt cushion when up to chair or wheelchair at all times  Orders Written  [facility] Nurse follow-up plan:weekly  Nursing to notify the Provider(s) and re-consult the [facility] Nurse if wound(s) deteriorates or new skin concern.    Patient History  According to provider note(s): 1)  Acute polyarthritis with fever (left knee, right wrist, both ankles) with monosodium urate crystals on knee and wrist aspirate c/w polyarticular gout. WBC 69021. Cultures pending.  Off Vanco.  Gout with increase incidence in transplant patients with decrease uric acid clearance.  No pain at rest suggesting some benefit from steroids.   2)  SALVADOR.  Cr 2.74.  Cr 2.56  (1.6 ).  S/p DDKT .  Volume depletion considered (with lasix PTA) and lisinopril effect. On IV fluids.  Consider uric acid nephropathy, bladder outlet obstruction as contributing factors.   K high normal 5.2.  Making urine.   3)  S/P A/P cervical fusion .  No issues.   4)  Oral pharyngeal dysphagia 2nd to #1.  5)  Intermittent confusion attributed to recent surgery, opiates, acute febrile illness and subsequent steroids.  Neuro non lateralizing (good motor exam per spine).  Consider head CT if  "doesn't clear.  .   6)  Anemia 2nd to acute illness and recent acute blood loss.   Adequate.  7)  Type II DM with BG down to 70's on admit.  Hyperglycemia with IV solumedrol  .  On carb coverage and medium correction.  8)  HTN, on PTA amlodipine (off lasix and lisinopril).  Adequate.   9)  Urinary urgency and frequency 2nd to retention.  UA bland.   UC mixed braydon.   10)  HLD.   11)  PAC's on rhythm after cervical fusion.  Benign.     Objective Data  Containment of urine/stool: Continent of bladder, occassionally incontinent of stool    Current Diet/ Nutrition:    Active Diet Order      Consistent Carbohydrate Diet 3648-2887 Calories: Moderate Consistent CHO (4-6 CHO units/meal)    Output:   I/O last 3 completed shifts:  In: 650 [P.O.:650]  Out: 3275 [Urine:3275]    Risk Assessment:   Sensory Perception: 2-->very limited  Moisture: 3-->occasionally moist  Activity: 1-->bedfast  Mobility: 2-->very limited  Nutrition: 3-->adequate  Friction and Shear: 2-->potential problem  Shahriar Score: 13    Labs:     Recent Labs  Lab 18  0646 18  0622 18  1008   ALBUMIN 1.9*  --   --    HGB  --  10.0* Canceled, Test credited   WBC  --   --  Canceled, Test credited       Physical Exam  Skin inspection: focused buttocks, sacrum, coccyx    Wound Location:  Sacrum    2018    Wound History: Present on admission. Patient with limited mobility. Prefers HOB up high and has a difficult time resting on side due to neck brace.  2018: Wound evolving over the past week. Areas of deep tissue injury have evolved and are less purple today. There remain some areas (particularly on the left buttock and midline sacrum/coccyx) in which the wound base cannot be seen. Expect being able to definitively stage this wound in a week.  Measurements (length x width x depth, in cm) Wound is a \"U\" shape starting over the sacrum and extending down over the coccyx and onto the buttocks within the  cleft.   Wound Base: mixed " tissue bases: Dermis with light yellow fibrin cover mixed with brown devitalized tissue.  Tunneling N/A  Undermining N/A  Palpation of the wound bed: firm   Periwound skin: denuded but improving  Color: red  Temperature: normal   Drainage: none  Description of drainage: serosanguinous  Odor: none  Pain: denies     Interventions  Current support surface: Pulsate mattress in place, Geomatt cushion on chair in room  Current off-loading measures: Foam padding and Pillows under calves  Visual inspection of wound(s) completed   Wound Care: was done per plan of care.  Supplies: floor stock  Educated provided: importance of repositioning and adequate blood glucose control and protein intake  Education provided to: patient  and nurse  Discussed importance of:repositioning every 2 hours, off-loading pressure to wound, wearing off-loading boots, their role in pressure injury prevention, head elevation <30 degrees, off-loading mattress, nutrition on wound healing and moisture management  Discussed plan of care with Patient, Nurse    Adrienne Cole RN

## 2018-06-06 ENCOUNTER — APPOINTMENT (OUTPATIENT)
Dept: PHYSICAL THERAPY | Facility: CLINIC | Age: 73
DRG: 554 | End: 2018-06-06
Payer: MEDICARE

## 2018-06-06 VITALS
SYSTOLIC BLOOD PRESSURE: 177 MMHG | HEART RATE: 67 BPM | RESPIRATION RATE: 16 BRPM | DIASTOLIC BLOOD PRESSURE: 63 MMHG | BODY MASS INDEX: 24.26 KG/M2 | WEIGHT: 160.1 LBS | TEMPERATURE: 96.3 F | OXYGEN SATURATION: 99 % | HEIGHT: 68 IN

## 2018-06-06 LAB
ANION GAP SERPL CALCULATED.3IONS-SCNC: 9 MMOL/L (ref 3–14)
BUN SERPL-MCNC: 63 MG/DL (ref 7–30)
CALCIUM SERPL-MCNC: 7.8 MG/DL (ref 8.5–10.1)
CHLORIDE SERPL-SCNC: 121 MMOL/L (ref 94–109)
CO2 SERPL-SCNC: 21 MMOL/L (ref 20–32)
CREAT SERPL-MCNC: 1.57 MG/DL (ref 0.66–1.25)
GFR SERPL CREATININE-BSD FRML MDRD: 44 ML/MIN/1.7M2
GLUCOSE BLDC GLUCOMTR-MCNC: 106 MG/DL (ref 70–99)
GLUCOSE BLDC GLUCOMTR-MCNC: 72 MG/DL (ref 70–99)
GLUCOSE BLDC GLUCOMTR-MCNC: 73 MG/DL (ref 70–99)
GLUCOSE BLDC GLUCOMTR-MCNC: 76 MG/DL (ref 70–99)
GLUCOSE SERPL-MCNC: 81 MG/DL (ref 70–99)
POTASSIUM SERPL-SCNC: 4.2 MMOL/L (ref 3.4–5.3)
SODIUM SERPL-SCNC: 151 MMOL/L (ref 133–144)

## 2018-06-06 PROCEDURE — 97530 THERAPEUTIC ACTIVITIES: CPT | Mod: GP | Performed by: PHYSICAL THERAPIST

## 2018-06-06 PROCEDURE — 40000193 ZZH STATISTIC PT WARD VISIT: Performed by: PHYSICAL THERAPIST

## 2018-06-06 PROCEDURE — 25000132 ZZH RX MED GY IP 250 OP 250 PS 637: Mod: GY | Performed by: INTERNAL MEDICINE

## 2018-06-06 PROCEDURE — 97116 GAIT TRAINING THERAPY: CPT | Mod: GP | Performed by: PHYSICAL THERAPIST

## 2018-06-06 PROCEDURE — 25000132 ZZH RX MED GY IP 250 OP 250 PS 637: Mod: GY | Performed by: STUDENT IN AN ORGANIZED HEALTH CARE EDUCATION/TRAINING PROGRAM

## 2018-06-06 PROCEDURE — A9270 NON-COVERED ITEM OR SERVICE: HCPCS | Mod: GY | Performed by: INTERNAL MEDICINE

## 2018-06-06 PROCEDURE — A9270 NON-COVERED ITEM OR SERVICE: HCPCS | Mod: GY | Performed by: STUDENT IN AN ORGANIZED HEALTH CARE EDUCATION/TRAINING PROGRAM

## 2018-06-06 PROCEDURE — 25000131 ZZH RX MED GY IP 250 OP 636 PS 637: Performed by: INTERNAL MEDICINE

## 2018-06-06 PROCEDURE — 36416 COLLJ CAPILLARY BLOOD SPEC: CPT | Performed by: INTERNAL MEDICINE

## 2018-06-06 PROCEDURE — 25000132 ZZH RX MED GY IP 250 OP 250 PS 637: Performed by: INTERNAL MEDICINE

## 2018-06-06 PROCEDURE — 99239 HOSP IP/OBS DSCHRG MGMT >30: CPT | Performed by: INTERNAL MEDICINE

## 2018-06-06 PROCEDURE — 00000146 ZZHCL STATISTIC GLUCOSE BY METER IP

## 2018-06-06 PROCEDURE — 80048 BASIC METABOLIC PNL TOTAL CA: CPT | Performed by: INTERNAL MEDICINE

## 2018-06-06 RX ORDER — DEXTROSE MONOHYDRATE 50 MG/ML
INJECTION, SOLUTION INTRAVENOUS CONTINUOUS
Status: DISCONTINUED | OUTPATIENT
Start: 2018-06-06 | End: 2018-06-06

## 2018-06-06 RX ADMIN — VITAMIN D, TAB 1000IU (100/BT) 2000 UNITS: 25 TAB at 08:37

## 2018-06-06 RX ADMIN — FOLIC ACID 1 MG: 1 TABLET ORAL at 08:37

## 2018-06-06 RX ADMIN — TACROLIMUS 1 MG: 1 CAPSULE, GELATIN COATED ORAL at 08:37

## 2018-06-06 RX ADMIN — ACETAMINOPHEN 1000 MG: 500 TABLET, FILM COATED ORAL at 08:42

## 2018-06-06 RX ADMIN — MYCOPHENOLIC ACID 720 MG: 360 TABLET, DELAYED RELEASE ORAL at 08:37

## 2018-06-06 RX ADMIN — ACETAMINOPHEN 1000 MG: 500 TABLET, FILM COATED ORAL at 01:30

## 2018-06-06 RX ADMIN — ALLOPURINOL 100 MG: 100 TABLET ORAL at 08:37

## 2018-06-06 NOTE — PLAN OF CARE
Problem: Patient Care Overview  Goal: Plan of Care/Patient Progress Review  Occupational Therapy Discharge Summary    Reason for therapy discharge:    Discharged to transitional care facility.    Progress towards therapy goal(s). See goals on Care Plan in [facility] electronic health record for goal details.  Goals partially met.  Barriers to achieving goals:   discharge from facility.    Therapy recommendation(s):    Continued therapy is recommended.  Rationale/Recommendations:  limited independence in ADLs/mobility.

## 2018-06-06 NOTE — PROGRESS NOTES
Diabetes team brief note-  Active orders were updated this morning to fixed meal dose aspart and reduced glargine.  We defer to primary team for discharge diabetes regimen.    Jo Bolton APRN [phone]

## 2018-06-06 NOTE — PLAN OF CARE
Problem: Patient Care Overview  Goal: Plan of Care/Patient Progress Review  Outcome: Adequate for Discharge Date Met: 06/06/18  Pt. states ready for discharge . Seen by Dr. Márquez. TRUE had 950 cc this shift. Voided good amount . BG last was 106.  Willard , pt's son here to pick  Patient to LTF.Pt. left the unit at 13:00 with all valuables, belongings accompanied by son-Willard. AVS sent .

## 2018-06-06 NOTE — PROGRESS NOTES
Pt feels well, is anxious to d/c to TCU  Generalized joint pain continues to improve    Appetite good, Pt states he is drinking a normal amount  Denies excessive thirst  No diarrhea  PVR less than 100  UO recorded at 1500 cc yesterday, but pt was also incontinent        VSS    Results for JC SHI (MRN [id number]) as of 6/6/2018 09:47   Ref. Range 6/5/2018 22:10 6/6/2018 02:11 6/6/2018 02:46 6/6/2018 07:01 6/6/2018 07:28   Glucose Latest Ref Range: 70 - 99 mg/dL 177 (H) 73 72 81 76     Alert, fully oriented  Oral mucosa moist  Lungs clear  CV rrr  And soft  1 + LE edema both LE  Both wrists remain mod swollen, non-tender      Results for JC SHI (MRN [id number]) as of 6/6/2018 09:47   Ref. Range 6/6/2018 07:01   Sodium Latest Ref Range: 133 - 144 mmol/L 151 (H)   Potassium Latest Ref Range: 3.4 - 5.3 mmol/L 4.2   Chloride Latest Ref Range: 94 - 109 mmol/L 121 (H)   Carbon Dioxide Latest Ref Range: 20 - 32 mmol/L 21   Urea Nitrogen Latest Ref Range: 7 - 30 mg/dL 63 (H)   Creatinine Latest Ref Range: 0.66 - 1.25 mg/dL 1.57 (H)   GFR Estimate Latest Ref Range: >60 mL/min/1.7m2 44 (L)   GFR Estimate If Black Latest Ref Range: >60 mL/min/1.7m2 53 (L)   Calcium Latest Ref Range: 8.5 - 10.1 mg/dL 7.8 (L)   Anion Gap Latest Ref Range: 3 - 14 mmol/L 9       Assessment    1)  Acute polyarthritis secondary to gout, improved. Suspect late positive culture (diptheroids) R wrist represents a contaminant  2)  SALVADOR. Cr 2.8 max. Continues to improve.  S/p DDKT 2006.  Pt with hypernatremia, likely related to recent lasix, decreased oral intake.  less likely DI or post SALVADOR concentrating defect  3)  S/P A/P cervical fusion 5/16.    4)  Oral pharyngeal dysphagia 2nd to #1, stable   5)  Anemia 2nd to acute illness and recent acute blood loss.     7)  Type II DM. BG intermittently low, endocrine has been adjusting dose  8)  HTN, off PTA amlodipine and lisinopril  9)  generalized edema, likely secondary to IV fluids, SALVADOR,  improved, with excellent diuresis with IV followed by po lasix. Lasix now on hold  10)  Hypocalcemia, secondary to low Vit D, on Vit D an calcium replacement replacement.  Stable    Plan  Pt is medically stable for d/c to TCU  Hypernatremia is a concern. I suspect Pt is simply not drinking sufficient amount of 02    I and staff encouraged him to drink water this am. He was noted to drink approximately 12 ounces without difficulty  Discussed this concerns with accepting NP, who is ok with Pt d/c, with close attention to intake and BMP

## 2018-06-06 NOTE — PLAN OF CARE
Problem: Patient Care Overview  Goal: Plan of Care/Patient Progress Review  Discharge Planner PT   Patient plan for discharge: TCU  Current status: Supine to sitting at EOB with min A x 1.  Sit to standing from bed with FWW and min A x 1.  Pt ambulated 200' with FWW and SBA x 1.  Standing to sitting in chair with FWW and CGA x 1.  Pt educated on car transfer and pt does not have any concerns about transfer.  Pt reported that his son will be able to assist him out of the car.   Barriers to return to prior living situation: Lives alone, level of assist, safety, and stairs.   Recommendations for discharge: TCU.   Rationale for recommendations: Pt would benefit from further skilled PT for LE strengthening and increase independence with all functional mobility/gait.        Entered by: Ruby Betts 06/06/2018 1:43 PM       Physical Therapy Discharge Summary    Reason for therapy discharge:    Discharged to transitional care facility.    Progress towards therapy goal(s). See goals on Care Plan in [facility] electronic health record for goal details.  Goals not met.  Barriers to achieving goals:   discharge from facility.    Therapy recommendation(s):    Continued therapy is recommended.  Rationale/Recommendations:  See above.

## 2018-06-06 NOTE — PLAN OF CARE
Problem: Patient Care Overview  Goal: Plan of Care/Patient Progress Review  Outcome: No Change  A&O x4. Pt denied pain overnight. Lung sounds clear and equal bilateral. Pt incontinent of bladder x 1. Pt up to use bathroom with walker and assist x1. Pt voiding well. Passing flatus. Last BM: 6/5. BGs overnight were 73 and 72. Pt given apple juice and daryl crackers. Scheduled Tylenol administered at 0130 and pt asked not to be woken for any other medications overnight.. Pt sleeping majority of shift. Soft touch call light within reach and pt able to make needs known. Pt plan to d/c today to SNF.

## 2018-06-06 NOTE — DISCHARGE SUMMARY
Admit Date:     05/27/2018   Discharge Date:  6/6/18     DATE OF ADMISSION: 05/27/2018   DATE OF PLANNED DISCHARGE: 06/06/2018      HISTORY AND HOSPITAL COURSE:  Familia Irving is a 73-year-old man with a remote history of gout, status post recent cervical spine decompression and fusion by Dr. Posey, who was admitted from his transitional care unit for the evaluation of fever, confusion, and generalized arthralgias, particularly involving his wrist and left knee.  The patient had been unable to bear weight over the previous 24 hours.      The concern on admission was that the patient might have a septic left knee.  He subsequently underwent an aspiration of the left knee as well as his right wrist, both of which revealed intracellular monosodium urate crystals, consistent with acute gouty arthritis.  The patient's uric acid level was elevated at 10.  CRP was over 250.  The patient initially received 1 dose of IV vancomycin because of concern regarding septic joints.  This was not restarted as his clinical picture subsequently seemed to be most consistent with oligoarticular acute gout flare.  Blood cultures were subsequently obtained and were unremarkable.  In the meantime, the patient was started empirically on Solu-Medrol.  He received 1 dose of 120 mg IV with marked improvement in his generalized arthralgias the next morning.  He subsequently received 5 more days of prednisone 40 mg a day with continued improvement in his generalized arthralgias.      The patient did have a fairly complex hospital course, which is as summarized below.   1.  Acute gout flare involving multiple joints including both wrists, left knee, and both ankles with marked improvement with Solu-Medrol, followed by prednisone.  Blood cultures were subsequently negative.  Of note, the patient did have late growth of diphtheroids from his right wrist aspirate.  This was reviewed informally with Infectious Disease and felt most likely to  represent contaminant and not requiring further treatment.  As above, he received only 1 dose of antibiotics, vancomycin, at the time of admission.   2.  Acute kidney injury.  The patient with history of transplanted kidney.  Baseline creatinine is 1.8-1.9.  Creatinine did reach a peak of 2.8.  He was seen by Nephrology who felt that he was experiencing nonoliguric acute renal failure likely secondary to acute inflammation as well as vancomycin and preadmission use of furosemide and lisinopril.  The patient's renal function was tracked very closely and on the day prior to discharge, his creatinine was down to 1.75.  Prior to admission antihypertensive medications, amlodipine and lisinopril, were discontinued secondary to acute kidney injury and relatively low blood pressure and were not required during the hospitalization.   3.  Volume overload.  The patient did develop generalized edema secondary to IV fluids for the treatment of volume depletion and acute kidney injury.  His prior to admission furosemide was held as noted above, but was restarted 4 days prior to discharge with excellent diuresis.  The patient did develop hypernatremia subsequent to diuretic therapy and reduced oral liquid intake. He was encouraged to drink water and actually did drink approximately 500 cc during a one hour period on the morning of discharge.  It is less likely that the hypernatremia is related to a renal concentrating defect or DI.  Lasix remains on hold   His prior to admission furosemide dose was 20 mg twice daily.   4.  Confusion.  The patient was noted by family to be acutely confused prior to admission.  Though in retrospect, he had been mildly confused since his discharge from the hospital following his cervical spine surgery.  This was attributed to multiple factors including opioids and acute inflammation. Mental status did gradually improve, at or close to baseline at the time of discharge.   5.  Hypocalcemia with ionized  calcium level of 3.0 noted on 05/31/2018.  This was associated with a slightly elevated phosphorus level, a very elevated PTH level and a low vitamin D level.  This was felt to represent hypovitamin D syndrome, likely secondary to chronic kidney disease and did improve with calcium supplementation, vitamin D supplementation and calcitriol.   6.  History of kidney transplant.  Creatinine, as above, did elevate to 2.8 prior to returning to baseline prior to discharge.  He was seen by Nephrology who recommended continuation of his current transplant medication doses.   7.  Diabetes mellitus.  Blood glucoses predictably were quite elevated shortly after admission secondary to steroids, but did improve significantly towards the end of his hospital course.  He did require a bump in his Lantus as well as the short-term institution of NPH in the setting of prednisone.  Blood glucoses were in the 100s prior to discharge.  Insulin orders are as below.  I would add that at baseline, the patient only takes Lantus insulin once daily and does not use prandial insulin.   8.  Urinary retention.  The patient did develop urinary retention in the setting of acute illness and confusion, requiring short-term St catheter placement.  St catheter was discontinued on the day prior to discharge with postvoid residual volumes less than 100 mL.   9.  Pressure injury over sacrum and buttocks, present on admission, which was followed by the wound nurse.  Current wound care orders will be noted on the discharge instruction sheet.   10.  Oropharyngeal dysphagia likely secondary to acute illness.  The patient was followed by Speech Pathology and also was noted to be able to tolerate a regular diet.   11.  Hypertension.  The patient's blood pressure was relatively low at the time of presentation to the hospital.  Accordingly, amlodipine, furosemide and lisinopril were held.  Lisinopril and amlodipine were not restarted.  It is recommended by  Rheumatology that at some point, as the blood pressure and renal function remain stable, he would be a candidate for the institution of losartan versus lisinopril as losartan has urate lowering properties.   12.  Status post recent anterior-posterior multilevel cervical spine decompression with instrumentation, C3 to T1 on 05/16/2018.  The patient continues to wear a cervical collar.  There are no limitations in activity.      Though the patient's functional status has improved considerably since admission, he remains debilitated from his recent surgery and subsequent acute medical illness.  He therefore will discharge to the [facility] TCU for continued therapy and monitoring of his medical status.  The patient and family were in agreement with this plan.      DISCHARGE DIAGNOSES:   1.  Acute oligoarticular gouty flare involving both wrists, left knee and both ankles with marked improvement with intravenous followed by oral steroids.  He has been off steroids for 4 days at the time of discharge.   2.  Status post recent cervical decompression with fusion as noted above.   3.  Acute kidney injury in a patient with a transplanted kidney, likely secondary to volume depletion, acute inflammatory state and preoperative use of lisinopril and furosemide, and possibly to single dose of vancomycin, resolved.   4.  Fluid overload secondary to IV fluids. Improved with IV followed by po lasix,  however, with subsequent hypernatremia which is limiting continued use of lasix, as noted above  5.  Confusion, likely representing delirium from recent cervical spine surgery, acute inflammatory illness as well as oxycodone use prior to this admission, improved.   6.  Hypocalcemia associated with hypovitaminosis D secondary to kidney failure, improved with calcium and vitamin D supplementation.   7.  Diabetes mellitus type 2 with elevated blood glucoses in the setting of systemic steroid use, trending down to baseline at the time  of discharge.   8.  Urinary retention, resolved.   9.  Sacral wound prior to admission secondary to pressure, stable during this hospitalization.   10.  Hypertension, stable currently off prior to admission lisinopril and amlodipine, with recommendation as noted above to consider losartan in place of lisinopril in the future.      DISCHARGE MEDICATIONS:    1.  Tylenol 650 mg every 4 hours p.r.n. pain.   2.  Allopurinol 100 mg daily.     3.  Rocaltrol 0.25 mcg daily.   4.  Calcium carbonate 500 mg twice daily.   5.  Vitamin D3 2000 units daily.   6.  Folic acid 1 mg daily.   7.  Lantus insulin 6 units daily.     He will not be discharged on prandial insulin coverage.  8.  Mycophenolic acid enteric-coated 720 mg twice daily.   9.  Senna 1-2 times twice daily p.r.n.    10.  Bicitra solution 30 mL daily.    11.  Tacrolimus (Prograf brand) 1 mg twice daily.    12.  Loperamide 2 mg 4 times a day p.r.n. diarrhea.    13.  ASA  81 mg daily  14. Pravastatin 40 mg daily     DISCHARGE INSTRUCTIONS: The patient is to wear a [place] J cervical collar at all times.  Wound care to the sacral wound is to be done every other day.  Wound is to be washed with wound cleanser and gauze, No-Sting applied to the sacrum and allowed to dry.  The wound is covered with Mepilex.  The patient is to use a Steven-Stepan cushion when up to chair or in a wheelchair at all times.      The patient will have bi-weekly basic metabolic panel obtained. He will be encouraged to drink at least 2000 cc of fluid daily.  BG will be monitored qid and every 2 am. Tacrolimus levels will be obtained every Monday and will need to be called to the patient's primary nephrologist, Dr. Suggs at the [facility].  A uric acid level should be obtained in 1 month with the goal of reducing the uric acid to less than 6.  The patient will follow up with Dr. Bass of the [facility].         GIOVANI WELLS MD             6/6/18     Name:     YURIDIA  JC   MRN:      [id number]        Account:        [id number]   :      1945           Admit Date:     2018                                  Discharge Date:       Document: [id number]       cc: [facility]

## 2018-06-06 NOTE — PROGRESS NOTES
Pt BG at 0211 was 73. Pt was given apple juice but would only drink a few sips. BG rechecked at 0249 was 72. Pt given daryl crackers. Pt is within reference range and refused any further intervention.

## 2018-06-06 NOTE — PLAN OF CARE
Problem: Patient Care Overview  Goal: Plan of Care/Patient Progress Review  Outcome: No Change    VS: VSS   O2: >90% on RA   Output: Voiding adequate amounts; PVR 98.   Last BM: 6/5 - loose stools per pt/report. Still need a stool sample to r/o c. Diff. Pt aware. Immodium administered.   Activity: Up in chair for the majority of the shift; 1A with FWW and gait belt   Skin: Intact except for anterior/posterior neck incisions from fusion on 5/6 (refused removal for skin check), and sacral wounds   Pain: Managed with scheduled tylenol   CMS: Intact. Swollen extremities - improving per pt/report   Dressing: CDI   Diet: Tolerating consistent carb diet; fair appetite. BG at supper 147, 177 at HS.   LDA: PIV SL   Equipment: [place] J cervical collar, Geomatt cushion In chair, pulsate mattress, FWW, gait belt   Plan: Likely DC to NH tomorrow, 6/6   Additional Info: Takes meds with apple sauce

## 2018-06-07 ENCOUNTER — TRANSFERRED RECORDS (OUTPATIENT)
Dept: HEALTH INFORMATION MANAGEMENT | Facility: CLINIC | Age: 73
End: 2018-06-07

## 2018-06-07 ENCOUNTER — RECORDS - HEALTHEAST (OUTPATIENT)
Dept: LAB | Facility: CLINIC | Age: 73
End: 2018-06-07

## 2018-06-07 ENCOUNTER — NURSING HOME VISIT (OUTPATIENT)
Dept: GERIATRICS | Facility: CLINIC | Age: 73
End: 2018-06-07
Payer: COMMERCIAL

## 2018-06-07 VITALS
TEMPERATURE: 97.7 F | SYSTOLIC BLOOD PRESSURE: 165 MMHG | HEART RATE: 87 BPM | BODY MASS INDEX: 28.28 KG/M2 | OXYGEN SATURATION: 99 % | DIASTOLIC BLOOD PRESSURE: 77 MMHG | WEIGHT: 186 LBS | RESPIRATION RATE: 18 BRPM

## 2018-06-07 DIAGNOSIS — E87.70 HYPERVOLEMIA, UNSPECIFIED HYPERVOLEMIA TYPE: ICD-10-CM

## 2018-06-07 DIAGNOSIS — E11.65 TYPE 2 DIABETES MELLITUS WITH HYPERGLYCEMIA, WITH LONG-TERM CURRENT USE OF INSULIN (H): Chronic | ICD-10-CM

## 2018-06-07 DIAGNOSIS — R33.9 URINARY RETENTION: ICD-10-CM

## 2018-06-07 DIAGNOSIS — T86.10 COMPLICATION OF TRANSPLANTED KIDNEY, UNSPECIFIED COMPLICATION: ICD-10-CM

## 2018-06-07 DIAGNOSIS — Z79.4 TYPE 2 DIABETES MELLITUS WITH HYPERGLYCEMIA, WITH LONG-TERM CURRENT USE OF INSULIN (H): Chronic | ICD-10-CM

## 2018-06-07 DIAGNOSIS — R41.0 CONFUSION: ICD-10-CM

## 2018-06-07 DIAGNOSIS — N17.9 AKI (ACUTE KIDNEY INJURY) (H): ICD-10-CM

## 2018-06-07 DIAGNOSIS — E87.0 HYPERNATREMIA: ICD-10-CM

## 2018-06-07 DIAGNOSIS — Z98.1 S/P CERVICAL SPINAL FUSION: ICD-10-CM

## 2018-06-07 DIAGNOSIS — M10.9 ACUTE GOUT OF MULTIPLE SITES, UNSPECIFIED CAUSE: Primary | ICD-10-CM

## 2018-06-07 DIAGNOSIS — K59.01 SLOW TRANSIT CONSTIPATION: ICD-10-CM

## 2018-06-07 DIAGNOSIS — I10 ESSENTIAL HYPERTENSION: ICD-10-CM

## 2018-06-07 LAB
ANION GAP SERPL CALCULATED.3IONS-SCNC: 9 MMOL/L (ref 5–18)
ANION GAP SERPL CALCULATED.3IONS-SCNC: 9 MMOL/L (ref 5–18)
BASOPHILS # BLD AUTO: 0 THOU/UL (ref 0–0.2)
BASOPHILS NFR BLD AUTO: 0 % (ref 0–2)
BUN SERPL-MCNC: 52 MG/DL (ref 8–28)
BUN SERPL-MCNC: 52 MG/DL (ref 8–28)
CALCIUM SERPL-MCNC: 8 MG/DL (ref 8.5–10.5)
CALCIUM SERPL-MCNC: 8 MG/DL (ref 8.5–10.5)
CHLORIDE BLD-SCNC: 117 MMOL/L (ref 98–107)
CHLORIDE SERPLBLD-SCNC: 117 MMOL/L (ref 98–107)
CO2 SERPL-SCNC: 21 MMOL/L (ref 22–31)
CO2 SERPL-SCNC: 21 MMOL/L (ref 22–31)
CREAT SERPL-MCNC: 1.51 MG/DL (ref 0.7–1.3)
CREAT SERPL-MCNC: 1.51 MG/DL (ref 0.7–1.3)
DIFFERENTIAL: ABNORMAL
EOSINOPHIL # BLD AUTO: 0.2 THOU/UL (ref 0–0.4)
EOSINOPHIL NFR BLD AUTO: 1 % (ref 0–6)
ERYTHROCYTE [DISTWIDTH] IN BLOOD BY AUTOMATED COUNT: 16.3 % (ref 11–14.5)
ERYTHROCYTE [DISTWIDTH] IN BLOOD BY AUTOMATED COUNT: 16.3 % (ref 11–14.5)
GFR SERPL CREATININE-BSD FRML MDRD: 46 ML/MIN/1.73M2
GFR SERPL CREATININE-BSD FRML MDRD: 46 ML/MIN/1.73M2
GLUCOSE BLD-MCNC: 97 MG/DL (ref 70–125)
GLUCOSE SERPL-MCNC: 97 MG/DL (ref 70–125)
HCT VFR BLD AUTO: 25.1 % (ref 40–54)
HCT VFR BLD AUTO: 25.1 % (ref 40–54)
HEMOGLOBIN: 8.1 G/DL (ref 14–18)
HGB BLD-MCNC: 8.1 G/DL (ref 14–18)
LYMPHOCYTES # BLD AUTO: 0.6 THOU/UL (ref 0.8–4.4)
LYMPHOCYTES NFR BLD AUTO: 4 % (ref 20–40)
MCH RBC QN AUTO: 29.6 PG (ref 27–34)
MCH RBC QN AUTO: 29.6 PG (ref 27–34)
MCHC RBC AUTO-ENTMCNC: 32.3 G/DL (ref 32–36)
MCHC RBC AUTO-ENTMCNC: 32.3 G/DL (ref 32–36)
MCV RBC AUTO: 92 FL (ref 80–100)
MCV RBC AUTO: 92 FL (ref 80–100)
MONOCYTES # BLD AUTO: 1 THOU/UL (ref 0–0.9)
MONOCYTES NFR BLD AUTO: 5 % (ref 2–10)
NEUTROPHILS # BLD AUTO: 16.2 THOU/UL (ref 2–7.7)
NEUTROPHILS NFR BLD AUTO: 90 % (ref 50–70)
PLATELET # BLD AUTO: 155 THOU/UL (ref 140–440)
PLATELET # BLD AUTO: 155 THOU/UL (ref 140–440)
PMV BLD AUTO: 12.2 FL (ref 8.5–12.5)
POTASSIUM BLD-SCNC: 3.7 MMOL/L (ref 3.5–5)
POTASSIUM SERPL-SCNC: 3.7 MMOL/L (ref 3.5–5)
RBC # BLD AUTO: 2.74 MILL/UL (ref 4.4–6.2)
RBC # BLD AUTO: 2.74 MILL/UL (ref 4.4–6.2)
SODIUM SERPL-SCNC: 147 MMOL/L (ref 136–145)
SODIUM SERPL-SCNC: 147 MMOL/L (ref 136–145)
WBC # BLD AUTO: 18.2 THOU/UL (ref 4–11)
WBC: 18.2 THOU/UL (ref 4–11)

## 2018-06-07 PROCEDURE — 99310 SBSQ NF CARE HIGH MDM 45: CPT | Performed by: NURSE PRACTITIONER

## 2018-06-07 NOTE — PROGRESS NOTES
[facility]  PRIMARY CARE PROVIDER AND CLINIC:  Clinic, [facility] [place]  Chief Complaint   Patient presents with     Hospital F/U     [place] Medical Record Number:  [id number]    HPI:    Familia Irving is a 73 year old  (1945),admitted to the [facility] from [facility].  Hospital stay 5/27/18 through 6/6/18.  Admitted to this facility for  rehab, medical management and nursing care.      Hospital Course Per [facility] Discharge Summary 6//618:    Familia Irving is a 73-year-old man with a remote history of gout, status post recent cervical spine decompression and fusion by Dr. Posey, who was admitted from his transitional care unit for the evaluation of fever, confusion, and generalized arthralgias, particularly involving his wrist and left knee.  The patient had been unable to bear weight over the previous 24 hours.       The concern on admission was that the patient might have a septic left knee.  He subsequently underwent an aspiration of the left knee as well as his right wrist, both of which revealed intracellular monosodium urate crystals, consistent with acute gouty arthritis.  The patient's uric acid level was elevated at 10.  CRP was over 250.  The patient initially received 1 dose of IV vancomycin because of concern regarding septic joints.  This was not restarted as his clinical picture subsequently seemed to be most consistent with oligoarticular acute gout flare.  Blood cultures were subsequently obtained and were unremarkable.  In the meantime, the patient was started empirically on Solu-Medrol.  He received 1 dose of 120 mg IV with marked improvement in his generalized arthralgias the next morning.  He subsequently received 5 more days of prednisone 40 mg a day with continued improvement in his generalized arthralgias.       The patient did have a fairly complex hospital course, which is as summarized below.   1.  Acute  gout flare involving multiple joints including both wrists, left knee, and both ankles with marked improvement with Solu-Medrol, followed by prednisone.  Blood cultures were subsequently negative.  Of note, the patient did have late growth of diphtheroids from his right wrist aspirate.  This was reviewed informally with Infectious Disease and felt most likely to represent contaminant and not requiring further treatment.  As above, he received only 1 dose of antibiotics, vancomycin, at the time of admission.   2.  Acute kidney injury.  The patient with history of transplanted kidney.  Baseline creatinine is 1.8-1.9.  Creatinine did reach a peak of 2.8.  He was seen by Nephrology who felt that he was experiencing nonoliguric acute renal failure likely secondary to acute inflammation as well as vancomycin and preadmission use of furosemide and lisinopril.  The patient's renal function was tracked very closely and on the day prior to discharge, his creatinine was down to 1.75.  Prior to admission antihypertensive medications, amlodipine and lisinopril, were discontinued secondary to acute kidney injury and relatively low blood pressure and were not required during the hospitalization.   3.  Volume overload.  The patient did develop generalized edema secondary to IV fluids for the treatment of volume depletion and acute kidney injury.  His prior to admission furosemide was held as noted above, but was restarted 4 days prior to discharge with excellent diuresis.  The patient did develop hypernatremia subsequent to diuretic therapy and reduced oral liquid intake. He was encouraged to drink water and actually did drink approximately 500 cc during a one hour period on the morning of discharge.  It is less likely that the hypernatremia is related to a renal concentrating defect or DI.  Lasix remains on hold   His prior to admission furosemide dose was 20 mg twice daily.   4.  Confusion.  The patient was noted by family to be  acutely confused prior to admission.  Though in retrospect, he had been mildly confused since his discharge from the hospital following his cervical spine surgery.  This was attributed to multiple factors including opioids and acute inflammation. Mental status did gradually improve, at or close to baseline at the time of discharge.   5.  Hypocalcemia with ionized calcium level of 3.0 noted on 05/31/2018.  This was associated with a slightly elevated phosphorus level, a very elevated PTH level and a low vitamin D level.  This was felt to represent hypovitamin D syndrome, likely secondary to chronic kidney disease and did improve with calcium supplementation, vitamin D supplementation and calcitriol.   6.  History of kidney transplant.  Creatinine, as above, did elevate to 2.8 prior to returning to baseline prior to discharge.  He was seen by Nephrology who recommended continuation of his current transplant medication doses.   7.  Diabetes mellitus.  Blood glucoses predictably were quite elevated shortly after admission secondary to steroids, but did improve significantly towards the end of his hospital course.  He did require a bump in his Lantus as well as the short-term institution of NPH in the setting of prednisone.  Blood glucoses were in the 100s prior to discharge.  Insulin orders are as below.  I would add that at baseline, the patient only takes Lantus insulin once daily and does not use prandial insulin.   8.  Urinary retention.  The patient did develop urinary retention in the setting of acute illness and confusion, requiring short-term St catheter placement.  St catheter was discontinued on the day prior to discharge with postvoid residual volumes less than 100 mL.   9.  Pressure injury over sacrum and buttocks, present on admission, which was followed by the wound nurse.  Current wound care orders will be noted on the discharge instruction sheet.   10.  Oropharyngeal dysphagia likely secondary to  acute illness.  The patient was followed by Speech Pathology and also was noted to be able to tolerate a regular diet.   11.  Hypertension.  The patient's blood pressure was relatively low at the time of presentation to the hospital.  Accordingly, amlodipine, furosemide and lisinopril were held.  Lisinopril and amlodipine were not restarted.  It is recommended by Rheumatology that at some point, as the blood pressure and renal function remain stable, he would be a candidate for the institution of losartan versus lisinopril as losartan has urate lowering properties.   12.  Status post recent anterior-posterior multilevel cervical spine decompression with instrumentation, C3 to T1 on 05/16/2018.  The patient continues to wear a cervical collar.  There are no limitations in activity.       Though the patient's functional status has improved considerably since admission, he remains debilitated from his recent surgery and subsequent acute medical illness.  He therefore will discharge to the [facility] TCU for continued therapy and monitoring of his medical status.  The patient and family were in agreement with this plan.     HPI information obtained from: facility chart records, facility staff, patient report and [facility] chart review.      Current issues are:      Acute gout of multiple sites, unspecified cause  See above. He says his knees are little stiff when he first gets up, but then this improves with movement. Pain improved. No knee buckling or instability    S/P cervical spinal fusion  See above. He hopes to go back home to [place] in 2 weeks. He is currently walking 20 feet with a walker.     SALVADOR (acute kidney injury) (H)  Complication of transplanted kidney, unspecified complication  See above  Creatinine   Date Value Ref Range Status   06/07/2018 1.51 (A) 0.70 - 1.30 mg/dL Final       Confusion  See above    Type 2 diabetes mellitus with hyperglycemia, with long-term current use of insulin (H)  On  lantus. , 182. Home lantus dose unclear, appears that it may have been 30 units  Lab Results   Component Value Date    A1C 6.6 2018    A1C 7.8 2018       Hypervolemia, unspecified hypervolemia type  Ongoing swelling in bilateral arms and legs, he says this is improving. No SOB.  Wt Readings from Last 4 Encounters:   18 186 lb (84.4 kg)   18 160 lb 1.6 oz (72.6 kg)   18 152 lb 8.9 oz (69.2 kg)   18 134 lb 12.8 oz (61.1 kg)       Hypernatremia  See above. Na 147 today.     Urinary retention  Patient feels he is emptying his bladder well    Essential hypertension  Since admitting , BP reading recorded once @ 165/77.    Slow transit constipation  No concerns      CODE STATUS/ADVANCE DIRECTIVES DISCUSSION:   CPR/Full code   Patient's living condition: lives alone    ALLERGIES:Heparin  PAST MEDICAL HISTORY:  has a past medical history of Cervical kyphosis; Cervical stenosis of spinal canal; Degenerative joint disease; Diabetes (H); Hyperlipidemia; Hypertension; Kidney replaced by transplant; Retinopathy; and Tinnitus.  PAST SURGICAL HISTORY:  has a past surgical history that includes Transplant kidney recipient  donor (2006); OPEN TX KNEE DISLOCATION W REPAIR/RECONSTRUCTION (); amputation (Right); Decompression, fusion cervical anterior three+ levels, combined (N/A, 2018); Graft bone from iliac crest (N/A, 2018); and Optical Tracking System Fusion Posterior Cervical Three + Levels (N/A, 2018).  FAMILY HISTORY: family history is not on file.  SOCIAL HISTORY:  reports that he has never smoked. He has never used smokeless tobacco. He reports that he does not drink alcohol or use illicit drugs.    Post Discharge Medication Reconciliation Status: discharge medications reconciled and changed, per note/orders (see AVS).  Current Outpatient Prescriptions   Medication Sig Dispense Refill     acetaminophen (TYLENOL) 325 MG tablet Take 2 tablets (650  mg) by mouth every 4 hours as needed for other (multimodal surgical pain management along with NSAIDS and opioid medication as indicated based on pain control and physical function.) 100 tablet      allopurinol (ZYLOPRIM) 100 MG tablet Take 1 tablet (100 mg) by mouth daily 30 tablet      ASPIRIN PO Take 81 mg by mouth daily       calcitRIOL (ROCALTROL) 0.25 MCG capsule Take 1 capsule (0.25 mcg) by mouth daily 30 capsule      calcium carbonate (TUMS) 500 MG chewable tablet Take 1 tablet (500 mg) by mouth 2 times daily 150 tablet      cholecalciferol 2000 units tablet Take 2,000 Units by mouth daily 30 tablet      FOLIC ACID PO Take 1 mg by mouth daily       insulin glargine (LANTUS SOLOSTAR) 100 UNIT/ML pen Inject 6 Units Subcutaneous daily (with dinner)       loperamide (IMODIUM) 2 MG capsule Take 1 capsule (2 mg) by mouth 4 times daily as needed for diarrhea 20 capsule      MAGNESIUM OXIDE PO Take 420 mg by mouth daily        MYFORTIC (BRAND) 360 MG EC TABLET Take 720 mg by mouth 2 times daily        PRAVASTATIN SODIUM PO Take 40 mg by mouth At Bedtime        PROGRAF (BRAND) 1 MG CAPSULE Take 1 capsule (1 mg) by mouth 2 times daily 240 capsule      senna-docusate (SENOKOT-S;PERICOLACE) 8.6-50 MG per tablet Take 1 tablet by mouth 2 times daily 100 tablet      sodium citrate-citric acid (BICITRA) 500-334 MG/5ML solution Take 30 mLs by mouth daily 1800 mL        ROS:  10 point ROS of systems including Constitutional, Eyes, Respiratory, Cardiovascular, Gastroenterology, Genitourinary, Integumentary, Muscularskeletal, Psychiatric were all negative except for pertinent positives noted in my HPI.    Exam:  /77  Pulse 87  Temp 97.7  F (36.5  C)  Resp 18  Wt 186 lb (84.4 kg)  SpO2 99%  BMI 28.28 kg/m2   GENERAL APPEARANCE:  Alert, in no distress, oriented  ENT:  Mouth and posterior oropharynx normal, moist mucous membranes  EYES:  EOM, conjunctivae, lids, pupils and irises normal  NECK:  neck collar in  place  RESP:  respiratory effort and palpation of chest normal, lungs clear to auscultation , no respiratory distress  CV:  Palpation and auscultation of heart done , regular rate and rhythm, no murmur, rub, or gallop, significant swelling bilateral lower arms, R>L and 2+ edema bilateral lower legs  ABDOMEN:  normal bowel sounds, soft, nontender, no hepatosplenomegaly or other masses  SKIN:  Inspection of skin and subcutaneous tissue baseline, Palpation of skin and subcutaneous tissue baseline  PSYCH:  oriented X 3, affect and mood normal    Lab/Diagnostic data:  CBC RESULTS:   Recent Labs   Lab Test 06/07/18 06/02/18   0622  05/31/18   1008   WBC  18.2*   --   Canceled, Test credited   RBC  2.74*   --   Canceled, Test credited   HGB  8.1*  10.0*  Canceled, Test credited   HCT  25.1*   --   Canceled, Test credited   MCV  92   --   Canceled, Test credited   MCH  29.6   --   Canceled, Test credited   MCHC  32.3   --   Canceled, Test credited   RDW  16.3*   --   Canceled, Test credited   PLT  155   --   Canceled, Test credited       Last Basic Metabolic Panel:  Recent Labs   Lab Test 06/07/18 06/06/18   0701   NA  147*  151*   POTASSIUM  3.7  4.2   CHLORIDE  117*  121*   BRAXTON  8.0*  7.8*   CO2  21*  21   BUN  52*  63*   CR  1.51*  1.57*   GLC  97  81       Liver Function Studies -   Recent Labs   Lab Test  06/03/18   0646  05/27/18   1808   PROTTOTAL   --   5.2*   ALBUMIN  1.9*  1.9*   BILITOTAL   --   1.7*   ALKPHOS   --   87   AST   --   45   ALT   --   40       Lab Results   Component Value Date    A1C 6.6 05/28/2018    A1C 7.8 04/18/2018         ASSESSMENT/PLAN:  (M10.9) Acute gout of multiple sites, unspecified cause  (primary encounter diagnosis)  Comment: Improved  Plan: Monitor pain    (Z98.1) S/P cervical spinal fusion  Comment: Pain controlled. No s/sx poor wound healing. Goal is to discharge home when PT/OT goals met.  Plan: PT/OT eval and treat, discharge planning per their recommendation. Monitor pain  severity. Monitor incision. F/u ortho as scheduled.    (N17.9) SALVADOR (acute kidney injury) (H)  (T86.10) Complication of transplanted kidney, unspecified complication  Comment: SALVADOR resolved. Creatinine back to baseline.   Plan: Avoid nephrotoxic medications and adjust medications per renal function. Monitor renal function.    (R41.0) Confusion  Comment: Appears resolved. Patient very talkative, somewhat challenging to assess cognition thoroughly, but therapy will certainly do this  Plan: OT eval and treat    (E11.65,  Z79.4) Type 2 diabetes mellitus with hyperglycemia, with long-term current use of insulin (H)  Comment: Too little data to assess at this time.  Plan: BGM BID. Adjust medication as clinically indicated.    (E87.70) Hypervolemia, unspecified hypervolemia type  Comment: Ongoing, significant edema in arms, legs. Given that his labs are improved, will resume PTA lasix  Plan: Lasix 20mg po BID. Monitor weight. [facility] 6/11/18    (E87.0) Hypernatremia  Comment: Improving  Plan: BMP 6/11    (R33.9) Urinary retention  Comment: Resolved  Plan: Monitor for s/sx retention    (I10) Essential hypertension  Comment: Too little data to assess at this time, one reading is elevated. Goal <140/90. May decrease with restarting lasix, would not add any other meds at this time.   Plan: Monitor BP. If remains elevated next week, will start losartan as recommended    (K59.01) Slow transit constipation  Comment: Controlled  Plan: Continue current POC with no changes at this time and adjustments as needed.      Orders:  Lasix 20mg BID  [facility] 6/11/18        Electronically signed by:  CHRISS [facility]  Pager: [phone]

## 2018-06-07 NOTE — LETTER
6/7/2018        RE: Familia Irving  [address]  [facility] [address]        [facility]  PRIMARY CARE PROVIDER AND CLINIC:  Clinic, [facility] [place]  Chief Complaint   Patient presents with     Hospital F/U     [place] Medical Record Number:  [id number]    HPI:    Familia Irving is a 73 year old  (1945),admitted to the [facility] from [facility].  Hospital stay 5/27/18 through 6/6/18.  Admitted to this facility for  rehab, medical management and nursing care.      Hospital Course Per [facility] Discharge Summary 6//[id number]:    Familia Irving is a 73-year-old man with a remote history of gout, status post recent cervical spine decompression and fusion by Dr. Posey, who was admitted from his transitional care unit for the evaluation of fever, confusion, and generalized arthralgias, particularly involving his wrist and left knee.  The patient had been unable to bear weight over the previous 24 hours.       The concern on admission was that the patient might have a septic left knee.  He subsequently underwent an aspiration of the left knee as well as his right wrist, both of which revealed intracellular monosodium urate crystals, consistent with acute gouty arthritis.  The patient's uric acid level was elevated at 10.  CRP was over 250.  The patient initially received 1 dose of IV vancomycin because of concern regarding septic joints.  This was not restarted as his clinical picture subsequently seemed to be most consistent with oligoarticular acute gout flare.  Blood cultures were subsequently obtained and were unremarkable.  In the meantime, the patient was started empirically on Solu-Medrol.  He received 1 dose of 120 mg IV with marked improvement in his generalized arthralgias the next morning.  He subsequently received 5 more days of prednisone 40 mg a day with continued improvement in his generalized arthralgias.        The patient did have a fairly complex hospital course, which is as summarized below.   1.  Acute gout flare involving multiple joints including both wrists, left knee, and both ankles with marked improvement with Solu-Medrol, followed by prednisone.  Blood cultures were subsequently negative.  Of note, the patient did have late growth of diphtheroids from his right wrist aspirate.  This was reviewed informally with Infectious Disease and felt most likely to represent contaminant and not requiring further treatment.  As above, he received only 1 dose of antibiotics, vancomycin, at the time of admission.   2.  Acute kidney injury.  The patient with history of transplanted kidney.  Baseline creatinine is 1.8-1.9.  Creatinine did reach a peak of 2.8.  He was seen by Nephrology who felt that he was experiencing nonoliguric acute renal failure likely secondary to acute inflammation as well as vancomycin and preadmission use of furosemide and lisinopril.  The patient's renal function was tracked very closely and on the day prior to discharge, his creatinine was down to 1.75.  Prior to admission antihypertensive medications, amlodipine and lisinopril, were discontinued secondary to acute kidney injury and relatively low blood pressure and were not required during the hospitalization.   3.  Volume overload.  The patient did develop generalized edema secondary to IV fluids for the treatment of volume depletion and acute kidney injury.  His prior to admission furosemide was held as noted above, but was restarted 4 days prior to discharge with excellent diuresis.  The patient did develop hypernatremia subsequent to diuretic therapy and reduced oral liquid intake. He was encouraged to drink water and actually did drink approximately 500 cc during a one hour period on the morning of discharge.  It is less likely that the hypernatremia is related to a renal concentrating defect or DI.  Lasix remains on hold   His prior to  admission furosemide dose was 20 mg twice daily.   4.  Confusion.  The patient was noted by family to be acutely confused prior to admission.  Though in retrospect, he had been mildly confused since his discharge from the hospital following his cervical spine surgery.  This was attributed to multiple factors including opioids and acute inflammation. Mental status did gradually improve, at or close to baseline at the time of discharge.   5.  Hypocalcemia with ionized calcium level of 3.0 noted on 05/31/2018.  This was associated with a slightly elevated phosphorus level, a very elevated PTH level and a low vitamin D level.  This was felt to represent hypovitamin D syndrome, likely secondary to chronic kidney disease and did improve with calcium supplementation, vitamin D supplementation and calcitriol.   6.  History of kidney transplant.  Creatinine, as above, did elevate to 2.8 prior to returning to baseline prior to discharge.  He was seen by Nephrology who recommended continuation of his current transplant medication doses.   7.  Diabetes mellitus.  Blood glucoses predictably were quite elevated shortly after admission secondary to steroids, but did improve significantly towards the end of his hospital course.  He did require a bump in his Lantus as well as the short-term institution of NPH in the setting of prednisone.  Blood glucoses were in the 100s prior to discharge.  Insulin orders are as below.  I would add that at baseline, the patient only takes Lantus insulin once daily and does not use prandial insulin.   8.  Urinary retention.  The patient did develop urinary retention in the setting of acute illness and confusion, requiring short-term St catheter placement.  St catheter was discontinued on the day prior to discharge with postvoid residual volumes less than 100 mL.   9.  Pressure injury over sacrum and buttocks, present on admission, which was followed by the wound nurse.  Current wound care  orders will be noted on the discharge instruction sheet.   10.  Oropharyngeal dysphagia likely secondary to acute illness.  The patient was followed by Speech Pathology and also was noted to be able to tolerate a regular diet.   11.  Hypertension.  The patient's blood pressure was relatively low at the time of presentation to the hospital.  Accordingly, amlodipine, furosemide and lisinopril were held.  Lisinopril and amlodipine were not restarted.  It is recommended by Rheumatology that at some point, as the blood pressure and renal function remain stable, he would be a candidate for the institution of losartan versus lisinopril as losartan has urate lowering properties.   12.  Status post recent anterior-posterior multilevel cervical spine decompression with instrumentation, C3 to T1 on 05/16/2018.  The patient continues to wear a cervical collar.  There are no limitations in activity.       Though the patient's functional status has improved considerably since admission, he remains debilitated from his recent surgery and subsequent acute medical illness.  He therefore will discharge to the [facility] TCU for continued therapy and monitoring of his medical status.  The patient and family were in agreement with this plan.     HPI information obtained from: facility chart records, facility staff, patient report and [facility] chart review.      Current issues are:      Acute gout of multiple sites, unspecified cause  See above. He says his knees are little stiff when he first gets up, but then this improves with movement. Pain improved. No knee buckling or instability    S/P cervical spinal fusion  See above. He hopes to go back home to [place] in 2 weeks. He is currently walking 20 feet with a walker.     SALVADOR (acute kidney injury) (H)  Complication of transplanted kidney, unspecified complication  See above  Creatinine   Date Value Ref Range Status   06/07/2018 1.51 (A) 0.70 - 1.30 mg/dL Final        Confusion  See above    Type 2 diabetes mellitus with hyperglycemia, with long-term current use of insulin (H)  On lantus. , 182. Home lantus dose unclear, appears that it may have been 30 units  Lab Results   Component Value Date    A1C 6.6 2018    A1C 7.8 2018       Hypervolemia, unspecified hypervolemia type  Ongoing swelling in bilateral arms and legs, he says this is improving. No SOB.  Wt Readings from Last 4 Encounters:   18 186 lb (84.4 kg)   18 160 lb 1.6 oz (72.6 kg)   18 152 lb 8.9 oz (69.2 kg)   18 134 lb 12.8 oz (61.1 kg)       Hypernatremia  See above. Na 147 today.     Urinary retention  Patient feels he is emptying his bladder well    Essential hypertension  Since admitting , BP reading recorded once @ 165/77.    Slow transit constipation  No concerns      CODE STATUS/ADVANCE DIRECTIVES DISCUSSION:   CPR/Full code   Patient's living condition: lives alone    ALLERGIES:Heparin  PAST MEDICAL HISTORY:  has a past medical history of Cervical kyphosis; Cervical stenosis of spinal canal; Degenerative joint disease; Diabetes (H); Hyperlipidemia; Hypertension; Kidney replaced by transplant; Retinopathy; and Tinnitus.  PAST SURGICAL HISTORY:  has a past surgical history that includes Transplant kidney recipient  donor (2006); OPEN TX KNEE DISLOCATION W REPAIR/RECONSTRUCTION (); amputation (Right); Decompression, fusion cervical anterior three+ levels, combined (N/A, 2018); Graft bone from iliac crest (N/A, 2018); and Optical Tracking System Fusion Posterior Cervical Three + Levels (N/A, 2018).  FAMILY HISTORY: family history is not on file.  SOCIAL HISTORY:  reports that he has never smoked. He has never used smokeless tobacco. He reports that he does not drink alcohol or use illicit drugs.    Post Discharge Medication Reconciliation Status: discharge medications reconciled and changed, per note/orders (see AVS).  Current  Outpatient Prescriptions   Medication Sig Dispense Refill     acetaminophen (TYLENOL) 325 MG tablet Take 2 tablets (650 mg) by mouth every 4 hours as needed for other (multimodal surgical pain management along with NSAIDS and opioid medication as indicated based on pain control and physical function.) 100 tablet      allopurinol (ZYLOPRIM) 100 MG tablet Take 1 tablet (100 mg) by mouth daily 30 tablet      ASPIRIN PO Take 81 mg by mouth daily       calcitRIOL (ROCALTROL) 0.25 MCG capsule Take 1 capsule (0.25 mcg) by mouth daily 30 capsule      calcium carbonate (TUMS) 500 MG chewable tablet Take 1 tablet (500 mg) by mouth 2 times daily 150 tablet      cholecalciferol 2000 units tablet Take 2,000 Units by mouth daily 30 tablet      FOLIC ACID PO Take 1 mg by mouth daily       insulin glargine (LANTUS SOLOSTAR) 100 UNIT/ML pen Inject 6 Units Subcutaneous daily (with dinner)       loperamide (IMODIUM) 2 MG capsule Take 1 capsule (2 mg) by mouth 4 times daily as needed for diarrhea 20 capsule      MAGNESIUM OXIDE PO Take 420 mg by mouth daily        MYFORTIC (BRAND) 360 MG EC TABLET Take 720 mg by mouth 2 times daily        PRAVASTATIN SODIUM PO Take 40 mg by mouth At Bedtime        PROGRAF (BRAND) 1 MG CAPSULE Take 1 capsule (1 mg) by mouth 2 times daily 240 capsule      senna-docusate (SENOKOT-S;PERICOLACE) 8.6-50 MG per tablet Take 1 tablet by mouth 2 times daily 100 tablet      sodium citrate-citric acid (BICITRA) 500-334 MG/5ML solution Take 30 mLs by mouth daily 1800 mL        ROS:  10 point ROS of systems including Constitutional, Eyes, Respiratory, Cardiovascular, Gastroenterology, Genitourinary, Integumentary, Muscularskeletal, Psychiatric were all negative except for pertinent positives noted in my HPI.    Exam:  /77  Pulse 87  Temp 97.7  F (36.5  C)  Resp 18  Wt 186 lb (84.4 kg)  SpO2 99%  BMI 28.28 kg/m2   GENERAL APPEARANCE:  Alert, in no distress, oriented  ENT:  Mouth and posterior oropharynx  normal, moist mucous membranes  EYES:  EOM, conjunctivae, lids, pupils and irises normal  NECK:  neck collar in place  RESP:  respiratory effort and palpation of chest normal, lungs clear to auscultation , no respiratory distress  CV:  Palpation and auscultation of heart done , regular rate and rhythm, no murmur, rub, or gallop, significant swelling bilateral lower arms, R>L and 2+ edema bilateral lower legs  ABDOMEN:  normal bowel sounds, soft, nontender, no hepatosplenomegaly or other masses  SKIN:  Inspection of skin and subcutaneous tissue baseline, Palpation of skin and subcutaneous tissue baseline  PSYCH:  oriented X 3, affect and mood normal    Lab/Diagnostic data:  CBC RESULTS:   Recent Labs   Lab Test 06/07/18 06/02/18   0622  05/31/18   1008   WBC  18.2*   --   Canceled, Test credited   RBC  2.74*   --   Canceled, Test credited   HGB  8.1*  10.0*  Canceled, Test credited   HCT  25.1*   --   Canceled, Test credited   MCV  92   --   Canceled, Test credited   MCH  29.6   --   Canceled, Test credited   MCHC  32.3   --   Canceled, Test credited   RDW  16.3*   --   Canceled, Test credited   PLT  155   --   Canceled, Test credited       Last Basic Metabolic Panel:  Recent Labs   Lab Test 06/07/18 06/06/18   0701   NA  147*  151*   POTASSIUM  3.7  4.2   CHLORIDE  117*  121*   BRAXTON  8.0*  7.8*   CO2  21*  21   BUN  52*  63*   CR  1.51*  1.57*   GLC  97  81       Liver Function Studies -   Recent Labs   Lab Test  06/03/18   0646  05/27/18   1808   PROTTOTAL   --   5.2*   ALBUMIN  1.9*  1.9*   BILITOTAL   --   1.7*   ALKPHOS   --   87   AST   --   45   ALT   --   40       Lab Results   Component Value Date    A1C 6.6 05/28/2018    A1C 7.8 04/18/2018         ASSESSMENT/PLAN:  (M10.9) Acute gout of multiple sites, unspecified cause  (primary encounter diagnosis)  Comment: Improved  Plan: Monitor pain    (Z98.1) S/P cervical spinal fusion  Comment: Pain controlled. No s/sx poor wound healing. Goal is to discharge home  when PT/OT goals met.  Plan: PT/OT eval and treat, discharge planning per their recommendation. Monitor pain severity. Monitor incision. F/u ortho as scheduled.    (N17.9) SALVADOR (acute kidney injury) (H)  (T86.10) Complication of transplanted kidney, unspecified complication  Comment: SALVADOR resolved. Creatinine back to baseline.   Plan: Avoid nephrotoxic medications and adjust medications per renal function. Monitor renal function.    (R41.0) Confusion  Comment: Appears resolved. Patient very talkative, somewhat challenging to assess cognition thoroughly, but therapy will certainly do this  Plan: OT eval and treat    (E11.65,  Z79.4) Type 2 diabetes mellitus with hyperglycemia, with long-term current use of insulin (H)  Comment: Too little data to assess at this time.  Plan: BGM BID. Adjust medication as clinically indicated.    (E87.70) Hypervolemia, unspecified hypervolemia type  Comment: Ongoing, significant edema in arms, legs. Given that his labs are improved, will resume PTA lasix  Plan: Lasix 20mg po BID. Monitor weight. BMP 6/11/18    (E87.0) Hypernatremia  Comment: Improving  Plan: BMP 6/11    (R33.9) Urinary retention  Comment: Resolved  Plan: Monitor for s/sx retention    (I10) Essential hypertension  Comment: Too little data to assess at this time, one reading is elevated. Goal <140/90. May decrease with restarting lasix, would not add any other meds at this time.   Plan: Monitor BP. If remains elevated next week, will start losartan as recommended    (K59.01) Slow transit constipation  Comment: Controlled  Plan: Continue current POC with no changes at this time and adjustments as needed.      Orders:  Lasix 20mg BID  [facility] 6/11/18        Electronically signed by:  CHRISS Henry [facility]  Pager: [phone]

## 2018-06-08 RX ORDER — FUROSEMIDE 20 MG
20 TABLET ORAL 2 TIMES DAILY
Qty: 180 TABLET | Refills: 3
Start: 2018-06-08

## 2018-06-09 ENCOUNTER — RECORDS - HEALTHEAST (OUTPATIENT)
Dept: LAB | Facility: CLINIC | Age: 73
End: 2018-06-09

## 2018-06-09 LAB
BACTERIA SPEC CULT: ABNORMAL
BACTERIA SPEC CULT: ABNORMAL
Lab: ABNORMAL
SPECIMEN SOURCE: ABNORMAL

## 2018-06-10 LAB
BACTERIA SPEC CULT: NORMAL
Lab: NORMAL
SPECIMEN SOURCE: NORMAL

## 2018-06-11 ENCOUNTER — NURSING HOME VISIT (OUTPATIENT)
Dept: GERIATRICS | Facility: CLINIC | Age: 73
End: 2018-06-11

## 2018-06-11 VITALS
BODY MASS INDEX: 24.33 KG/M2 | RESPIRATION RATE: 18 BRPM | OXYGEN SATURATION: 95 % | SYSTOLIC BLOOD PRESSURE: 153 MMHG | WEIGHT: 160 LBS | HEART RATE: 79 BPM | DIASTOLIC BLOOD PRESSURE: 74 MMHG | TEMPERATURE: 98.1 F

## 2018-06-11 DIAGNOSIS — T86.10 COMPLICATION OF TRANSPLANTED KIDNEY, UNSPECIFIED COMPLICATION: ICD-10-CM

## 2018-06-11 DIAGNOSIS — N17.9 AKI (ACUTE KIDNEY INJURY) (H): ICD-10-CM

## 2018-06-11 DIAGNOSIS — I10 ESSENTIAL HYPERTENSION: ICD-10-CM

## 2018-06-11 DIAGNOSIS — E87.70 HYPERVOLEMIA, UNSPECIFIED HYPERVOLEMIA TYPE: ICD-10-CM

## 2018-06-11 DIAGNOSIS — E87.0 HYPERNATREMIA: ICD-10-CM

## 2018-06-11 DIAGNOSIS — Z79.4 TYPE 2 DIABETES MELLITUS WITH HYPERGLYCEMIA, WITH LONG-TERM CURRENT USE OF INSULIN (H): ICD-10-CM

## 2018-06-11 DIAGNOSIS — Z98.1 S/P CERVICAL SPINAL FUSION: ICD-10-CM

## 2018-06-11 DIAGNOSIS — M10.9 ACUTE GOUT OF MULTIPLE SITES, UNSPECIFIED CAUSE: Primary | ICD-10-CM

## 2018-06-11 DIAGNOSIS — E11.65 TYPE 2 DIABETES MELLITUS WITH HYPERGLYCEMIA, WITH LONG-TERM CURRENT USE OF INSULIN (H): ICD-10-CM

## 2018-06-11 DIAGNOSIS — R33.9 URINARY RETENTION: ICD-10-CM

## 2018-06-11 DIAGNOSIS — K52.9 CHRONIC DIARRHEA: ICD-10-CM

## 2018-06-11 LAB
ANION GAP SERPL CALCULATED.3IONS-SCNC: 9 MMOL/L (ref 5–18)
BUN SERPL-MCNC: 39 MG/DL (ref 8–28)
CALCIUM SERPL-MCNC: 8.4 MG/DL (ref 8.5–10.5)
CHLORIDE BLD-SCNC: 111 MMOL/L (ref 98–107)
CO2 SERPL-SCNC: 23 MMOL/L (ref 22–31)
CREAT SERPL-MCNC: 1.8 MG/DL (ref 0.7–1.3)
GFR SERPL CREATININE-BSD FRML MDRD: 37 ML/MIN/1.73M2
GLUCOSE BLD-MCNC: 201 MG/DL (ref 70–125)
POTASSIUM BLD-SCNC: 4 MMOL/L (ref 3.5–5)
SODIUM SERPL-SCNC: 143 MMOL/L (ref 136–145)

## 2018-06-11 PROCEDURE — 99310 SBSQ NF CARE HIGH MDM 45: CPT | Performed by: NURSE PRACTITIONER

## 2018-06-11 RX ORDER — LOPERAMIDE HCL 2 MG
4 CAPSULE ORAL DAILY
Qty: 20 CAPSULE
Start: 2018-06-11

## 2018-06-11 NOTE — PROGRESS NOTES
[facility]    Chief Complaint   Patient presents with     DEBRA       [facility] Record Number:  [id number]    HPI:    Familia Irving is a 73 year old  (1945), who is being seen today for an episodic care visit at [facility].  [facility] Hospital stay 5/27/18 through 6/6/18. Remote history of gout, status post recent cervical spine decompression and fusion by Dr. Posey, who was admitted from his transitional care unit for the evaluation of fever, confusion, and generalized arthralgias, particularly involving his wrist and left knee. The patient had been unable to bear weight over the previous 24 hours. He was treated for a gout flare of multiple joints. Hospital course complicated by SALVADOR (he is s/p kidney transplant), volume overload, hypernatremia, labile BG, urinary retention, and confusion.     HPI information obtained from: facility chart records, facility staff, patient report and [facility] chart review.    Today's concern is:  Acute gout of multiple sites, unspecified cause  See above. He says his knees are little stiff when he first gets up, but then this improves with movement. Pain improved. No knee buckling or instability.    S/P cervical spinal fusion  See above. He plans to go back home to [place]. He is frustrated with his slow progress. He still cannot get out of bed without significant help. No narcotics due to AMS in the hospital. He takes Tylenol arthritis every morning at home, he would like to resume this.    SALVADOR (acute kidney injury) (H)  Complication of transplanted kidney, unspecified complication  See above. Lisinopril was stopped and was not needed for HTN. Baseline creatinine 1.8-1.9  Creatinine   Date Value Ref Range Status   06/07/2018 1.51 (A) 0.70 - 1.30 mg/dL Final       Type 2 diabetes mellitus with hyperglycemia, with long-term current use of insulin (H)  On lantus, most recent home dose was 15 units, currently on 6  units. He was taking it in the morning at home.    Lab Results   Component Value Date    A1C 6.6 2018    A1C 7.8 2018     Last BG Levels:    AM:  141, 115, 174, 199    PM:  201, 234, 355    Hypervolemia, unspecified hypervolemia type  Lasix was restarted 18 due to ongoing swelling in bilateral arms and legs, he says this is improving. No SOB.  Wt Readings from Last 4 Encounters:   06/10/18 160 lb (72.6 kg)   18 186 lb (84.4 kg)   18 160 lb 1.6 oz (72.6 kg)   18 152 lb 8.9 oz (69.2 kg)       Hypernatremia  See above. Na 147 18. Na 143 today.     Urinary retention  Patient feels he is emptying his bladder well    Essential hypertension  Lisinopril and amlodipine stopped in the hospital. Nephrology recommended starting losartan when needed  BP Readin/74  170/70  182/83  165/77      Chronic diarrhea  Patient reports that he typically takes imodium 4mg every morning for chronic diarrhea. He had 3 loose stools yesterday. He has difficulty getting to the bathroom on time    ALLERGIES: Heparin  Past Medical, Surgical, Family and Social History reviewed and updated in [employer].    Current Outpatient Prescriptions   Medication Sig Dispense Refill     acetaminophen (TYLENOL) 325 MG tablet Take 2 tablets (650 mg) by mouth every 4 hours as needed for other (multimodal surgical pain management along with NSAIDS and opioid medication as indicated based on pain control and physical function.) 100 tablet      allopurinol (ZYLOPRIM) 100 MG tablet Take 1 tablet (100 mg) by mouth daily 30 tablet      ASPIRIN PO Take 81 mg by mouth daily       calcitRIOL (ROCALTROL) 0.25 MCG capsule Take 1 capsule (0.25 mcg) by mouth daily 30 capsule      calcium carbonate (TUMS) 500 MG chewable tablet Take 1 tablet (500 mg) by mouth 2 times daily 150 tablet      cholecalciferol 2000 units tablet Take 2,000 Units by mouth daily 30 tablet      FOLIC ACID PO Take 1 mg by mouth daily       furosemide (LASIX) 20 MG  tablet Take 1 tablet (20 mg) by mouth 2 times daily 180 tablet 3     insulin glargine (LANTUS SOLOSTAR) 100 UNIT/ML pen Inject 6 Units Subcutaneous daily (with dinner)       loperamide (IMODIUM) 2 MG capsule Take 1 capsule (2 mg) by mouth 4 times daily as needed for diarrhea 20 capsule      MAGNESIUM OXIDE PO Take 420 mg by mouth daily        MYFORTIC (BRAND) 360 MG EC TABLET Take 720 mg by mouth 2 times daily        PRAVASTATIN SODIUM PO Take 40 mg by mouth At Bedtime        PROGRAF (BRAND) 1 MG CAPSULE Take 1 capsule (1 mg) by mouth 2 times daily 240 capsule      senna-docusate (SENOKOT-S;PERICOLACE) 8.6-50 MG per tablet Take 1 tablet by mouth 2 times daily 100 tablet      sodium citrate-citric acid (BICITRA) 500-334 MG/5ML solution Take 30 mLs by mouth daily 1800 mL      Medications reviewed:  Medications reconciled to facility chart and changes were made to reflect current medications as identified as above med list.     REVIEW OF SYSTEMS:  10 point ROS of systems including Constitutional, Eyes, Respiratory, Cardiovascular, Gastroenterology, Genitourinary, Integumentary, Muscularskeletal, Psychiatric were all negative except for pertinent positives noted in my HPI.    Physical Exam:  /74  Pulse 79  Temp 98.1  F (36.7  C)  Resp 18  Wt 186 lb (84.4 kg)  SpO2 95%  BMI 28.28 kg/m2   GENERAL APPEARANCE:  Alert, in no distress, oriented  ENT:  Mouth and posterior oropharynx normal, moist mucous membranes  EYES:  EOM, conjunctivae, lids, pupils and irises normal  NECK:  neck collar in place  RESP:  respiratory effort and palpation of chest normal, lungs clear to auscultation , no respiratory distress  CV:  Palpation and auscultation of heart done , regular rate and rhythm, no murmur, rub, or gallop, swelling bilateral lower arms, but is improving and 2+ edema bilateral lower legs  ABDOMEN:  normal bowel sounds, soft, nontender, no hepatosplenomegaly or other masses  SKIN:  Inspection of skin and  subcutaneous tissue baseline, Palpation of skin and subcutaneous tissue baseline  PSYCH:  oriented X 3, affect and mood normal      Recent Labs:   6/11/18  Na 143  K 4.0  BUN 39  Creat 1.8    CBC RESULTS:   Recent Labs   Lab Test 06/07/18 06/02/18   0622  05/31/18   1008   WBC  18.2*   --   Canceled, Test credited   RBC  2.74*   --   Canceled, Test credited   HGB  8.1*  10.0*  Canceled, Test credited   HCT  25.1*   --   Canceled, Test credited   MCV  92   --   Canceled, Test credited   MCH  29.6   --   Canceled, Test credited   MCHC  32.3   --   Canceled, Test credited   RDW  16.3*   --   Canceled, Test credited   PLT  155   --   Canceled, Test credited       Last Basic Metabolic Panel:  Recent Labs   Lab Test 06/07/18 06/06/18   0701   NA  147*  151*   POTASSIUM  3.7  4.2   CHLORIDE  117*  121*   BRAXTON  8.0*  7.8*   CO2  21*  21   BUN  52*  63*   CR  1.51*  1.57*   GLC  97  81       Liver Function Studies -   Recent Labs   Lab Test  06/03/18   0646  05/27/18   1808   PROTTOTAL   --   5.2*   ALBUMIN  1.9*  1.9*   BILITOTAL   --   1.7*   ALKPHOS   --   87   AST   --   45   ALT   --   40       Lab Results   Component Value Date    A1C 6.6 05/28/2018    A1C 7.8 04/18/2018         Assessment/Plan:  (M10.9) Acute gout of multiple sites, unspecified cause  (primary encounter diagnosis)  Comment: Improved  Plan: Monitor pain    (Z98.1) S/P cervical spinal fusion  Comment: Pain controlled. No s/sx poor wound healing. Goal is to discharge home when PT/OT goals met. Advised patient that this will take time given how debilitated he was.  Plan: PT/OT eval and treat, discharge planning per their recommendation. Tylenol arthritis qam and qpm prn. Monitor pain severity. Monitor incision. F/u ortho as scheduled.    (N17.9) SALVADOR (acute kidney injury) (H)  (T86.10) Complication of transplanted kidney, unspecified complication  Comment: SALVADOR resolved. Creatinine back to baseline.   Plan: Avoid nephrotoxic medications and adjust  medications per renal function. Monitor renal function.    (E11.65,  Z79.4) Type 2 diabetes mellitus with hyperglycemia, with long-term current use of insulin (H)  Comment: Poorly controlled  Plan: Increase lantus to 10 units qam. BGM BID. Adjust medication as clinically indicated.    (E87.70) Hypervolemia, unspecified hypervolemia type  Comment: Improving with lasix. Seems to be tolerating this based on BMP  Plan: Cont lasix. Monitor weight. BMP 6/14/18    (E87.0) Hypernatremia  Comment: Improved  Plan: BMP 6/14    (R33.9) Urinary retention  Comment: Resolved  Plan: Monitor for s/sx retention    (I10) Essential hypertension  Comment: Poorly controlled currently. Will recheck BMP later this week, if creatinine is still stable, will start losartan at that time. Goal <140/90.   Plan: Monitor BP. If remains elevated and BMP stable 6/14, will start losartan as recommended    (K52.9) Chronic diarrhea  Comment: Doubt acute infection or cdiff as this is chronic for him. Will resume imodium as at home, advised patient that he should decline a dose if he has not had a BM in 24 hours.  Plan: Imodium 4mg qam. Monitor bowel function. Adjust medication as clinically indicated.      Orders:  Imodium 4mg qam  Tylenol arthritis 1300mg qam and may have another dose prn at least 8 hours after first dose  Change lantus to 10 units qam  D/C Senna-S  BMP 6/14/18      Total time spent with patient visit at the [facility] nursing facility was 45 min including patient visit, review of past records and discussion with daughter. Greater than 50% of total time spent with counseling and coordinating care due to diarrhea, pain, edema, diabetes, PT/OT      Electronically signed by:  CHRISS Henry [facility]  Pager: [phone]

## 2018-06-11 NOTE — LETTER
6/11/2018        RE: Familia Irving  [address]  [place] [place] [address]        [facility]    Chief Complaint   Patient presents with     RECHECK       [facility] Medical Record Number:  [id number]    HPI:    Familia Irving is a 73 year old  (1945), who is being seen today for an episodic care visit at [facility].  [facility] Hospital stay 5/27/18 through 6/6/18. Remote history of gout, status post recent cervical spine decompression and fusion by Dr. Posey, who was admitted from his transitional care unit for the evaluation of fever, confusion, and generalized arthralgias, particularly involving his wrist and left knee. The patient had been unable to bear weight over the previous 24 hours. He was treated for a gout flare of multiple joints. Hospital course complicated by SALVADOR (he is s/p kidney transplant), volume overload, hypernatremia, labile BG, urinary retention, and confusion.     HPI information obtained from: facility chart records, facility staff, patient report and [facility] chart review.    Today's concern is:  Acute gout of multiple sites, unspecified cause  See above. He says his knees are little stiff when he first gets up, but then this improves with movement. Pain improved. No knee buckling or instability.    S/P cervical spinal fusion  See above. He plans to go back home to [place]. He is frustrated with his slow progress. He still cannot get out of bed without significant help. No narcotics due to AMS in the hospital. He takes Tylenol arthritis every morning at home, he would like to resume this.    SALVADOR (acute kidney injury) (H)  Complication of transplanted kidney, unspecified complication  See above. Lisinopril was stopped and was not needed for HTN. Baseline creatinine 1.8-1.9  Creatinine   Date Value Ref Range Status   06/07/2018 1.51 (A) 0.70 - 1.30 mg/dL Final       Type 2 diabetes mellitus with hyperglycemia,  with long-term current use of insulin (H)  On lantus, most recent home dose was 15 units, currently on 6 units. He was taking it in the morning at home.    Lab Results   Component Value Date    A1C 6.6 2018    A1C 7.8 2018     Last BG Levels:    AM:  141, 115, 174, 199    PM:  201, 234, 355    Hypervolemia, unspecified hypervolemia type  Lasix was restarted 18 due to ongoing swelling in bilateral arms and legs, he says this is improving. No SOB.  Wt Readings from Last 4 Encounters:   06/10/18 160 lb (72.6 kg)   18 186 lb (84.4 kg)   18 160 lb 1.6 oz (72.6 kg)   18 152 lb 8.9 oz (69.2 kg)       Hypernatremia  See above. Na 147 18. Na 143 today.     Urinary retention  Patient feels he is emptying his bladder well    Essential hypertension  Lisinopril and amlodipine stopped in the hospital. Nephrology recommended starting losartan when needed  BP Readin/74  170/70  182/83  165/77      Chronic diarrhea  Patient reports that he typically takes imodium 4mg every morning for chronic diarrhea. He had 3 loose stools yesterday. He has difficulty getting to the bathroom on time    ALLERGIES: Heparin  Past Medical, Surgical, Family and Social History reviewed and updated in [facility].    Current Outpatient Prescriptions   Medication Sig Dispense Refill     acetaminophen (TYLENOL) 325 MG tablet Take 2 tablets (650 mg) by mouth every 4 hours as needed for other (multimodal surgical pain management along with NSAIDS and opioid medication as indicated based on pain control and physical function.) 100 tablet      allopurinol (ZYLOPRIM) 100 MG tablet Take 1 tablet (100 mg) by mouth daily 30 tablet      ASPIRIN PO Take 81 mg by mouth daily       calcitRIOL (ROCALTROL) 0.25 MCG capsule Take 1 capsule (0.25 mcg) by mouth daily 30 capsule      calcium carbonate (TUMS) 500 MG chewable tablet Take 1 tablet (500 mg) by mouth 2 times daily 150 tablet      cholecalciferol 2000 units tablet Take 2,000  Units by mouth daily 30 tablet      FOLIC ACID PO Take 1 mg by mouth daily       furosemide (LASIX) 20 MG tablet Take 1 tablet (20 mg) by mouth 2 times daily 180 tablet 3     insulin glargine (LANTUS SOLOSTAR) 100 UNIT/ML pen Inject 6 Units Subcutaneous daily (with dinner)       loperamide (IMODIUM) 2 MG capsule Take 1 capsule (2 mg) by mouth 4 times daily as needed for diarrhea 20 capsule      MAGNESIUM OXIDE PO Take 420 mg by mouth daily        MYFORTIC (BRAND) 360 MG EC TABLET Take 720 mg by mouth 2 times daily        PRAVASTATIN SODIUM PO Take 40 mg by mouth At Bedtime        PROGRAF (BRAND) 1 MG CAPSULE Take 1 capsule (1 mg) by mouth 2 times daily 240 capsule      senna-docusate (SENOKOT-S;PERICOLACE) 8.6-50 MG per tablet Take 1 tablet by mouth 2 times daily 100 tablet      sodium citrate-citric acid (BICITRA) 500-334 MG/5ML solution Take 30 mLs by mouth daily 1800 mL      Medications reviewed:  Medications reconciled to facility chart and changes were made to reflect current medications as identified as above med list.     REVIEW OF SYSTEMS:  10 point ROS of systems including Constitutional, Eyes, Respiratory, Cardiovascular, Gastroenterology, Genitourinary, Integumentary, Muscularskeletal, Psychiatric were all negative except for pertinent positives noted in my HPI.    Physical Exam:  /74  Pulse 79  Temp 98.1  F (36.7  C)  Resp 18  Wt 186 lb (84.4 kg)  SpO2 95%  BMI 28.28 kg/m2   GENERAL APPEARANCE:  Alert, in no distress, oriented  ENT:  Mouth and posterior oropharynx normal, moist mucous membranes  EYES:  EOM, conjunctivae, lids, pupils and irises normal  NECK:  neck collar in place  RESP:  respiratory effort and palpation of chest normal, lungs clear to auscultation , no respiratory distress  CV:  Palpation and auscultation of heart done , regular rate and rhythm, no murmur, rub, or gallop, swelling bilateral lower arms, but is improving and 2+ edema bilateral lower legs  ABDOMEN:  normal bowel  sounds, soft, nontender, no hepatosplenomegaly or other masses  SKIN:  Inspection of skin and subcutaneous tissue baseline, Palpation of skin and subcutaneous tissue baseline  PSYCH:  oriented X 3, affect and mood normal      Recent Labs:   6/11/18  Na 143  K 4.0  BUN 39  Creat 1.8    CBC RESULTS:   Recent Labs   Lab Test 06/07/18 06/02/18   0622  05/31/18   1008   WBC  18.2*   --   Canceled, Test credited   RBC  2.74*   --   Canceled, Test credited   HGB  8.1*  10.0*  Canceled, Test credited   HCT  25.1*   --   Canceled, Test credited   MCV  92   --   Canceled, Test credited   MCH  29.6   --   Canceled, Test credited   MCHC  32.3   --   Canceled, Test credited   RDW  16.3*   --   Canceled, Test credited   PLT  155   --   Canceled, Test credited       Last Basic Metabolic Panel:  Recent Labs   Lab Test 06/07/18 06/06/18   0701   NA  147*  151*   POTASSIUM  3.7  4.2   CHLORIDE  117*  121*   BRAXTON  8.0*  7.8*   CO2  21*  21   BUN  52*  63*   CR  1.51*  1.57*   GLC  97  81       Liver Function Studies -   Recent Labs   Lab Test  06/03/18   0646  05/27/18   1808   PROTTOTAL   --   5.2*   ALBUMIN  1.9*  1.9*   BILITOTAL   --   1.7*   ALKPHOS   --   87   AST   --   45   ALT   --   40       Lab Results   Component Value Date    A1C 6.6 05/28/2018    A1C 7.8 04/18/2018         Assessment/Plan:  (M10.9) Acute gout of multiple sites, unspecified cause  (primary encounter diagnosis)  Comment: Improved  Plan: Monitor pain    (Z98.1) S/P cervical spinal fusion  Comment: Pain controlled. No s/sx poor wound healing. Goal is to discharge home when PT/OT goals met. Advised patient that this will take time given how debilitated he was.  Plan: PT/OT eval and treat, discharge planning per their recommendation. Tylenol arthritis qam and qpm prn. Monitor pain severity. Monitor incision. F/u ortho as scheduled.    (N17.9) SALVADOR (acute kidney injury) (H)  (T86.10) Complication of transplanted kidney, unspecified complication  Comment: SALVADOR  resolved. Creatinine back to baseline.   Plan: Avoid nephrotoxic medications and adjust medications per renal function. Monitor renal function.    (E11.65,  Z79.4) Type 2 diabetes mellitus with hyperglycemia, with long-term current use of insulin (H)  Comment: Poorly controlled  Plan: Increase lantus to 10 units qam. BGM BID. Adjust medication as clinically indicated.    (E87.70) Hypervolemia, unspecified hypervolemia type  Comment: Improving with lasix. Seems to be tolerating this based on BMP  Plan: Cont lasix. Monitor weight. BMP 6/14/18    (E87.0) Hypernatremia  Comment: Improved  Plan: BMP 6/14    (R33.9) Urinary retention  Comment: Resolved  Plan: Monitor for s/sx retention    (I10) Essential hypertension  Comment: Poorly controlled currently. Will recheck BMP later this week, if creatinine is still stable, will start losartan at that time. Goal <140/90.   Plan: Monitor BP. If remains elevated and BMP stable 6/14, will start losartan as recommended    (K52.9) Chronic diarrhea  Comment: Doubt acute infection or cdiff as this is chronic for him. Will resume imodium as at home, advised patient that he should decline a dose if he has not had a BM in 24 hours.  Plan: Imodium 4mg qam. Monitor bowel function. Adjust medication as clinically indicated.      Orders:  Imodium 4mg qam  Tylenol arthritis 1300mg qam and may have another dose prn at least 8 hours after first dose  Change lantus to 10 units qam  D/C Senna-S  BMP 6/14/18      Total time spent with patient visit at the [facility] nursing facility was 45 min including patient visit, review of past records and discussion with daughter. Greater than 50% of total time spent with counseling and coordinating care due to diarrhea, pain, edema, diabetes, PT/OT      Electronically signed by:  CHRISS Henry [facility]  Pager: [phone]

## 2018-06-13 ENCOUNTER — TELEPHONE (OUTPATIENT)
Dept: GERIATRICS | Facility: CLINIC | Age: 73
End: 2018-06-13

## 2018-06-13 NOTE — TELEPHONE ENCOUNTER
Patient was seen by provider on 6/11/18. He was disappointed in his slow progress in therapy, but he was making progress. His edema was improving with resuming lasix. Labs were stable.     Nursing is now reporting a low grade temp, worsening edema in arms and legs. He is so weak they needed to use a Brittney to get him out of bed.    These are all the same symptoms that sent him to the hospital previously. Given the severity and acuteness of these symptoms, and his history of a kidney transplant, he will need more rapid evaluation and intense monitoring than can be done at the nursing home.    PLAN:  Send to [facility] ER for evaluation

## 2018-06-16 ENCOUNTER — TRANSFERRED RECORDS (OUTPATIENT)
Dept: HEALTH INFORMATION MANAGEMENT | Facility: CLINIC | Age: 73
End: 2018-06-16

## 2018-06-19 DIAGNOSIS — Z98.1 S/P CERVICAL SPINAL FUSION: Primary | ICD-10-CM

## 2018-06-20 ENCOUNTER — TELEPHONE (OUTPATIENT)
Dept: ORTHOPEDICS | Facility: CLINIC | Age: 73
End: 2018-06-20

## 2018-06-20 NOTE — TELEPHONE ENCOUNTER
Was informed via phone last Saturday 6/16/18 by Neurosurgery resident at [facility] (Dr. Aimee Varner) that patient had dehiscence of his posterior cervical wound (1 month postop), and that they were about to bring him in to the OR for I&D.  [facility] and DM control.    Will ask our nursing team to [facility].

## 2018-07-24 DIAGNOSIS — Z98.1 S/P CERVICAL SPINAL FUSION: Primary | ICD-10-CM

## 2018-08-01 DIAGNOSIS — Z98.1 S/P CERVICAL SPINAL FUSION: Primary | ICD-10-CM

## 2018-08-23 ENCOUNTER — TELEPHONE (OUTPATIENT)
Dept: ORTHOPEDICS | Facility: CLINIC | Age: 73
End: 2018-08-23

## 2018-08-23 NOTE — TELEPHONE ENCOUNTER
[facility] Call Center    Phone Message    May a detailed message be left on voicemail: yes    Reason for Call: Other: Pt's son Tacho requesting call back from Pratibha Dixonice stated that [facility]. Tacho wants to know what the f/u appt that the pt needs with Dr. Bass is for, and when it would be recommended the pt come in. Tacho stated that they will need some time to get the pt there from the hospital     Action Taken: Message routed to:  [facility]o clinic     DOS 5-16-18 ACDF.  I called son back.    Son states Pt. has been unable to make RTC postop appts because [facility] has [facility] or [facility]. Facilities in & out with general medical issues & also Major Flare up of Gout.  Son will [facility] & sent to us for review & if they are unable to order them, then son will call back & make RTC appt. Here.  We will call him back about results.  Will inform .  Pratibha Thayer RN.

## 2018-08-27 ENCOUNTER — TELEPHONE (OUTPATIENT)
Dept: ORTHOPEDICS | Facility: CLINIC | Age: 73
End: 2018-08-27

## 2018-08-27 NOTE — TELEPHONE ENCOUNTER
[facility] Call Center    Phone Message    May a detailed message be left on voicemail: yes    Reason for Call: Other: Dr. Crescencio Bridges [facility] need to speal to Dr. Bass or Kennedy about what kind of imaging is needed. He can be reached on his cell: [phone] or pager: [phone]     Action Taken: Message routed to:  [facility] Orthopedics    [facility] called [facility]  Back. He will do cervical AP LAT XR & push to us so  can review & we will get back to son-see previous note.  S.O./Kennedy Thayer RN.

## 2018-10-02 NOTE — PROGRESS NOTES
Diabetes Consult Daily  Progress Note          Assessment/Plan:   Familia Irving is a 73 year old man with type 2 diabetes, renal transplant in 2006, hypertension, and cervical myelopathy, s/p cervical fusion on 5/16/18, admitted with concern for left knee septic arthritis, being treated for gout with steroids, leading to significant hyperglycemia.       Improved glucose control.     Plan  -continue NPH 15 units, given with prednisone 40 mg   -reduce glargine to 24 units qAM  -continue aspart 1 unit per 5 grams carbohydrate  -aspart correction increased to 2 per 30  Mg/dL qAC, HS 0200  -BG monitor qAC, HS 0200         Outpatient diabetes follow up: likely w/ PCP  Plan discussed with patient           Interval History:     The last 24 hours progress and nursing notes reviewed.  Glucose improved to target by last evening.  Pt eating more regularly.  Asking if could remove cervical collar to help feed self more easily.  Prednisone 40 mg daily continues. Note reads plan for 5 days= through 6/2  Creatinine stable today 2.74    PTA Regimen: Lantus 30 units every morning, BID glucose monitoring        Recent Labs  Lab 05/31/18  1008 05/31/18  0842 05/31/18  0203 05/30/18  2111 05/30/18  1759 05/30/18  1630 05/30/18  1327 05/30/18  1151  05/30/18  0641  05/29/18  1721  05/29/18  0724  05/28/18  1645   *  --   --   --   --  215*  --   --   --  323*  --  308*  --  267*  --  163*   BGM  --  124* 119* 155* 156*  --  251* 283*  < >  --   < >  --   < >  --   < >  --    < > = values in this interval not displayed.            Review of Systems:   See interval hx          Medications:       Active Diet Order      Consistent Carbohydrate Diet 1168-9759 Calories: Moderate Consistent CHO (4-6 CHO units/meal)     Physical Exam:  Gen: Alert, up to chair, in NAD   HEENT: NC/AT, mucous membranes are moist  Neck: cervical collar remains in place  Resp: Unlabored  Neuro:oriented x3, communicating clearly  BP  "118/49 (BP Location: Right arm)  Pulse 60  Temp 95.8  F (35.4  C) (Oral)  Resp 16  Ht 1.727 m (5' 8\")  Wt 81.8 kg (180 lb 4.8 oz)  SpO2 97%  BMI 27.41 kg/m2           Data:     Lab Results   Component Value Date    A1C 6.6 05/28/2018    A1C 7.8 04/18/2018              CBC RESULTS:   Recent Labs   Lab Test  05/31/18   1008   WBC  Canceled, Test credited   RBC  Canceled, Test credited   HGB  Canceled, Test credited   HCT  Canceled, Test credited   MCV  Canceled, Test credited   MCH  Canceled, Test credited   MCHC  Canceled, Test credited   RDW  Canceled, Test credited   PLT  Canceled, Test credited     Recent Labs   Lab Test  05/31/18   1008  05/30/18   1630   NA  137  141   POTASSIUM  4.7  4.2   CHLORIDE  110*  110*   CO2  13*  17*   ANIONGAP  14  14   GLC  155*  215*   BUN  108*  105*   CR  2.74*  2.74*   BRAXTON  6.8*  6.8*     Liver Function Studies -   Recent Labs   Lab Test  05/27/18   1808   PROTTOTAL  5.2*   ALBUMIN  1.9*   BILITOTAL  1.7*   ALKPHOS  87   AST  45   ALT  40     Lab Results   Component Value Date    INR 1.45 05/27/2018    INR 1.42 05/16/2018    INR 1.40 05/16/2018       Jodenzel Bolton APRN [facility] [phone]    Diabetes Management job code [id number]          " No

## 2019-11-04 ENCOUNTER — HEALTH MAINTENANCE LETTER (OUTPATIENT)
Age: 74
End: 2019-11-04

## 2020-02-16 ENCOUNTER — HEALTH MAINTENANCE LETTER (OUTPATIENT)
Age: 75
End: 2020-02-16

## 2020-11-22 ENCOUNTER — HEALTH MAINTENANCE LETTER (OUTPATIENT)
Age: 75
End: 2020-11-22

## 2021-04-04 ENCOUNTER — HEALTH MAINTENANCE LETTER (OUTPATIENT)
Age: 76
End: 2021-04-04

## 2021-05-29 ENCOUNTER — HEALTH MAINTENANCE LETTER (OUTPATIENT)
Age: 76
End: 2021-05-29

## 2021-08-02 NOTE — PATIENT INSTRUCTIONS
Please get your CT done before you leave today.   
POST-OP DIAGNOSIS:  Complication of internal prosthetic shoulder joint 02-Aug-2021 16:00:07 Complication of internal prosthetic shoulder joint Praveen Gunn

## 2021-09-19 ENCOUNTER — HEALTH MAINTENANCE LETTER (OUTPATIENT)
Age: 76
End: 2021-09-19

## 2022-01-08 ENCOUNTER — HEALTH MAINTENANCE LETTER (OUTPATIENT)
Age: 77
End: 2022-01-08

## 2022-04-30 ENCOUNTER — HEALTH MAINTENANCE LETTER (OUTPATIENT)
Age: 77
End: 2022-04-30

## 2022-08-20 ENCOUNTER — HEALTH MAINTENANCE LETTER (OUTPATIENT)
Age: 77
End: 2022-08-20

## 2022-11-20 ENCOUNTER — HEALTH MAINTENANCE LETTER (OUTPATIENT)
Age: 77
End: 2022-11-20

## 2023-04-15 ENCOUNTER — HEALTH MAINTENANCE LETTER (OUTPATIENT)
Age: 78
End: 2023-04-15

## 2023-06-02 ENCOUNTER — HEALTH MAINTENANCE LETTER (OUTPATIENT)
Age: 78
End: 2023-06-02

## 2023-11-25 ENCOUNTER — HEALTH MAINTENANCE LETTER (OUTPATIENT)
Age: 78
End: 2023-11-25

## 2025-03-18 NOTE — PROGRESS NOTES
"[facility] Internal Medicine Progress Note            Interval History:   Record reviewed including ortho follow up.  Discussed with Dr. Márquez.  Seen with RN.   Remains confused.  Lying in bed in no distress.  Indicates unable to tell if joints painful as \"hasn't moved\".  Indicates last gout attack 50 years ago.   Solumedrol 125 mg IV 5/28 at 0954.  Off lisinopril and lasix.  IV NS at 125 cc's/hr.  Urine retention with  cc's MN.  Bladder volume 110 cc's at 5 AM.   Denies neck pain.  Speech path pending f/u swallow.  On moderate consistent CHO diet.  Carb coverage and correction scale.  Off lantus.  Mechanical DVT prophylaxis.  WO consult for wound on coccyx.  On bed rest.  ( history of \"severe reaction\" to heparin during transplant)  No CP, SOB, cough, nausea, reflux, abd pain or distention.  Indicates BM 5/28 (no documentation)          Medications:   All medications reviewed today          Physical Exam:   Blood pressure 105/45, pulse 60, temperature 95.4  F (35.2  C), temperature source Oral, resp. rate 16, height 1.727 m (5' 8\"), SpO2 95 %.    Intake/Output Summary (Last 24 hours) at 05/29/18 0803  Last data filed at 05/29/18 0030   Gross per 24 hour   Intake               60 ml   Output              775 ml   Net             -715 ml       General:  Alert.  Speech fluent. No distress.  Confused as to present circumstances (unclear where he is, reason for admit, day, date).   No  O2.     Heent:      Neck:    Skin:    Chest:  clear    Cardiac:  Reg without gallop, murmur.  Collar in place - unable to assess neck veins.      Abdomen:  Non distended, soft, non-tender.  BS normal.     Extremities:  Perfused.  1-2 plus pre tibial edema.  No  calf, posterior thigh tenderness to suggest DVT.   Swelling with painful ROM right wrist.  Erythema medial joint space left knee with painful ROM.  Painful ROM left ankle.     Neuro:  Non lateralizing.              Data:     Results for orders placed or performed " Chief Complaint   Patient presents with    Derm Problem     Skin exam    Office Visit       HISTORY OF PRESENT ILLNESS:     The patient is a 69 year old female who goes by the name Lesa.    The patient is accompanied by her spouse.    The patient presents in 1 year followup regarding history of nonmelanoma skin cancer in a patient with sun damaged skin.  The patient's last skin cancer was a basal cell carcinoma located on the right nasal tip.  It was treated with Mohs by Dr. Hamilton.  It was treated on the following date:  11/7/23       When the patient was asked if there are any spots on the skin that the patient would like to point out today the patient responded:       they have no lesions of concern to point out today.      The patient uses the over the counter medication, sunscreen of at least SPF 30   Yes  The patient avoids the peak sun hours of 10 am to 4 pm     Yes  The patient wears sun-protective clothing       Yes        REVIEW OF SYSTEMS:  Constitutional:    In general, the patient feels well:  Yes  Cardiovascular:   The patient has a pacemaker:  No        The patient has a defibrillator:  No  Hematologic:   The patient bleeds easily because of being on aspirin or an anticoagulant:  Yes       ALLERGIES:   Allergen Reactions    Ambien SWELLING     THROAT and TONGUE swelling      Trazodone SWELLING     THROAT and TONGUE swelling      Januvia [Sitagliptin Phosphate] PRURITUS     Vaginal itching    Metformin GI UPSET       Current Outpatient Medications   Medication Sig    Continuous Glucose Sensor (FreeStyle Keenan 3 Plus Sensor) Misc 1 Device every 15 days.    Basaglar KwikPen 100 UNIT/ML pen-injector Inject 22 Units into the skin nightly. Prime 2 units before each dose.    Farxiga 5 MG tablet Take 1 tablet by mouth every morning.    Insulin Pen Needle (BD Pen Needle Gabriela 2nd Gen) 32G X 4 MM Misc Use to inject insulin once daily. Remove needle cover(s) to expose needle before injecting.    pantoprazole  (PROTONIX) 40 MG tablet Take 1 tablet by mouth daily.    simvastatin (ZOCOR) 10 MG tablet Take 1 tablet by mouth nightly.    rOPINIRole (REQUIP) 1 MG tablet TAKE 1 TABLET EVERY MORNINGAND 1 TABLET EVERY EVENING    gabapentin (NEURONTIN) 800 MG tablet TAKE 1 TABLET NIGHTLY    Semaglutide, 1 MG/DOSE, (Ozempic, 1 MG/DOSE,) 4 MG/3ML Solution Pen-injector Inject 1 mg into the skin every 7 days. Indications: Type 2 Diabetes    busPIRone (BUSPAR) 7.5 MG tablet Take 1 tablet by mouth in the morning and 1 tablet in the evening.    fluconazole (DIFLUCAN) 150 MG tablet TAKE 1 TABLET BY MOUTH TODAY, 1 TABLET ON FRIDAY, AND THEN ONCE WEEKLY. WITH FLARE UP, TAKE EVERY 72 HOURS FOR 2 DOSES.    DULoxetine (CYMBALTA) 30 MG capsule Take 1 capsule by mouth daily.    DULoxetine (CYMBALTA) 60 MG capsule TAKE 1 CAPSULE DAILY WITH  30MG DULOXETINE    blood glucose meter Test blood sugar 3 times daily. Diagnosis: E11.9. Meter: One touch ultra or insurance preferred    blood glucose test strip Test blood sugar 3 times daily. Diagnosis: E11.9. Meter: one touch ultra or insurance preferred    5-fluorouracil 6% compounded cream Apply at 9 AM and 3 PM to dorsum of the nose and left eyebrow for 2 weeks. Wash off before bed.    fluconazole (Diflucan) 150 MG tablet Take one tablet every 72 hours for 3 doses. Then take one tablet once a week for 6 months.    valACYclovir (Valtrex) 500 MG tablet Take 1 tablet by mouth daily.    estradiol (ESTRACE VAGINAL) 0.1 MG/GM vaginal cream Apply externally to vulva at night twice per week as instructed.    Calcium Citrate-Vitamin D (CALCIUM + D PO)     diphenhydrAMINE (BENADRYL) 25 MG capsule Take 50 mg by mouth.    diphenhydramine-acetaminophen (TYLENOL PM)  MG Tab Take 2 tablets by mouth.    blood glucose lancets Test blood sugar 4 times daily as directed. Diagnosis: E11.9. Meter: Freestyle Lite    clobetasol (TEMOVATE) 0.05 % ointment Apply to the vulvar area weekly.    melatonin 3 MG Take 6 mg by  during the hospital encounter of 05/27/18 (from the past 24 hour(s))   Glucose by meter   Result Value Ref Range    Glucose 163 (H) 70 - 99 mg/dL   Glucose by meter   Result Value Ref Range    Glucose 146 (H) 70 - 99 mg/dL   Basic metabolic panel   Result Value Ref Range    Sodium 143 133 - 144 mmol/L    Potassium 4.3 3.4 - 5.3 mmol/L    Chloride 114 (H) 94 - 109 mmol/L    Carbon Dioxide 17 (L) 20 - 32 mmol/L    Anion Gap 12 3 - 14 mmol/L    Glucose 163 (H) 70 - 99 mg/dL    Urea Nitrogen 63 (H) 7 - 30 mg/dL    Creatinine 2.50 (H) 0.66 - 1.25 mg/dL    GFR Estimate 26 (L) >60 mL/min/1.7m2    GFR Estimate If Black 31 (L) >60 mL/min/1.7m2    Calcium 8.3 (L) 8.5 - 10.1 mg/dL   Glucose by meter   Result Value Ref Range    Glucose 169 (H) 70 - 99 mg/dL   Glucose by meter   Result Value Ref Range    Glucose 225 (H) 70 - 99 mg/dL   Glucose by meter   Result Value Ref Range    Glucose 212 (H) 70 - 99 mg/dL   Basic metabolic panel   Result Value Ref Range    Sodium 146 (H) 133 - 144 mmol/L    Potassium 5.2 3.4 - 5.3 mmol/L    Chloride 113 (H) 94 - 109 mmol/L    Carbon Dioxide 17 (L) 20 - 32 mmol/L    Anion Gap 16 (H) 3 - 14 mmol/L    Glucose 267 (H) 70 - 99 mg/dL    Urea Nitrogen 84 (H) 7 - 30 mg/dL    Creatinine 2.74 (H) 0.66 - 1.25 mg/dL    GFR Estimate 23 (L) >60 mL/min/1.7m2    GFR Estimate If Black 28 (L) >60 mL/min/1.7m2    Calcium 8.0 (L) 8.5 - 10.1 mg/dL   Magnesium   Result Value Ref Range    Magnesium 1.9 1.6 - 2.3 mg/dL   CBC with platelets   Result Value Ref Range    WBC Canceled, Test credited 4.0 - 11.0 10e9/L    RBC Count Canceled, Test credited 4.4 - 5.9 10e12/L    Hemoglobin Canceled, Test credited 13.3 - 17.7 g/dL    Hematocrit Canceled, Test credited 40.0 - 53.0 %    MCV Canceled, Test credited 78 - 100 fl    MCH Canceled, Test credited 26.5 - 33.0 pg    MCHC Canceled, Test credited 31.5 - 36.5 g/dL    RDW Canceled, Test credited 10.0 - 15.0 %    Platelet Count Canceled, Test credited 150 - 450 10e9/L    Glucose by meter   Result Value Ref Range    Glucose 230 (H) 70 - 99 mg/dL                Assessment and Plan:   1)  Acute polyarthritis with fever (left knee, right wrist, both ankles) with monosodium urate crystals on knee and wrist aspirate c/w polyarticular gout. WBC 22771. Cultures pending.  Off Vanco.  Gout with increase incidence in transplant patients with decrease uric acid clearance.  No pain at rest suggesting some benefit from steroids.   2)  SALVADOR.  Cr 2.74.  Cr 2.56 5/27 (1.6 5/21).  S/p DDKT 2006.  Volume depletion considered (with lasix PTA) and lisinopril effect. On IV fluids.  Consider uric acid nephropathy, bladder outlet obstruction as contributing factors.   K high normal 5.2.  Making urine.   3)  S/P A/P cervical fusion 5/16.  No issues.   4)  Oral pharyngeal dysphagia 2nd to #1.  5)  Intermittent confusion attributed to recent surgery, opiates, acute febrile illness and subsequent steroids.  Neuro non lateralizing (good motor exam per spine).  Consider head CT if doesn't clear.  .   6)  Anemia 2nd to acute illness and recent acute blood loss.   Adequate.  7)  Type II DM with BG down to 70's on admit.  Hyperglycemia with IV solumedrol  5/28.  On carb coverage and medium correction.  8)  HTN, on PTA amlodipine (off lasix and lisinopril).  Adequate.   9)  Urinary urgency and frequency 2nd to retention.  UA bland.   UC mixed braydon.   10)  HLD.   11)  PAC's on rhythm after cervical fusion.  Benign.     PLAN:  1)  Renal transplant and rheumatology consults.  2)  Resume lower dose lantus.  3)  Consider castanon placement with retention issue and to monitor UO.  4)  Use of colchicine more risky with transplant patients.  Likely continued steroids.  5)  Trend labs closely.  6)  Scheduled tylenol.  7)  Mechanical DVT prophylaxis.  Mobilize as able.  8)  Speech and WOC consults.  9)  Update family.    Disposition Plan   Expected discharge unclear pending course.  [facility]  [facility].    Cholecalciferol (VITAMIN D) 2000 units capsule      No current facility-administered medications for this visit.     PAST MEDICAL HISTORY:      Bronchial asthma (CMD)                                        DM type 2 (diabetes mellitus, type 2)  (CMD)                  LBP (low back pain)                                           Hypertension                                                  Hyperlipidemia                                                DJD (degenerative joint disease)                                Comment: BILATERAL KNEES    Nuclear sclerosis                                             Allergic rhinitis                                             Steroid induced glaucoma, right eye                           Depression                                                    Cellulitis                                                    Dermatitis                                                    Accessory skin tags                                           Bunion                                                        Pes planus                                                    Stress incontinence                                           Foot pain, bilateral                                            Comment: TREATED WITH ORTHOTICS    Papilloma of eyelid                                             Comment: RIGHT LOWER    Campylobacter diarrhea                          2007            Comment: RESOLVED    Anxiety in acute stress reaction                                Comment: TRAVEL/FLYING    Achilles tendon rupture                                         Comment: LEFT    Iliotibial band syndrome of right side                        H/O impetigo                                                  Poison oak                                                      Comment: RESOLVED    Wears contact lenses                                          Sebaceous cyst of eyelid                                         Comment: RIGHT    Sebaceous cyst                                                  Comment: RIGHT LABIA    Myopia                                                        Astigmatism                                                   Keratoconus                                                   Dry eye                                                         Comment: PLUG PLACED    Right retinal lattice degeneration                            Macular pigment deposit                                         Comment: RIGHT EYE    Vitreous detachment                                             Comment: LEFT EYE    Pseudophakia                                                  Sinus bradycardia on ECG                                      Restless legs syndrome                                        Otitis externa of right ear                                   Lyme disease                                                  Bartholin's duct cyst                                         Papilloma of eyelid                                             Comment: RIGHT    Venous stasis                                                   Comment: R>L    Osteochondral lesion                                            Comment: LEFT ANKLE    Decreased libido                                              PONV (postoperative nausea and vomiting)                        Comment: SEVERE POST OP NAUSEA/VOMITING, TERRIBLE                VERTIGO    Allergy                                                       Herpes                                                        Encounter for breast cancer screening other th* 10/18/2019      Comment: AMMON risk assessment:  5.3% 10 year  11.8%                lifetime 0.02% BRCA1 0.07% BRCA2    Cancer  (CMD)                                                   DERMATOLOGY PAST MEDICAL HISTORY:      History of syringoma removal by Dr. Chavez.  History of skin tag removal and milia removal by Dr. Afshan Felix.    issue.      Entered: Familia Dumont 05/29/2018, 8:03 AM     ADD:  Reviewed with diabetes service.  Resume lantus 30 units.  Consider 1:5 gram CHO coverage and high correction (if continued higher dose steroids consider insulin infusion).   ADD:  Reviewed with rheumatology.  Start prednisone 40 mg daily for 5 days.  Allopurinol 100 mg daily.  Goal UA level < 6.  Recheck 4 weeks.  Consider losartan in place of lisinopril. RN contacted with changes.          Attestation:  I have reviewed today's vital signs, notes, medications, labs and imaging.     Familia Dumont MD             History of milia extraction and syringoma treatment with Dr. Quiroga in December 2016.     Rosacea treated with metrogel, soolantra and finacea by Dr. Guerrero.      Excision of cyst 10/12/2020     Wart treated with liquid nitrogen and cantharone      Dyshidrotic eczema treated with clobetasol     diffuse actinic keratoses on the dorsum of nose and left eyebrow treated with 6% % 5-Fluorouracil cream - 2/20/2023       basal cell carcinoma on the right nasal tip treated with Mohs by Dr. Hamilton - 11/7/23     FAMILY HISTORY:  Does the patient have a family history of melanoma? No    PHYSICAL EXAMINATION:    No recurrent nonmelanoma skin cancer at 1 sites previously treated.    Sun damaged skin    On the chest, back, abdomen, arms, and legs there are light brown even colored and symmetric macules (Nevi)    On the chest, back, abdomen, arms, and legs there are light brown stuck on papules (Seborrheic keratosis)    On the nose multiple pink scaly papules on a background of actinic damage(actinic damage/actinic keratoses)      No active rash on buttocks       Right sole (2) 2-4 mm verrucous papules (plantar warts)   Left distal sole 2 mm verrucous papule (plantar warts)       No other significant findings on  EXAMINATION FROM THE WAIST UP AND LEGS  which is a DETAILED  EXAMINATION  including palpation and inspection of the scalp and hair and inspection of the head and face, eyelids, lips, neck, chest (including breasts and underarms-including eccrine and apocrine glands), abdomen, back and right and left upper and lower extremities.  The patient is well nourished and well developed.    The patient was seen and examined by Elinor Quiroga MD.  in the presence of my medical assistant  Curtis Bustillos MA .  This office visit note was in part created by Curtis Bustillos MA acting also as a scribe for Elinor Quiroga MD.  Elinor Quiroga MD    reviewed the note for accuracy and completeness before signing.        ASSESSMENT AND PLAN:     1.   History of non-melanoma skin cancers     We recommended using an over the counter medication, Sunscreen with SPF 30.    Return to clinic in 1 year for examination from the waist up and legs.      2. Sun damaged skin (dermatoheliosis)    For management of this problem I recommend:  Wearing appropriate sun protection clothing.  We recommend the patient use the over the counter medication, Sunscreen with SPF 30.      3. Melanocytic nevi    We recommend the patient use the over the counter medication, Sunscreen with SPF 30 and wearing appropriate sun protective clothing.      4. Seborrheic keratosis (or keratoses)    The diagnosis was discussed.  Since the lesion(s)  is(are)  asymptomatic, no therapy is required at this time.       5. Actinic damage - nose    I prescribed Efudex (5-Fluorouracil)  5% cream.  Apply at 9 am and 3 pm to nose for 2 weeks. Do not wash off between applications. Wash off before bed.  .  Dispense 40 g tube with 1 refill.    I instructed them that after applying the Efudex cream that they should wash off the finger used to apply the Efudex cream to prevent getting the Efudex cream in the eyes.    I stressed the importance of avoiding sun exposure during the time they are using the Efudex cream.  I gave the patient handouts on this medication and emphasized how the Efudex cream will likely make the treated areas very red and inflamed.    We added the following message to the pharmacist in the comment portion of the prescription:  Please advise the patient if it is cheaper for the patient to use their insurance benefit to pay for this medication or whether they should use [facility] to pay out-of-pocket for this medication.    Return to clinic in 6 weeks.       6. No active rash on buttock    Patient reports rash comes three times a year in the same location.     Patient will call when active and swab for HSV. Schedule within 24 hours if possible.       7. Plantar warts x 3    The viral nature of  warts was discussed.  Various treatment options were discussed and I recommended liquid Nitrogen therapy be performed on a regular basis until the warts are gone.  After discussion of the risks and benefits of liquid Nitrogen therapy, oral consent was obtained.  Therefore, the areas were frozen with liquid Nitrogen.    Return to clinic in 6 weeks.                ICurtis MA scribed the services personally performed by Elinor Quiroga MD  The documentation recorded by the scribe accurately and completely reflects the service(s) I personally performed and the decisions made by me.

## (undated) DEVICE — MIDAS REX DISSECTING TOOL  14MH30

## (undated) DEVICE — DRSG PRIMAPORE 02X3" 7133

## (undated) DEVICE — Device

## (undated) DEVICE — DRAPE STERI TOWEL LG 1010

## (undated) DEVICE — BLADE BONE MILL STRK 5.0MM MED 5400-701-000

## (undated) DEVICE — SU MONOCRYL 3-0 PS-2 18" UND Y497G

## (undated) DEVICE — GOWN XLG DISP 9545

## (undated) DEVICE — DRAPE MAYO STAND 23X54 8337

## (undated) DEVICE — SU MONOCRYL 4-0 PS-2 18" UND Y496G

## (undated) DEVICE — RX SURGIFLO HEMOSTATIC MATRIX W/THROMBIN 8ML NEXTGEN 2993

## (undated) DEVICE — IMM COLLAR CERVICAL MED UNIVERSAL 2.5X24" 79-83520

## (undated) DEVICE — NDL 22GA 1.5"

## (undated) DEVICE — BLADE KNIFE SURG 10 371110

## (undated) DEVICE — BLADE CLIPPER SGL USE 9680

## (undated) DEVICE — SU DERMABOND ADVANCED .7ML DNX12

## (undated) DEVICE — BASIN SET MAJOR

## (undated) DEVICE — PROTECTOR ARM ONE-STEP TRENDELENBURG 40418

## (undated) DEVICE — APPLICATOR EVICEL FLEX TIP 45CM 3909

## (undated) DEVICE — NDL ANGIOCATH 14GA 1.25" 4048

## (undated) DEVICE — SPONGE SURGIFOAM 100 1974

## (undated) DEVICE — DRSG TEGADERM 4X4 3/4" 1626W

## (undated) DEVICE — CELL SAVER

## (undated) DEVICE — ESU GROUND PAD UNIVERSAL W/O CORD

## (undated) DEVICE — DRAIN JACKSON PRATT RESERVOIR 100ML SU130-1305

## (undated) DEVICE — DRSG AQUACEL AG 3.5X9.75" HYDROFIBER 412011

## (undated) DEVICE — BONE WAX 2.5GM W31G

## (undated) DEVICE — DRSG DRAIN 4X4" 7086

## (undated) DEVICE — CATH TRAY FOLEY SURESTEP 16FR WDRAIN BAG STLK LATEX A300316A

## (undated) DEVICE — DRAPE POUCH INSTRUMENT 1018

## (undated) DEVICE — ESU ELEC BLADE 2.75" COATED/INSULATED E1455

## (undated) DEVICE — SU VICRYL 4-0 PS-2 18" UND J496H

## (undated) DEVICE — MITT SURGICAL PREP HAIR REMOVER LATEX FREE SPM100

## (undated) DEVICE — NDL SPINAL 18GA 3.5" 405184

## (undated) DEVICE — SPONGE COTTONOID 1/2X1/2" 80-1400

## (undated) DEVICE — SYR 50ML LL W/O NDL 309653

## (undated) DEVICE — PACK SET-UP STD 9102

## (undated) DEVICE — MARKER SPHERZ PACK 5

## (undated) DEVICE — LIGHT HANDLE X1 31140133

## (undated) DEVICE — SPONGE RAY-TEC 4X8" 7318

## (undated) DEVICE — SPONGE LAP 18X18" X8435

## (undated) DEVICE — RX BACITRACIN OINTMENT 0.9G 1/32OZ 01680 11109

## (undated) DEVICE — PREP CHLORAPREP 26ML TINTED ORANGE  260815

## (undated) DEVICE — PAD ARMBOARD FOAM EGGCRATE COVIDEN 3114367

## (undated) DEVICE — SU SILK 2-0 SH 30" K833H

## (undated) DEVICE — SOL WATER IRRIG 1000ML BOTTLE 2F7114

## (undated) DEVICE — SU VICRYL 1 CT-1 CR 8X18" J741D

## (undated) DEVICE — IOM MONITORING 15 MIN

## (undated) DEVICE — SPONGE COTTONOID 1X3" 80-1408

## (undated) DEVICE — DRAPE LAP W/ARMBOARD 29410

## (undated) DEVICE — SU VICRYL 0 CT-1 3X27" J430T

## (undated) DEVICE — SPONGE KITTNER 31001010

## (undated) DEVICE — DRSG PRIMAPORE 03 1/8X6" 66000318

## (undated) DEVICE — IMP SCR VERTEX 3.5X12MM MA 6958712
Type: IMPLANTABLE DEVICE | Site: SPINE CERVICAL | Status: NON-FUNCTIONAL
Removed: 2018-05-16

## (undated) DEVICE — ESU BIPOLAR SEALER AQUAMANTYS 6MM 23-112-1

## (undated) DEVICE — LINEN TOWEL PACK X5 5464

## (undated) DEVICE — SOL NACL 0.9% INJ 250ML BAG 2B1322Q

## (undated) DEVICE — GLOVE PROTEXIS MICRO 8.0  2D73PM80

## (undated) DEVICE — ESU ELEC BLADE 6" COATED E1450-6

## (undated) DEVICE — SU VICRYL 3-0 PS-1 18" UND J683G

## (undated) DEVICE — DRAPE O ARM TUBE 9732722

## (undated) DEVICE — SOL NACL 0.9% IRRIG 1000ML BOTTLE 2F7124

## (undated) DEVICE — DRAPE IOBAN INCISE 23X17" 6650EZ

## (undated) DEVICE — DRAIN HEMOVAC RESERVOIR KIT 10FR 1/8" MED 00-2550-002-10

## (undated) DEVICE — LINEN GOWN X4 5410

## (undated) DEVICE — GLOVE PROTEXIS BLUE W/NEU-THERA 8.5  2D73EB85

## (undated) DEVICE — SU VICRYL 2-0 CT-1 27" UND J259H

## (undated) DEVICE — DRAPE MICROSCOPE LEICA 46X120" AR8033651

## (undated) DEVICE — SPONGE SURGIFOAM 12 1972

## (undated) DEVICE — LINEN BACK PACK 5440

## (undated) DEVICE — SU VICRYL 0 CT-2 27" J334H

## (undated) DEVICE — DRAPE C-ARM W/STRAPS 42X72" 07-CA104

## (undated) DEVICE — DRAIN JACKSON PRATT 07FR ROUND SU130-1320

## (undated) DEVICE — DRSG AQUACEL AG 3.5X6.0" HYDROFIBER 412010

## (undated) DEVICE — WIPES FOLEY CARE SURESTEP PROVON DFC100

## (undated) RX ORDER — OXYCODONE HYDROCHLORIDE 5 MG/1
TABLET ORAL
Status: DISPENSED
Start: 2018-05-17

## (undated) RX ORDER — AMOXICILLIN 250 MG
CAPSULE ORAL
Status: DISPENSED
Start: 2018-05-16

## (undated) RX ORDER — SODIUM CHLORIDE, SODIUM LACTATE, POTASSIUM CHLORIDE, CALCIUM CHLORIDE 600; 310; 30; 20 MG/100ML; MG/100ML; MG/100ML; MG/100ML
INJECTION, SOLUTION INTRAVENOUS
Status: DISPENSED
Start: 2018-05-17

## (undated) RX ORDER — FENTANYL CITRATE 50 UG/ML
INJECTION, SOLUTION INTRAMUSCULAR; INTRAVENOUS
Status: DISPENSED
Start: 2018-05-16

## (undated) RX ORDER — PHENYLEPHRINE HCL IN 0.9% NACL 1 MG/10 ML
SYRINGE (ML) INTRAVENOUS
Status: DISPENSED
Start: 2018-05-16

## (undated) RX ORDER — HYDROMORPHONE HYDROCHLORIDE 1 MG/ML
INJECTION, SOLUTION INTRAMUSCULAR; INTRAVENOUS; SUBCUTANEOUS
Status: DISPENSED
Start: 2018-05-16

## (undated) RX ORDER — LIDOCAINE HYDROCHLORIDE 20 MG/ML
INJECTION, SOLUTION EPIDURAL; INFILTRATION; INTRACAUDAL; PERINEURAL
Status: DISPENSED
Start: 2018-05-16

## (undated) RX ORDER — CEFAZOLIN SODIUM 1 G/3ML
INJECTION, POWDER, FOR SOLUTION INTRAMUSCULAR; INTRAVENOUS
Status: DISPENSED
Start: 2018-05-16

## (undated) RX ORDER — GLYCOPYRROLATE 0.2 MG/ML
INJECTION, SOLUTION INTRAMUSCULAR; INTRAVENOUS
Status: DISPENSED
Start: 2018-05-16

## (undated) RX ORDER — MAGNESIUM SULFATE HEPTAHYDRATE 40 MG/ML
INJECTION, SOLUTION INTRAVENOUS
Status: DISPENSED
Start: 2018-05-17

## (undated) RX ORDER — REGADENOSON 0.08 MG/ML
INJECTION, SOLUTION INTRAVENOUS
Status: DISPENSED
Start: 2018-05-02

## (undated) RX ORDER — CEFAZOLIN SODIUM 1 G/3ML
INJECTION, POWDER, FOR SOLUTION INTRAMUSCULAR; INTRAVENOUS
Status: DISPENSED
Start: 2018-05-17

## (undated) RX ORDER — HYDROMORPHONE HYDROCHLORIDE 1 MG/ML
INJECTION, SOLUTION INTRAMUSCULAR; INTRAVENOUS; SUBCUTANEOUS
Status: DISPENSED
Start: 2018-05-17

## (undated) RX ORDER — ACETAMINOPHEN 325 MG/1
TABLET ORAL
Status: DISPENSED
Start: 2018-05-16

## (undated) RX ORDER — CEFAZOLIN SODIUM 2 G/100ML
INJECTION, SOLUTION INTRAVENOUS
Status: DISPENSED
Start: 2018-05-16

## (undated) RX ORDER — DEXAMETHASONE SODIUM PHOSPHATE 4 MG/ML
INJECTION, SOLUTION INTRA-ARTICULAR; INTRALESIONAL; INTRAMUSCULAR; INTRAVENOUS; SOFT TISSUE
Status: DISPENSED
Start: 2018-05-16

## (undated) RX ORDER — REMIFENTANIL HYDROCHLORIDE 1 MG/ML
INJECTION, POWDER, LYOPHILIZED, FOR SOLUTION INTRAVENOUS
Status: DISPENSED
Start: 2018-05-16

## (undated) RX ORDER — ONDANSETRON 2 MG/ML
INJECTION INTRAMUSCULAR; INTRAVENOUS
Status: DISPENSED
Start: 2018-05-16

## (undated) RX ORDER — PROPOFOL 10 MG/ML
INJECTION, EMULSION INTRAVENOUS
Status: DISPENSED
Start: 2018-05-16

## (undated) RX ORDER — KETAMINE HCL IN 0.9 % NACL 50 MG/5 ML
SYRINGE (ML) INTRAVENOUS
Status: DISPENSED
Start: 2018-05-16

## (undated) RX ORDER — EPHEDRINE SULFATE 50 MG/ML
INJECTION, SOLUTION INTRAMUSCULAR; INTRAVENOUS; SUBCUTANEOUS
Status: DISPENSED
Start: 2018-05-16